# Patient Record
Sex: MALE | Race: WHITE | NOT HISPANIC OR LATINO | Employment: OTHER | URBAN - NONMETROPOLITAN AREA
[De-identification: names, ages, dates, MRNs, and addresses within clinical notes are randomized per-mention and may not be internally consistent; named-entity substitution may affect disease eponyms.]

---

## 2017-01-03 RX ORDER — SOTALOL HYDROCHLORIDE 80 MG/1
TABLET ORAL
Qty: 90 TABLET | Refills: 3 | Status: SHIPPED | OUTPATIENT
Start: 2017-01-03 | End: 2018-01-15 | Stop reason: SDUPTHER

## 2017-03-01 ENCOUNTER — CLINICAL SUPPORT (OUTPATIENT)
Dept: CARDIOLOGY | Facility: CLINIC | Age: 77
End: 2017-03-01

## 2017-03-01 DIAGNOSIS — I49.5 SICK SINUS SYNDROME (HCC): Primary | ICD-10-CM

## 2017-03-01 PROCEDURE — 93288 INTERROG EVL PM/LDLS PM IP: CPT | Performed by: INTERNAL MEDICINE

## 2017-03-01 NOTE — PROGRESS NOTES
Dual-chamber pacemaker    Indication sick sinus syndrome    Battery voltage is 2.63 V, ERIKA is 5 months  Atrial paced 96%, patient is pacemaker dependent  Lower rate is 60 height rate is 1 30 bpm  Atrial sensing is 2.8 mV, RV lead sensing is 16 mV  Atrial threshold is 1.3 V at 0.4 ms, RV lead threshold is 1.6 V at 0.4 ms  Programmed output in the atrium is 3.2 V at 0.4 ms, RV is 1.25 V at 0.4 ms  Atrial threshold is 497 ohms RV lead impedance is 1072 ohms    ERIKA is 5 months follow-up in 2 months

## 2017-03-02 DIAGNOSIS — C92.10 CHRONIC MYELOID LEUKEMIA (HCC): Primary | ICD-10-CM

## 2017-03-03 ENCOUNTER — LAB (OUTPATIENT)
Dept: ONCOLOGY | Facility: HOSPITAL | Age: 77
End: 2017-03-03

## 2017-03-03 ENCOUNTER — OFFICE VISIT (OUTPATIENT)
Dept: ONCOLOGY | Facility: CLINIC | Age: 77
End: 2017-03-03

## 2017-03-03 VITALS
DIASTOLIC BLOOD PRESSURE: 80 MMHG | RESPIRATION RATE: 16 BRPM | TEMPERATURE: 97.4 F | HEIGHT: 71 IN | SYSTOLIC BLOOD PRESSURE: 145 MMHG | BODY MASS INDEX: 28.64 KG/M2 | WEIGHT: 204.6 LBS | HEART RATE: 83 BPM

## 2017-03-03 DIAGNOSIS — C92.10 CHRONIC MYELOID LEUKEMIA (HCC): Primary | ICD-10-CM

## 2017-03-03 DIAGNOSIS — C92.10 CHRONIC MYELOID LEUKEMIA (HCC): ICD-10-CM

## 2017-03-03 LAB
ALBUMIN SERPL-MCNC: 5 G/DL (ref 3.4–4.8)
ALBUMIN/GLOB SERPL: 1.8 G/DL (ref 1.1–1.8)
ALP SERPL-CCNC: 37 U/L (ref 38–126)
ALT SERPL W P-5'-P-CCNC: 36 U/L (ref 21–72)
ANION GAP SERPL CALCULATED.3IONS-SCNC: 12 MMOL/L (ref 5–15)
ANISOCYTOSIS BLD QL: NORMAL
AST SERPL-CCNC: 32 U/L (ref 17–59)
BASOPHILS # BLD AUTO: 0.29 10*3/MM3 (ref 0–0.2)
BASOPHILS NFR BLD AUTO: 1.2 % (ref 0–2)
BILIRUB SERPL-MCNC: 1 MG/DL (ref 0.2–1.3)
BUN BLD-MCNC: 13 MG/DL (ref 7–21)
BUN/CREAT SERPL: 14.3 (ref 7–25)
CALCIUM SPEC-SCNC: 9.3 MG/DL (ref 8.4–10.2)
CHLORIDE SERPL-SCNC: 103 MMOL/L (ref 95–110)
CO2 SERPL-SCNC: 25 MMOL/L (ref 22–31)
CREAT BLD-MCNC: 0.91 MG/DL (ref 0.7–1.3)
DEPRECATED RDW RBC AUTO: 102.4 FL (ref 35.1–43.9)
EOSINOPHIL # BLD AUTO: 0.24 10*3/MM3 (ref 0–0.7)
EOSINOPHIL NFR BLD AUTO: 1 % (ref 0–7)
ERYTHROCYTE [DISTWIDTH] IN BLOOD BY AUTOMATED COUNT: 28.3 % (ref 11.5–14.5)
GFR SERPL CREATININE-BSD FRML MDRD: 81 ML/MIN/1.73 (ref 42–98)
GLOBULIN UR ELPH-MCNC: 2.8 GM/DL (ref 2.3–3.5)
GLUCOSE BLD-MCNC: 102 MG/DL (ref 60–100)
HCT VFR BLD AUTO: 34 % (ref 39–49)
HGB BLD-MCNC: 11.6 G/DL (ref 13.7–17.3)
HYPOCHROMIA BLD QL: NORMAL
IMM GRANULOCYTES # BLD: 5.27 10*3/MM3 (ref 0–0.02)
IMM GRANULOCYTES NFR BLD: 22.4 % (ref 0–0.5)
LDH SERPL-CCNC: 480 U/L (ref 313–618)
LYMPHOCYTES # BLD AUTO: 2.21 10*3/MM3 (ref 0.6–4.2)
LYMPHOCYTES NFR BLD AUTO: 9.4 % (ref 10–50)
MACROCYTES BLD QL SMEAR: NORMAL
MCH RBC QN AUTO: 34 PG (ref 26.5–34)
MCHC RBC AUTO-ENTMCNC: 34.1 G/DL (ref 31.5–36.3)
MCV RBC AUTO: 99.7 FL (ref 80–98)
MONOCYTES # BLD AUTO: 6.19 10*3/MM3 (ref 0–0.9)
MONOCYTES NFR BLD AUTO: 26.3 % (ref 0–12)
NEUTROPHILS # BLD AUTO: 9.33 10*3/MM3 (ref 2–8.6)
NEUTROPHILS NFR BLD AUTO: 39.7 % (ref 37–80)
NRBC BLD MANUAL-RTO: 2 /100 WBC (ref 0–0)
PLATELET # BLD AUTO: 187 10*3/MM3 (ref 150–450)
PMV BLD AUTO: ABNORMAL FL (ref 8–12)
POLYCHROMASIA BLD QL SMEAR: NORMAL
POTASSIUM BLD-SCNC: 4.8 MMOL/L (ref 3.5–5.1)
PROT SERPL-MCNC: 7.8 G/DL (ref 6.3–8.6)
RBC # BLD AUTO: 3.41 10*6/MM3 (ref 4.37–5.74)
SMALL PLATELETS BLD QL SMEAR: ADEQUATE
SODIUM BLD-SCNC: 140 MMOL/L (ref 137–145)
WBC MORPH BLD: NORMAL
WBC NRBC COR # BLD: 23.53 10*3/MM3 (ref 3.2–9.8)

## 2017-03-03 PROCEDURE — 80053 COMPREHEN METABOLIC PANEL: CPT | Performed by: INTERNAL MEDICINE

## 2017-03-03 PROCEDURE — 99213 OFFICE O/P EST LOW 20 MIN: CPT | Performed by: INTERNAL MEDICINE

## 2017-03-03 PROCEDURE — 85007 BL SMEAR W/DIFF WBC COUNT: CPT | Performed by: INTERNAL MEDICINE

## 2017-03-03 PROCEDURE — 83615 LACTATE (LD) (LDH) ENZYME: CPT | Performed by: INTERNAL MEDICINE

## 2017-03-03 PROCEDURE — 85025 COMPLETE CBC W/AUTO DIFF WBC: CPT | Performed by: INTERNAL MEDICINE

## 2017-03-03 NOTE — PROGRESS NOTES
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 male presents today for follow up.  Denied having any symptoms of chest pain, shortness of breath, nausea, vomiting or diarrhea. Has no mouth sores, skin rash or fatigue. No numbness or tingling sensations in the upper or lower extremities. No urinary symptoms. No fever, chills, rigors, night sweats, weight loss or loss of appetite.             Current Outpatient Prescriptions on File Prior to Visit   Medication Sig Dispense Refill   • allopurinol (ZYLOPRIM) 100 MG tablet Take 1 tablet by mouth 3 (Three) Times a Day. 270 tablet 1   • aspirin 81 MG chewable tablet Chew 81 mg daily.     • oxybutynin (DITROPAN) 5 MG tablet Take 5 mg by mouth 2 (Two) Times a Day.     • sotalol (BETAPACE) 80 MG tablet TAKE 1/2 TABLET TWICE DAILY 90 tablet 3   • tamsulosin (FLOMAX) 0.4 MG capsule 24 hr capsule Take 1 capsule by mouth every night.       No current facility-administered medications on file prior to visit.             Review Of Systems   Comprehensive review of systems was done it was negative other than what mentioned in HPI       PHYSICAL EXAMINATION:   There were no vitals taken for this visit.   General Appearance: Appears healthy, alert, polite and cooperative   Head and neck: Mild pallor and no jaundice. wet mucous membranes without ulceration.   Lungs: Good bilateral air entry, clear to auscultation   Heart: Regular rate and rhythm,   Abdomen: Soft, nontender, not distended   Extremities: No edema.     Labs:     Reviewed.     ASSESSMENT/PLAN:   1- CMML  Doing well clinically   Counts are stable   No indication to treat   Follow up in 3 months

## 2017-03-06 ENCOUNTER — OFFICE VISIT (OUTPATIENT)
Dept: INTERNAL MEDICINE | Facility: CLINIC | Age: 77
End: 2017-03-06

## 2017-03-06 VITALS
SYSTOLIC BLOOD PRESSURE: 120 MMHG | HEIGHT: 71 IN | WEIGHT: 208 LBS | DIASTOLIC BLOOD PRESSURE: 70 MMHG | BODY MASS INDEX: 29.12 KG/M2

## 2017-03-06 DIAGNOSIS — I48.0 PAROXYSMAL ATRIAL FIBRILLATION (HCC): ICD-10-CM

## 2017-03-06 DIAGNOSIS — I10 ESSENTIAL HYPERTENSION: ICD-10-CM

## 2017-03-06 DIAGNOSIS — C92.10 CHRONIC MYELOID LEUKEMIA (HCC): ICD-10-CM

## 2017-03-06 DIAGNOSIS — M1A.00X0 IDIOPATHIC CHRONIC GOUT WITHOUT TOPHUS, UNSPECIFIED SITE: Primary | ICD-10-CM

## 2017-03-06 PROCEDURE — 99213 OFFICE O/P EST LOW 20 MIN: CPT | Performed by: INTERNAL MEDICINE

## 2017-03-06 RX ORDER — COLCHICINE 0.6 MG/1
0.6 TABLET ORAL DAILY
Qty: 90 TABLET | Refills: 1 | Status: SHIPPED | OUTPATIENT
Start: 2017-03-06 | End: 2018-09-12

## 2017-03-06 NOTE — PROGRESS NOTES
Angelo Humphrey is a 76 y.o. male       Patient is in for recheck on his chronic medical conditions, including chronic myelomonocytic leukemia, paroxysmal atrial fibrillation and gout.    He is doing fairly well , except of having problems with recurrent gout in his left toe. He has had couple of gout attacks in the last few months.  On Allopurinol 300 mg daily for prevention. Tolerates medication well.  Uric acid level 6.4 /06/08/2015/.    His BP is controlled. Remains in NSR, on Sotalol.  Patient denies chest pain, palpitations,dyspnea or edema in lower extremities.  He is status post pacer placement and has been following with .    In terms of leukemia, patient remains under observation. He denies fever, chills or night sweats.  Also denies abdominal pain, nausea, vomiting or diarrhea. Appetite is good, and weight has been stable.  He is mil;dly anemic with most recent Hgb 11.4 and Hct 32.8.    Past Medical History   Diagnosis Date   • Atrophy of testis    • Benign prostatic hyperplasia    • Borderline glaucoma    • Chronic myelomonocytic leukemia    • Degenerative joint disease involving multiple joints    • Gout    • History of echocardiogram 05/08/2012     Normal left ventricular systolic function, EF 60%. Early diastolic dysfunction. Mild aortic and pulmonic regurgitation. Trace mitral and mild tricuspid regurgitation. No intracardiac mass pericardial effusion or cardiac thrombus.   • History of Holter monitoring 01/18/2011   • Impotence    • Lightheadedness    • Neck pain, musculoskeletal    • Sleep apnea      Probable reason for fatigue        Past Surgical History   Procedure Laterality Date   • Cardiac pacemaker placement  06/2008     Dual chamber permanent pacemaker implantation   • Prostatectomy  11/17/2000     Benign prostatic hypertrophy with urinary retention. Prostatitis. transurethral resection of prostate.   • Laparoscopic cholecystectomy  10/26/2006     Gallstones.  Laparoscopic cholecystectomy with operative cholangiogram   • Colectomy partial / total  04/14/2000     Cecal diverticulitis. Removal of small segment of terminal ileum, cecum and right colon, sent to pathology   • Cystoscopy  11/15/2000     Urinary retention on the basis of medications and benign prostatic hypertrophy   • Steroid injection  10/21/2010     Decadron (Gout)    • Inguinal hernia repair  02/15/2007     Recurrent right inguinal hernia. Repair of recurrent inguinal hernia with EPS Prolene mesh system.   • Steroid injection  07/23/2013     Kenalog (Gout) (4)   • Cardiac catheterization  09/30/2009     No epicardial coronary artery disease noted that would explain patient's chest pain of the abnormal stress test noted. Normal left ventricular systolic function with no wall motion abnormalities   • Cardiac catheterization  05/24/1989     Normal catheterization, false-positive exercise treadmill. Noncardiac chest pain   • Colon surgery  03/27/2016   • Colonoscopy  05/25/2012       Social History   Substance Use Topics   • Smoking status: Never Smoker   • Smokeless tobacco: Never Used   • Alcohol use No     Family History   Problem Relation Age of Onset   • Cancer Other    • Colon cancer Other      parent   • Coronary artery disease Other    • Heart disease Other    • Hypertension Other    • Cholelithiasis Other    • Other Other      colon problems     Allergies   Allergen Reactions   • Soma [Carisoprodol]    • Sulfa Antibiotics Swelling     facial   • Other Rash     SILK TAPE     Current Outpatient Prescriptions on File Prior to Visit   Medication Sig Dispense Refill   • allopurinol (ZYLOPRIM) 100 MG tablet Take 1 tablet by mouth 3 (Three) Times a Day. 270 tablet 1   • aspirin 81 MG chewable tablet Chew 81 mg daily.     • oxybutynin (DITROPAN) 5 MG tablet Take 5 mg by mouth 2 (Two) Times a Day.     • sotalol (BETAPACE) 80 MG tablet TAKE 1/2 TABLET TWICE DAILY 90 tablet 3   • tamsulosin (FLOMAX) 0.4 MG capsule  24 hr capsule Take 1 capsule by mouth every night.       No current facility-administered medications on file prior to visit.      Review of Systems   Constitutional: Negative for activity change, chills, fatigue and fever.   HENT: Negative.    Eyes: Negative.    Respiratory: Negative for chest tightness and shortness of breath.    Cardiovascular: Negative for chest pain, palpitations and leg swelling.   Gastrointestinal: Negative for abdominal pain, blood in stool, constipation and diarrhea.   Endocrine: Negative for cold intolerance, heat intolerance, polydipsia and polyuria.   Genitourinary: Negative for dysuria, flank pain and frequency.   Musculoskeletal: Negative for back pain and gait problem.        Positive for episodes of left toe pain and swelling   Neurological: Negative for dizziness and headaches.   Psychiatric/Behavioral: Negative for sleep disturbance. The patient is not nervous/anxious.        Objective   Physical Exam   Constitutional: He is oriented to person, place, and time. He appears well-developed and well-nourished.   HENT:   Head: Normocephalic and atraumatic.   Nose: Nose normal.   Mouth/Throat: Oropharynx is clear and moist.   Eyes: Conjunctivae and EOM are normal. Left eye exhibits no discharge. No scleral icterus.   Neck: Neck supple. No JVD present. No thyromegaly present.   Cardiovascular: Normal rate and regular rhythm.    No murmur heard.  Pulmonary/Chest: Effort normal and breath sounds normal.   Abdominal: Soft. Bowel sounds are normal. He exhibits no mass.   Musculoskeletal: Normal range of motion. He exhibits no edema or deformity.   Neurological: He is alert and oriented to person, place, and time. He has normal reflexes.   Skin: Skin is warm and dry. No rash noted.   Psychiatric: He has a normal mood and affect. His behavior is normal.           Patient Active Problem List   Diagnosis   • Sick sinus syndrome   • Essential hypertension   • Paroxysmal atrial fibrillation   •  Gout   • Chronic myeloid leukemia           Lab on 03/03/2017   Component Date Value Ref Range Status   • Glucose 03/03/2017 102* 60 - 100 mg/dL Final   • BUN 03/03/2017 13  7 - 21 mg/dL Final   • Creatinine 03/03/2017 0.91  0.70 - 1.30 mg/dL Final   • Sodium 03/03/2017 140  137 - 145 mmol/L Final   • Potassium 03/03/2017 4.8  3.5 - 5.1 mmol/L Final   • Chloride 03/03/2017 103  95 - 110 mmol/L Final   • CO2 03/03/2017 25.0  22.0 - 31.0 mmol/L Final   • Calcium 03/03/2017 9.3  8.4 - 10.2 mg/dL Final   • Total Protein 03/03/2017 7.8  6.3 - 8.6 g/dL Final   • Albumin 03/03/2017 5.00* 3.40 - 4.80 g/dL Final   • ALT (SGPT) 03/03/2017 36  21 - 72 U/L Final   • AST (SGOT) 03/03/2017 32  17 - 59 U/L Final   • Alkaline Phosphatase 03/03/2017 37* 38 - 126 U/L Final   • Total Bilirubin 03/03/2017 1.0  0.2 - 1.3 mg/dL Final   • eGFR Non African Amer 03/03/2017 81  42 - 98 mL/min/1.73 Final   • Globulin 03/03/2017 2.8  2.3 - 3.5 gm/dL Final   • A/G Ratio 03/03/2017 1.8  1.1 - 1.8 g/dL Final   • BUN/Creatinine Ratio 03/03/2017 14.3  7.0 - 25.0 Final   • Anion Gap 03/03/2017 12.0  5.0 - 15.0 mmol/L Final   • LDH 03/03/2017 480  313 - 618 U/L Final   • WBC 03/03/2017 23.53* 3.20 - 9.80 10*3/mm3 Final   • RBC 03/03/2017 3.41* 4.37 - 5.74 10*6/mm3 Final   • Hemoglobin 03/03/2017 11.6* 13.7 - 17.3 g/dL Final   • Hematocrit 03/03/2017 34.0* 39.0 - 49.0 % Final   • MCV 03/03/2017 99.7* 80.0 - 98.0 fL Final   • MCH 03/03/2017 34.0  26.5 - 34.0 pg Final   • MCHC 03/03/2017 34.1  31.5 - 36.3 g/dL Final   • RDW 03/03/2017 28.3* 11.5 - 14.5 % Final   • RDW-SD 03/03/2017 102.4* 35.1 - 43.9 fl Final   • MPV 03/03/2017   8.0 - 12.0 fL Final   • Platelets 03/03/2017 187  150 - 450 10*3/mm3 Final   • Neutrophil % 03/03/2017 39.7  37.0 - 80.0 % Final   • Lymphocyte % 03/03/2017 9.4* 10.0 - 50.0 % Final   • Monocyte % 03/03/2017 26.3* 0.0 - 12.0 % Final   • Eosinophil % 03/03/2017 1.0  0.0 - 7.0 % Final   • Basophil % 03/03/2017 1.2  0.0 - 2.0 %  Final   • Immature Grans % 03/03/2017 22.4* 0.0 - 0.5 % Final   • Neutrophils, Absolute 03/03/2017 9.33* 2.00 - 8.60 10*3/mm3 Final   • Lymphocytes, Absolute 03/03/2017 2.21  0.60 - 4.20 10*3/mm3 Final   • Monocytes, Absolute 03/03/2017 6.19* 0.00 - 0.90 10*3/mm3 Final   • Eosinophils, Absolute 03/03/2017 0.24  0.00 - 0.70 10*3/mm3 Final   • Basophils, Absolute 03/03/2017 0.29* 0.00 - 0.20 10*3/mm3 Final   • Immature Grans, Absolute 03/03/2017 5.27* 0.00 - 0.02 10*3/mm3 Final   • nRBC 03/03/2017 2.0* 0.0 - 0.0 /100 WBC Final   • Anisocytosis 03/03/2017 Mod/2+  None Seen Final   • Hypochromia 03/03/2017 Slight/1+  None Seen Final   • Macrocytes 03/03/2017 Slight/1+  None Seen Final   • Polychromasia 03/03/2017 Slight/1+  None Seen Final   • WBC Morphology 03/03/2017 Normal  Normal Final   • Platelet Estimate 03/03/2017 Adequate  Normal Final   Hospital Outpatient Visit on 12/09/2016   Component Date Value Ref Range Status   • Sodium 12/09/2016 139  137 - 145 mmol/L Final   • Potassium 12/09/2016 4.0  3.5 - 5.1 mmol/L Final   • Chloride 12/09/2016 102  95 - 110 mmol/L Final   • CO2 12/09/2016 24  22 - 31 mmol/L Final   • Anion Gap 12/09/2016 13.0  5.0 - 15.0 mmol/L Final   • Glucose 12/09/2016 101* 60 - 100 mg/dl Final   • BUN 12/09/2016 10  7 - 21 mg/dl Final   • Creatinine 12/09/2016 0.9  0.7 - 1.3 mg/dl Final   • GFR MDRD Non  12/09/2016 82  42 - 98 mL/min/1.73 sq.M Final   • GFR MDRD  12/09/2016 100* 42 - 98 mL/min/1.73 sq.M Final   • Calcium 12/09/2016 8.7  8.4 - 10.2 mg/dl Final   • Total Protein 12/09/2016 7.2  6.3 - 8.6 gm/dl Final   • Albumin 12/09/2016 4.3  3.4 - 4.8 gm/dl Final   • Total Bilirubin 12/09/2016 1.0  0.2 - 1.3 mg/dl Final   • Alkaline Phosphatase 12/09/2016 45  38 - 126 U/L Final   • AST (SGOT) 12/09/2016 24  17 - 59 U/L Final   • ALT (SGPT) 12/09/2016 25  21 - 72 U/L Final   • LDH 12/09/2016 401  313 - 618 U/L Final   • WBC 12/09/2016 14.1* 3.2 - 9.8 x1000/uL  Final   • RBC 12/09/2016 3.35* 4.37 - 5.74 laverne/mm3 Final   • Hemoglobin 12/09/2016 11.4* 13.7 - 17.3 gm/dl Final   • Hematocrit 12/09/2016 32.8* 39.0 - 49.0 % Final   • MCV 12/09/2016 97.9  80.0 - 98.0 fl Final   • MCH 12/09/2016 34.0  26.0 - 34.0 pg Final   • MCHC 12/09/2016 34.8  31.5 - 36.3 gm/dl Final   • RDW 12/09/2016 28.2* 11.5 - 14.5 % Final   • Platelets 12/09/2016 221  150 - 450 x1000/mm3 Final   • MPV 12/09/2016 0.0* 8.0 - 12.0 fl Final   • Neutrophil Rel % 12/09/2016 36.9* 37.0 - 80.0 % Final   • Lymphocyte Rel % 12/09/2016 14.6  10.0 - 50.0 % Final   • Monocyte Rel % 12/09/2016 34.9* 0.0 - 12.0 % Final   • Eosinophil Rel % 12/09/2016 1.2  0.0 - 7.0 % Final   • Basophil Rel % 12/09/2016 1.1  0.0 - 2.0 % Final   • Immature Granulocyte Rel % 12/09/2016 11.30* 0.00 - 0.50 % Final   • Neutrophils Absolute 12/09/2016 5.18  2.00 - 8.60 x1000/uL Final   • Lymphocytes Absolute 12/09/2016 2.06  0.60 - 4.20 x1000/uL Final   • Monocytes Absolute 12/09/2016 4.92* 0.00 - 0.90 x1000/uL Final   • Eosinophils Absolute 12/09/2016 0.17  0.00 - 0.70 x1000/uL Final   • Basophils Absolute 12/09/2016 0.16  0.00 - 0.20 x1000/uL Final   • Immature Granulocytes Absolute 12/09/2016 1.590* 0.005 - 0.022 x1000/uL Final   • nRBC 12/09/2016 0.1  0.0 - 0.2 % Final   • nRBC 12/09/2016 0.120  x1000/uL Final   • Neutrophil Rel % 12/09/2016 39  37 - 80 % Final   • Bands Rel %  12/09/2016 9* 3 - 5 % Final   • Lymphocyte Rel % 12/09/2016 13  10 - 50 % Final   • Monocyte Rel % 12/09/2016 27* 0 - 12 % Final   • Eosinophil Rel % 12/09/2016 1  0 - 7 % Final   • Basophil Rel % 12/09/2016 2  0 - 2 % Final   • Atypical Lymphocytes Rel % 12/09/2016 4* 0 - 0 % Final   • Metamyelocyte % 12/09/2016 1* 0 - 0 % Final   • Myelocyte Rel % 12/09/2016 2* 0 - 0 % Final   • Promyelocyte Rel %  12/09/2016 2* 0 - 0 % Final   • nRBC 12/09/2016 2* 0 - 0  /100 WBC Final   • RBC Morphology 12/09/2016 1+ Anisocytosis 1+ Poikilocytosis Occ Hypochromia   Final   •  Platelet Estimate 12/09/2016 Normal   Final   • Total Counted 12/09/2016 100   Final   Hospital Outpatient Visit on 11/02/2016   Component Date Value Ref Range Status   • Sodium 11/02/2016 139  137 - 145 mmol/L Final   • Potassium 11/02/2016 3.9  3.5 - 5.1 mmol/L Final   • Chloride 11/02/2016 104  95 - 110 mmol/L Final   • CO2 11/02/2016 20* 22 - 31 mmol/L Final   • Anion Gap 11/02/2016 15.0  5.0 - 15.0 mmol/L Final   • Glucose 11/02/2016 113* 60 - 100 mg/dl Final   • BUN 11/02/2016 17  7 - 21 mg/dl Final   • Creatinine 11/02/2016 1.1  0.7 - 1.3 mg/dl Final   • GFR MDRD Non  11/02/2016 65  42 - 98 mL/min/1.73 sq.M Final   • GFR MDRD  11/02/2016 79  42 - 98 mL/min/1.73 sq.M Final   • Calcium 11/02/2016 8.8  8.4 - 10.2 mg/dl Final   • Total Protein 11/02/2016 8.6  6.3 - 8.6 gm/dl Final   • Albumin 11/02/2016 4.9* 3.4 - 4.8 gm/dl Final   • Total Bilirubin 11/02/2016 1.2  0.2 - 1.3 mg/dl Final   • Alkaline Phosphatase 11/02/2016 54  38 - 126 U/L Final   • AST (SGOT) 11/02/2016 25  17 - 59 U/L Final   • ALT (SGPT) 11/02/2016 20* 21 - 72 U/L Final   • WBC 11/02/2016 29.7* 3.2 - 9.8 x1000/uL Final   • RBC 11/02/2016 3.87* 4.37 - 5.74 laverne/mm3 Final   • Hemoglobin 11/02/2016 13.1* 13.7 - 17.3 gm/dl Final   • Hematocrit 11/02/2016 38.0* 39.0 - 49.0 % Final   • MCV 11/02/2016 98.2* 80.0 - 98.0 fl Final   • MCH 11/02/2016 33.9  26.0 - 34.0 pg Final   • MCHC 11/02/2016 34.5  31.5 - 36.3 gm/dl Final   • RDW 11/02/2016 28.3* 11.5 - 14.5 % Final   • Platelets 11/02/2016 221  150 - 450 x1000/mm3 Final   • MPV 11/02/2016 0.0* 8.0 - 12.0 fl Final   • Neutrophil Rel % 11/02/2016 46.8  37.0 - 80.0 % Final   • Lymphocyte Rel % 11/02/2016 9.8* 10.0 - 50.0 % Final   • Monocyte Rel % 11/02/2016 27.6* 0.0 - 12.0 % Final   • Eosinophil Rel % 11/02/2016 0.7  0.0 - 7.0 % Final   • Basophil Rel % 11/02/2016 1.2  0.0 - 2.0 % Final   • Immature Granulocyte Rel % 11/02/2016 13.90* 0.00 - 0.50 % Final   • Neutrophils  Absolute 11/02/2016 13.89* 2.00 - 8.60 x1000/uL Final   • Lymphocytes Absolute 11/02/2016 2.90  0.60 - 4.20 x1000/uL Final   • Monocytes Absolute 11/02/2016 8.19* 0.00 - 0.90 x1000/uL Final   • Eosinophils Absolute 11/02/2016 0.21  0.00 - 0.70 x1000/uL Final   • Basophils Absolute 11/02/2016 0.37* 0.00 - 0.20 x1000/uL Final   • Immature Granulocytes Absolute 11/02/2016 4.130* 0.005 - 0.022 x1000/uL Final   • nRBC 11/02/2016 0.2  0.0 - 0.2 % Final   • nRBC 11/02/2016 0.530  x1000/uL Final   • Neutrophil Rel % 11/02/2016 56  37 - 80 % Final   • Bands Rel %  11/02/2016 10* 3 - 5 % Final   • Lymphocyte Rel % 11/02/2016 9* 10 - 50 % Final   • Monocyte Rel % 11/02/2016 15* 0 - 12 % Final   • Eosinophil Rel % 11/02/2016 1  0 - 7 % Final   • Metamyelocyte % 11/02/2016 5* 0 - 0 % Final   • Myelocyte Rel % 11/02/2016 4* 0 - 0 % Final   • Platelet Estimate 11/02/2016 Normal   Final   • Total Counted 11/02/2016 100   Final         Assessment      Diagnosis Plan   1. Idiopathic chronic gout without tophus, unspecified site  Uric acid   2. Essential hypertension  Lipid Panel   3. Paroxysmal atrial fibrillation     4. Chronic myeloid leukemia         Plan      1. Patient will continue Allopurinol 300mg daily.      Will recheck uric acid level.      Will add Colchicine 0.6 mg daily.      Discussed diet for patient with gout.  2. BP remains in normal range, off medications.  3. Patient will continue Sotalol, as per his cardiologist.  4. He is clinically stable and will have labs repeated in [facility] in 3 months.      Follow up in 3 months or earlier if any problems.

## 2017-03-10 ENCOUNTER — APPOINTMENT (OUTPATIENT)
Dept: ONCOLOGY | Facility: HOSPITAL | Age: 77
End: 2017-03-10

## 2017-03-21 ENCOUNTER — APPOINTMENT (OUTPATIENT)
Dept: LAB | Facility: HOSPITAL | Age: 77
End: 2017-03-21

## 2017-03-21 ENCOUNTER — TELEPHONE (OUTPATIENT)
Dept: INTERNAL MEDICINE | Facility: CLINIC | Age: 77
End: 2017-03-21

## 2017-03-21 LAB
ARTICHOKE IGE QN: 45 MG/DL (ref 1–129)
CHOLEST SERPL-MCNC: 96 MG/DL (ref 0–199)
HDLC SERPL-MCNC: 26 MG/DL (ref 60–200)
LDLC/HDLC SERPL: 1.38 {RATIO} (ref 0–3.55)
TRIGL SERPL-MCNC: 170 MG/DL (ref 20–199)
URATE SERPL-MCNC: 7 MG/DL (ref 2.5–8.5)

## 2017-03-21 PROCEDURE — 36415 COLL VENOUS BLD VENIPUNCTURE: CPT | Performed by: INTERNAL MEDICINE

## 2017-03-21 PROCEDURE — 84550 ASSAY OF BLOOD/URIC ACID: CPT | Performed by: INTERNAL MEDICINE

## 2017-03-21 PROCEDURE — 80061 LIPID PANEL: CPT | Performed by: INTERNAL MEDICINE

## 2017-03-21 NOTE — TELEPHONE ENCOUNTER
Spoke with pt let him know lab shows uric acid level is elevated ans since he still has gout and insurance wont approve the colchicine DR MATHEW recommends he increase the allopurinol to 400mg daily

## 2017-03-23 RX ORDER — ALLOPURINOL 100 MG/1
100 TABLET ORAL 4 TIMES DAILY
Qty: 360 TABLET | Refills: 1 | Status: SHIPPED | OUTPATIENT
Start: 2017-03-23 | End: 2018-01-15 | Stop reason: SDUPTHER

## 2017-05-03 ENCOUNTER — TELEPHONE (OUTPATIENT)
Dept: CARDIOLOGY | Facility: CLINIC | Age: 77
End: 2017-05-03

## 2017-05-03 ENCOUNTER — CLINICAL SUPPORT (OUTPATIENT)
Dept: CARDIOLOGY | Facility: CLINIC | Age: 77
End: 2017-05-03

## 2017-05-03 DIAGNOSIS — I49.5 SICK SINUS SYNDROME (HCC): Primary | ICD-10-CM

## 2017-05-03 DIAGNOSIS — I48.0 PAROXYSMAL ATRIAL FIBRILLATION (HCC): ICD-10-CM

## 2017-05-03 PROCEDURE — 93288 INTERROG EVL PM/LDLS PM IP: CPT | Performed by: INTERNAL MEDICINE

## 2017-05-08 ENCOUNTER — OFFICE VISIT (OUTPATIENT)
Dept: CARDIOLOGY | Facility: CLINIC | Age: 77
End: 2017-05-08

## 2017-05-08 VITALS
HEIGHT: 71 IN | DIASTOLIC BLOOD PRESSURE: 60 MMHG | BODY MASS INDEX: 28.28 KG/M2 | SYSTOLIC BLOOD PRESSURE: 120 MMHG | WEIGHT: 202 LBS | HEART RATE: 60 BPM

## 2017-05-08 DIAGNOSIS — I10 ESSENTIAL HYPERTENSION: ICD-10-CM

## 2017-05-08 DIAGNOSIS — I48.0 PAROXYSMAL ATRIAL FIBRILLATION (HCC): ICD-10-CM

## 2017-05-08 DIAGNOSIS — I49.5 SICK SINUS SYNDROME (HCC): Primary | ICD-10-CM

## 2017-05-08 PROCEDURE — 99213 OFFICE O/P EST LOW 20 MIN: CPT | Performed by: INTERNAL MEDICINE

## 2017-05-10 ENCOUNTER — PREP FOR SURGERY (OUTPATIENT)
Dept: CARDIOLOGY | Facility: CLINIC | Age: 77
End: 2017-05-10

## 2017-05-10 DIAGNOSIS — I49.5 SSS (SICK SINUS SYNDROME) (HCC): Primary | ICD-10-CM

## 2017-05-10 RX ORDER — SODIUM CHLORIDE 9 MG/ML
50 INJECTION, SOLUTION INTRAVENOUS CONTINUOUS
Status: CANCELLED | OUTPATIENT
Start: 2017-05-18

## 2017-05-18 ENCOUNTER — HOSPITAL ENCOUNTER (OUTPATIENT)
Facility: HOSPITAL | Age: 77
Setting detail: HOSPITAL OUTPATIENT SURGERY
Discharge: HOME | End: 2017-05-18
Attending: INTERNAL MEDICINE | Admitting: INTERNAL MEDICINE

## 2017-05-18 VITALS
WEIGHT: 201.28 LBS | DIASTOLIC BLOOD PRESSURE: 69 MMHG | OXYGEN SATURATION: 94 % | TEMPERATURE: 97.7 F | BODY MASS INDEX: 28.18 KG/M2 | SYSTOLIC BLOOD PRESSURE: 128 MMHG | HEIGHT: 71 IN | RESPIRATION RATE: 20 BRPM | HEART RATE: 61 BPM

## 2017-05-18 DIAGNOSIS — I49.5 SSS (SICK SINUS SYNDROME) (HCC): ICD-10-CM

## 2017-05-18 LAB
ANION GAP SERPL CALCULATED.3IONS-SCNC: 12 MMOL/L (ref 5–15)
ANISOCYTOSIS BLD QL: ABNORMAL
BUN BLD-MCNC: 14 MG/DL (ref 7–21)
BUN/CREAT SERPL: 15.2 (ref 7–25)
CALCIUM SPEC-SCNC: 8.8 MG/DL (ref 8.4–10.2)
CHLORIDE SERPL-SCNC: 104 MMOL/L (ref 95–110)
CO2 SERPL-SCNC: 22 MMOL/L (ref 22–31)
CREAT BLD-MCNC: 0.92 MG/DL (ref 0.7–1.3)
DEPRECATED RDW RBC AUTO: 88.9 FL (ref 35.1–43.9)
ERYTHROCYTE [DISTWIDTH] IN BLOOD BY AUTOMATED COUNT: 24.6 % (ref 11.5–14.5)
GFR SERPL CREATININE-BSD FRML MDRD: 80 ML/MIN/1.73 (ref 42–98)
GLUCOSE BLD-MCNC: 99 MG/DL (ref 60–100)
HCT VFR BLD AUTO: 32.6 % (ref 39–49)
HGB BLD-MCNC: 11.4 G/DL (ref 13.7–17.3)
INR PPP: 1.31 (ref 0.8–1.2)
LYMPHOCYTES # BLD MANUAL: 2.08 10*3/MM3 (ref 0.6–4.2)
LYMPHOCYTES NFR BLD MANUAL: 25 % (ref 10–50)
LYMPHOCYTES NFR BLD MANUAL: 27 % (ref 0–12)
MCH RBC QN AUTO: 34.7 PG (ref 26.5–34)
MCHC RBC AUTO-ENTMCNC: 35 G/DL (ref 31.5–36.3)
MCV RBC AUTO: 99.1 FL (ref 80–98)
METAMYELOCYTES NFR BLD MANUAL: 3 % (ref 0–0)
MONOCYTES # BLD AUTO: 2.24 10*3/MM3 (ref 0–0.9)
MYELOCYTES NFR BLD MANUAL: 2 % (ref 0–0)
NEUTROPHILS # BLD AUTO: 3.4 10*3/MM3 (ref 2–8.6)
NEUTROPHILS NFR BLD MANUAL: 41 % (ref 37–80)
PLATELET # BLD AUTO: 169 10*3/MM3 (ref 150–450)
PMV BLD AUTO: ABNORMAL FL (ref 8–12)
POTASSIUM BLD-SCNC: 4.2 MMOL/L (ref 3.5–5.1)
PROTHROMBIN TIME: 16.3 SECONDS (ref 11.1–15.3)
RBC # BLD AUTO: 3.29 10*6/MM3 (ref 4.37–5.74)
SMALL PLATELETS BLD QL SMEAR: ADEQUATE
SODIUM BLD-SCNC: 138 MMOL/L (ref 137–145)
VARIANT LYMPHS NFR BLD MANUAL: 2 % (ref 0–5)
WBC MORPH BLD: NORMAL
WBC NRBC COR # BLD: 8.3 10*3/MM3 (ref 3.2–9.8)

## 2017-05-18 PROCEDURE — 85610 PROTHROMBIN TIME: CPT | Performed by: INTERNAL MEDICINE

## 2017-05-18 PROCEDURE — 33228 REMV&REPLC PM GEN DUAL LEAD: CPT | Performed by: INTERNAL MEDICINE

## 2017-05-18 PROCEDURE — 25010000002 CEFAZOLIN PER 500 MG: Performed by: INTERNAL MEDICINE

## 2017-05-18 PROCEDURE — 25010000002 HYDROMORPHONE PER 4 MG: Performed by: INTERNAL MEDICINE

## 2017-05-18 PROCEDURE — 85007 BL SMEAR W/DIFF WBC COUNT: CPT | Performed by: INTERNAL MEDICINE

## 2017-05-18 PROCEDURE — 25010000002 GENTAMICIN PER 80 MG: Performed by: INTERNAL MEDICINE

## 2017-05-18 PROCEDURE — 85025 COMPLETE CBC W/AUTO DIFF WBC: CPT | Performed by: INTERNAL MEDICINE

## 2017-05-18 PROCEDURE — 25010000002 MIDAZOLAM PER 1 MG: Performed by: INTERNAL MEDICINE

## 2017-05-18 PROCEDURE — 80048 BASIC METABOLIC PNL TOTAL CA: CPT | Performed by: INTERNAL MEDICINE

## 2017-05-18 PROCEDURE — C1785 PMKR, DUAL, RATE-RESP: HCPCS | Performed by: INTERNAL MEDICINE

## 2017-05-18 DEVICE — GEN PM ADVISA SURESCAN DR MRI: Type: IMPLANTABLE DEVICE | Status: FUNCTIONAL

## 2017-05-18 RX ORDER — CEPHALEXIN 500 MG/1
500 CAPSULE ORAL 3 TIMES DAILY
Qty: 18 CAPSULE | Refills: 0 | Status: SHIPPED | OUTPATIENT
Start: 2017-05-18 | End: 2017-06-20

## 2017-05-18 RX ORDER — MIDAZOLAM HYDROCHLORIDE 1 MG/ML
INJECTION INTRAMUSCULAR; INTRAVENOUS AS NEEDED
Status: DISCONTINUED | OUTPATIENT
Start: 2017-05-18 | End: 2017-05-18 | Stop reason: HOSPADM

## 2017-05-18 RX ORDER — LIDOCAINE HYDROCHLORIDE 20 MG/ML
INJECTION, SOLUTION INFILTRATION; PERINEURAL AS NEEDED
Status: DISCONTINUED | OUTPATIENT
Start: 2017-05-18 | End: 2017-05-18 | Stop reason: HOSPADM

## 2017-05-18 RX ORDER — SODIUM CHLORIDE 9 MG/ML
50 INJECTION, SOLUTION INTRAVENOUS CONTINUOUS
Status: DISCONTINUED | OUTPATIENT
Start: 2017-05-18 | End: 2017-05-18 | Stop reason: HOSPADM

## 2017-05-18 RX ADMIN — GENTAMICIN SULFATE 80 MG: 40 INJECTION, SOLUTION INTRAMUSCULAR; INTRAVENOUS at 08:45

## 2017-05-18 RX ADMIN — SODIUM CHLORIDE 50 ML/HR: 9 INJECTION, SOLUTION INTRAVENOUS at 07:29

## 2017-05-18 RX ADMIN — CEFAZOLIN SODIUM 2 G: 10 INJECTION, POWDER, FOR SOLUTION INTRAVENOUS at 08:25

## 2017-05-24 ENCOUNTER — TELEPHONE (OUTPATIENT)
Dept: CARDIOLOGY | Facility: CLINIC | Age: 77
End: 2017-05-24

## 2017-05-31 ENCOUNTER — DOCUMENTATION (OUTPATIENT)
Dept: CARDIOLOGY | Facility: CLINIC | Age: 77
End: 2017-05-31

## 2017-06-02 ENCOUNTER — LAB (OUTPATIENT)
Dept: ONCOLOGY | Facility: HOSPITAL | Age: 77
End: 2017-06-02

## 2017-06-02 ENCOUNTER — OFFICE VISIT (OUTPATIENT)
Dept: ONCOLOGY | Facility: CLINIC | Age: 77
End: 2017-06-02

## 2017-06-02 VITALS
TEMPERATURE: 97.8 F | HEART RATE: 81 BPM | RESPIRATION RATE: 18 BRPM | BODY MASS INDEX: 27.85 KG/M2 | WEIGHT: 199.7 LBS | DIASTOLIC BLOOD PRESSURE: 75 MMHG | SYSTOLIC BLOOD PRESSURE: 145 MMHG

## 2017-06-02 DIAGNOSIS — C92.10 CHRONIC MYELOID LEUKEMIA (HCC): ICD-10-CM

## 2017-06-02 LAB
BASOPHILS # BLD AUTO: 0.09 10*3/MM3 (ref 0–0.2)
BASOPHILS NFR BLD AUTO: 0.8 % (ref 0–2)
DACRYOCYTES BLD QL SMEAR: NORMAL
DEPRECATED RDW RBC AUTO: 85 FL (ref 35.1–43.9)
EOSINOPHIL # BLD AUTO: 0.14 10*3/MM3 (ref 0–0.7)
EOSINOPHIL NFR BLD AUTO: 1.2 % (ref 0–7)
ERYTHROCYTE [DISTWIDTH] IN BLOOD BY AUTOMATED COUNT: 23.8 % (ref 11.5–14.5)
HCT VFR BLD AUTO: 32.5 % (ref 39–49)
HGB BLD-MCNC: 11.1 G/DL (ref 13.7–17.3)
IMM GRANULOCYTES # BLD: 0.91 10*3/MM3 (ref 0–0.02)
IMM GRANULOCYTES NFR BLD: 8.1 % (ref 0–0.5)
LYMPHOCYTES # BLD AUTO: 1.88 10*3/MM3 (ref 0.6–4.2)
LYMPHOCYTES NFR BLD AUTO: 16.7 % (ref 10–50)
MCH RBC QN AUTO: 33.7 PG (ref 26.5–34)
MCHC RBC AUTO-ENTMCNC: 34.2 G/DL (ref 31.5–36.3)
MCV RBC AUTO: 98.8 FL (ref 80–98)
MONOCYTES # BLD AUTO: 3.49 10*3/MM3 (ref 0–0.9)
MONOCYTES NFR BLD AUTO: 31 % (ref 0–12)
NEUTROPHILS # BLD AUTO: 4.75 10*3/MM3 (ref 2–8.6)
NEUTROPHILS NFR BLD AUTO: 42.2 % (ref 37–80)
OVALOCYTES BLD QL SMEAR: NORMAL
PLATELET # BLD AUTO: 193 10*3/MM3 (ref 150–450)
PMV BLD AUTO: ABNORMAL FL (ref 8–12)
POLYCHROMASIA BLD QL SMEAR: NORMAL
RBC # BLD AUTO: 3.29 10*6/MM3 (ref 4.37–5.74)
SMALL PLATELETS BLD QL SMEAR: ADEQUATE
WBC MORPH BLD: NORMAL
WBC NRBC COR # BLD: 11.26 10*3/MM3 (ref 3.2–9.8)

## 2017-06-02 PROCEDURE — 99213 OFFICE O/P EST LOW 20 MIN: CPT | Performed by: INTERNAL MEDICINE

## 2017-06-02 PROCEDURE — G0463 HOSPITAL OUTPT CLINIC VISIT: HCPCS | Performed by: INTERNAL MEDICINE

## 2017-06-02 PROCEDURE — 85007 BL SMEAR W/DIFF WBC COUNT: CPT | Performed by: INTERNAL MEDICINE

## 2017-06-02 PROCEDURE — 36415 COLL VENOUS BLD VENIPUNCTURE: CPT | Performed by: INTERNAL MEDICINE

## 2017-06-02 PROCEDURE — 85025 COMPLETE CBC W/AUTO DIFF WBC: CPT | Performed by: INTERNAL MEDICINE

## 2017-06-02 RX ORDER — OXYBUTYNIN CHLORIDE 5 MG/1
5 TABLET ORAL 2 TIMES DAILY
COMMUNITY
Start: 2017-05-31

## 2017-06-02 NOTE — PROGRESS NOTES
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 male presents today for follow up.  Denied having any symptoms of chest pain, shortness of breath, nausea, vomiting or diarrhea. Has no mouth sores, skin rash or fatigue. No numbness or tingling sensations in the upper or lower extremities. No urinary symptoms. No fever, chills, rigors, night sweats, weight loss or loss of appetite.            Current Outpatient Prescriptions on File Prior to Visit   Medication Sig Dispense Refill   • allopurinol (ZYLOPRIM) 100 MG tablet Take 1 tablet by mouth 4 (Four) Times a Day. 360 tablet 1   • aspirin 81 MG chewable tablet Chew 81 mg daily.     • cephalexin (KEFLEX) 500 MG capsule Take 1 capsule by mouth 3 (Three) Times a Day. 18 capsule 0   • colchicine 0.6 MG tablet Take 1 tablet by mouth Daily. 90 tablet 1   • sotalol (BETAPACE) 80 MG tablet TAKE 1/2 TABLET TWICE DAILY 90 tablet 3   • tamsulosin (FLOMAX) 0.4 MG capsule 24 hr capsule Take 1 capsule by mouth every night.       No current facility-administered medications on file prior to visit.             Review Of Systems   Comprehensive review of systems was done it was negative other than what mentioned in HPI       PHYSICAL EXAMINATION:   There were no vitals taken for this visit.   General Appearance: Appears healthy, alert, polite and cooperative   Head and neck: Mild pallor and no jaundice. wet mucous membranes without ulceration.   Lungs: Good bilateral air entry, clear to auscultation   Heart: Regular rate and rhythm,   Abdomen: Soft, nontender, not distended   Extremities: No edema.     Labs:     Reviewed.     ASSESSMENT/PLAN:   1- CMML  Clinically asymptomatic    Counts are stable. WBC back to normal.  Will continue to follow up every 6 months.

## 2017-06-06 ENCOUNTER — OFFICE VISIT (OUTPATIENT)
Dept: INTERNAL MEDICINE | Facility: CLINIC | Age: 77
End: 2017-06-06

## 2017-06-06 ENCOUNTER — DOCUMENTATION (OUTPATIENT)
Dept: CARDIOLOGY | Facility: CLINIC | Age: 77
End: 2017-06-06

## 2017-06-06 VITALS
SYSTOLIC BLOOD PRESSURE: 120 MMHG | WEIGHT: 198.4 LBS | BODY MASS INDEX: 27.77 KG/M2 | DIASTOLIC BLOOD PRESSURE: 80 MMHG | HEIGHT: 71 IN

## 2017-06-06 DIAGNOSIS — I48.0 PAROXYSMAL ATRIAL FIBRILLATION (HCC): ICD-10-CM

## 2017-06-06 DIAGNOSIS — M1A.00X0 IDIOPATHIC CHRONIC GOUT WITHOUT TOPHUS, UNSPECIFIED SITE: Primary | ICD-10-CM

## 2017-06-06 DIAGNOSIS — I10 ESSENTIAL HYPERTENSION: ICD-10-CM

## 2017-06-06 PROCEDURE — 99213 OFFICE O/P EST LOW 20 MIN: CPT | Performed by: INTERNAL MEDICINE

## 2017-06-06 NOTE — PROGRESS NOTES
Pt in today for wound check, area clear, no s/s redness or swelling. Will return for follow-up apt

## 2017-06-06 NOTE — PROGRESS NOTES
Subjective   Jan Humphrey is a 76 y.o. male     Patient is in for recheck on gout and chronic medical conditions.    He has not had any attacks of gout in the last few months. Patient is taking Allopurinol for prevention and has been using Colchicine only for flares.  Uric acid level 7.0 /03/12/2017/.        Patient remains in NSR, on Sotalol. BP is well controlled off the medications.  He denies, chest pain, dyspnea, orthopnea or edema in lower extremities.  He is status post pacer placement for sick sinus syndrome and had pacer changed recently by .    In terms of leukemia, he remains asymptomatic.   Denies any fever, chills or systemic complaints.  His blood counts remain stable.      Past Medical History:   Diagnosis Date   • Atrophy of testis    • Benign prostatic hyperplasia    • Borderline glaucoma    • Chronic myelomonocytic leukemia    • Degenerative joint disease involving multiple joints    • Gout    • History of echocardiogram 05/08/2012    Normal left ventricular systolic function, EF 60%. Early diastolic dysfunction. Mild aortic and pulmonic regurgitation. Trace mitral and mild tricuspid regurgitation. No intracardiac mass pericardial effusion or cardiac thrombus.   • History of Holter monitoring 01/18/2011   • Impotence    • Lightheadedness    • Neck pain, musculoskeletal    • Sleep apnea     Probable reason for fatigue        Past Surgical History:   Procedure Laterality Date   • CARDIAC CATHETERIZATION  09/30/2009    No epicardial coronary artery disease noted that would explain patient's chest pain of the abnormal stress test noted. Normal left ventricular systolic function with no wall motion abnormalities   • CARDIAC CATHETERIZATION  05/24/1989    Normal catheterization, false-positive exercise treadmill. Noncardiac chest pain   • CARDIAC ELECTROPHYSIOLOGY PROCEDURE N/A 5/18/2017    Procedure: PPM generator change - dual;  Surgeon: Geneva Day MD;  Location: [facility]  CATH INVASIVE LOCATION;  Service:    • CARDIAC PACEMAKER PLACEMENT  06/2008    Dual chamber permanent pacemaker implantation   • COLECTOMY PARTIAL / TOTAL  04/14/2000    Cecal diverticulitis. Removal of small segment of terminal ileum, cecum and right colon, sent to pathology   • COLON SURGERY  03/27/2016   • COLONOSCOPY  05/25/2012   • CYSTOSCOPY  11/15/2000    Urinary retention on the basis of medications and benign prostatic hypertrophy   • INGUINAL HERNIA REPAIR  02/15/2007    Recurrent right inguinal hernia. Repair of recurrent inguinal hernia with EPS Prolene mesh system.   • LAPAROSCOPIC CHOLECYSTECTOMY  10/26/2006    Gallstones. Laparoscopic cholecystectomy with operative cholangiogram   • PROSTATECTOMY  11/17/2000    Benign prostatic hypertrophy with urinary retention. Prostatitis. transurethral resection of prostate.   • STEROID INJECTION  10/21/2010    Decadron (Gout)    • STEROID INJECTION  07/23/2013    Kenalog (Gout) (4)       Social History   Substance Use Topics   • Smoking status: Never Smoker   • Smokeless tobacco: Never Used   • Alcohol use No     Family History   Problem Relation Age of Onset   • Cancer Other    • Colon cancer Other      parent   • Coronary artery disease Other    • Heart disease Other    • Hypertension Other    • Cholelithiasis Other    • Other Other      colon problems     Allergies   Allergen Reactions   • Soma [Carisoprodol]    • Sulfa Antibiotics Swelling     facial   • Other Rash     SILK TAPE     Current Outpatient Prescriptions on File Prior to Visit   Medication Sig Dispense Refill   • allopurinol (ZYLOPRIM) 100 MG tablet Take 1 tablet by mouth 4 (Four) Times a Day. 360 tablet 1   • cephalexin (KEFLEX) 500 MG capsule Take 1 capsule by mouth 3 (Three) Times a Day. 18 capsule 0   • colchicine 0.6 MG tablet Take 1 tablet by mouth Daily. (Patient taking differently: Take 0.6 mg by mouth As Needed.) 90 tablet 1   • oxybutynin (DITROPAN) 5 MG tablet      • sotalol (BETAPACE)  80 MG tablet TAKE 1/2 TABLET TWICE DAILY 90 tablet 3   • tamsulosin (FLOMAX) 0.4 MG capsule 24 hr capsule Take 1 capsule by mouth every night.     • aspirin 81 MG chewable tablet Chew 81 mg daily.       No current facility-administered medications on file prior to visit.      Review of Systems   Constitutional: Negative for fatigue and unexpected weight change.   Respiratory: Negative for chest tightness and shortness of breath.    Gastrointestinal: Negative for abdominal pain, constipation and diarrhea.   Endocrine: Negative for cold intolerance and heat intolerance.   Genitourinary: Negative for flank pain and frequency.   Musculoskeletal: Negative for gait problem.   Skin: Negative for color change and rash.   Neurological: Negative for dizziness and light-headedness.   Hematological: Negative for adenopathy. Does not bruise/bleed easily.   Psychiatric/Behavioral: Negative for sleep disturbance. The patient is not nervous/anxious.        Objective   Physical Exam   Constitutional: He is oriented to person, place, and time.   HENT:   Head: Normocephalic and atraumatic.   Nose: Nose normal.   Mouth/Throat: Oropharynx is clear and moist.   Eyes: Conjunctivae are normal. Pupils are equal, round, and reactive to light.   Neck: Neck supple. No JVD present. No thyromegaly present.   Cardiovascular: Normal rate and regular rhythm.    No murmur heard.  Pulmonary/Chest: Effort normal and breath sounds normal.   Abdominal: Soft. Bowel sounds are normal. He exhibits no mass.   Neurological: He is alert and oriented to person, place, and time. He has normal reflexes.   Skin: Skin is warm and dry.   Psychiatric: He has a normal mood and affect. His behavior is normal.   Nursing note and vitals reviewed.          Patient Active Problem List   Diagnosis   • Sick sinus syndrome   • Essential hypertension   • Paroxysmal atrial fibrillation   • Gout   • Chronic myeloid leukemia               Assessment    Diagnosis Plan   1.  Idiopathic chronic gout without tophus, unspecified site  Uric acid   2. Essential hypertension     3. Paroxysmal atrial fibrillation           Plan      1. Patient will continue current therapy with Allopurinol and Colchicine.      Medications risks and side effects discussed with the patient.      Will recheck uric acid level.  2. BP is OK off medications.  3. Sotalol is continued.      Continue to follow up with the cardiologist.      Follow up in 6 months.

## 2017-06-20 ENCOUNTER — OFFICE VISIT (OUTPATIENT)
Dept: CARDIOLOGY | Facility: CLINIC | Age: 77
End: 2017-06-20

## 2017-06-20 ENCOUNTER — CLINICAL SUPPORT (OUTPATIENT)
Dept: CARDIOLOGY | Facility: CLINIC | Age: 77
End: 2017-06-20

## 2017-06-20 VITALS
SYSTOLIC BLOOD PRESSURE: 110 MMHG | HEART RATE: 70 BPM | WEIGHT: 198 LBS | HEIGHT: 71 IN | BODY MASS INDEX: 27.72 KG/M2 | DIASTOLIC BLOOD PRESSURE: 70 MMHG

## 2017-06-20 DIAGNOSIS — I49.5 SICK SINUS SYNDROME (HCC): Primary | ICD-10-CM

## 2017-06-20 DIAGNOSIS — I10 ESSENTIAL HYPERTENSION: ICD-10-CM

## 2017-06-20 DIAGNOSIS — I48.0 PAROXYSMAL ATRIAL FIBRILLATION (HCC): ICD-10-CM

## 2017-06-20 PROCEDURE — 93288 INTERROG EVL PM/LDLS PM IP: CPT | Performed by: INTERNAL MEDICINE

## 2017-06-20 PROCEDURE — 99024 POSTOP FOLLOW-UP VISIT: CPT | Performed by: INTERNAL MEDICINE

## 2017-06-20 NOTE — PROGRESS NOTES
77 years old patient with history of sick sinus syndrome status post pacemaker implantation.  Manufact[employer]'s a [employer] model #80 2DR01 and serial number PU [id number].  Implantation date 5/20/2017 and date of interrogation 6/20/2017.  Battery status is good for 10 years patient atrium about 90% ventricle less than 5% of the time.  Bradycardia component programmed DDD at 60 and 1:30.  AV delay 180 and PV delay 150.  Sensing P waves 1 with a lead impedance 380 and threshold 1.7 at 0.66.  Sensing R waves 7 with a lead impedance 720 and threshold 0.7 at 0.4.  Clinical impression normal function pacemaker recommend to follow up in 6 month

## 2017-06-20 NOTE — PROGRESS NOTES
Jan Humphrey  77 y.o. male    06/20/2017  1. Sick sinus syndrome    2. Essential hypertension    3. Paroxysmal atrial fibrillation        History of Present Illness    Mr. Humphrey is here for follow-up of his above stated problems.  He underwent a pacemaker generator replacement about a month ago and the procedure was uncomplicated.  Pacing and sensing parameters were appropriate post procedure.  Incision site is healing well.  His blood pressure was in the normal range.  No signs of congestive heart failure or angina was noted.  His been compliant with his medications.        SUBJECTIVE    Allergies   Allergen Reactions   • Soma [Carisoprodol]    • Sulfa Antibiotics Swelling     facial   • Other Rash     SILK TAPE         Past Medical History:   Diagnosis Date   • Atrophy of testis    • Benign prostatic hyperplasia    • Borderline glaucoma    • Chronic myelomonocytic leukemia    • Degenerative joint disease involving multiple joints    • Gout    • History of echocardiogram 05/08/2012    Normal left ventricular systolic function, EF 60%. Early diastolic dysfunction. Mild aortic and pulmonic regurgitation. Trace mitral and mild tricuspid regurgitation. No intracardiac mass pericardial effusion or cardiac thrombus.   • History of Holter monitoring 01/18/2011   • Impotence    • Lightheadedness    • Neck pain, musculoskeletal    • Sleep apnea     Probable reason for fatigue            Past Surgical History:   Procedure Laterality Date   • CARDIAC CATHETERIZATION  09/30/2009    No epicardial coronary artery disease noted that would explain patient's chest pain of the abnormal stress test noted. Normal left ventricular systolic function with no wall motion abnormalities   • CARDIAC CATHETERIZATION  05/24/1989    Normal catheterization, false-positive exercise treadmill. Noncardiac chest pain   • CARDIAC ELECTROPHYSIOLOGY PROCEDURE N/A 5/18/2017    Procedure: PPM generator change - dual;  Surgeon: Geneva Turcios  MD Shay;  Location: [facility] INVASIVE LOCATION;  Service:    • CARDIAC PACEMAKER PLACEMENT  06/2008    Dual chamber permanent pacemaker implantation   • COLECTOMY PARTIAL / TOTAL  04/14/2000    Cecal diverticulitis. Removal of small segment of terminal ileum, cecum and right colon, sent to pathology   • COLON SURGERY  03/27/2016   • COLONOSCOPY  05/25/2012   • CYSTOSCOPY  11/15/2000    Urinary retention on the basis of medications and benign prostatic hypertrophy   • INGUINAL HERNIA REPAIR  02/15/2007    Recurrent right inguinal hernia. Repair of recurrent inguinal hernia with EPS Prolene mesh system.   • LAPAROSCOPIC CHOLECYSTECTOMY  10/26/2006    Gallstones. Laparoscopic cholecystectomy with operative cholangiogram   • PROSTATECTOMY  11/17/2000    Benign prostatic hypertrophy with urinary retention. Prostatitis. transurethral resection of prostate.   • STEROID INJECTION  10/21/2010    Decadron (Gout)    • STEROID INJECTION  07/23/2013    Kenalog (Gout) (4)         Family History   Problem Relation Age of Onset   • Cancer Other    • Colon cancer Other      parent   • Coronary artery disease Other    • Heart disease Other    • Hypertension Other    • Cholelithiasis Other    • Other Other      colon problems         Social History     Social History   • Marital status:      Spouse name: N/A   • Number of children: N/A   • Years of education: N/A     Occupational History   • Not on file.     Social History Main Topics   • Smoking status: Never Smoker   • Smokeless tobacco: Never Used   • Alcohol use No   • Drug use: No   • Sexual activity: Defer     Other Topics Concern   • Not on file     Social History Narrative         Current Outpatient Prescriptions   Medication Sig Dispense Refill   • allopurinol (ZYLOPRIM) 100 MG tablet Take 1 tablet by mouth 4 (Four) Times a Day. 360 tablet 1   • aspirin 81 MG chewable tablet Chew 81 mg daily.     • colchicine 0.6 MG tablet Take 1 tablet by mouth Daily.  "(Patient taking differently: Take 0.6 mg by mouth As Needed.) 90 tablet 1   • oxybutynin (DITROPAN) 5 MG tablet      • sotalol (BETAPACE) 80 MG tablet TAKE 1/2 TABLET TWICE DAILY 90 tablet 3   • tamsulosin (FLOMAX) 0.4 MG capsule 24 hr capsule Take 1 capsule by mouth every night.       No current facility-administered medications for this visit.          OBJECTIVE    /70  Pulse 70  Ht 71\" (180.3 cm)  Wt 198 lb (89.8 kg)  BMI 27.62 kg/m2        Review of Systems     Constitutional:  Denies recent weight loss, weight gain, fever or chills, no change in exercise tolerance     HENT:  Denies any hearing loss, epistaxis, hoarseness, or difficulty speaking.     Eyes: Wears eyeglasses or contact lenses     Respiratory:  Denies dyspnea with exertion,no cough, wheezing, or hemoptysis.     Cardiovascular: Negative for palpations, chest pain, orthopnea, PND, peripheral edema, syncope, or claudication.     Gastrointestinal:  Denies change in bowel habits, dyspepsia, ulcer disease, hematochezia, or melena.     Endocrine: Negative for cold intolerance, heat intolerance, polydipsia, polyphagia and polyuria.      Physical Exam     Constitutional: Cooperative, alert and oriented, well-developed, well-nourished, in no acute distress.     HENT:   Head: Normocephalic, normal hair patterns, no masses or tenderness.  Ears, Nose, and Throat: No gross abnormalities. No pallor or cyanosis.   Eyes: EOMS intact, PERRL, conjunctivae and lids unremarkable. Fundoscopic exam and visual fields not performed.   Neck: No palpable masses or adenopathy, no thyromegaly, no JVD, carotid pulses are full and equal bilaterally and without  Bruits.     Cardiovascular: Regular rhythm, S1 and S2 normal, no S3 or S4. No murmurs, gallops, or rubs detected.     Pulmonary/Chest: Chest: normal symmetry, no tenderness to palpation, normal respiratory excursion, pacemaker incision site healed well           Pulmonary: Normal breath sounds. No rales or " handyyoan.    Abdominal: Abdomen soft, bowel sounds normoactive, no masses, no hepatosplenomegaly, non-tender, no bruits.     Musculoskeletal: No deformities, clubbing, cyanosis, erythema, or edema observed.    Procedures      Lab Results   Component Value Date    WBC 11.26 (H) 06/02/2017    HGB 11.1 (L) 06/02/2017    HCT 32.5 (L) 06/02/2017    MCV 98.8 (H) 06/02/2017     06/02/2017     Lab Results   Component Value Date    GLUCOSE 99 05/18/2017    BUN 14 05/18/2017    CREATININE 0.92 05/18/2017    EGFRIFNONA 80 05/18/2017    BCR 15.2 05/18/2017    CO2 22.0 05/18/2017    CALCIUM 8.8 05/18/2017    ALBUMIN 5.00 (H) 03/03/2017    LABIL2 1.8 03/03/2017    AST 32 03/03/2017    ALT 36 03/03/2017     Lab Results   Component Value Date    CHOL 96 03/21/2017     Lab Results   Component Value Date    TRIG 170 03/21/2017    TRIG 113 09/22/2015     Lab Results   Component Value Date    HDL 26 (L) 03/21/2017     Lab Results   Component Value Date    LDLCALC 62 09/22/2015     No results found for: LDL  No results found for: HDLLDLRATIO  No components found for: CHOLHDL  No results found for: HGBA1C  Lab Results   Component Value Date    TSH 1.79 12/16/2014           ASSESSMENT AND PLAN  Mr. del cid is stable with no evidence of angina, arrhythmia or congestive heart failure.  Incision site is healing well.  Antiplatelet therapy with aspirin, antiarrhythmic therapy with sotalol has been continued.    Diagnoses and all orders for this visit:    Sick sinus syndrome    Essential hypertension    Paroxysmal atrial fibrillation        Geneva Day MD  6/20/2017  11:39 AM

## 2017-09-06 ENCOUNTER — OFFICE VISIT (OUTPATIENT)
Dept: INTERNAL MEDICINE | Facility: CLINIC | Age: 77
End: 2017-09-06

## 2017-09-06 ENCOUNTER — CLINICAL SUPPORT (OUTPATIENT)
Dept: CARDIOLOGY | Facility: CLINIC | Age: 77
End: 2017-09-06

## 2017-09-06 ENCOUNTER — LAB (OUTPATIENT)
Dept: LAB | Facility: HOSPITAL | Age: 77
End: 2017-09-06

## 2017-09-06 VITALS
HEIGHT: 71 IN | SYSTOLIC BLOOD PRESSURE: 140 MMHG | DIASTOLIC BLOOD PRESSURE: 70 MMHG | BODY MASS INDEX: 27.92 KG/M2 | WEIGHT: 199.4 LBS

## 2017-09-06 DIAGNOSIS — M54.41 ACUTE RIGHT-SIDED LOW BACK PAIN WITH RIGHT-SIDED SCIATICA: Primary | ICD-10-CM

## 2017-09-06 DIAGNOSIS — I49.5 SICK SINUS SYNDROME (HCC): Primary | ICD-10-CM

## 2017-09-06 DIAGNOSIS — M1A.00X0 IDIOPATHIC CHRONIC GOUT WITHOUT TOPHUS, UNSPECIFIED SITE: ICD-10-CM

## 2017-09-06 DIAGNOSIS — Z51.81 MEDICATION MONITORING ENCOUNTER: ICD-10-CM

## 2017-09-06 DIAGNOSIS — I48.0 PAROXYSMAL ATRIAL FIBRILLATION (HCC): ICD-10-CM

## 2017-09-06 DIAGNOSIS — Z51.81 MEDICATION MONITORING ENCOUNTER: Primary | ICD-10-CM

## 2017-09-06 LAB
ANION GAP SERPL CALCULATED.3IONS-SCNC: 13 MMOL/L (ref 5–15)
BUN BLD-MCNC: 10 MG/DL (ref 7–21)
BUN/CREAT SERPL: 12.2 (ref 7–25)
CALCIUM SPEC-SCNC: 9 MG/DL (ref 8.4–10.2)
CHLORIDE SERPL-SCNC: 101 MMOL/L (ref 95–110)
CO2 SERPL-SCNC: 25 MMOL/L (ref 22–31)
CREAT BLD-MCNC: 0.82 MG/DL (ref 0.7–1.3)
GFR SERPL CREATININE-BSD FRML MDRD: 91 ML/MIN/1.73 (ref 60–98)
GLUCOSE BLD-MCNC: 91 MG/DL (ref 60–100)
POTASSIUM BLD-SCNC: 4.7 MMOL/L (ref 3.5–5.1)
SODIUM BLD-SCNC: 139 MMOL/L (ref 137–145)
URATE SERPL-MCNC: 4.8 MG/DL (ref 2.5–8.5)

## 2017-09-06 PROCEDURE — 84550 ASSAY OF BLOOD/URIC ACID: CPT | Performed by: INTERNAL MEDICINE

## 2017-09-06 PROCEDURE — 36415 COLL VENOUS BLD VENIPUNCTURE: CPT

## 2017-09-06 PROCEDURE — 99213 OFFICE O/P EST LOW 20 MIN: CPT | Performed by: INTERNAL MEDICINE

## 2017-09-06 PROCEDURE — 80048 BASIC METABOLIC PNL TOTAL CA: CPT | Performed by: INTERNAL MEDICINE

## 2017-09-06 RX ORDER — TRAMADOL HYDROCHLORIDE 50 MG/1
50 TABLET ORAL EVERY 6 HOURS PRN
Qty: 30 TABLET | Refills: 0 | Status: SHIPPED | OUTPATIENT
Start: 2017-09-06 | End: 2018-01-05

## 2017-09-06 RX ORDER — SODIUM PHOSPHATE,MONO-DIBASIC 19G-7G/118
1 ENEMA (ML) RECTAL 2 TIMES DAILY WITH MEALS
COMMUNITY
End: 2018-04-18

## 2017-09-06 NOTE — PROGRESS NOTES
"Pacemaker\" report on Jan Humphrey,  date of birth [birth year]  Mr. Humphrey is a 77-year-old male with sick sinus syndrome, paroxysmal atrial fibrillation status post pacemaker implantation on 5/8/2017  The pacemaker was a [employer] pacemaker ADVISA DR MCCULLOUGH  The battery status was adequate at 3.03V an estimated longevity was 8 years.  The patient was atrial paced 86.7%  Atrial sensing was 1.3 mV and threshold 1.5V at 0.6 ms and impedance 437 ohms  Ventricular lead sensing was 8.6 mV and threshold 0.7 5V at 0.4 ms and impedance 855 ohms  There were no atrial high rates and 1 ventricular high rate was noted.  Pacemaker functioning well.  No changes in parameters made.  "

## 2017-09-06 NOTE — PROGRESS NOTES
Subjective   Jan Julio Workman is a 77 y.o. male     Patient is in complaining of persistent back pain over the last 6 weeks. Pain is located in lower lumbar area and is radiates down the right leg. Also complains of numbness in his right leg.  [facility] and was placed on Diclofenac and Zanaflex without any improvement in his symptoms.  He denies any recent falls or injuries.  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    Past Medical History:   Diagnosis Date   • Atrophy of testis    • Benign prostatic hyperplasia    • Borderline glaucoma    • Chronic myelomonocytic leukemia    • Degenerative joint disease involving multiple joints    • Gout    • History of echocardiogram 05/08/2012    Normal left ventricular systolic function, EF 60%. Early diastolic dysfunction. Mild aortic and pulmonic regurgitation. Trace mitral and mild tricuspid regurgitation. No intracardiac mass pericardial effusion or cardiac thrombus.   • History of Holter monitoring 01/18/2011   • Impotence    • Lightheadedness    • Neck pain, musculoskeletal    • Sleep apnea     Probable reason for fatigue        Past Surgical History:   Procedure Laterality Date   • CARDIAC CATHETERIZATION  09/30/2009    No epicardial coronary artery disease noted that would explain patient's chest pain of the abnormal stress test noted. Normal left ventricular systolic function with no wall motion abnormalities   • CARDIAC CATHETERIZATION  05/24/1989    Normal catheterization, false-positive exercise treadmill. Noncardiac chest pain   • CARDIAC ELECTROPHYSIOLOGY PROCEDURE N/A 5/18/2017    Procedure: PPM generator change - dual;  Surgeon: Geneva Day MD;  Location: [facility] INVASIVE LOCATION;  Service:    • CARDIAC PACEMAKER PLACEMENT  06/2008    Dual chamber permanent pacemaker implantation   • COLECTOMY PARTIAL / TOTAL  04/14/2000    Cecal diverticulitis. Removal of small segment of terminal  ileum, cecum and right colon, sent to pathology   • COLON SURGERY  03/27/2016   • COLONOSCOPY  05/25/2012   • CYSTOSCOPY  11/15/2000    Urinary retention on the basis of medications and benign prostatic hypertrophy   • INGUINAL HERNIA REPAIR  02/15/2007    Recurrent right inguinal hernia. Repair of recurrent inguinal hernia with EPS Prolene mesh system.   • LAPAROSCOPIC CHOLECYSTECTOMY  10/26/2006    Gallstones. Laparoscopic cholecystectomy with operative cholangiogram   • PROSTATECTOMY  11/17/2000    Benign prostatic hypertrophy with urinary retention. Prostatitis. transurethral resection of prostate.   • STEROID INJECTION  10/21/2010    Decadron (Gout)    • STEROID INJECTION  07/23/2013    Kenalog (Gout) (4)       Social History   Substance Use Topics   • Smoking status: Never Smoker   • Smokeless tobacco: Never Used   • Alcohol use No     Family History   Problem Relation Age of Onset   • Cancer Other    • Colon cancer Other      parent   • Coronary artery disease Other    • Heart disease Other    • Hypertension Other    • Cholelithiasis Other    • Other Other      colon problems     Allergies   Allergen Reactions   • Soma [Carisoprodol]    • Sulfa Antibiotics Swelling     facial   • Other Rash     SILK TAPE     Current Outpatient Prescriptions on File Prior to Visit   Medication Sig Dispense Refill   • allopurinol (ZYLOPRIM) 100 MG tablet Take 1 tablet by mouth 4 (Four) Times a Day. 360 tablet 1   • aspirin 81 MG chewable tablet Chew 81 mg daily.     • colchicine 0.6 MG tablet Take 1 tablet by mouth Daily. (Patient taking differently: Take 0.6 mg by mouth As Needed.) 90 tablet 1   • oxybutynin (DITROPAN) 5 MG tablet      • sotalol (BETAPACE) 80 MG tablet TAKE 1/2 TABLET TWICE DAILY 90 tablet 3   • tamsulosin (FLOMAX) 0.4 MG capsule 24 hr capsule Take 1 capsule by mouth every night.     • [DISCONTINUED] diclofenac (VOLTAREN) 50 MG EC tablet Take 1 tablet by mouth 3 (Three) Times a Day. 30 tablet 0   •  [DISCONTINUED] tiZANidine (ZANAFLEX) 2 MG tablet Take 1 tablet by mouth Every 8 (Eight) Hours As Needed for Muscle Spasms. 30 tablet 0     No current facility-administered medications on file prior to visit.      Review of Systems   Constitutional: Negative for activity change, chills and fever.   Respiratory: Negative for chest tightness and shortness of breath.    Cardiovascular: Negative for chest pain, palpitations and leg swelling.   Gastrointestinal: Negative for abdominal pain, constipation and diarrhea.   Endocrine: Negative for cold intolerance, heat intolerance, polydipsia and polyuria.   Genitourinary: Negative for difficulty urinating, flank pain and frequency.   Musculoskeletal: Positive for back pain.        Positive for pain and numbness in right leg   Neurological: Positive for numbness. Negative for dizziness.       Objective   Physical Exam   Constitutional: He appears well-developed and well-nourished.   Eyes: Conjunctivae are normal. No scleral icterus.   Neck: Neck supple. No JVD present. No thyromegaly present.   Cardiovascular: Normal rate and regular rhythm.    No murmur heard.  Pulmonary/Chest: Effort normal and breath sounds normal.   Abdominal: Soft. Bowel sounds are normal. He exhibits no mass.   Musculoskeletal:   Tenderness in lower lumbar area   Neurological: He is alert. He has normal reflexes.   Skin: Skin is warm. No rash noted.   Psychiatric: He has a normal mood and affect.   Nursing note and vitals reviewed.          Patient Active Problem List   Diagnosis   • Sick sinus syndrome   • Essential hypertension   • Paroxysmal atrial fibrillation   • Gout   • Chronic myeloid leukemia               Assessment   Diagnosis Plan   1. Acute right-sided low back pain with right-sided sciatica  CT lumbar spine w contrast       Plan      Patient will be scheduled for CT lumbar spine.  He has pacemaker and cannot have MRI done.  Tramadol 50 mg q 8 hrs PRN for pain.        Follow up after CT  scan is completed.          This document has been electronically signed by Alicia Gaona MD on September 6, 2017 9:35 AM

## 2017-09-07 ENCOUNTER — TELEPHONE (OUTPATIENT)
Dept: INTERNAL MEDICINE | Facility: CLINIC | Age: 77
End: 2017-09-07

## 2017-09-13 ENCOUNTER — APPOINTMENT (OUTPATIENT)
Dept: CT IMAGING | Facility: HOSPITAL | Age: 77
End: 2017-09-13
Attending: INTERNAL MEDICINE

## 2017-12-01 ENCOUNTER — OFFICE VISIT (OUTPATIENT)
Dept: INTERNAL MEDICINE | Facility: CLINIC | Age: 77
End: 2017-12-01

## 2017-12-01 ENCOUNTER — OFFICE VISIT (OUTPATIENT)
Dept: ONCOLOGY | Facility: CLINIC | Age: 77
End: 2017-12-01

## 2017-12-01 ENCOUNTER — LAB (OUTPATIENT)
Dept: ONCOLOGY | Facility: HOSPITAL | Age: 77
End: 2017-12-01

## 2017-12-01 VITALS
BODY MASS INDEX: 27.99 KG/M2 | DIASTOLIC BLOOD PRESSURE: 78 MMHG | HEIGHT: 71 IN | RESPIRATION RATE: 16 BRPM | TEMPERATURE: 98.3 F | HEART RATE: 84 BPM | SYSTOLIC BLOOD PRESSURE: 136 MMHG | WEIGHT: 199.96 LBS

## 2017-12-01 VITALS
HEART RATE: 86 BPM | DIASTOLIC BLOOD PRESSURE: 70 MMHG | SYSTOLIC BLOOD PRESSURE: 150 MMHG | HEIGHT: 71 IN | OXYGEN SATURATION: 93 % | BODY MASS INDEX: 28.15 KG/M2 | WEIGHT: 201.06 LBS

## 2017-12-01 DIAGNOSIS — C92.10 CHRONIC MYELOID LEUKEMIA (HCC): ICD-10-CM

## 2017-12-01 DIAGNOSIS — Z23 INFLUENZA VACCINE NEEDED: ICD-10-CM

## 2017-12-01 DIAGNOSIS — C93.10 CHRONIC MYELOMONOCYTIC LEUKEMIA NOT HAVING ACHIEVED REMISSION (HCC): Primary | ICD-10-CM

## 2017-12-01 DIAGNOSIS — M54.16 LUMBAR BACK PAIN WITH RADICULOPATHY AFFECTING RIGHT LOWER EXTREMITY: Primary | ICD-10-CM

## 2017-12-01 LAB
ALBUMIN SERPL-MCNC: 4.8 G/DL (ref 3.4–4.8)
ALBUMIN/GLOB SERPL: 1.5 G/DL (ref 1.1–1.8)
ALP SERPL-CCNC: 43 U/L (ref 38–126)
ALT SERPL W P-5'-P-CCNC: 30 U/L (ref 21–72)
ANION GAP SERPL CALCULATED.3IONS-SCNC: 15 MMOL/L (ref 5–15)
ANISOCYTOSIS BLD QL: ABNORMAL
AST SERPL-CCNC: 31 U/L (ref 17–59)
BASOPHILS # BLD MANUAL: 0.21 10*3/MM3 (ref 0–0.2)
BASOPHILS NFR BLD AUTO: 1 % (ref 0–2)
BILIRUB SERPL-MCNC: 0.9 MG/DL (ref 0.2–1.3)
BUN BLD-MCNC: 15 MG/DL (ref 7–21)
BUN/CREAT SERPL: 15 (ref 7–25)
CALCIUM SPEC-SCNC: 9.2 MG/DL (ref 8.4–10.2)
CHLORIDE SERPL-SCNC: 103 MMOL/L (ref 95–110)
CO2 SERPL-SCNC: 22 MMOL/L (ref 22–31)
CREAT BLD-MCNC: 1 MG/DL (ref 0.7–1.3)
DEPRECATED RDW RBC AUTO: 93 FL (ref 35.1–43.9)
EOSINOPHIL # BLD MANUAL: 0.21 10*3/MM3 (ref 0–0.7)
EOSINOPHIL NFR BLD MANUAL: 1 % (ref 0–7)
ERYTHROCYTE [DISTWIDTH] IN BLOOD BY AUTOMATED COUNT: 25.3 % (ref 11.5–14.5)
GFR SERPL CREATININE-BSD FRML MDRD: 73 ML/MIN/1.73 (ref 42–98)
GLOBULIN UR ELPH-MCNC: 3.2 GM/DL (ref 2.3–3.5)
GLUCOSE BLD-MCNC: 119 MG/DL (ref 60–100)
HCT VFR BLD AUTO: 34 % (ref 39–49)
HGB BLD-MCNC: 11.9 G/DL (ref 13.7–17.3)
HYPOCHROMIA BLD QL: ABNORMAL
LYMPHOCYTES # BLD MANUAL: 2.28 10*3/MM3 (ref 0.6–4.2)
LYMPHOCYTES NFR BLD MANUAL: 11 % (ref 10–50)
LYMPHOCYTES NFR BLD MANUAL: 16 % (ref 0–12)
MCH RBC QN AUTO: 35.5 PG (ref 26.5–34)
MCHC RBC AUTO-ENTMCNC: 35 G/DL (ref 31.5–36.3)
MCV RBC AUTO: 101.5 FL (ref 80–98)
METAMYELOCYTES NFR BLD MANUAL: 8 % (ref 0–0)
MONOCYTES # BLD AUTO: 3.32 10*3/MM3 (ref 0–0.9)
MYELOCYTES NFR BLD MANUAL: 4 % (ref 0–0)
NEUTROPHILS # BLD AUTO: 11.83 10*3/MM3 (ref 2–8.6)
NEUTROPHILS NFR BLD MANUAL: 48 % (ref 37–80)
NEUTS BAND NFR BLD MANUAL: 9 % (ref 0–5)
PLATELET # BLD AUTO: 208 10*3/MM3 (ref 150–450)
PMV BLD AUTO: ABNORMAL FL (ref 8–12)
POIKILOCYTOSIS BLD QL SMEAR: ABNORMAL
POLYCHROMASIA BLD QL SMEAR: ABNORMAL
POTASSIUM BLD-SCNC: 3.9 MMOL/L (ref 3.5–5.1)
PROT SERPL-MCNC: 8 G/DL (ref 6.3–8.6)
RBC # BLD AUTO: 3.35 10*6/MM3 (ref 4.37–5.74)
SMALL PLATELETS BLD QL SMEAR: ADEQUATE
SODIUM BLD-SCNC: 140 MMOL/L (ref 137–145)
VARIANT LYMPHS NFR BLD MANUAL: 2 % (ref 0–5)
WBC MORPH BLD: NORMAL
WBC NRBC COR # BLD: 20.76 10*3/MM3 (ref 3.2–9.8)

## 2017-12-01 PROCEDURE — 96372 THER/PROPH/DIAG INJ SC/IM: CPT | Performed by: INTERNAL MEDICINE

## 2017-12-01 PROCEDURE — 90662 IIV NO PRSV INCREASED AG IM: CPT | Performed by: INTERNAL MEDICINE

## 2017-12-01 PROCEDURE — G0463 HOSPITAL OUTPT CLINIC VISIT: HCPCS | Performed by: NURSE PRACTITIONER

## 2017-12-01 PROCEDURE — 85007 BL SMEAR W/DIFF WBC COUNT: CPT

## 2017-12-01 PROCEDURE — 80053 COMPREHEN METABOLIC PANEL: CPT

## 2017-12-01 PROCEDURE — 85025 COMPLETE CBC W/AUTO DIFF WBC: CPT

## 2017-12-01 PROCEDURE — G0008 ADMIN INFLUENZA VIRUS VAC: HCPCS | Performed by: INTERNAL MEDICINE

## 2017-12-01 PROCEDURE — 99214 OFFICE O/P EST MOD 30 MIN: CPT | Performed by: NURSE PRACTITIONER

## 2017-12-01 PROCEDURE — 99213 OFFICE O/P EST LOW 20 MIN: CPT | Performed by: INTERNAL MEDICINE

## 2017-12-01 RX ORDER — OMEPRAZOLE 20 MG/1
20 CAPSULE, DELAYED RELEASE ORAL DAILY
Qty: 30 CAPSULE | Refills: 1 | Status: SHIPPED | OUTPATIENT
Start: 2017-12-01 | End: 2018-01-15 | Stop reason: SDUPTHER

## 2017-12-01 RX ORDER — TRIAMCINOLONE ACETONIDE 40 MG/ML
40 INJECTION, SUSPENSION INTRA-ARTICULAR; INTRAMUSCULAR ONCE
Status: COMPLETED | OUTPATIENT
Start: 2017-12-01 | End: 2017-12-01

## 2017-12-01 RX ORDER — TIZANIDINE 4 MG/1
4 TABLET ORAL NIGHTLY PRN
Qty: 30 TABLET | Refills: 1 | Status: SHIPPED | OUTPATIENT
Start: 2017-12-01 | End: 2017-12-04 | Stop reason: SDUPTHER

## 2017-12-01 RX ADMIN — TRIAMCINOLONE ACETONIDE 40 MG: 40 INJECTION, SUSPENSION INTRA-ARTICULAR; INTRAMUSCULAR at 14:40

## 2017-12-01 NOTE — PROGRESS NOTES
Subjective   Jan Julio Workman is a 77 y.o. male       Patient is in complaining of back pain. Pain is located in lower lumbar area and radiates down his right leg. Complains of numbness in his leg.  Pain is worse with activity or walking.  Symptoms have been present for several weeks.  He denies any injury or falls.  Has history of back pain in the past.    Past Medical History:   Diagnosis Date   • Atrophy of testis    • Benign prostatic hyperplasia    • Borderline glaucoma    • Chronic myelomonocytic leukemia    • Degenerative joint disease involving multiple joints    • Gout    • History of echocardiogram 05/08/2012    Normal left ventricular systolic function, EF 60%. Early diastolic dysfunction. Mild aortic and pulmonic regurgitation. Trace mitral and mild tricuspid regurgitation. No intracardiac mass pericardial effusion or cardiac thrombus.   • History of Holter monitoring 01/18/2011   • Impotence    • Lightheadedness    • Neck pain, musculoskeletal    • Sleep apnea     Probable reason for fatigue        Past Surgical History:   Procedure Laterality Date   • CARDIAC CATHETERIZATION  09/30/2009    No epicardial coronary artery disease noted that would explain patient's chest pain of the abnormal stress test noted. Normal left ventricular systolic function with no wall motion abnormalities   • CARDIAC CATHETERIZATION  05/24/1989    Normal catheterization, false-positive exercise treadmill. Noncardiac chest pain   • CARDIAC ELECTROPHYSIOLOGY PROCEDURE N/A 5/18/2017    Procedure: PPM generator change - dual;  Surgeon: Geneva Day MD;  Location: [facility] INVASIVE LOCATION;  Service:    • CARDIAC PACEMAKER PLACEMENT  06/2008    Dual chamber permanent pacemaker implantation   • COLECTOMY PARTIAL / TOTAL  04/14/2000    Cecal diverticulitis. Removal of small segment of terminal ileum, cecum and right colon, sent to pathology   • COLON SURGERY  03/27/2016   • COLONOSCOPY  05/25/2012   • CYSTOSCOPY   11/15/2000    Urinary retention on the basis of medications and benign prostatic hypertrophy   • INGUINAL HERNIA REPAIR  02/15/2007    Recurrent right inguinal hernia. Repair of recurrent inguinal hernia with EPS Prolene mesh system.   • LAPAROSCOPIC CHOLECYSTECTOMY  10/26/2006    Gallstones. Laparoscopic cholecystectomy with operative cholangiogram   • PROSTATECTOMY  11/17/2000    Benign prostatic hypertrophy with urinary retention. Prostatitis. transurethral resection of prostate.   • STEROID INJECTION  10/21/2010    Decadron (Gout)    • STEROID INJECTION  07/23/2013    Kenalog (Gout) (4)       Social History   Substance Use Topics   • Smoking status: Never Smoker   • Smokeless tobacco: Never Used   • Alcohol use No     Family History   Problem Relation Age of Onset   • Cancer Other    • Colon cancer Other      parent   • Coronary artery disease Other    • Heart disease Other    • Hypertension Other    • Cholelithiasis Other    • Other Other      colon problems     Allergies   Allergen Reactions   • Soma [Carisoprodol]    • Sulfa Antibiotics Swelling     facial   • Other Rash     SILK TAPE     Current Outpatient Prescriptions on File Prior to Visit   Medication Sig Dispense Refill   • allopurinol (ZYLOPRIM) 100 MG tablet Take 1 tablet by mouth 4 (Four) Times a Day. 360 tablet 1   • aspirin 81 MG chewable tablet Chew 81 mg daily.     • colchicine 0.6 MG tablet Take 1 tablet by mouth Daily. (Patient taking differently: Take 0.6 mg by mouth As Needed.) 90 tablet 1   • glucosamine-chondroitin 500-400 MG capsule capsule Take 1 capsule by mouth 2 (Two) Times a Day With Meals.     • oxybutynin (DITROPAN) 5 MG tablet      • sotalol (BETAPACE) 80 MG tablet TAKE 1/2 TABLET TWICE DAILY 90 tablet 3   • tamsulosin (FLOMAX) 0.4 MG capsule 24 hr capsule Take 1 capsule by mouth every night.     • traMADol (ULTRAM) 50 MG tablet Take 1 tablet by mouth Every 6 (Six) Hours As Needed for Moderate Pain . (Patient taking differently:  Take 50 mg by mouth As Needed for Moderate Pain .) 30 tablet 0     No current facility-administered medications on file prior to visit.      Review of Systems   Constitutional: Negative for activity change, chills, fatigue and fever.   Respiratory: Negative for chest tightness and shortness of breath.    Gastrointestinal: Negative for abdominal pain, constipation and diarrhea.   Endocrine: Negative for cold intolerance, heat intolerance, polydipsia and polyuria.   Genitourinary: Negative for difficulty urinating, flank pain and frequency.   Musculoskeletal: Positive for back pain.   Neurological: Positive for numbness.       Objective   Physical Exam   Constitutional: He is oriented to person, place, and time. He appears well-developed and well-nourished.   Eyes: Conjunctivae are normal.   Neck: Neck supple. No JVD present. No thyromegaly present.   Cardiovascular: Normal rate and regular rhythm.    No murmur heard.  Pulmonary/Chest: Effort normal and breath sounds normal.   Abdominal: Soft. Bowel sounds are normal. He exhibits no mass.   Musculoskeletal:        Lumbar back: He exhibits decreased range of motion, tenderness and pain. He exhibits no swelling.   Neurological: He is alert and oriented to person, place, and time. He has normal reflexes.   Straight leg is positive on the right   Skin: Skin is warm and dry. No erythema.   Psychiatric: He has a normal mood and affect.   Nursing note and vitals reviewed.          Patient Active Problem List   Diagnosis   • Sick sinus syndrome   • Essential hypertension   • Paroxysmal atrial fibrillation   • Gout   • Chronic myeloid leukemia   • Chronic myelomonocytic leukemia not having achieved remission   • Sleep apnea   • Neck pain, musculoskeletal   • Lightheadedness   • Impotence   • History of Holter monitoring   • History of echocardiogram   • Degenerative joint disease involving multiple joints   • Chronic myelomonocytic leukemia   • Borderline glaucoma   • Benign  prostatic hyperplasia   • Atrophy of testis               Assessment   Diagnosis Plan   1. Lumbar back pain with radiculopathy affecting right lower extremity  triamcinolone acetonide (KENALOG-40) injection 40 mg       Plan      Voltaren 50 mg bid.  Prilosec 20 mg daily for GI protection.  Zanaflex 4 mg q 8hrs PRN.  He is deferring PT at present.      MRI of L spine if not improved.        Follow in 4-6 weeks.              This document has been electronically signed by Alicia Gaona MD on December 1, 2017 2:22 PM

## 2017-12-01 NOTE — PROGRESS NOTES
DATE OF VISIT: 12/1/2017    REASON FOR VISIT:  Here for 6 month follow-up    HISTORY OF PRESENT ILLNESS:      77 yr old male with past medical history of  CMML type 1. He has never required treatment.  He was sent to [place] to Dr. Kimbrough to see if he was a transplant candidate and he was found not to be a candidate. He was offered Vidaza in past but he refused. He remains on observation at this time. Clinically he has been doing very well. He denies any weight loss, no night sweats and no fever or chills. Appetite is good. He is having some hip pain and he is seeing his primary medical doctor today regarding that. He thinks he is having issue with sciatica nerve pain.    PAST MEDICAL HISTORY:    Past Medical History:   Diagnosis Date   • Atrophy of testis    • Benign prostatic hyperplasia    • Borderline glaucoma    • Chronic myelomonocytic leukemia    • Degenerative joint disease involving multiple joints    • Gout    • History of echocardiogram 05/08/2012    Normal left ventricular systolic function, EF 60%. Early diastolic dysfunction. Mild aortic and pulmonic regurgitation. Trace mitral and mild tricuspid regurgitation. No intracardiac mass pericardial effusion or cardiac thrombus.   • History of Holter monitoring 01/18/2011   • Impotence    • Lightheadedness    • Neck pain, musculoskeletal    • Sleep apnea     Probable reason for fatigue          SOCIAL HISTORY:    Social History   Substance Use Topics   • Smoking status: Never Smoker   • Smokeless tobacco: Never Used   • Alcohol use No       Surgical History :  Past Surgical History:   Procedure Laterality Date   • CARDIAC CATHETERIZATION  09/30/2009    No epicardial coronary artery disease noted that would explain patient's chest pain of the abnormal stress test noted. Normal left ventricular systolic function with no wall motion abnormalities   • CARDIAC CATHETERIZATION  05/24/1989    Normal catheterization, false-positive exercise treadmill. Noncardiac  chest pain   • CARDIAC ELECTROPHYSIOLOGY PROCEDURE N/A 5/18/2017    Procedure: PPM generator change - dual;  Surgeon: Geneva Day MD;  Location: [facility] INVASIVE LOCATION;  Service:    • CARDIAC PACEMAKER PLACEMENT  06/2008    Dual chamber permanent pacemaker implantation   • COLECTOMY PARTIAL / TOTAL  04/14/2000    Cecal diverticulitis. Removal of small segment of terminal ileum, cecum and right colon, sent to pathology   • COLON SURGERY  03/27/2016   • COLONOSCOPY  05/25/2012   • CYSTOSCOPY  11/15/2000    Urinary retention on the basis of medications and benign prostatic hypertrophy   • INGUINAL HERNIA REPAIR  02/15/2007    Recurrent right inguinal hernia. Repair of recurrent inguinal hernia with EPS Prolene mesh system.   • LAPAROSCOPIC CHOLECYSTECTOMY  10/26/2006    Gallstones. Laparoscopic cholecystectomy with operative cholangiogram   • PROSTATECTOMY  11/17/2000    Benign prostatic hypertrophy with urinary retention. Prostatitis. transurethral resection of prostate.   • STEROID INJECTION  10/21/2010    Decadron (Gout)    • STEROID INJECTION  07/23/2013    Kenalog (Gout) (4)       ALLERGIES:    Allergies   Allergen Reactions   • Soma [Carisoprodol]    • Sulfa Antibiotics Swelling     facial   • Other Rash     SILK TAPE       REVIEW OF SYSTEMS:      CONSTITUTIONAL:  No fever, chills, or night sweats.     HEENT:  No epistaxis, mouth sores, or difficulty swallowing.    RESPIRATORY:  No new shortness of breath or cough at present.    CARDIOVASCULAR:  No chest pain or palpitations.    GASTROINTESTINAL:  No abdominal pain, nausea, vomiting, or blood in the stool.    GENITOURINARY:  No dysuria or hematuria.    MUSCULOSKELETAL:  New pain in his left hip; seeing Dr. Gaona today.     NEUROLOGICAL:  No tingling or numbness. No new headache or dizziness.     LYMPHATICS:  Denies any abnormal swollen and anywhere in the body.    SKIN:  Denies any new skin rash.    PHYSICAL EXAMINATION:      VITAL SIGNS:   "/78  Pulse 84  Temp 98.3 °F (36.8 °C) (Temporal Artery )   Resp 16  Ht 70.98\" (180.3 cm)  Wt 199 lb 15.3 oz (90.7 kg)  BMI 27.9 kg/m2    GENERAL:  Not in any distress.    HEENT:  Normocephalic, Atraumatic.Mild Conjunctival pallor. No icterus. Facial Asymmetry noted.    NECK:  No adenopathy. No JVD.    RESPIRATORY:  Fair air entry bilateral. No rhonchi or wheezing.    CARDIOVASCULAR:  S1, S2. Regular rate and rhythm. No murmur or gallop appreciated.    ABDOMEN:  Soft, obese, nontender. Bowel sounds present in all four quadrants.  No organomegaly appreciated.    EXTREMITIES:  No edema.No Calf Tenderness.    NEUROLOGIC:  Alert, awake and oriented ×3.      SKIN : No new skin lesion identified    DIAGNOSTIC DATA:    Glucose   Date Value Ref Range Status   12/01/2017 119 (H) 60 - 100 mg/dL Final     Sodium   Date Value Ref Range Status   12/01/2017 140 137 - 145 mmol/L Final     Potassium   Date Value Ref Range Status   12/01/2017 3.9 3.5 - 5.1 mmol/L Final     CO2   Date Value Ref Range Status   12/01/2017 22.0 22.0 - 31.0 mmol/L Final     Chloride   Date Value Ref Range Status   12/01/2017 103 95 - 110 mmol/L Final     Anion Gap   Date Value Ref Range Status   12/01/2017 15.0 5.0 - 15.0 mmol/L Final     Creatinine   Date Value Ref Range Status   12/01/2017 1.00 0.70 - 1.30 mg/dL Final     BUN   Date Value Ref Range Status   12/01/2017 15 7 - 21 mg/dL Final     BUN/Creatinine Ratio   Date Value Ref Range Status   12/01/2017 15.0 7.0 - 25.0 Final     Calcium   Date Value Ref Range Status   12/01/2017 9.2 8.4 - 10.2 mg/dL Final     eGFR Non  Amer   Date Value Ref Range Status   12/01/2017 73 42 - 98 mL/min/1.73 Final     Alkaline Phosphatase   Date Value Ref Range Status   12/01/2017 43 38 - 126 U/L Final     Total Protein   Date Value Ref Range Status   12/01/2017 8.0 6.3 - 8.6 g/dL Final     ALT (SGPT)   Date Value Ref Range Status   12/01/2017 30 21 - 72 U/L Final     AST (SGOT)   Date Value Ref Range " Status   12/01/2017 31 17 - 59 U/L Final     Total Bilirubin   Date Value Ref Range Status   12/01/2017 0.9 0.2 - 1.3 mg/dL Final     Albumin   Date Value Ref Range Status   12/01/2017 4.80 3.40 - 4.80 g/dL Final     Globulin   Date Value Ref Range Status   12/01/2017 3.2 2.3 - 3.5 gm/dL Final     A/G Ratio   Date Value Ref Range Status   12/01/2017 1.5 1.1 - 1.8 g/dL Final     Lab Results   Component Value Date    WBC 20.76 (H) 12/01/2017    HGB 11.9 (L) 12/01/2017    HCT 34.0 (L) 12/01/2017    .5 (H) 12/01/2017     12/01/2017     Lab Results   Component Value Date    NEUTROABS 11.83 (H) 12/01/2017     Lab Results   Component Value Date    HCGQUANT <1 12/16/2014   ]        ASSESSMENT AND PLAN:      1. Chronic myelomonocytic leukemia, type I. Patient remains on observation. He has not been interested in any treatment and wants to continue with observation for now. His WBC and nuetorphil counts are little more elevated today than they have been recently, this could be reactive to what is going on with his hip. He feels good and is not having fevers or night sweats. Will continue observation for now, will change his follow-up however to every 3 months just to keep a better eye on his blood counts. He is agreeable to this.    2. Mild anemia secondary to problem #1, Hgb is actually better today. He has been checked and no nutritional deficiencies found.    3. Health maintenance, he is not a smoke and he had a colonoscopy in 2012.           This document has been signed by ISABELL Torres on December 1, 2017 10:51 AM

## 2017-12-04 RX ORDER — TIZANIDINE 4 MG/1
4 TABLET ORAL NIGHTLY PRN
Qty: 30 TABLET | Refills: 1 | Status: SHIPPED | OUTPATIENT
Start: 2017-12-04 | End: 2017-12-12 | Stop reason: SINTOL

## 2017-12-05 RX ORDER — TIZANIDINE 4 MG/1
TABLET ORAL
Qty: 90 TABLET | Refills: 1 | OUTPATIENT
Start: 2017-12-05

## 2017-12-12 ENCOUNTER — OFFICE VISIT (OUTPATIENT)
Dept: CARDIOLOGY | Facility: CLINIC | Age: 77
End: 2017-12-12

## 2017-12-12 VITALS
WEIGHT: 198 LBS | DIASTOLIC BLOOD PRESSURE: 82 MMHG | SYSTOLIC BLOOD PRESSURE: 132 MMHG | HEIGHT: 71 IN | BODY MASS INDEX: 27.72 KG/M2 | HEART RATE: 83 BPM

## 2017-12-12 DIAGNOSIS — I49.5 SICK SINUS SYNDROME (HCC): ICD-10-CM

## 2017-12-12 DIAGNOSIS — I48.0 PAROXYSMAL ATRIAL FIBRILLATION (HCC): ICD-10-CM

## 2017-12-12 DIAGNOSIS — I10 ESSENTIAL HYPERTENSION: Primary | ICD-10-CM

## 2017-12-12 PROCEDURE — 99213 OFFICE O/P EST LOW 20 MIN: CPT | Performed by: INTERNAL MEDICINE

## 2017-12-12 PROCEDURE — 93000 ELECTROCARDIOGRAM COMPLETE: CPT | Performed by: INTERNAL MEDICINE

## 2017-12-12 NOTE — PROGRESS NOTES
Jan Humphrey  77 y.o. male    12/12/2017  1. Essential hypertension    2. Paroxysmal atrial fibrillation    3. Sick sinus syndrome        History of Present Illness    Mr. Humphrey is here for follow-up of his above stated problems.  He denied any chest pain, palpitation, dizziness or syncope.  His blood pressure was in the normal range.  EKG confirmed sinus rhythm with paced atrial rhythm.  Nonspecific T-wave changes noted.  QTc interval was 437 ms.        SUBJECTIVE    Allergies   Allergen Reactions   • Soma [Carisoprodol]    • Sulfa Antibiotics Swelling     facial   • Other Rash     SILK TAPE         Past Medical History:   Diagnosis Date   • Atrophy of testis    • Benign prostatic hyperplasia    • Borderline glaucoma    • Chronic myelomonocytic leukemia    • Degenerative joint disease involving multiple joints    • Gout    • History of echocardiogram 05/08/2012    Normal left ventricular systolic function, EF 60%. Early diastolic dysfunction. Mild aortic and pulmonic regurgitation. Trace mitral and mild tricuspid regurgitation. No intracardiac mass pericardial effusion or cardiac thrombus.   • History of Holter monitoring 01/18/2011   • Impotence    • Lightheadedness    • Neck pain, musculoskeletal    • Sleep apnea     Probable reason for fatigue            Past Surgical History:   Procedure Laterality Date   • CARDIAC CATHETERIZATION  09/30/2009    No epicardial coronary artery disease noted that would explain patient's chest pain of the abnormal stress test noted. Normal left ventricular systolic function with no wall motion abnormalities   • CARDIAC CATHETERIZATION  05/24/1989    Normal catheterization, false-positive exercise treadmill. Noncardiac chest pain   • CARDIAC ELECTROPHYSIOLOGY PROCEDURE N/A 5/18/2017    Procedure: PPM generator change - dual;  Surgeon: Geneva Day MD;  Location: [facility] INVASIVE LOCATION;  Service:    • CARDIAC PACEMAKER PLACEMENT  06/2008    Dual chamber  permanent pacemaker implantation   • COLECTOMY PARTIAL / TOTAL  04/14/2000    Cecal diverticulitis. Removal of small segment of terminal ileum, cecum and right colon, sent to pathology   • COLON SURGERY  03/27/2016   • COLONOSCOPY  05/25/2012   • CYSTOSCOPY  11/15/2000    Urinary retention on the basis of medications and benign prostatic hypertrophy   • INGUINAL HERNIA REPAIR  02/15/2007    Recurrent right inguinal hernia. Repair of recurrent inguinal hernia with EPS Prolene mesh system.   • LAPAROSCOPIC CHOLECYSTECTOMY  10/26/2006    Gallstones. Laparoscopic cholecystectomy with operative cholangiogram   • PROSTATECTOMY  11/17/2000    Benign prostatic hypertrophy with urinary retention. Prostatitis. transurethral resection of prostate.   • STEROID INJECTION  10/21/2010    Decadron (Gout)    • STEROID INJECTION  07/23/2013    Kenalog (Gout) (4)         Family History   Problem Relation Age of Onset   • Cancer Other    • Colon cancer Other      parent   • Coronary artery disease Other    • Heart disease Other    • Hypertension Other    • Cholelithiasis Other    • Other Other      colon problems         Social History     Social History   • Marital status:      Spouse name: N/A   • Number of children: N/A   • Years of education: N/A     Occupational History   • Not on file.     Social History Main Topics   • Smoking status: Never Smoker   • Smokeless tobacco: Never Used   • Alcohol use No   • Drug use: No   • Sexual activity: Defer     Other Topics Concern   • Not on file     Social History Narrative         Current Outpatient Prescriptions   Medication Sig Dispense Refill   • allopurinol (ZYLOPRIM) 100 MG tablet Take 1 tablet by mouth 4 (Four) Times a Day. 360 tablet 1   • aspirin 81 MG chewable tablet Chew 81 mg daily.     • colchicine 0.6 MG tablet Take 1 tablet by mouth Daily. (Patient taking differently: Take 0.6 mg by mouth As Needed.) 90 tablet 1   • diclofenac (VOLTAREN) 50 MG EC tablet Take 1 tablet by  "mouth 2 (Two) Times a Day. 60 tablet 1   • glucosamine-chondroitin 500-400 MG capsule capsule Take 1 capsule by mouth 2 (Two) Times a Day With Meals.     • omeprazole (PRILOSEC) 20 MG capsule Take 1 capsule by mouth Daily. 30 capsule 1   • oxybutynin (DITROPAN) 5 MG tablet      • sotalol (BETAPACE) 80 MG tablet TAKE 1/2 TABLET TWICE DAILY 90 tablet 3   • tamsulosin (FLOMAX) 0.4 MG capsule 24 hr capsule Take 1 capsule by mouth every night.     • traMADol (ULTRAM) 50 MG tablet Take 1 tablet by mouth Every 6 (Six) Hours As Needed for Moderate Pain . (Patient taking differently: Take 50 mg by mouth As Needed for Moderate Pain .) 30 tablet 0     No current facility-administered medications for this visit.          OBJECTIVE    /82  Pulse 83  Ht 180.3 cm (70.98\")  Wt 89.8 kg (198 lb)  BMI 27.63 kg/m2        Review of Systems     Constitutional:  Denies recent weight loss, weight gain, fever or chills, no change in exercise tolerance     HENT:  Denies any hearing loss, epistaxis, hoarseness, or difficulty speaking.     Eyes: Wears eyeglasses or contact lenses     Respiratory:  Denies dyspnea with exertion,no cough, wheezing, or hemoptysis.     Cardiovascular: Negative for palpations, chest pain, orthopnea, PND, peripheral edema, syncope, or claudication.     Gastrointestinal:  Denies change in bowel habits, dyspepsia, ulcer disease, hematochezia, or melena.     Endocrine: Negative for cold intolerance, heat intolerance, polydipsia, polyphagia and polyuria. Denies any history of weight change, heat/cold intolerance, polydipsia, polyuria     Genitourinary: Negative.      Musculoskeletal: DJD    Skin:  Denies any change in hair or nails, rashes, or skin lesions.     Allergic/Immunologic: Negative.  Negative for environmental allergies, food allergies and immunocompromised state.     Neurological:  Denies any history of recurrent headaches, strokes, TIA, or seizure disorder.     Hematological: Chronic mild low " monocytic leukemia.  Stable    Psychiatric/Behavioral: Denies any history of depression, substance abuse, or change in cognitive function.         Physical Exam     Constitutional: Cooperative, alert and oriented, well-developed, well-nourished, in no acute distress.     HENT:   Head: Normocephalic, normal hair patterns, no masses or tenderness.  Ears, Nose, and Throat: No gross abnormalities. No pallor or cyanosis. Eyes: EOMS intact, PERRL, conjunctivae and lids unremarkable. Fundoscopic exam and visual fields not performed.   Neck: No palpable masses or adenopathy, no thyromegaly, no JVD, carotid pulses are full and equal bilaterally and without  Bruits.     Cardiovascular: Regular rhythm, S1 and S2 normal, no S3 or S4. No murmurs, gallops, or rubs detected.     Pulmonary/Chest: Chest: normal symmetry, no tenderness to palpation, normal respiratory excursion, no intercostal retraction, no use of accessory muscles.            Pulmonary: Normal breath sounds. No rales or ronchi.    Abdominal: Abdomen soft, bowel sounds normoactive, no masses, no hepatosplenomegaly, non-tender, no bruits.     Musculoskeletal: No deformities, clubbing, cyanosis, erythema, or edema observed.     Neurological: No gross motor or sensory deficits noted, affect appropriate, oriented to time, person, place.     Skin: Warm and dry to the touch, no apparent skin lesions or masses noted.     Psychiatric: He has a normal mood and affect. His behavior is normal. Judgment and thought content normal.           ECG 12 Lead  Date/Time: 2017 11:03 AM  Performed by: ALBERT KIRBY  Authorized by: ALBERT KIRBY   Comparison: not compared with previous ECG   Rhythm: sinus rhythm  Rate: normal  QRS axis: normal  Pacin% capture  Comments: EKG showed sinus rhythm with heart rate of 84 bpm.  Based atrial beats noted.  QTc interval 437 ms.              Lab Results   Component Value Date    WBC 20.76 (H) 2017    HGB  11.9 (L) 12/01/2017    HCT 34.0 (L) 12/01/2017    .5 (H) 12/01/2017     12/01/2017     Lab Results   Component Value Date    GLUCOSE 119 (H) 12/01/2017    BUN 15 12/01/2017    CREATININE 1.00 12/01/2017    EGFRIFNONA 73 12/01/2017    BCR 15.0 12/01/2017    CO2 22.0 12/01/2017    CALCIUM 9.2 12/01/2017    ALBUMIN 4.80 12/01/2017    LABIL2 1.5 12/01/2017    AST 31 12/01/2017    ALT 30 12/01/2017     Lab Results   Component Value Date    CHOL 96 03/21/2017     Lab Results   Component Value Date    TRIG 170 03/21/2017    TRIG 113 09/22/2015     Lab Results   Component Value Date    HDL 26 (L) 03/21/2017     Lab Results   Component Value Date    LDLCALC 62 09/22/2015     No results found for: LDL  No results found for: HDLLDLRATIO  No components found for: CHOLHDL  No results found for: HGBA1C  Lab Results   Component Value Date    TSH 1.79 12/16/2014           ASSESSMENT AND PLAN  Mr. Humphrey is stable with no reoccurrence of atrial fibrillation.  He remains in sinus rhythm.  QTc interval within normal limits.  Present antiplatelet therapy with aspirin, antiarrhythmic therapy with sotalol have been continued.  Pacemaker functioning well.    Jan was seen today for follow-up.    Diagnoses and all orders for this visit:    Essential hypertension    Paroxysmal atrial fibrillation    Sick sinus syndrome        Geneva Day MD  12/12/2017  11:03 AM

## 2018-01-05 ENCOUNTER — OFFICE VISIT (OUTPATIENT)
Dept: INTERNAL MEDICINE | Facility: CLINIC | Age: 78
End: 2018-01-05

## 2018-01-05 VITALS
SYSTOLIC BLOOD PRESSURE: 130 MMHG | BODY MASS INDEX: 27.82 KG/M2 | WEIGHT: 198.7 LBS | DIASTOLIC BLOOD PRESSURE: 80 MMHG | HEIGHT: 71 IN

## 2018-01-05 DIAGNOSIS — J06.9 URI, ACUTE: Primary | ICD-10-CM

## 2018-01-05 DIAGNOSIS — R05.9 COUGH: ICD-10-CM

## 2018-01-05 PROCEDURE — 99213 OFFICE O/P EST LOW 20 MIN: CPT | Performed by: INTERNAL MEDICINE

## 2018-01-05 RX ORDER — FLUTICASONE PROPIONATE 50 MCG
2 SPRAY, SUSPENSION (ML) NASAL DAILY
Qty: 1 BOTTLE | Refills: 1 | Status: SHIPPED | OUTPATIENT
Start: 2018-01-05 | End: 2018-04-18

## 2018-01-05 RX ORDER — LORATADINE 10 MG/1
10 TABLET ORAL DAILY
Qty: 30 TABLET | Refills: 0 | Status: SHIPPED | OUTPATIENT
Start: 2018-01-05 | End: 2018-01-21

## 2018-01-08 NOTE — PROGRESS NOTES
Subjective   Jan Humphrey is a 77 y.o. male who presents with URI for about a week.Complains of nasal congestion, drainage and nonproductive cough.  URI    This is a new problem. The current episode started in the past 7 days. There has been no fever. Associated symptoms include congestion. Pertinent negatives include no abdominal pain, dysuria, nausea, neck pain, sinus pain, sneezing, swollen glands or vomiting. He has tried acetaminophen for the symptoms. The treatment provided mild relief.         Patient is in co  Past Medical History:   Diagnosis Date   • Atrophy of testis    • Benign prostatic hyperplasia    • Borderline glaucoma    • Chronic myelomonocytic leukemia    • Degenerative joint disease involving multiple joints    • Gout    • History of echocardiogram 05/08/2012    Normal left ventricular systolic function, EF 60%. Early diastolic dysfunction. Mild aortic and pulmonic regurgitation. Trace mitral and mild tricuspid regurgitation. No intracardiac mass pericardial effusion or cardiac thrombus.   • History of Holter monitoring 01/18/2011   • Impotence    • Lightheadedness    • Neck pain, musculoskeletal    • Sleep apnea     Probable reason for fatigue        Past Surgical History:   Procedure Laterality Date   • CARDIAC CATHETERIZATION  09/30/2009    No epicardial coronary artery disease noted that would explain patient's chest pain of the abnormal stress test noted. Normal left ventricular systolic function with no wall motion abnormalities   • CARDIAC CATHETERIZATION  05/24/1989    Normal catheterization, false-positive exercise treadmill. Noncardiac chest pain   • CARDIAC ELECTROPHYSIOLOGY PROCEDURE N/A 5/18/2017    Procedure: PPM generator change - dual;  Surgeon: Geneva Day MD;  Location: [facility] INVASIVE LOCATION;  Service:    • CARDIAC PACEMAKER PLACEMENT  06/2008    Dual chamber permanent pacemaker implantation   • COLECTOMY PARTIAL / TOTAL  04/14/2000    Cecal  diverticulitis. Removal of small segment of terminal ileum, cecum and right colon, sent to pathology   • COLON SURGERY  03/27/2016   • COLONOSCOPY  05/25/2012   • CYSTOSCOPY  11/15/2000    Urinary retention on the basis of medications and benign prostatic hypertrophy   • INGUINAL HERNIA REPAIR  02/15/2007    Recurrent right inguinal hernia. Repair of recurrent inguinal hernia with EPS Prolene mesh system.   • LAPAROSCOPIC CHOLECYSTECTOMY  10/26/2006    Gallstones. Laparoscopic cholecystectomy with operative cholangiogram   • PROSTATECTOMY  11/17/2000    Benign prostatic hypertrophy with urinary retention. Prostatitis. transurethral resection of prostate.   • STEROID INJECTION  10/21/2010    Decadron (Gout)    • STEROID INJECTION  07/23/2013    Kenalog (Gout) (4)       Social History   Substance Use Topics   • Smoking status: Never Smoker   • Smokeless tobacco: Never Used   • Alcohol use No     Family History   Problem Relation Age of Onset   • Cancer Other    • Colon cancer Other      parent   • Coronary artery disease Other    • Heart disease Other    • Hypertension Other    • Cholelithiasis Other    • Other Other      colon problems     Allergies   Allergen Reactions   • Soma [Carisoprodol]    • Sulfa Antibiotics Swelling     facial   • Other Rash     SILK TAPE     Current Outpatient Prescriptions on File Prior to Visit   Medication Sig Dispense Refill   • allopurinol (ZYLOPRIM) 100 MG tablet Take 1 tablet by mouth 4 (Four) Times a Day. 360 tablet 1   • aspirin 81 MG chewable tablet Chew 81 mg daily.     • colchicine 0.6 MG tablet Take 1 tablet by mouth Daily. (Patient taking differently: Take 0.6 mg by mouth As Needed.) 90 tablet 1   • diclofenac (VOLTAREN) 50 MG EC tablet Take 1 tablet by mouth 2 (Two) Times a Day. 60 tablet 1   • glucosamine-chondroitin 500-400 MG capsule capsule Take 1 capsule by mouth 2 (Two) Times a Day With Meals.     • omeprazole (PRILOSEC) 20 MG capsule Take 1 capsule by mouth Daily. 30  capsule 1   • oxybutynin (DITROPAN) 5 MG tablet      • sotalol (BETAPACE) 80 MG tablet TAKE 1/2 TABLET TWICE DAILY 90 tablet 3   • tamsulosin (FLOMAX) 0.4 MG capsule 24 hr capsule Take 1 capsule by mouth every night.       No current facility-administered medications on file prior to visit.      Review of Systems   HENT: Positive for congestion. Negative for facial swelling, sinus pain, sinus pressure, sneezing, trouble swallowing and voice change.    Respiratory: Negative for chest tightness.    Gastrointestinal: Negative for abdominal pain, nausea and vomiting.   Genitourinary: Negative for dysuria, flank pain and frequency.   Musculoskeletal: Negative for back pain and neck pain.   Neurological: Negative for dizziness and light-headedness.       Objective   Physical Exam   Constitutional: He appears well-developed and well-nourished.   HENT:   Head: Normocephalic and atraumatic.   Right Ear: Hearing and tympanic membrane normal.   Left Ear: Hearing and tympanic membrane normal.   Nose: Mucosal edema and rhinorrhea present. No sinus tenderness. Right sinus exhibits no maxillary sinus tenderness and no frontal sinus tenderness. Left sinus exhibits no maxillary sinus tenderness and no frontal sinus tenderness.   Mouth/Throat: Oropharynx is clear and moist and mucous membranes are normal.   Eyes: Conjunctivae and EOM are normal. Pupils are equal, round, and reactive to light.   Neck: Neck supple. No JVD present. No thyromegaly present.   Cardiovascular: Normal rate and regular rhythm.    Pulmonary/Chest: Effort normal and breath sounds normal.   Abdominal: Soft. Bowel sounds are normal.   Lymphadenopathy:     He has no cervical adenopathy.   Skin: Skin is warm.   Nursing note and vitals reviewed.          Patient Active Problem List   Diagnosis   • Sick sinus syndrome   • Essential hypertension   • Paroxysmal atrial fibrillation   • Gout   • Chronic myeloid leukemia   • Chronic myelomonocytic leukemia not having  achieved remission   • Sleep apnea   • Neck pain, musculoskeletal   • Lightheadedness   • Impotence   • History of Holter monitoring   • History of echocardiogram   • Degenerative joint disease involving multiple joints   • Chronic myelomonocytic leukemia   • Borderline glaucoma   • Benign prostatic hyperplasia   • Atrophy of testis               Assessment/Plan   Jan was seen today for uri and cough.    Diagnoses and all orders for this visit:    URI, acute    Cough    Other orders  -     fluticasone (FLONASE) 50 MCG/ACT nasal spray; 2 sprays into each nostril Daily.  -     loratadine (CLARITIN) 10 MG tablet; Take 1 tablet by mouth Daily.           Plan      Current symptoms are likely secondary to viral URI.  Symptomatic treatment.  Flonase nasal spray 2 puffs daily.  Claritin 10 mg daily.  Robitussin DM OTC for cough.  Advised to increase intake of fluids.      Follow up if not improved or for routine care.            This document has been electronically signed by Alicia Gaona MD on January 7, 2018 10:01 PM

## 2018-01-15 RX ORDER — SOTALOL HYDROCHLORIDE 80 MG/1
40 TABLET ORAL
Qty: 90 TABLET | Refills: 2 | Status: SHIPPED | OUTPATIENT
Start: 2018-01-15 | End: 2018-01-15 | Stop reason: SDUPTHER

## 2018-01-15 RX ORDER — ALLOPURINOL 100 MG/1
100 TABLET ORAL 4 TIMES DAILY
Qty: 360 TABLET | Refills: 1 | Status: SHIPPED | OUTPATIENT
Start: 2018-01-15 | End: 2018-10-22 | Stop reason: SDUPTHER

## 2018-01-15 RX ORDER — OMEPRAZOLE 20 MG/1
20 CAPSULE, DELAYED RELEASE ORAL DAILY
Qty: 90 CAPSULE | Refills: 1 | Status: SHIPPED | OUTPATIENT
Start: 2018-01-15 | End: 2018-05-17 | Stop reason: SDUPTHER

## 2018-01-15 RX ORDER — SOTALOL HYDROCHLORIDE 80 MG/1
40 TABLET ORAL
Qty: 90 TABLET | Refills: 2 | Status: SHIPPED | OUTPATIENT
Start: 2018-01-15 | End: 2018-03-06 | Stop reason: SDUPTHER

## 2018-01-21 ENCOUNTER — HOSPITAL ENCOUNTER (EMERGENCY)
Facility: HOSPITAL | Age: 78
Discharge: HOME | End: 2018-01-21
Attending: EMERGENCY MEDICINE | Admitting: EMERGENCY MEDICINE

## 2018-01-21 ENCOUNTER — APPOINTMENT (OUTPATIENT)
Dept: GENERAL RADIOLOGY | Facility: HOSPITAL | Age: 78
End: 2018-01-21

## 2018-01-21 VITALS
HEIGHT: 71 IN | TEMPERATURE: 98.8 F | WEIGHT: 201 LBS | RESPIRATION RATE: 18 BRPM | HEART RATE: 61 BPM | OXYGEN SATURATION: 95 % | DIASTOLIC BLOOD PRESSURE: 59 MMHG | BODY MASS INDEX: 28.14 KG/M2 | SYSTOLIC BLOOD PRESSURE: 120 MMHG

## 2018-01-21 DIAGNOSIS — R07.9 CHEST PAIN, UNSPECIFIED TYPE: Primary | ICD-10-CM

## 2018-01-21 LAB
ALBUMIN SERPL-MCNC: 4.4 G/DL (ref 3.4–4.8)
ALBUMIN/GLOB SERPL: 1.5 G/DL (ref 1.1–1.8)
ALP SERPL-CCNC: 43 U/L (ref 38–126)
ALT SERPL W P-5'-P-CCNC: 26 U/L (ref 21–72)
ANION GAP SERPL CALCULATED.3IONS-SCNC: 14 MMOL/L (ref 5–15)
ANISOCYTOSIS BLD QL: ABNORMAL
AST SERPL-CCNC: 26 U/L (ref 17–59)
BASOPHILS # BLD MANUAL: 0.27 10*3/MM3 (ref 0–0.2)
BASOPHILS NFR BLD AUTO: 1 % (ref 0–2)
BILIRUB SERPL-MCNC: 1.1 MG/DL (ref 0.2–1.3)
BUN BLD-MCNC: 11 MG/DL (ref 7–21)
BUN/CREAT SERPL: 14.7 (ref 7–25)
CALCIUM SPEC-SCNC: 8.9 MG/DL (ref 8.4–10.2)
CHLORIDE SERPL-SCNC: 103 MMOL/L (ref 95–110)
CO2 SERPL-SCNC: 23 MMOL/L (ref 22–31)
CREAT BLD-MCNC: 0.75 MG/DL (ref 0.7–1.3)
D-DIMER, QUANTITATIVE (MAD,POW, STR): <270 NG/ML (FEU) (ref 0–470)
DEPRECATED RDW RBC AUTO: 101.6 FL (ref 35.1–43.9)
ELLIPTOCYTES BLD QL SMEAR: ABNORMAL
ERYTHROCYTE [DISTWIDTH] IN BLOOD BY AUTOMATED COUNT: 26.9 % (ref 11.5–14.5)
GFR SERPL CREATININE-BSD FRML MDRD: 101 ML/MIN/1.73 (ref 60–98)
GIANT PLATELETS: ABNORMAL
GLOBULIN UR ELPH-MCNC: 2.9 GM/DL (ref 2.3–3.5)
GLUCOSE BLD-MCNC: 134 MG/DL (ref 60–100)
HCT VFR BLD AUTO: 34 % (ref 39–49)
HGB BLD-MCNC: 11.8 G/DL (ref 13.7–17.3)
HOLD SPECIMEN: NORMAL
HOLD SPECIMEN: NORMAL
LARGE PLATELETS: ABNORMAL
LYMPHOCYTES # BLD MANUAL: 4.53 10*3/MM3 (ref 0.6–4.2)
LYMPHOCYTES NFR BLD MANUAL: 17 % (ref 10–50)
LYMPHOCYTES NFR BLD MANUAL: 24 % (ref 0–12)
MACROCYTES BLD QL SMEAR: ABNORMAL
MCH RBC QN AUTO: 36.2 PG (ref 26.5–34)
MCHC RBC AUTO-ENTMCNC: 34.7 G/DL (ref 31.5–36.3)
MCV RBC AUTO: 104.3 FL (ref 80–98)
MONOCYTES # BLD AUTO: 6.4 10*3/MM3 (ref 0–0.9)
MYELOCYTES NFR BLD MANUAL: 6 % (ref 0–0)
NEUTROPHILS # BLD AUTO: 13.86 10*3/MM3 (ref 2–8.6)
NEUTROPHILS NFR BLD MANUAL: 52 % (ref 37–80)
NRBC SPEC MANUAL: 4 /100 WBC (ref 0–0)
NT-PROBNP SERPL-MCNC: 194 PG/ML (ref 0–1800)
PLATELET # BLD AUTO: 208 10*3/MM3 (ref 150–450)
PMV BLD AUTO: ABNORMAL FL (ref 8–12)
POLYCHROMASIA BLD QL SMEAR: ABNORMAL
POTASSIUM BLD-SCNC: 3.9 MMOL/L (ref 3.5–5.1)
PROT SERPL-MCNC: 7.3 G/DL (ref 6.3–8.6)
RBC # BLD AUTO: 3.26 10*6/MM3 (ref 4.37–5.74)
SODIUM BLD-SCNC: 140 MMOL/L (ref 137–145)
TARGETS BLD QL SMEAR: ABNORMAL
TROPONIN I SERPL-MCNC: <0.012 NG/ML
TROPONIN I SERPL-MCNC: <0.012 NG/ML
WBC MORPH BLD: NORMAL
WBC NRBC COR # BLD: 26.65 10*3/MM3 (ref 3.2–9.8)
WHOLE BLOOD HOLD SPECIMEN: NORMAL
WHOLE BLOOD HOLD SPECIMEN: NORMAL

## 2018-01-21 PROCEDURE — 80053 COMPREHEN METABOLIC PANEL: CPT | Performed by: EMERGENCY MEDICINE

## 2018-01-21 PROCEDURE — 71045 X-RAY EXAM CHEST 1 VIEW: CPT

## 2018-01-21 PROCEDURE — 85379 FIBRIN DEGRADATION QUANT: CPT | Performed by: EMERGENCY MEDICINE

## 2018-01-21 PROCEDURE — 83880 ASSAY OF NATRIURETIC PEPTIDE: CPT | Performed by: EMERGENCY MEDICINE

## 2018-01-21 PROCEDURE — 84484 ASSAY OF TROPONIN QUANT: CPT | Performed by: EMERGENCY MEDICINE

## 2018-01-21 PROCEDURE — 99284 EMERGENCY DEPT VISIT MOD MDM: CPT

## 2018-01-21 PROCEDURE — 85025 COMPLETE CBC W/AUTO DIFF WBC: CPT | Performed by: EMERGENCY MEDICINE

## 2018-01-21 PROCEDURE — 93010 ELECTROCARDIOGRAM REPORT: CPT | Performed by: INTERNAL MEDICINE

## 2018-01-21 PROCEDURE — 93005 ELECTROCARDIOGRAM TRACING: CPT

## 2018-01-21 PROCEDURE — 93005 ELECTROCARDIOGRAM TRACING: CPT | Performed by: EMERGENCY MEDICINE

## 2018-01-21 PROCEDURE — 85007 BL SMEAR W/DIFF WBC COUNT: CPT | Performed by: EMERGENCY MEDICINE

## 2018-01-21 RX ORDER — NITROGLYCERIN 0.4 MG/1
0.4 TABLET SUBLINGUAL
Status: DISCONTINUED | OUTPATIENT
Start: 2018-01-21 | End: 2018-01-21 | Stop reason: HOSPADM

## 2018-01-21 RX ORDER — ASPIRIN 325 MG
325 TABLET ORAL ONCE
Status: COMPLETED | OUTPATIENT
Start: 2018-01-21 | End: 2018-01-21

## 2018-01-21 RX ORDER — SODIUM CHLORIDE 0.9 % (FLUSH) 0.9 %
10 SYRINGE (ML) INJECTION AS NEEDED
Status: DISCONTINUED | OUTPATIENT
Start: 2018-01-21 | End: 2018-01-21 | Stop reason: HOSPADM

## 2018-01-21 RX ADMIN — NITROGLYCERIN 0.4 MG: 0.4 TABLET SUBLINGUAL at 13:20

## 2018-01-21 RX ADMIN — NITROGLYCERIN 0.4 MG: 0.4 TABLET SUBLINGUAL at 15:09

## 2018-01-21 RX ADMIN — NITROGLYCERIN 0.4 MG: 0.4 TABLET SUBLINGUAL at 13:29

## 2018-01-21 RX ADMIN — ASPIRIN 325 MG: 325 TABLET, COATED ORAL at 13:18

## 2018-01-21 NOTE — ED NOTES
Pt has been experiencing chest pain on the right side that radiates to the right side since yesterday morning.  Pt c/o nausea as well.     Anay Nichole RN  01/21/18 [id number]

## 2018-01-21 NOTE — ED PROVIDER NOTES
Subjective   History of Present Illness  77-year-old male presents to the emergency department with right-sided chest pain is worse with inspiration described as a tightness sensation.  He also has some pain radiating down his right arm.  He does report that he had a recent viral illness and thought maybe he was having pleurisy.  The pain was worse and he came to the emergency department today for evaluation.  He is a previous history of bradycardia requiring pacemaker placement no previous history of coronary artery disease or stents.  Denies any previous history of heart attacks.  No previous history of blood clots.  He is not on any anticoagulation.  The pain isn't present since yesterday and has been constant since that time.  He had some nausea associated with the pain but no vomiting.  Denies any diaphoresis.  Review of Systems   Constitutional: Negative for chills and fever.   HENT: Negative for congestion, nosebleeds, postnasal drip, sinus pressure and sore throat.    Respiratory: Positive for chest tightness and shortness of breath. Negative for cough, wheezing and stridor.    Gastrointestinal: Positive for nausea. Negative for abdominal pain, constipation, diarrhea and vomiting.   Genitourinary: Negative for dysuria, flank pain, frequency, hematuria, testicular pain and urgency.   Musculoskeletal: Negative for arthralgias and myalgias.        Right arm pain   Skin: Negative for rash.   Neurological: Negative for syncope, light-headedness and headaches.   Psychiatric/Behavioral: Negative.        Past Medical History:   Diagnosis Date   • Atrophy of testis    • Benign prostatic hyperplasia    • Borderline glaucoma    • Chronic myelomonocytic leukemia    • Degenerative joint disease involving multiple joints    • Gout    • History of echocardiogram 05/08/2012    Normal left ventricular systolic function, EF 60%. Early diastolic dysfunction. Mild aortic and pulmonic regurgitation. Trace mitral and mild  tricuspid regurgitation. No intracardiac mass pericardial effusion or cardiac thrombus.   • History of Holter monitoring 01/18/2011   • Impotence    • Lightheadedness    • Neck pain, musculoskeletal    • Sleep apnea     Probable reason for fatigue          Allergies   Allergen Reactions   • Sulfa Antibiotics Swelling     facial   • Other Rash     SILK TAPE   • Soma [Carisoprodol] Swelling and Rash       Past Surgical History:   Procedure Laterality Date   • CARDIAC CATHETERIZATION  09/30/2009    No epicardial coronary artery disease noted that would explain patient's chest pain of the abnormal stress test noted. Normal left ventricular systolic function with no wall motion abnormalities   • CARDIAC CATHETERIZATION  05/24/1989    Normal catheterization, false-positive exercise treadmill. Noncardiac chest pain   • CARDIAC ELECTROPHYSIOLOGY PROCEDURE N/A 5/18/2017    Procedure: PPM generator change - dual;  Surgeon: Geneva Day MD;  Location: [facility] INVASIVE LOCATION;  Service:    • CARDIAC PACEMAKER PLACEMENT  06/2008    Dual chamber permanent pacemaker implantation   • COLECTOMY PARTIAL / TOTAL  04/14/2000    Cecal diverticulitis. Removal of small segment of terminal ileum, cecum and right colon, sent to pathology   • COLON SURGERY  03/27/2016   • COLONOSCOPY  05/25/2012   • CYSTOSCOPY  11/15/2000    Urinary retention on the basis of medications and benign prostatic hypertrophy   • INGUINAL HERNIA REPAIR  02/15/2007    Recurrent right inguinal hernia. Repair of recurrent inguinal hernia with EPS Prolene mesh system.   • LAPAROSCOPIC CHOLECYSTECTOMY  10/26/2006    Gallstones. Laparoscopic cholecystectomy with operative cholangiogram   • PROSTATECTOMY  11/17/2000    Benign prostatic hypertrophy with urinary retention. Prostatitis. transurethral resection of prostate.   • STEROID INJECTION  10/21/2010    Decadron (Gout)    • STEROID INJECTION  07/23/2013    Kenalog (Gout) (4)       Family History    Problem Relation Age of Onset   • Cancer Other    • Colon cancer Other      parent   • Coronary artery disease Other    • Heart disease Other    • Hypertension Other    • Cholelithiasis Other    • Other Other      colon problems       Social History     Social History   • Marital status:      Spouse name: N/A   • Number of children: N/A   • Years of education: N/A     Social History Main Topics   • Smoking status: Never Smoker   • Smokeless tobacco: Never Used   • Alcohol use No   • Drug use: No   • Sexual activity: Defer     Other Topics Concern   • None     Social History Narrative           Objective   Physical Exam   Constitutional: He is oriented to person, place, and time. He appears well-developed and well-nourished.   HENT:   Head: Normocephalic and atraumatic.   Eyes: Conjunctivae and EOM are normal. Pupils are equal, round, and reactive to light.   Neck: Normal range of motion. Neck supple.   Cardiovascular: Normal rate, regular rhythm and normal heart sounds.    Pulmonary/Chest: Effort normal and breath sounds normal. No respiratory distress. He has no wheezes. He has no rales.   Patient is tender to palpation along the right anterior lateral chest wall   Abdominal: Soft. He exhibits no distension. There is no tenderness. There is no rebound and no guarding.   Musculoskeletal: Normal range of motion.   Neurological: He is alert and oriented to person, place, and time.   Skin: Skin is warm and dry.   Psychiatric: He has a normal mood and affect.   Nursing note and vitals reviewed.      ECG 12 Lead    Date/Time: 1/21/2018 2:06 PM  Performed by: KAREN BELTRE  Authorized by: KAREN BELTRE   Interpreted by physician  Comments: Paced rate is 62.  Atrial pacemaker.  No STEMI.               ED Course  ED Course        Labs Reviewed   COMPREHENSIVE METABOLIC PANEL - Abnormal; Notable for the following:        Result Value    Glucose 134 (*)     All other components within normal  limits    Narrative:     The MDRD GFR formula is only valid for adults with stable renal function between ages 18 and 70.   CBC WITH AUTO DIFFERENTIAL - Abnormal; Notable for the following:     WBC 26.65 (*)     RBC 3.26 (*)     Hemoglobin 11.8 (*)     Hematocrit 34.0 (*)     .3 (*)     MCH 36.2 (*)     RDW 26.9 (*)     RDW-.6 (*)     All other components within normal limits   MANUAL DIFFERENTIAL - Abnormal; Notable for the following:     Monocyte % 24.0 (*)     Myelocyte % 6.0 (*)     Neutrophils Absolute 13.86 (*)     Lymphocytes Absolute 4.53 (*)     Monocytes Absolute 6.40 (*)     Basophils Absolute 0.27 (*)     nRBC 4.0 (*)     All other components within normal limits   TROPONIN (IN-HOUSE) - Normal   TROPONIN (IN-HOUSE) - Normal   BNP (IN-HOUSE) - Normal   D-DIMER, QUANTITATIVE - Normal    Narrative:     Dimer values <500 ng/ml FEU are FDA approved as aid in diagnosis of deep venous thrombosis and pulmonary embolism.  This test should not be used in an exclusion strategy with pretest probability alone.    A recent guideline regarding diagnosis for pulmonary thomboembolism recommends an adjusted exclusion criterion of age x 10 ng/ml FEU for patients >50 years of age ([employer] Intern Med 2015; 163: 701-711).   RAINBOW DRAW    Narrative:     The following orders were created for panel order [place] Draw.  Procedure                               Abnormality         Status                     ---------                               -----------         ------                     Light Blue Top[[id number]]                                   Final result               Green Top (Gel)[[id number]]                                  Final result               Lavender Top[[id number]]                                     Final result               Gold Top - SST[[id number]]                                   Final result                 Please view results for these tests on the individual orders.   TROPONIN (IN-HOUSE)    CBC AND DIFFERENTIAL    Narrative:     The following orders were created for panel order CBC & Differential.  Procedure                               Abnormality         Status                     ---------                               -----------         ------                     Manual Differential[[id number]]          Abnormal            Final result               Scan Slide[[id number]]                                                                  CBC Auto Differential[[id number]]        Abnormal            Final result                 Please view results for these tests on the individual orders.   LIGHT BLUE TOP   GREEN TOP   LAVENDER TOP   GOLD TOP - SST     XR Chest 1 View   Final Result   No acute pulmonary or pleural finding. Bronchovascular   accentuation on the basis of the inspiratory effort.      Electronically signed by:  Justin Verdin MD  1/21/2018 2:26 PM   [facility] Workstation: [id number]                  MDM  Number of Diagnoses or Management Options  Chest pain, unspecified type:   Diagnosis management comments: Patient has been having constant chest pain for last day and a half.  EKG is unremarkable and shows a paced rhythm.  His initial troponin is less than 0.012 effectively ruling out acute coronary syndrome given his constant pain for the last day and a half.  He does have a history of leukemia although is now receiving chemotherapy.  We're waiting on a d-dimer to risk stratify this patient further for pulmonary embolism.  It is possible patient could've been pleurisy given his recent viral illness however given his leukemia with chest pain is worse with inspiration he does need some risk stratification for pulmonary embolism.      D-dimer is negative.  Patient has 2 troponins that were negative.  A negative d-dimer makes patient low risk for PE in today for troponins were normal EKG make it unlikely this is acute coronary syndrome especially given the duration of the patient's pain.  We'll  discharge patient home with follow-up with primary care doctor.      Final diagnoses:   Chest pain, unspecified type            Rajesh Danielson MD  01/21/18 [id number]

## 2018-01-21 NOTE — DISCHARGE INSTRUCTIONS
Nonspecific Chest Pain  Chest pain can be caused by many different conditions. There is a chance that your pain could be related to something serious, such as a heart attack or a blood clot in your lungs. Chest pain can also be caused by conditions that are not life-threatening. If you have chest pain, it is very important to follow up with your doctor.  Follow these instructions at home:  Medicines  · If you were prescribed an antibiotic medicine, take it as told by your doctor. Do not stop taking the antibiotic even if you start to feel better.  · Take over-the-counter and prescription medicines only as told by your doctor.  Lifestyle  · Do not use any products that contain nicotine or tobacco, such as cigarettes and e-cigarettes. If you need help quitting, ask your doctor.  · Do not drink alcohol.  · Make lifestyle changes as told by your doctor. These may include:  ¨ Getting regular exercise. Ask your doctor for some activities that are safe for you.  ¨ Eating a heart-healthy diet. A diet specialist (dietitian) can help you to learn healthy eating options.  ¨ Staying at a healthy weight.  ¨ Managing diabetes, if needed.  ¨ Lowering your stress, as with deep breathing or spending time in nature.  General instructions  · Avoid any activities that make you feel chest pain.  · If your chest pain is because of heartburn:  ¨ Raise (elevate) the head of your bed about 6 inches (15 cm). You can do this by putting blocks under the bed legs at the head of the bed.  ¨ Do not sleep with extra pillows under your head. That does not help heartburn.  · Keep all follow-up visits as told by your doctor. This is important. This includes any further testing if your chest pain does not go away.  Contact a doctor if:  · Your chest pain does not go away.  · You have a rash with blisters on your chest.  · You have a fever.  · You have chills.  Get help right away if:  · Your chest pain is worse.  · You have a cough that gets worse, or  you cough up blood.  · You have very bad (severe) pain in your belly (abdomen).  · You are very weak.  · You pass out (faint).  · You have either of these for no clear reason:  ¨ Sudden chest discomfort.  ¨ Sudden discomfort in your arms, back, neck, or jaw.  · You have shortness of breath at any time.  · You suddenly start to sweat, or your skin gets clammy.  · You feel sick to your stomach (nauseous).  · You throw up (vomit).  · You suddenly feel light-headed or dizzy.  · Your heart starts to beat fast, or it feels like it is skipping beats.  These symptoms may be an emergency. Do not wait to see if the symptoms will go away. Get medical help right away. Call your local emergency services (911 in the [place] Do not drive yourself to the hospital.   This information is not intended to replace advice given to you by your health care provider. Make sure you discuss any questions you have with your health care provider.  Document Released: 06/05/2009 Document Revised: 09/11/2017 Document Reviewed: 09/11/2017  [employer] Interactive Patient Education © 2017 [employer]

## 2018-01-22 ENCOUNTER — OFFICE VISIT (OUTPATIENT)
Dept: INTERNAL MEDICINE | Facility: CLINIC | Age: 78
End: 2018-01-22

## 2018-01-22 VITALS
HEIGHT: 71 IN | SYSTOLIC BLOOD PRESSURE: 160 MMHG | BODY MASS INDEX: 28.22 KG/M2 | DIASTOLIC BLOOD PRESSURE: 78 MMHG | WEIGHT: 201.6 LBS

## 2018-01-22 DIAGNOSIS — R07.81 CHEST PAIN, PLEURITIC: Primary | ICD-10-CM

## 2018-01-22 PROCEDURE — 99213 OFFICE O/P EST LOW 20 MIN: CPT | Performed by: INTERNAL MEDICINE

## 2018-01-22 PROCEDURE — 96372 THER/PROPH/DIAG INJ SC/IM: CPT | Performed by: INTERNAL MEDICINE

## 2018-01-22 RX ORDER — TRIAMCINOLONE ACETONIDE 40 MG/ML
40 INJECTION, SUSPENSION INTRA-ARTICULAR; INTRAMUSCULAR ONCE
Status: COMPLETED | OUTPATIENT
Start: 2018-01-22 | End: 2018-01-22

## 2018-01-22 RX ADMIN — TRIAMCINOLONE ACETONIDE 40 MG: 40 INJECTION, SUSPENSION INTRA-ARTICULAR; INTRAMUSCULAR at 15:26

## 2018-01-25 NOTE — PROGRESS NOTES
Subjective   Jan Humphrey is a 77 y.o. male     Patient is in complaining of right sided chest pain.Pain is described as sharp and is worse with inspiration and cough. He denies any fever or chills. Has some mild shortness of breath with that.  He was seen in ER where cardiac evaluation was negative.  Chest X ray did not show any abnormalities and D dimer was normal.    Past Medical History:   Diagnosis Date   • Atrophy of testis    • Benign prostatic hyperplasia    • Borderline glaucoma    • Chronic myelomonocytic leukemia    • Degenerative joint disease involving multiple joints    • Gout    • History of echocardiogram 05/08/2012    Normal left ventricular systolic function, EF 60%. Early diastolic dysfunction. Mild aortic and pulmonic regurgitation. Trace mitral and mild tricuspid regurgitation. No intracardiac mass pericardial effusion or cardiac thrombus.   • History of Holter monitoring 01/18/2011   • Impotence    • Lightheadedness    • Neck pain, musculoskeletal    • Sleep apnea     Probable reason for fatigue        Past Surgical History:   Procedure Laterality Date   • CARDIAC CATHETERIZATION  09/30/2009    No epicardial coronary artery disease noted that would explain patient's chest pain of the abnormal stress test noted. Normal left ventricular systolic function with no wall motion abnormalities   • CARDIAC CATHETERIZATION  05/24/1989    Normal catheterization, false-positive exercise treadmill. Noncardiac chest pain   • CARDIAC ELECTROPHYSIOLOGY PROCEDURE N/A 5/18/2017    Procedure: PPM generator change - dual;  Surgeon: Geneva Day MD;  Location: [facility] INVASIVE LOCATION;  Service:    • CARDIAC PACEMAKER PLACEMENT  06/2008    Dual chamber permanent pacemaker implantation   • COLECTOMY PARTIAL / TOTAL  04/14/2000    Cecal diverticulitis. Removal of small segment of terminal ileum, cecum and right colon, sent to pathology   • COLON SURGERY  03/27/2016   • COLONOSCOPY  05/25/2012    • CYSTOSCOPY  11/15/2000    Urinary retention on the basis of medications and benign prostatic hypertrophy   • INGUINAL HERNIA REPAIR  02/15/2007    Recurrent right inguinal hernia. Repair of recurrent inguinal hernia with EPS Prolene mesh system.   • LAPAROSCOPIC CHOLECYSTECTOMY  10/26/2006    Gallstones. Laparoscopic cholecystectomy with operative cholangiogram   • PROSTATECTOMY  11/17/2000    Benign prostatic hypertrophy with urinary retention. Prostatitis. transurethral resection of prostate.   • STEROID INJECTION  10/21/2010    Decadron (Gout)    • STEROID INJECTION  07/23/2013    Kenalog (Gout) (4)       Social History   Substance Use Topics   • Smoking status: Never Smoker   • Smokeless tobacco: Never Used   • Alcohol use No     Family History   Problem Relation Age of Onset   • Cancer Other    • Colon cancer Other      parent   • Coronary artery disease Other    • Heart disease Other    • Hypertension Other    • Cholelithiasis Other    • Other Other      colon problems     Allergies   Allergen Reactions   • Sulfa Antibiotics Swelling     facial   • Other Rash     SILK TAPE   • Soma [Carisoprodol] Swelling and Rash     Current Outpatient Prescriptions on File Prior to Visit   Medication Sig Dispense Refill   • allopurinol (ZYLOPRIM) 100 MG tablet Take 1 tablet by mouth 4 (Four) Times a Day. 360 tablet 1   • aspirin 81 MG chewable tablet Chew 81 mg daily.     • colchicine 0.6 MG tablet Take 1 tablet by mouth Daily. (Patient taking differently: Take 0.6 mg by mouth As Needed.) 90 tablet 1   • diclofenac (VOLTAREN) 50 MG EC tablet Take 1 tablet by mouth 2 (Two) Times a Day. 60 tablet 1   • fluticasone (FLONASE) 50 MCG/ACT nasal spray 2 sprays into each nostril Daily. 1 bottle 1   • glucosamine-chondroitin 500-400 MG capsule capsule Take 1 capsule by mouth 2 (Two) Times a Day With Meals.     • omeprazole (PRILOSEC) 20 MG capsule Take 1 capsule by mouth Daily. 90 capsule 1   • oxybutynin (DITROPAN) 5 MG tablet       • sotalol (BETAPACE) 80 MG tablet Take 0.5 tablets by mouth 2 (Two) Times a Day. 90 tablet 2   • tamsulosin (FLOMAX) 0.4 MG capsule 24 hr capsule Take 1 capsule by mouth every night.       No current facility-administered medications on file prior to visit.      Review of Systems   Constitutional: Negative for chills and fever.   HENT: Negative for congestion.    Respiratory: Positive for shortness of breath.    Cardiovascular: Positive for chest pain. Negative for palpitations and leg swelling.   Gastrointestinal: Negative for abdominal pain, constipation and diarrhea.   Endocrine: Negative for cold intolerance and polyuria.   Genitourinary: Negative for flank pain and frequency.   Skin: Negative for pallor and wound.   Neurological: Negative for dizziness, light-headedness and headaches.   Psychiatric/Behavioral: Negative for sleep disturbance. The patient is not nervous/anxious.        Objective   Physical Exam   Constitutional: He appears well-developed and well-nourished.   HENT:   Nose: Nose normal.   Mouth/Throat: Oropharynx is clear and moist.   Eyes: Conjunctivae and EOM are normal. Pupils are equal, round, and reactive to light.   Neck: Neck supple. No JVD present. No thyromegaly present.   Cardiovascular: Normal rate and regular rhythm.    No murmur heard.  Pulmonary/Chest: Effort normal and breath sounds normal. He has no wheezes.   Abdominal: Soft. Bowel sounds are normal.   Musculoskeletal: Normal range of motion.   Neurological: He has normal reflexes.   Skin: Skin is warm and dry. No rash noted. No erythema.   Nursing note and vitals reviewed.          Patient Active Problem List   Diagnosis   • Sick sinus syndrome   • Essential hypertension   • Paroxysmal atrial fibrillation   • Gout   • Chronic myeloid leukemia   • Chronic myelomonocytic leukemia not having achieved remission   • Sleep apnea   • Neck pain, musculoskeletal   • Lightheadedness   • Impotence   • History of Holter monitoring   •  History of echocardiogram   • Degenerative joint disease involving multiple joints   • Chronic myelomonocytic leukemia   • Borderline glaucoma   • Benign prostatic hyperplasia   • Atrophy of testis         Results for orders placed or performed during the hospital encounter of 01/21/18   Troponin   Result Value Ref Range    Troponin I <0.012 <=0.034 ng/mL   Troponin   Result Value Ref Range    Troponin I <0.012 <=0.034 ng/mL   Comprehensive Metabolic Panel   Result Value Ref Range    Glucose 134 (H) 60 - 100 mg/dL    BUN 11 7 - 21 mg/dL    Creatinine 0.75 0.70 - 1.30 mg/dL    Sodium 140 137 - 145 mmol/L    Potassium 3.9 3.5 - 5.1 mmol/L    Chloride 103 95 - 110 mmol/L    CO2 23.0 22.0 - 31.0 mmol/L    Calcium 8.9 8.4 - 10.2 mg/dL    Total Protein 7.3 6.3 - 8.6 g/dL    Albumin 4.40 3.40 - 4.80 g/dL    ALT (SGPT) 26 21 - 72 U/L    AST (SGOT) 26 17 - 59 U/L    Alkaline Phosphatase 43 38 - 126 U/L    Total Bilirubin 1.1 0.2 - 1.3 mg/dL    eGFR Non African Amer 101 >60 mL/min/1.73    Globulin 2.9 2.3 - 3.5 gm/dL    A/G Ratio 1.5 1.1 - 1.8 g/dL    BUN/Creatinine Ratio 14.7 7.0 - 25.0    Anion Gap 14.0 5.0 - 15.0 mmol/L   BNP   Result Value Ref Range    proBNP 194.0 0.0 - 1800.0 pg/mL   CBC Auto Differential   Result Value Ref Range    WBC 26.65 (H) 3.20 - 9.80 10*3/mm3    RBC 3.26 (L) 4.37 - 5.74 10*6/mm3    Hemoglobin 11.8 (L) 13.7 - 17.3 g/dL    Hematocrit 34.0 (L) 39.0 - 49.0 %    .3 (H) 80.0 - 98.0 fL    MCH 36.2 (H) 26.5 - 34.0 pg    MCHC 34.7 31.5 - 36.3 g/dL    RDW 26.9 (H) 11.5 - 14.5 %    RDW-.6 (H) 35.1 - 43.9 fl    MPV  8.0 - 12.0 fL    Platelets 208 150 - 450 10*3/mm3   D-dimer, Quantitative   Result Value Ref Range    D-Dimer, Quantitative <270 0 - 470 ng/mL (FEU)   Light Blue Top   Result Value Ref Range    Extra Tube hold for add-on    Green Top (Gel)   Result Value Ref Range    Extra Tube Hold for add-ons.    Lavender Top   Result Value Ref Range    Extra Tube hold for add-on    Gold Top - SST    Result Value Ref Range    Extra Tube Hold for add-ons.    Manual Differential   Result Value Ref Range    Neutrophil % 52.0 37.0 - 80.0 %    Lymphocyte % 17.0 10.0 - 50.0 %    Monocyte % 24.0 (H) 0.0 - 12.0 %    Basophil % 1.0 0.0 - 2.0 %    Myelocyte % 6.0 (H) 0.0 - 0.0 %    Neutrophils Absolute 13.86 (H) 2.00 - 8.60 10*3/mm3    Lymphocytes Absolute 4.53 (H) 0.60 - 4.20 10*3/mm3    Monocytes Absolute 6.40 (H) 0.00 - 0.90 10*3/mm3    Basophils Absolute 0.27 (H) 0.00 - 0.20 10*3/mm3    nRBC 4.0 (H) 0.0 - 0.0 /100 WBC    Anisocytosis Large/3+ None Seen    Elliptocytes Slight/1+ None Seen    Macrocytes Mod/2+ None Seen    Polychromasia Slight/1+ None Seen    Target Cells Slight/1+ None Seen    WBC Morphology Normal Normal    Large Platelets Slight/1+ None Seen    Giant Platelets Slight/1+ None Seen     TECHNIQUE: Single AP view of the chest     COMPARISON: 10/29/2015     HISTORY: Chest pain triage protocol.     FINDINGS:      Life-support devices: Redemonstration of a dual lead left  subclavian approach pacemaking device.      Lungs/pleura: There is decreased inspiratory effort on the  current exam which contributes to the bronchovascular marking  accentuation. No parenchymal consolidation, pleural effusion, or  pneumothorax.     Heart, hilar and mediastinal structures: Heart size and  mediastinal contours within limits of normal. The trachea is  midline.     Skeletal Structures: Degenerative changes within the left greater  than right shoulder, AC joints, and the spine. No free air  beneath the diaphragm.        IMPRESSION:  No acute pulmonary or pleural finding. Bronchovascular  accentuation on the basis of the inspiratory effort.     Electronically signed by:  Justin Verdin MD  1/21/2018 2:26       Assessment/Plan   Jan was seen today for chest pain.    Diagnoses and all orders for this visit:    Chest pain, pleuritic  -     triamcinolone acetonide (KENALOG-40) injection 40 mg; Inject 1 mL into the shoulder,  thigh, or buttocks 1 (One) Time.             Plan      Patient's symptoms are most likely related to pleurisy associated with recent viral illness.  Kenalog 40 mg IM.  Voltaren 50 mg bid.  Tramadol 50 mg q 8hrs PRN.      Follow up in 2 weeks or earlier if not improved.          This document has been electronically signed by Alicia Gaona MD on January 24, 2018 9:00 PM

## 2018-01-26 ENCOUNTER — HOSPITAL ENCOUNTER (OUTPATIENT)
Dept: CT IMAGING | Facility: HOSPITAL | Age: 78
Discharge: HOME | End: 2018-01-26
Admitting: INTERNAL MEDICINE

## 2018-01-26 DIAGNOSIS — R06.02 SOB (SHORTNESS OF BREATH): ICD-10-CM

## 2018-01-26 DIAGNOSIS — Z12.5 SCREENING FOR PROSTATE CANCER: Primary | ICD-10-CM

## 2018-01-26 DIAGNOSIS — R07.9 RIGHT-SIDED CHEST PAIN: Primary | ICD-10-CM

## 2018-01-26 DIAGNOSIS — R07.9 RIGHT-SIDED CHEST PAIN: ICD-10-CM

## 2018-01-26 PROCEDURE — 71260 CT THORAX DX C+: CPT

## 2018-01-26 PROCEDURE — 0 IOPAMIDOL 61 % SOLUTION: Performed by: INTERNAL MEDICINE

## 2018-01-26 RX ORDER — HYDROCODONE BITARTRATE AND ACETAMINOPHEN 7.5; 325 MG/1; MG/1
1 TABLET ORAL EVERY 4 HOURS PRN
Qty: 30 TABLET | Refills: 0 | Status: SHIPPED | OUTPATIENT
Start: 2018-01-26 | End: 2018-04-18

## 2018-01-26 RX ADMIN — IOPAMIDOL 90 ML: 612 INJECTION, SOLUTION INTRAVENOUS at 13:58

## 2018-01-29 ENCOUNTER — APPOINTMENT (OUTPATIENT)
Dept: LAB | Facility: HOSPITAL | Age: 78
End: 2018-01-29

## 2018-01-29 LAB — PSA SERPL-MCNC: 0.58 NG/ML (ref 0–4)

## 2018-01-29 PROCEDURE — G0103 PSA SCREENING: HCPCS | Performed by: INTERNAL MEDICINE

## 2018-01-29 PROCEDURE — 36415 COLL VENOUS BLD VENIPUNCTURE: CPT | Performed by: INTERNAL MEDICINE

## 2018-01-30 ENCOUNTER — TELEPHONE (OUTPATIENT)
Dept: INTERNAL MEDICINE | Facility: CLINIC | Age: 78
End: 2018-01-30

## 2018-01-31 DIAGNOSIS — R93.89 ABNORMAL FINDING ON CT SCAN: Primary | ICD-10-CM

## 2018-02-08 ENCOUNTER — HOSPITAL ENCOUNTER (OUTPATIENT)
Dept: NUCLEAR MEDICINE | Facility: HOSPITAL | Age: 78
Discharge: HOME | End: 2018-02-08

## 2018-02-08 DIAGNOSIS — R93.89 ABNORMAL FINDING ON CT SCAN: ICD-10-CM

## 2018-02-08 PROCEDURE — A9503 TC99M MEDRONATE: HCPCS | Performed by: INTERNAL MEDICINE

## 2018-02-08 PROCEDURE — 78306 BONE IMAGING WHOLE BODY: CPT

## 2018-02-08 PROCEDURE — 0 TECHNETIUM MEDRONATE KIT: Performed by: INTERNAL MEDICINE

## 2018-02-08 RX ORDER — TC 99M MEDRONATE 20 MG/10ML
26.7 INJECTION, POWDER, LYOPHILIZED, FOR SOLUTION INTRAVENOUS
Status: COMPLETED | OUTPATIENT
Start: 2018-02-08 | End: 2018-02-08

## 2018-02-08 RX ADMIN — Medication 26.7 MILLICURIE: at 08:49

## 2018-03-01 ENCOUNTER — OFFICE VISIT (OUTPATIENT)
Dept: ONCOLOGY | Facility: CLINIC | Age: 78
End: 2018-03-01

## 2018-03-01 ENCOUNTER — LAB (OUTPATIENT)
Dept: ONCOLOGY | Facility: HOSPITAL | Age: 78
End: 2018-03-01

## 2018-03-01 VITALS
HEIGHT: 71 IN | HEART RATE: 82 BPM | SYSTOLIC BLOOD PRESSURE: 131 MMHG | DIASTOLIC BLOOD PRESSURE: 76 MMHG | WEIGHT: 197.97 LBS | BODY MASS INDEX: 27.72 KG/M2 | TEMPERATURE: 98.3 F | RESPIRATION RATE: 16 BRPM

## 2018-03-01 DIAGNOSIS — C93.10 CHRONIC MYELOMONOCYTIC LEUKEMIA NOT HAVING ACHIEVED REMISSION (HCC): ICD-10-CM

## 2018-03-01 DIAGNOSIS — C93.10 CHRONIC MYELOMONOCYTIC LEUKEMIA NOT HAVING ACHIEVED REMISSION (HCC): Primary | ICD-10-CM

## 2018-03-01 LAB
ALBUMIN SERPL-MCNC: 4.3 G/DL (ref 3.4–4.8)
ALBUMIN/GLOB SERPL: 1.4 G/DL (ref 1.1–1.8)
ALP SERPL-CCNC: 41 U/L (ref 38–126)
ALT SERPL W P-5'-P-CCNC: 36 U/L (ref 21–72)
ANION GAP SERPL CALCULATED.3IONS-SCNC: 15 MMOL/L (ref 5–15)
ANISOCYTOSIS BLD QL: ABNORMAL
AST SERPL-CCNC: 24 U/L (ref 17–59)
BASOPHILS # BLD MANUAL: 0.16 10*3/MM3 (ref 0–0.2)
BASOPHILS NFR BLD AUTO: 1 % (ref 0–2)
BILIRUB SERPL-MCNC: 0.9 MG/DL (ref 0.2–1.3)
BUN BLD-MCNC: 13 MG/DL (ref 7–21)
BUN/CREAT SERPL: 16.7 (ref 7–25)
CALCIUM SPEC-SCNC: 8.8 MG/DL (ref 8.4–10.2)
CHLORIDE SERPL-SCNC: 103 MMOL/L (ref 95–110)
CO2 SERPL-SCNC: 22 MMOL/L (ref 22–31)
CREAT BLD-MCNC: 0.78 MG/DL (ref 0.7–1.3)
DEPRECATED RDW RBC AUTO: 97.4 FL (ref 35.1–43.9)
ERYTHROCYTE [DISTWIDTH] IN BLOOD BY AUTOMATED COUNT: 25.8 % (ref 11.5–14.5)
GFR SERPL CREATININE-BSD FRML MDRD: 97 ML/MIN/1.73 (ref 42–98)
GLOBULIN UR ELPH-MCNC: 3 GM/DL (ref 2.3–3.5)
GLUCOSE BLD-MCNC: 100 MG/DL (ref 60–100)
HCT VFR BLD AUTO: 33.1 % (ref 39–49)
HGB BLD-MCNC: 11.2 G/DL (ref 13.7–17.3)
HYPOCHROMIA BLD QL: ABNORMAL
LYMPHOCYTES # BLD MANUAL: 1.48 10*3/MM3 (ref 0.6–4.2)
LYMPHOCYTES NFR BLD MANUAL: 16 % (ref 0–12)
LYMPHOCYTES NFR BLD MANUAL: 9 % (ref 10–50)
MACROCYTES BLD QL SMEAR: ABNORMAL
MCH RBC QN AUTO: 34.7 PG (ref 26.5–34)
MCHC RBC AUTO-ENTMCNC: 33.8 G/DL (ref 31.5–36.3)
MCV RBC AUTO: 102.5 FL (ref 80–98)
METAMYELOCYTES NFR BLD MANUAL: 5 % (ref 0–0)
MONOCYTES # BLD AUTO: 2.63 10*3/MM3 (ref 0–0.9)
NEUTROPHILS # BLD AUTO: 11.34 10*3/MM3 (ref 2–8.6)
NEUTROPHILS NFR BLD MANUAL: 37 % (ref 37–80)
NEUTS BAND NFR BLD MANUAL: 32 % (ref 0–5)
PLATELET # BLD AUTO: 170 10*3/MM3 (ref 150–450)
PMV BLD AUTO: ABNORMAL FL (ref 8–12)
POIKILOCYTOSIS BLD QL SMEAR: ABNORMAL
POTASSIUM BLD-SCNC: 4 MMOL/L (ref 3.5–5.1)
PROT SERPL-MCNC: 7.3 G/DL (ref 6.3–8.6)
RBC # BLD AUTO: 3.23 10*6/MM3 (ref 4.37–5.74)
SMALL PLATELETS BLD QL SMEAR: ADEQUATE
SODIUM BLD-SCNC: 140 MMOL/L (ref 137–145)
WBC MORPH BLD: NORMAL
WBC NRBC COR # BLD: 16.43 10*3/MM3 (ref 3.2–9.8)

## 2018-03-01 PROCEDURE — 85007 BL SMEAR W/DIFF WBC COUNT: CPT | Performed by: NURSE PRACTITIONER

## 2018-03-01 PROCEDURE — 36415 COLL VENOUS BLD VENIPUNCTURE: CPT | Performed by: NURSE PRACTITIONER

## 2018-03-01 PROCEDURE — 99214 OFFICE O/P EST MOD 30 MIN: CPT | Performed by: NURSE PRACTITIONER

## 2018-03-01 PROCEDURE — G0463 HOSPITAL OUTPT CLINIC VISIT: HCPCS | Performed by: NURSE PRACTITIONER

## 2018-03-01 PROCEDURE — 85025 COMPLETE CBC W/AUTO DIFF WBC: CPT | Performed by: NURSE PRACTITIONER

## 2018-03-01 PROCEDURE — 80053 COMPREHEN METABOLIC PANEL: CPT | Performed by: NURSE PRACTITIONER

## 2018-03-01 NOTE — PROGRESS NOTES
DATE OF VISIT: 3/1/2018    REASON FOR VISIT:  3 month follow-up for CMML    HISTORY OF PRESENT ILLNESS: 77 yr old male with past medical history of  CMML type 1. He has never required treatment.  He was sent to [place] to Dr. Kimbrough to see if he was a transplant candidate and he was found not to be a candidate. He was offered Vidaza in past but he refused. He remains on observation at this time. Clinically he has been doing very well. He denies any weight loss, no night sweats and no fever or chills. Appetite is good.     Since he was here last he was seeing his primary care provider for shoulder pain. CT scan was obtained that mentioned sclerotic lesions in ribs and spine; whole body bone scan was obtained that mentions uptake in right 7th rib and left femur. Pt states he was referred to Dr. Bennett who ordered a PSA level that was 0.5; she is planning to do prostate biopsy later this month. Pt denies any pain in left leg and denies any type of injury to this area.       PAST MEDICAL HISTORY:    Past Medical History:   Diagnosis Date   • Atrophy of testis    • Benign prostatic hyperplasia    • Borderline glaucoma    • Chronic myelomonocytic leukemia    • Degenerative joint disease involving multiple joints    • Gout    • History of echocardiogram 05/08/2012    Normal left ventricular systolic function, EF 60%. Early diastolic dysfunction. Mild aortic and pulmonic regurgitation. Trace mitral and mild tricuspid regurgitation. No intracardiac mass pericardial effusion or cardiac thrombus.   • History of Holter monitoring 01/18/2011   • Impotence    • Lightheadedness    • Neck pain, musculoskeletal    • Sleep apnea     Probable reason for fatigue          SOCIAL HISTORY:    Social History   Substance Use Topics   • Smoking status: Never Smoker   • Smokeless tobacco: Never Used   • Alcohol use No       Surgical History :  Past Surgical History:   Procedure Laterality Date   • CARDIAC CATHETERIZATION  09/30/2009    No  epicardial coronary artery disease noted that would explain patient's chest pain of the abnormal stress test noted. Normal left ventricular systolic function with no wall motion abnormalities   • CARDIAC CATHETERIZATION  05/24/1989    Normal catheterization, false-positive exercise treadmill. Noncardiac chest pain   • CARDIAC ELECTROPHYSIOLOGY PROCEDURE N/A 5/18/2017    Procedure: PPM generator change - dual;  Surgeon: Geneva Day MD;  Location: [facility] INVASIVE LOCATION;  Service:    • CARDIAC PACEMAKER PLACEMENT  06/2008    Dual chamber permanent pacemaker implantation   • COLECTOMY PARTIAL / TOTAL  04/14/2000    Cecal diverticulitis. Removal of small segment of terminal ileum, cecum and right colon, sent to pathology   • COLON SURGERY  03/27/2016   • COLONOSCOPY  05/25/2012   • CYSTOSCOPY  11/15/2000    Urinary retention on the basis of medications and benign prostatic hypertrophy   • INGUINAL HERNIA REPAIR  02/15/2007    Recurrent right inguinal hernia. Repair of recurrent inguinal hernia with EPS Prolene mesh system.   • LAPAROSCOPIC CHOLECYSTECTOMY  10/26/2006    Gallstones. Laparoscopic cholecystectomy with operative cholangiogram   • PROSTATECTOMY  11/17/2000    Benign prostatic hypertrophy with urinary retention. Prostatitis. transurethral resection of prostate.   • STEROID INJECTION  10/21/2010    Decadron (Gout)    • STEROID INJECTION  07/23/2013    Kenalog (Gout) (4)       ALLERGIES:    Allergies   Allergen Reactions   • Sulfa Antibiotics Swelling     facial   • Other Rash     SILK TAPE   • Soma [Carisoprodol] Swelling and Rash       REVIEW OF SYSTEMS:      CONSTITUTIONAL:  No fever, chills, or night sweats.     HEENT:  No epistaxis, mouth sores, or difficulty swallowing.    RESPIRATORY:  No new shortness of breath or cough at present.    CARDIOVASCULAR:  No chest pain or palpitations.    GASTROINTESTINAL:  No abdominal pain, nausea, vomiting, or blood in the stool.    GENITOURINARY:   "No dysuria or hematuria.    MUSCULOSKELETAL:  No any new back pain or arthralgias.     NEUROLOGICAL:  No tingling or numbness. No new headache or dizziness.     LYMPHATICS:  Denies any abnormal swollen and anywhere in the body.    SKIN:  Denies any new skin rash.    PHYSICAL EXAMINATION:      VITAL SIGNS:  /76  Pulse 82  Temp 98.3 °F (36.8 °C) (Temporal Artery )   Resp 16  Ht 180.3 cm (70.98\")  Wt 89.8 kg (197 lb 15.6 oz)  BMI 27.62 kg/m2    GENERAL:  Not in any distress.    HEENT:  Normocephalic, Atraumatic.Mild Conjunctival pallor. No icterus. Extraocular Movements Intact. No Facial Asymmetry noted.    NECK:  No adenopathy. No JVD.    RESPIRATORY:  Fair air entry bilateral. No rhonchi or wheezing.    CARDIOVASCULAR:  S1, S2. Regular rate and rhythm. No murmur or gallop appreciated.    ABDOMEN:  Soft, obese, nontender. Bowel sounds present in all four quadrants.  No organomegaly appreciated.    EXTREMITIES:  No edema.No Calf Tenderness.    NEUROLOGIC:  Alert, awake and oriented ×3.  No  Motor or sensory deficit appreciated. Cranial Nerves 2-12 grossly intact.    SKIN : No new skin lesion identified  DIAGNOSTIC DATA:    Glucose   Date Value Ref Range Status   03/01/2018 100 60 - 100 mg/dL Final     Sodium   Date Value Ref Range Status   03/01/2018 140 137 - 145 mmol/L Final     Potassium   Date Value Ref Range Status   03/01/2018 4.0 3.5 - 5.1 mmol/L Final     CO2   Date Value Ref Range Status   03/01/2018 22.0 22.0 - 31.0 mmol/L Final     Chloride   Date Value Ref Range Status   03/01/2018 103 95 - 110 mmol/L Final     Anion Gap   Date Value Ref Range Status   03/01/2018 15.0 5.0 - 15.0 mmol/L Final     Creatinine   Date Value Ref Range Status   03/01/2018 0.78 0.70 - 1.30 mg/dL Final     BUN   Date Value Ref Range Status   03/01/2018 13 7 - 21 mg/dL Final     BUN/Creatinine Ratio   Date Value Ref Range Status   03/01/2018 16.7 7.0 - 25.0 Final     Calcium   Date Value Ref Range Status   03/01/2018 8.8 " 8.4 - 10.2 mg/dL Final     eGFR Non  Amer   Date Value Ref Range Status   03/01/2018 97 42 - 98 mL/min/1.73 Final     Alkaline Phosphatase   Date Value Ref Range Status   03/01/2018 41 38 - 126 U/L Final     Total Protein   Date Value Ref Range Status   03/01/2018 7.3 6.3 - 8.6 g/dL Final     ALT (SGPT)   Date Value Ref Range Status   03/01/2018 36 21 - 72 U/L Final     AST (SGOT)   Date Value Ref Range Status   03/01/2018 24 17 - 59 U/L Final     Total Bilirubin   Date Value Ref Range Status   03/01/2018 0.9 0.2 - 1.3 mg/dL Final     Albumin   Date Value Ref Range Status   03/01/2018 4.30 3.40 - 4.80 g/dL Final     Globulin   Date Value Ref Range Status   03/01/2018 3.0 2.3 - 3.5 gm/dL Final     A/G Ratio   Date Value Ref Range Status   03/01/2018 1.4 1.1 - 1.8 g/dL Final     Lab Results   Component Value Date    WBC 16.43 (H) 03/01/2018    HGB 11.2 (L) 03/01/2018    HCT 33.1 (L) 03/01/2018    .5 (H) 03/01/2018     03/01/2018     Lab Results   Component Value Date    NEUTROABS 11.34 (H) 03/01/2018     Lab Results   Component Value Date    HCGQUANT <1 12/16/2014   ]  Component PSA   Latest Ref Rng & Units 0.000 - 4.000 ng/mL   10/29/2015 1.22   5/13/2016 0.72   1/29/2018 0.585       RADIOLOGY DATA :   Whole body bone scan 2/8/2018:    IMPRESSION:  CONCLUSION:       1.  Focally increased activity in the posterior right seventh  rib, of uncertain clinical significance.      2. Focally increased activity involving the proximal left femur.  3. Otherwise, physiologic radiotracer distribution.     For imaging management of these findings, suggest a plain film  radiograph of the proximal left femur to establish a radiographic  baseline.     Consider 6 month follow-up utilizing a nuclear medicine total  body bone scan.       CT chest 1/26/2018:    IMPRESSION:  CONCLUSION:  Multiple sclerotic lesions in the spine and right RIBS,  concerning for metastatic prostate cancer. Recommend correlation  with PSA  levels and nuclear medicine bone scan.   No images are attached to the encounter.      ASSESSMENT AND PLAN:      1.  CMML; Chronic myelomonocytic leukemia, type I. Patient remains on observation. He has not been interested in any treatment and wants to continue with observation for now. His WBC and nuetorphil counts are stable today and he is not having any B symptoms; will continue with observation and recheck his labs again in 3 months.    2. Recent abnormalities on CT scan and bone scan; pt is scheduled for prostate biopsy later this month. He asked me to look into his imaging studies. Will discuss with other oncology providers here in clinic and will let him know if anything additional needs to be done.    3. Health maintenance, he does not smoke and had colonoscopy within past 5 yrs.       This document has been signed by ISABELL Torres on March 1, 2018 2:48 PM

## 2018-03-05 NOTE — PROGRESS NOTES
Discussed case with Dr. Mota, CT scan and bone scan images reviewed; Will contact pt and let him know agree with proceeding with prostate biopsy and would also recommend getting a CT femur to further evaluate the femur looking for any lytic or lesion that would need orthopedic referral. Will contact pt and let him know of these recommendations.

## 2018-03-06 RX ORDER — SOTALOL HYDROCHLORIDE 80 MG/1
40 TABLET ORAL
Qty: 90 TABLET | Refills: 3 | Status: SHIPPED | OUTPATIENT
Start: 2018-03-06 | End: 2018-09-12 | Stop reason: SDUPTHER

## 2018-03-07 ENCOUNTER — CLINICAL SUPPORT (OUTPATIENT)
Dept: CARDIOLOGY | Facility: CLINIC | Age: 78
End: 2018-03-07

## 2018-03-07 DIAGNOSIS — Z95.0 PACEMAKER: ICD-10-CM

## 2018-03-07 DIAGNOSIS — I49.5 SICK SINUS SYNDROME (HCC): Primary | ICD-10-CM

## 2018-03-07 PROCEDURE — 93288 INTERROG EVL PM/LDLS PM IP: CPT | Performed by: NURSE PRACTITIONER

## 2018-03-07 NOTE — PROGRESS NOTES
Pacemaker Evaluation Report    March 7, 2018    Primary Cardiologist: Dr. Day  Implanting MD: Dr. Day  :[id number] Model: A2DR01 Serial Number: [id number]  Implant date: 5-18-17    Reason for evaluation:routine Office, PPM  Cardiac device indication(s):sinus node dysfunction, AFib    Battery  ERIKA: 6 yrs       Interrogation Results  Atrial sensing: P wave: 1.1 mV  Atrial capture: 2.0 V @ 0.6 ms   Atrial lead impedance: 437 ohms  Ventricular sensing: R wave: 8.1 mV  Ventricular capture: 0.75 V @ 0.4 ms  Ventricular lead impedance: right  855 ohms    Parameters  Mode: AAAIR<=>DDDR  Base Rate: 60/130    Diagnostic Data  Atrial paced: 89.1 % Ventricular paced: 0.6 %  Mode switch:   AT/AF [place]: 0  AHR:0  VHR:0    Changes made: no changes    Conclusions: Normal device function    Assessment:  1. Sick sinus syndrome    2. Pacemaker              This document has been electronically signed by ISABELL Travis on March 7, 2018 12:12 PM

## 2018-03-14 DIAGNOSIS — M89.9 LYTIC BONE LESION OF LEFT FEMUR: Primary | ICD-10-CM

## 2018-03-23 ENCOUNTER — HOSPITAL ENCOUNTER (OUTPATIENT)
Dept: CT IMAGING | Facility: HOSPITAL | Age: 78
Discharge: HOME | End: 2018-03-23
Admitting: NURSE PRACTITIONER

## 2018-03-23 DIAGNOSIS — M89.9 LYTIC BONE LESION OF LEFT FEMUR: ICD-10-CM

## 2018-03-23 PROCEDURE — 25010000002 IOPAMIDOL 61 % SOLUTION: Performed by: NURSE PRACTITIONER

## 2018-03-23 PROCEDURE — 73701 CT LOWER EXTREMITY W/DYE: CPT

## 2018-03-23 RX ADMIN — IOPAMIDOL 80 ML: 612 INJECTION, SOLUTION INTRAVENOUS at 12:40

## 2018-03-26 ENCOUNTER — TELEPHONE (OUTPATIENT)
Dept: ONCOLOGY | Facility: CLINIC | Age: 78
End: 2018-03-26

## 2018-04-18 ENCOUNTER — OFFICE VISIT (OUTPATIENT)
Dept: INTERNAL MEDICINE | Facility: CLINIC | Age: 78
End: 2018-04-18

## 2018-04-18 VITALS
SYSTOLIC BLOOD PRESSURE: 130 MMHG | WEIGHT: 200.9 LBS | DIASTOLIC BLOOD PRESSURE: 80 MMHG | HEIGHT: 71 IN | BODY MASS INDEX: 28.13 KG/M2

## 2018-04-18 DIAGNOSIS — G89.29 CHRONIC MIDLINE LOW BACK PAIN WITH RIGHT-SIDED SCIATICA: ICD-10-CM

## 2018-04-18 DIAGNOSIS — M54.41 CHRONIC MIDLINE LOW BACK PAIN WITH RIGHT-SIDED SCIATICA: ICD-10-CM

## 2018-04-18 DIAGNOSIS — E66.3 OVERWEIGHT: ICD-10-CM

## 2018-04-18 DIAGNOSIS — Z86.39 HISTORY OF HYPERGLYCEMIA: ICD-10-CM

## 2018-04-18 DIAGNOSIS — Z00.00 MEDICARE ANNUAL WELLNESS VISIT, INITIAL: Primary | ICD-10-CM

## 2018-04-18 PROCEDURE — G0438 PPPS, INITIAL VISIT: HCPCS | Performed by: INTERNAL MEDICINE

## 2018-04-18 PROCEDURE — 99213 OFFICE O/P EST LOW 20 MIN: CPT | Performed by: INTERNAL MEDICINE

## 2018-04-18 NOTE — PROGRESS NOTES
QUICK REFERENCE INFORMATION:  The ABCs of the Annual Wellness Visit    Initial Medicare Wellness Visit    HEALTH RISK ASSESSMENT    1940    Recent Hospitalizations:  No hospitalization(s) within the last year..        Current Medical Providers:  Patient Care Team:  Alicia Gaona MD as PCP - General  Jordin Roblero MD as Consulting Physician (Hematology and Oncology)  ISABELL Torres as Nurse Practitioner (Oncology)        Smoking Status:  History   Smoking Status   • Never Smoker   Smokeless Tobacco   • Never Used       Alcohol Consumption:  History   Alcohol Use No       Depression Screen:   PHQ-2/PHQ-9 Depression Screening 4/18/2018   Little interest or pleasure in doing things 2   Feeling down, depressed, or hopeless 2   Trouble falling or staying asleep, or sleeping too much 3   Feeling tired or having little energy 3   Poor appetite or overeating 2   Feeling bad about yourself - or that you are a failure or have let yourself or your family down 2   Trouble concentrating on things, such as reading the newspaper or watching television 2   Moving or speaking so slowly that other people could have noticed. Or the opposite - being so fidgety or restless that you have been moving around a lot more than usual 2   Thoughts that you would be better off dead, or of hurting yourself in some way 2   Total Score 20   If you checked off any problems, how difficult have these problems made it for you to do your work, take care of things at home, or get along with other people? Very difficult       Health Habits and Functional and Cognitive Screening:  Functional & Cognitive Status 4/18/2018   Do you have difficulty preparing food and eating? Yes   Do you have difficulty bathing yourself, getting dressed or grooming yourself? Yes   Do you have difficulty using the toilet? No   Do you have difficulty moving around from place to place? Yes   Do you have trouble with steps or getting out of a bed or a chair? Yes   In the  past year have you fallen or experienced a near fall? No   Current Diet Well Balanced Diet   Dental Exam Not up to date   Eye Exam Not up to date   Exercise (times per week) 0 times per week   Current Exercise Activities Include None   Do you need help using the phone?  No   Are you deaf or do you have serious difficulty hearing?  Yes   Do you need help with transportation? Yes   Do you need help shopping? Yes   Do you need help preparing meals?  Yes   Do you need help with housework?  Yes   Do you need help with laundry? Yes   Do you need help taking your medications? No   Do you need help managing money? No   Do you ever drive or ride in a car without wearing a seat belt? Yes   Have you felt unusual stress, anger or loneliness in the last month? Yes   Who do you live with? Spouse   If you need help, do you have trouble finding someone available to you? Yes   Have you been bothered in the last four weeks by sexual problems? No   Do you have difficulty concentrating, remembering or making decisions? Yes           Does the patient have evidence of cognitive impairment? No    Asiprin use counseling: Taking ASA appropriately as indicated      Recent Lab Results:    Lab Results   Component Value Date    LDL 45 03/21/2017    LDL 62 09/22/2015     No results found for: HGBA1C   Lab Results   Component Value Date    HGB 11.2 (L) 03/01/2018    HGB 11.8 (L) 01/21/2018    HGB 11.9 (L) 12/01/2017     Lab Results   Component Value Date    HCT 33.1 (L) 03/01/2018    HCT 34.0 (L) 01/21/2018    HCT 34.0 (L) 12/01/2017     Lab Results   Component Value Date    BUN 13 03/01/2018    BUN 11 01/21/2018    BUN 15 12/01/2017     Lab Results   Component Value Date    CREATININE 0.78 03/01/2018    CREATININE 0.75 01/21/2018    CREATININE 1.00 12/01/2017       Visual Acuity:  No exam data present    Age-appropriate Screening Schedule:  Refer to the list below for future screening recommendations based on patient's age, sex and/or medical  conditions. Orders for these recommended tests are listed in the plan section. The patient has been provided with a written plan.    Health Maintenance   Topic Date Due   • ZOSTER VACCINE  10/18/2018 (Originally 8/2/2016)   • INFLUENZA VACCINE  08/01/2018   • TDAP/TD VACCINES (2 - Td) 09/25/2023   • COLONOSCOPY  03/27/2026   • PNEUMOCOCCAL VACCINES (65+ LOW/MEDIUM RISK)  Completed        Subjective   History of Present Illness    Jan Humphrey is a 77 y.o. male who presents for an Annual Wellness Visit and also has some other issues which will be addressed./see separate note/.    The following portions of the patient's history were reviewed and updated as appropriate: allergies, current medications, past family history, past medical history, past social history, past surgical history and problem list.    Outpatient Medications Prior to Visit   Medication Sig Dispense Refill   • allopurinol (ZYLOPRIM) 100 MG tablet Take 1 tablet by mouth 4 (Four) Times a Day. 360 tablet 1   • aspirin 81 MG chewable tablet Chew 81 mg daily.     • colchicine 0.6 MG tablet Take 1 tablet by mouth Daily. (Patient taking differently: Take 0.6 mg by mouth As Needed.) 90 tablet 1   • diclofenac (VOLTAREN) 50 MG EC tablet Take 1 tablet by mouth 2 (Two) Times a Day. 60 tablet 1   • omeprazole (PRILOSEC) 20 MG capsule Take 1 capsule by mouth Daily. 90 capsule 1   • oxybutynin (DITROPAN) 5 MG tablet      • sotalol (BETAPACE) 80 MG tablet Take 0.5 tablets by mouth 2 (Two) Times a Day. 90 tablet 3   • tamsulosin (FLOMAX) 0.4 MG capsule 24 hr capsule Take 1 capsule by mouth every night.     • fluticasone (FLONASE) 50 MCG/ACT nasal spray 2 sprays into each nostril Daily. 1 bottle 1   • HYDROcodone-acetaminophen (NORCO) 7.5-325 MG per tablet Take 1 tablet by mouth Every 4 (Four) Hours As Needed for Severe Pain . 30 tablet 0   • glucosamine-chondroitin 500-400 MG capsule capsule Take 1 capsule by mouth 2 (Two) Times a Day With Meals.       No  facility-administered medications prior to visit.        Patient Active Problem List   Diagnosis   • Sick sinus syndrome   • Essential hypertension   • Paroxysmal atrial fibrillation   • Gout   • Chronic myeloid leukemia   • Chronic myelomonocytic leukemia not having achieved remission   • Sleep apnea   • Neck pain, musculoskeletal   • Lightheadedness   • Impotence   • History of Holter monitoring   • History of echocardiogram   • Degenerative joint disease involving multiple joints   • Chronic myelomonocytic leukemia   • Borderline glaucoma   • Benign prostatic hyperplasia   • Atrophy of testis   • Pacemaker   • BMI 28.0-28.9,adult       Advance Care Planning:  has NO advance directive - information provided to the patient today    Identification of Risk Factors:  Risk factors include: weight , cardiovascular risk, inactivity, chronic pain and financial stress.    Review of Systems   Constitutional: Negative for activity change and fatigue.   HENT: Negative.    Eyes: Negative.    Respiratory: Negative for choking, shortness of breath and wheezing.    Cardiovascular: Negative for chest pain, palpitations and leg swelling.   Gastrointestinal: Negative for abdominal pain, constipation and diarrhea.   Endocrine: Negative for cold intolerance, heat intolerance, polydipsia and polyuria.   Genitourinary: Negative for flank pain and frequency.   Musculoskeletal: Positive for back pain. Negative for gait problem and joint swelling.   Skin: Negative for color change and rash.   Neurological: Negative for dizziness, light-headedness, numbness and headaches.   Hematological: Negative for adenopathy. Does not bruise/bleed easily.   Psychiatric/Behavioral: Negative for sleep disturbance. The patient is not nervous/anxious.        Compared to one year ago, the patient feels his physical health is better.  Compared to one year ago, the patient feels his mental health is the same.    Objective     Physical Exam    Vitals:     "04/18/18 1307   BP: 130/80   Weight: 91.1 kg (200 lb 14.4 oz)   Height: 180.3 cm (70.98\")   PainSc:   9   PainLoc: Back       Patient's Body mass index is 28.03 kg/m². BMI is above normal parameters. Follow-up plan includes:  educational material, exercise counseling and nutrition counseling.      Assessment/Plan   Patient Self-Management and Personalized Health Advice  The patient has been provided with information about: diet, exercise, weight management and designing advance directives and preventive services including:   · Advance directive  ·  Counseling for cardiovascular disease risk reduction, Diabetes screening, see lab orders,  ·  Exercise counseling provided, Nutrition counseling provided  · Information given on Shingrix, patient will check with Pharmacy about availability and coverage    Visit Diagnoses:    ICD-10-CM ICD-9-CM   1. Medicare annual wellness visit, initial Z00.00 V70.0   2. Chronic midline low back pain with right-sided sciatica M54.41 724.2    G89.29 724.3     338.29   3. BMI 28.0-28.9,adult Z68.28 V85.24   4. Overweight  E66.3 278.02   5. History of hyperglycemia Z86.39 V12.29       Orders Placed This Encounter   Procedures   • MRI lumbar spine w wo contrast   • Lipid Panel   • Hemoglobin A1c       Outpatient Encounter Prescriptions as of 4/18/2018   Medication Sig Dispense Refill   • allopurinol (ZYLOPRIM) 100 MG tablet Take 1 tablet by mouth 4 (Four) Times a Day. 360 tablet 1   • aspirin 81 MG chewable tablet Chew 81 mg daily.     • colchicine 0.6 MG tablet Take 1 tablet by mouth Daily. (Patient taking differently: Take 0.6 mg by mouth As Needed.) 90 tablet 1   • diclofenac (VOLTAREN) 50 MG EC tablet Take 1 tablet by mouth 2 (Two) Times a Day. 60 tablet 1   • omeprazole (PRILOSEC) 20 MG capsule Take 1 capsule by mouth Daily. 90 capsule 1   • oxybutynin (DITROPAN) 5 MG tablet      • sotalol (BETAPACE) 80 MG tablet Take 0.5 tablets by mouth 2 (Two) Times a Day. 90 tablet 3   • tamsulosin " (FLOMAX) 0.4 MG capsule 24 hr capsule Take 1 capsule by mouth every night.     • [DISCONTINUED] fluticasone (FLONASE) 50 MCG/ACT nasal spray 2 sprays into each nostril Daily. 1 bottle 1   • [DISCONTINUED] HYDROcodone-acetaminophen (NORCO) 7.5-325 MG per tablet Take 1 tablet by mouth Every 4 (Four) Hours As Needed for Severe Pain . 30 tablet 0   • [DISCONTINUED] glucosamine-chondroitin 500-400 MG capsule capsule Take 1 capsule by mouth 2 (Two) Times a Day With Meals.       No facility-administered encounter medications on file as of 4/18/2018.        Reviewed use of high risk medication in the elderly: yes  Reviewed for potential of harmful drug interactions in the elderly: yes    Follow Up:  3 months     An After Visit Summary and PPPS with all of these plans were given to the patient.

## 2018-04-18 NOTE — PROGRESS NOTES
Subjective   Jan Humphrey is a 77 y.o. male who presents with persistent back pain.    Back Pain   This is a chronic problem. The current episode started more than 1 month ago. The problem occurs constantly. The problem is unchanged. The pain is present in the lumbar spine and sacro-iliac. The quality of the pain is described as shooting. The pain radiates to the right thigh. The pain is at a severity of 5/10. The pain is moderate. The symptoms are aggravated by position, sitting and standing. Pertinent negatives include no abdominal pain, chest pain or headaches. Risk factors include lack of exercise and sedentary lifestyle. He has tried analgesics, bed rest and NSAIDs for the symptoms. The treatment provided mild relief.     Past Medical History:   Diagnosis Date   • Atrophy of testis    • Benign prostatic hyperplasia    • Borderline glaucoma    • Chronic myelomonocytic leukemia    • Degenerative joint disease involving multiple joints    • Gout    • History of echocardiogram 05/08/2012    Normal left ventricular systolic function, EF 60%. Early diastolic dysfunction. Mild aortic and pulmonic regurgitation. Trace mitral and mild tricuspid regurgitation. No intracardiac mass pericardial effusion or cardiac thrombus.   • History of Holter monitoring 01/18/2011   • Impotence    • Lightheadedness    • Neck pain, musculoskeletal    • Sleep apnea     Probable reason for fatigue        Past Surgical History:   Procedure Laterality Date   • CARDIAC CATHETERIZATION  09/30/2009    No epicardial coronary artery disease noted that would explain patient's chest pain of the abnormal stress test noted. Normal left ventricular systolic function with no wall motion abnormalities   • CARDIAC CATHETERIZATION  05/24/1989    Normal catheterization, false-positive exercise treadmill. Noncardiac chest pain   • CARDIAC ELECTROPHYSIOLOGY PROCEDURE N/A 5/18/2017    Procedure: PPM generator change - dual;  Surgeon: Geneva Turcios  MD Shay;  Location: [facility] INVASIVE LOC[facility];  Service:    • CARDIAC PACEMAKER PLACEMENT  06/2008    Dual chamber permanent pacemaker implantation   • COLECTOMY PARTIAL / TOTAL  04/14/2000    Cecal diverticulitis. Removal of small segment of terminal ileum, cecum and right colon, sent to pathology   • COLON SURGERY  03/27/2016   • COLONOSCOPY  05/25/2012   • CYSTOSCOPY  11/15/2000    Urinary retention on the basis of medications and benign prostatic hypertrophy   • INGUINAL HERNIA REPAIR  02/15/2007    Recurrent right inguinal hernia. Repair of recurrent inguinal hernia with EPS Prolene mesh system.   • LAPAROSCOPIC CHOLECYSTECTOMY  10/26/2006    Gallstones. Laparoscopic cholecystectomy with operative cholangiogram   • PROSTATECTOMY  11/17/2000    Benign prostatic hypertrophy with urinary retention. Prostatitis. transurethral resection of prostate.   • STEROID INJECTION  10/21/2010    Decadron (Gout)    • STEROID INJECTION  07/23/2013    Kenalog (Gout) (4)       Social History   Substance Use Topics   • Smoking status: Never Smoker   • Smokeless tobacco: Never Used   • Alcohol use No     Family History   Problem Relation Age of Onset   • Cancer Other    • Colon cancer Other      parent   • Coronary artery disease Other    • Heart disease Other    • Hypertension Other    • Cholelithiasis Other    • Other Other      colon problems     Allergies   Allergen Reactions   • Sulfa Antibiotics Swelling     facial   • Other Rash     SILK TAPE   • Soma [Carisoprodol] Swelling and Rash     Current Outpatient Prescriptions on File Prior to Visit   Medication Sig Dispense Refill   • allopurinol (ZYLOPRIM) 100 MG tablet Take 1 tablet by mouth 4 (Four) Times a Day. 360 tablet 1   • aspirin 81 MG chewable tablet Chew 81 mg daily.     • colchicine 0.6 MG tablet Take 1 tablet by mouth Daily. (Patient taking differently: Take 0.6 mg by mouth As Needed.) 90 tablet 1   • diclofenac (VOLTAREN) 50 MG EC tablet Take 1 tablet by  mouth 2 (Two) Times a Day. 60 tablet 1   • omeprazole (PRILOSEC) 20 MG capsule Take 1 capsule by mouth Daily. 90 capsule 1   • oxybutynin (DITROPAN) 5 MG tablet      • sotalol (BETAPACE) 80 MG tablet Take 0.5 tablets by mouth 2 (Two) Times a Day. 90 tablet 3   • tamsulosin (FLOMAX) 0.4 MG capsule 24 hr capsule Take 1 capsule by mouth every night.       No current facility-administered medications on file prior to visit.      Review of Systems   Constitutional: Negative for activity change, fatigue and unexpected weight change.   HENT: Negative.    Eyes: Negative.    Respiratory: Negative for chest tightness and shortness of breath.    Cardiovascular: Negative for chest pain, palpitations and leg swelling.   Gastrointestinal: Negative for abdominal pain, constipation and diarrhea.   Genitourinary: Negative for difficulty urinating and flank pain.   Musculoskeletal: Positive for back pain. Negative for arthralgias, gait problem and myalgias.   Skin: Negative for color change and rash.   Neurological: Negative for dizziness, light-headedness and headaches.   Hematological: Negative for adenopathy. Does not bruise/bleed easily.   Psychiatric/Behavioral: Negative for sleep disturbance. The patient is not nervous/anxious.        Objective   Physical Exam   Constitutional: He is oriented to person, place, and time. He appears well-developed and well-nourished.   HENT:   Head: Atraumatic.   Nose: Nose normal.   Mouth/Throat: Oropharynx is clear and moist.   Eyes: Conjunctivae and EOM are normal. Pupils are equal, round, and reactive to light. No scleral icterus.   Neck: Normal range of motion. Neck supple. No JVD present.   Cardiovascular: Normal rate and regular rhythm.    Pulmonary/Chest: Effort normal and breath sounds normal.   Abdominal: Soft. Bowel sounds are normal. He exhibits no mass.   Musculoskeletal: Normal range of motion. He exhibits no edema.   Neurological: He is alert and oriented to person, place, and  time. He has normal reflexes. He displays normal reflexes. Coordination normal.   ? Positive straight leg raising on the right   Skin: Skin is warm and dry.   Psychiatric: He has a normal mood and affect. His behavior is normal.   Nursing note and vitals reviewed.          Patient Active Problem List   Diagnosis   • Sick sinus syndrome   • Essential hypertension   • Paroxysmal atrial fibrillation   • Gout   • Chronic myeloid leukemia   • Chronic myelomonocytic leukemia not having achieved remission   • Sleep apnea   • Neck pain, musculoskeletal   • Lightheadedness   • Impotence   • History of Holter monitoring   • History of echocardiogram   • Degenerative joint disease involving multiple joints   • Chronic myelomonocytic leukemia   • Borderline glaucoma   • Benign prostatic hyperplasia   • Atrophy of testis   • Pacemaker   • BMI 28.0-28.9,adult                 Assessment/Plan   Jan was seen today for annual exam and back pain.    Diagnoses and all orders for this visit:    Medicare annual wellness visit, initial  -     Lipid Panel    Chronic midline low back pain with right-sided sciatica  -     MRI lumbar spine w wo contrast    BMI 28.0-28.9,adult    Overweight   -     Lipid Panel             Plan      1.See separate note.  2.MRI of L spine.  3. Activity: Approximately 150 minutes of moderate activity (such as walking program)  per week (20-30 minutes most days of the week)  Diet: Heart healthy foods - more fresh fruits and vegetables and whole grains; less red meat; more fish and poultry that is baked or grilled - not fried; less salt not to exceed 2000 mg daily - less processed food; No trans or saturated fat      Weight management: Patient's Body mass index is 28.03 kg/m². BMI is above normal parameters. Follow-up plan includes:  educational material, exercise counseling and nutrition counseling.    Stress management    *Disease risk for type 2 diabetes. Hypertension and cardiovascular disease discussed with  the patient  * Increased waist circumference (greater than 35 inches for females and 40 inches for males) also can be a marker for increased risk, even in persons of normal weight - patient made aware of this information                This document has been electronically signed by Alicia Gaona MD on April 23, 2018 7:34 AM

## 2018-04-18 NOTE — PATIENT INSTRUCTIONS
Advance Directive  Advance directives are legal documents that let you make choices ahead of time about your health care and medical treatment in case you become unable to communicate for yourself. Advance directives are a way for you to communicate your wishes to family, friends, and health care providers. This can help convey your decisions about end-of-life care if you become unable to communicate.  Discussing and writing advance directives should happen over time rather than all at once. Advance directives can be changed depending on your situation and what you want, even after you have signed the advance directives.  If you do not have an advance directive, some states assign family decision makers to act on your behalf based on how closely you are related to them. Each state has its own laws regarding advance directives. You may want to check with your health care provider, , or state representative about the laws in your state. There are different types of advance directives, such as:  · Medical power of .  · Living will.  · Do not resuscitate (DNR) or do not attempt resuscitation (DNAR) order.  Health care proxy and medical power of   A health care proxy, also called a health care agent, is a person who is appointed to make medical decisions for you in cases in which you are unable to make the decisions yourself. Generally, people choose someone they know well and trust to represent their preferences. Make sure to ask this person for an agreement to act as your proxy. A proxy may have to exercise judgment in the event of a medical decision for which your wishes are not known.  A medical power of  is a legal document that names your health care proxy. Depending on the laws in your state, after the document is written, it may also need to be:  · Signed.  · Notarized.  · Dated.  · Copied.  · Witnessed.  · Incorporated into your medical record.  You may also want to appoint  someone to manage your financial affairs in a situation in which you are unable to do so. This is called a durable power of  for finances. It is a separate legal document from the durable power of  for health care. You may choose the same person or someone different from your health care proxy to act as your agent in financial matters.  If you do not appoint a proxy, or if there is a concern that the proxy is not acting in your best interests, a court-appointed guardian may be designated to act on your behalf.  Living will  A living will is a set of instructions documenting your wishes about medical care when you cannot express them yourself. Health care providers should keep a copy of your living will in your medical record. You may want to give a copy to family members or friends. To alert caregivers in case of an emergency, you can place a card in your wallet to let them know that you have a living will and where they can find it. A living will is used if you become:  · Terminally ill.  · Incapacitated.  · Unable to communicate or make decisions.  Items to consider in your living will include:  · The use or non-use of life-sustaining equipment, such as dialysis machines and breathing machines (ventilators).  · A DNR or DNAR order, which is the instruction not to use cardiopulmonary resuscitation (CPR) if breathing or heartbeat stops.  · The use or non-use of tube feeding.  · Withholding of food and fluids.  · Comfort (palliative) care when the goal becomes comfort rather than a cure.  · Organ and tissue donation.  A living will does not give instructions for distributing your money and property if you should pass away. It is recommended that you seek the advice of a  when writing a will. Decisions about taxes, beneficiaries, and asset distribution will be legally binding. This process can relieve your family and friends of any concerns surrounding disputes or questions that may come up about  the distribution of your assets.  DNR or DNAR  A DNR or DNAR order is a request not to have CPR in the event that your heart stops beating or you stop breathing. If a DNR or DNAR order has not been made and shared, a health care provider will try to help any patient whose heart has stopped or who has stopped breathing. If you plan to have surgery, talk with your health care provider about how your DNR or DNAR order will be followed if problems occur.  Summary  · Advance directives are the legal documents that allow you to make choices ahead of time about your health care and medical treatment in case you become unable to communicate for yourself.  · The process of discussing and writing advance directives should happen over time. You can change the advance directives, even after you have signed them.  · Advance directives include DNR or DNAR orders, living dickson, and designating an agent as your medical power of .  This information is not intended to replace advice given to you by your health care provider. Make sure you discuss any questions you have with your health care provider.  Document Released: 03/26/2009 Document Revised: 11/06/2017 Document Reviewed: 11/06/2017  [employer] Patient Education © 2017 [employer]  Exercising to Lose Weight  Exercising can help you to lose weight. In order to lose weight through exercise, you need to do vigorous-intensity exercise. You can tell that you are exercising with vigorous intensity if you are breathing very hard and fast and cannot hold a conversation while exercising.  Moderate-intensity exercise helps to maintain your current weight. You can tell that you are exercising at a moderate level if you have a higher heart rate and faster breathing, but you are still able to hold a conversation.  How often should I exercise?  Choose an activity that you enjoy and set realistic goals. Your health care provider can help you to make an activity plan that  works for you. Exercise regularly as directed by your health care provider. This may include:  · Doing resistance training twice each week, such as:  ¨ Push-ups.  ¨ Sit-ups.  ¨ Lifting weights.  ¨ Using resistance bands.  · Doing a given intensity of exercise for a given amount of time. Choose from these options:  ¨ 150 minutes of moderate-intensity exercise every week.  ¨ 75 minutes of vigorous-intensity exercise every week.  ¨ A mix of moderate-intensity and vigorous-intensity exercise every week.  Children, pregnant women, people who are out of shape, people who are overweight, and older adults may need to consult a health care provider for individual recommendations. If you have any sort of medical condition, be sure to consult your health care provider before starting a new exercise program.  What are some activities that can help me to lose weight?  · Walking at a rate of at least 4.5 miles an hour.  · Jogging or running at a rate of 5 miles per hour.  · Biking at a rate of at least 10 miles per hour.  · Lap swimming.  · Roller-skating or in-line skating.  · Cross-country skiing.  · Vigorous competitive sports, such as football, basketball, and soccer.  · Jumping rope.  · Aerobic dancing.  How can I be more active in my day-to-day activities?  · Use the stairs instead of the elevator.  · Take a walk during your lunch break.  · If you drive, park your car farther away from work or school.  · If you take public transportation, get off one stop early and walk the rest of the way.  · Make all of your phone calls while standing up and walking around.  · Get up, stretch, and walk around every 30 minutes throughout the day.  What guidelines should I follow while exercising?  · Do not exercise so much that you hurt yourself, feel dizzy, or get very short of breath.  · Consult your health care provider prior to starting a new exercise program.  · Wear comfortable clothes and shoes with good support.  · Drink plenty of  water while you exercise to prevent dehydration or heat stroke. Body water is lost during exercise and must be replaced.  · Work out until you breathe faster and your heart beats faster.  This information is not intended to replace advice given to you by your health care provider. Make sure you discuss any questions you have with your health care provider.  Document Released: 01/20/2012 Document Revised: 05/25/2017 Document Reviewed: 05/21/2015  [employer] Interactive Patient Education © 2017 [employer]

## 2018-04-23 DIAGNOSIS — G47.33 OSA (OBSTRUCTIVE SLEEP APNEA): Primary | ICD-10-CM

## 2018-04-26 ENCOUNTER — HOSPITAL ENCOUNTER (OUTPATIENT)
Dept: MRI IMAGING | Facility: HOSPITAL | Age: 78
Discharge: HOME | End: 2018-04-26
Attending: INTERNAL MEDICINE | Admitting: INTERNAL MEDICINE

## 2018-04-26 VITALS
HEART RATE: 62 BPM | OXYGEN SATURATION: 96 % | SYSTOLIC BLOOD PRESSURE: 132 MMHG | DIASTOLIC BLOOD PRESSURE: 66 MMHG | RESPIRATION RATE: 20 BRPM

## 2018-04-26 PROCEDURE — A9576 INJ PROHANCE MULTIPACK: HCPCS | Performed by: INTERNAL MEDICINE

## 2018-04-26 PROCEDURE — 25010000002 GADOTERIDOL PER 1 ML: Performed by: INTERNAL MEDICINE

## 2018-04-26 PROCEDURE — 72158 MRI LUMBAR SPINE W/O & W/DYE: CPT

## 2018-04-26 RX ADMIN — GADOTERIDOL 20 ML: 279.3 INJECTION, SOLUTION INTRAVENOUS at 11:45

## 2018-04-27 DIAGNOSIS — M54.10 BACK PAIN WITH RIGHT-SIDED RADICULOPATHY: Primary | ICD-10-CM

## 2018-04-27 DIAGNOSIS — N28.89 RIGHT KIDNEY MASS: Primary | ICD-10-CM

## 2018-05-04 ENCOUNTER — HOSPITAL ENCOUNTER (OUTPATIENT)
Dept: CT IMAGING | Facility: HOSPITAL | Age: 78
Discharge: HOME | End: 2018-05-04
Attending: INTERNAL MEDICINE | Admitting: INTERNAL MEDICINE

## 2018-05-04 DIAGNOSIS — N28.89 RIGHT KIDNEY MASS: ICD-10-CM

## 2018-05-04 PROCEDURE — 74170 CT ABD WO CNTRST FLWD CNTRST: CPT

## 2018-05-04 PROCEDURE — 25010000002 IOPAMIDOL 61 % SOLUTION: Performed by: INTERNAL MEDICINE

## 2018-05-04 RX ADMIN — IOPAMIDOL 90 ML: 612 INJECTION, SOLUTION INTRAVENOUS at 08:19

## 2018-05-07 ENCOUNTER — TELEPHONE (OUTPATIENT)
Dept: NEUROSURGERY | Age: 78
End: 2018-05-07

## 2018-05-17 ENCOUNTER — TELEPHONE (OUTPATIENT)
Dept: INTERNAL MEDICINE | Facility: CLINIC | Age: 78
End: 2018-05-17

## 2018-05-17 RX ORDER — OMEPRAZOLE 20 MG/1
20 CAPSULE, DELAYED RELEASE ORAL DAILY
Qty: 90 CAPSULE | Refills: 1 | Status: SHIPPED | OUTPATIENT
Start: 2018-05-17 | End: 2018-10-22 | Stop reason: SDUPTHER

## 2018-05-21 LAB
ARTICHOKE IGE QN: 44 MG/DL (ref 1–129)
CHOLEST SERPL-MCNC: 78 MG/DL (ref 0–199)
HBA1C MFR BLD: 6.2 % (ref 4–5.6)
HDLC SERPL-MCNC: 23 MG/DL (ref 60–200)
LDLC/HDLC SERPL: 1.65 {RATIO} (ref 0–3.55)
TRIGL SERPL-MCNC: 85 MG/DL (ref 20–199)

## 2018-05-21 PROCEDURE — 83036 HEMOGLOBIN GLYCOSYLATED A1C: CPT | Performed by: INTERNAL MEDICINE

## 2018-05-21 PROCEDURE — 80061 LIPID PANEL: CPT | Performed by: INTERNAL MEDICINE

## 2018-05-21 PROCEDURE — 36415 COLL VENOUS BLD VENIPUNCTURE: CPT | Performed by: FAMILY MEDICINE

## 2018-05-23 ENCOUNTER — TELEPHONE (OUTPATIENT)
Dept: INTERNAL MEDICINE | Facility: CLINIC | Age: 78
End: 2018-05-23

## 2018-05-23 NOTE — TELEPHONE ENCOUNTER
Spoke with pt let him know labs show cholesterol was ok but hba1c was a little high but fbs was ok ,DR MATHEW said watch carbs in diet lose weight and try to increase physical activities and pt ststed they did call from [facility] about his back and they see him on may 31st

## 2018-05-29 ENCOUNTER — HOSPITAL ENCOUNTER (OUTPATIENT)
Dept: GENERAL RADIOLOGY | Age: 78
Discharge: HOME | End: 2018-05-29
Payer: MEDICARE

## 2018-05-29 ENCOUNTER — OFFICE VISIT (OUTPATIENT)
Dept: NEUROSURGERY | Age: 78
End: 2018-05-29
Payer: MEDICARE

## 2018-05-29 VITALS
OXYGEN SATURATION: 94 % | HEART RATE: 64 BPM | DIASTOLIC BLOOD PRESSURE: 72 MMHG | SYSTOLIC BLOOD PRESSURE: 139 MMHG | HEIGHT: 71 IN | BODY MASS INDEX: 27.16 KG/M2 | WEIGHT: 194 LBS

## 2018-05-29 DIAGNOSIS — R29.818 FINE MOTOR IMPAIRMENT: ICD-10-CM

## 2018-05-29 DIAGNOSIS — M79.604 RIGHT LEG PAIN: ICD-10-CM

## 2018-05-29 DIAGNOSIS — H53.9 VISION CHANGES: ICD-10-CM

## 2018-05-29 DIAGNOSIS — M54.41 ACUTE MIDLINE LOW BACK PAIN WITH RIGHT-SIDED SCIATICA: ICD-10-CM

## 2018-05-29 DIAGNOSIS — M43.17 SPONDYLOLISTHESIS AT L5-S1 LEVEL: Primary | ICD-10-CM

## 2018-05-29 DIAGNOSIS — M79.601 RIGHT ARM PAIN: ICD-10-CM

## 2018-05-29 DIAGNOSIS — M43.17 SPONDYLOLISTHESIS AT L5-S1 LEVEL: ICD-10-CM

## 2018-05-29 DIAGNOSIS — R29.898 FINE MOTOR IMPAIRMENT: ICD-10-CM

## 2018-05-29 DIAGNOSIS — H53.60: ICD-10-CM

## 2018-05-29 DIAGNOSIS — M54.2 NECK PAIN: ICD-10-CM

## 2018-05-29 DIAGNOSIS — R51.9 FREQUENT HEADACHES: ICD-10-CM

## 2018-05-29 PROCEDURE — 72110 X-RAY EXAM L-2 SPINE 4/>VWS: CPT

## 2018-05-29 PROCEDURE — 99204 OFFICE O/P NEW MOD 45 MIN: CPT | Performed by: NURSE PRACTITIONER

## 2018-05-29 RX ORDER — ASPIRIN 81 MG/1
81 TABLET, CHEWABLE ORAL DAILY
COMMUNITY

## 2018-05-29 RX ORDER — OXYBUTYNIN CHLORIDE 5 MG/1
5 TABLET ORAL 2 TIMES DAILY
COMMUNITY

## 2018-05-29 RX ORDER — ALLOPURINOL 100 MG/1
100 TABLET ORAL 4 TIMES DAILY
COMMUNITY

## 2018-05-29 RX ORDER — OMEPRAZOLE 20 MG/1
20 CAPSULE, DELAYED RELEASE ORAL DAILY
COMMUNITY

## 2018-05-29 RX ORDER — SOTALOL HYDROCHLORIDE 80 MG/1
80 TABLET ORAL 2 TIMES DAILY
COMMUNITY

## 2018-05-29 RX ORDER — TAMSULOSIN HYDROCHLORIDE 0.4 MG/1
0.4 CAPSULE ORAL DAILY
COMMUNITY

## 2018-05-29 ASSESSMENT — ENCOUNTER SYMPTOMS
HEMOPTYSIS: 0
CONSTIPATION: 0
EYE PAIN: 0
BACK PAIN: 1
ABDOMINAL PAIN: 1
SPUTUM PRODUCTION: 0
HEARTBURN: 0
STRIDOR: 0
SORE THROAT: 0
EYE DISCHARGE: 0
SINUS PAIN: 0
SHORTNESS OF BREATH: 1
DOUBLE VISION: 0
VOMITING: 1
DIARRHEA: 0
PHOTOPHOBIA: 1
EYE REDNESS: 0
BLOOD IN STOOL: 0
NAUSEA: 1
BLURRED VISION: 1
COUGH: 0
WHEEZING: 0

## 2018-05-30 ENCOUNTER — TELEPHONE (OUTPATIENT)
Dept: NEUROSURGERY | Age: 78
End: 2018-05-30

## 2018-06-07 ENCOUNTER — OFFICE VISIT (OUTPATIENT)
Dept: ONCOLOGY | Facility: CLINIC | Age: 78
End: 2018-06-07

## 2018-06-07 ENCOUNTER — LAB (OUTPATIENT)
Dept: ONCOLOGY | Facility: HOSPITAL | Age: 78
End: 2018-06-07

## 2018-06-07 VITALS
WEIGHT: 199.2 LBS | HEIGHT: 71 IN | TEMPERATURE: 98.3 F | DIASTOLIC BLOOD PRESSURE: 84 MMHG | BODY MASS INDEX: 27.89 KG/M2 | RESPIRATION RATE: 18 BRPM | SYSTOLIC BLOOD PRESSURE: 126 MMHG | HEART RATE: 78 BPM

## 2018-06-07 DIAGNOSIS — C93.10 CHRONIC MYELOMONOCYTIC LEUKEMIA NOT HAVING ACHIEVED REMISSION (HCC): ICD-10-CM

## 2018-06-07 DIAGNOSIS — D50.8 OTHER IRON DEFICIENCY ANEMIA: Primary | ICD-10-CM

## 2018-06-07 DIAGNOSIS — R21 RASH OF UNKNOWN CAUSE: ICD-10-CM

## 2018-06-07 LAB
ALBUMIN SERPL-MCNC: 4.4 G/DL (ref 3.4–4.8)
ALBUMIN/GLOB SERPL: 1.6 G/DL (ref 1.1–1.8)
ALP SERPL-CCNC: 47 U/L (ref 38–126)
ALT SERPL W P-5'-P-CCNC: 29 U/L (ref 21–72)
ANION GAP SERPL CALCULATED.3IONS-SCNC: 12 MMOL/L (ref 5–15)
ANISOCYTOSIS BLD QL: ABNORMAL
AST SERPL-CCNC: 22 U/L (ref 17–59)
BASOPHILS # BLD MANUAL: 0.15 10*3/MM3 (ref 0–0.2)
BASOPHILS NFR BLD AUTO: 1 % (ref 0–2)
BILIRUB SERPL-MCNC: 0.9 MG/DL (ref 0.2–1.3)
BUN BLD-MCNC: 9 MG/DL (ref 7–21)
BUN/CREAT SERPL: 12.2 (ref 7–25)
CALCIUM SPEC-SCNC: 8.6 MG/DL (ref 8.4–10.2)
CHLORIDE SERPL-SCNC: 106 MMOL/L (ref 95–110)
CO2 SERPL-SCNC: 22 MMOL/L (ref 22–31)
CREAT BLD-MCNC: 0.74 MG/DL (ref 0.7–1.3)
DEPRECATED RDW RBC AUTO: 93.4 FL (ref 35.1–43.9)
EOSINOPHIL # BLD MANUAL: 0.29 10*3/MM3 (ref 0–0.7)
EOSINOPHIL NFR BLD MANUAL: 2 % (ref 0–7)
ERYTHROCYTE [DISTWIDTH] IN BLOOD BY AUTOMATED COUNT: 25.8 % (ref 11.5–14.5)
FERRITIN SERPL-MCNC: 169 NG/ML (ref 17.9–464)
GFR SERPL CREATININE-BSD FRML MDRD: 103 ML/MIN/1.73 (ref 42–98)
GLOBULIN UR ELPH-MCNC: 2.7 GM/DL (ref 2.3–3.5)
GLUCOSE BLD-MCNC: 104 MG/DL (ref 60–100)
HCT VFR BLD AUTO: 30.3 % (ref 39–49)
HGB BLD-MCNC: 10.7 G/DL (ref 13.7–17.3)
IRON 24H UR-MRATE: 89 MCG/DL (ref 49–181)
IRON SATN MFR SERPL: 31 % (ref 20–55)
LYMPHOCYTES # BLD MANUAL: 3.09 10*3/MM3 (ref 0.6–4.2)
LYMPHOCYTES NFR BLD MANUAL: 15 % (ref 0–12)
LYMPHOCYTES NFR BLD MANUAL: 21 % (ref 10–50)
MACROCYTES BLD QL SMEAR: ABNORMAL
MCH RBC QN AUTO: 35.7 PG (ref 26.5–34)
MCHC RBC AUTO-ENTMCNC: 35.3 G/DL (ref 31.5–36.3)
MCV RBC AUTO: 101 FL (ref 80–98)
METAMYELOCYTES NFR BLD MANUAL: 2 % (ref 0–0)
MONOCYTES # BLD AUTO: 2.21 10*3/MM3 (ref 0–0.9)
MYELOCYTES NFR BLD MANUAL: 3 % (ref 0–0)
NEUTROPHILS # BLD AUTO: 8.09 10*3/MM3 (ref 2–8.6)
NEUTROPHILS NFR BLD MANUAL: 39 % (ref 37–80)
NEUTS BAND NFR BLD MANUAL: 16 % (ref 0–5)
NRBC SPEC MANUAL: 1 /100 WBC (ref 0–0)
PLATELET # BLD AUTO: 176 10*3/MM3 (ref 150–450)
PMV BLD AUTO: ABNORMAL FL (ref 8–12)
POIKILOCYTOSIS BLD QL SMEAR: ABNORMAL
POTASSIUM BLD-SCNC: 3.9 MMOL/L (ref 3.5–5.1)
PROT SERPL-MCNC: 7.1 G/DL (ref 6.3–8.6)
RBC # BLD AUTO: 3 10*6/MM3 (ref 4.37–5.74)
SMALL PLATELETS BLD QL SMEAR: ADEQUATE
SODIUM BLD-SCNC: 140 MMOL/L (ref 137–145)
TIBC SERPL-MCNC: 286 MCG/DL (ref 261–462)
VARIANT LYMPHS NFR BLD MANUAL: 1 % (ref 0–5)
WBC MORPH BLD: NORMAL
WBC NRBC COR # BLD: 14.71 10*3/MM3 (ref 3.2–9.8)

## 2018-06-07 PROCEDURE — 83540 ASSAY OF IRON: CPT | Performed by: NURSE PRACTITIONER

## 2018-06-07 PROCEDURE — 80053 COMPREHEN METABOLIC PANEL: CPT | Performed by: NURSE PRACTITIONER

## 2018-06-07 PROCEDURE — G0463 HOSPITAL OUTPT CLINIC VISIT: HCPCS | Performed by: NURSE PRACTITIONER

## 2018-06-07 PROCEDURE — 99214 OFFICE O/P EST MOD 30 MIN: CPT | Performed by: NURSE PRACTITIONER

## 2018-06-07 PROCEDURE — 85007 BL SMEAR W/DIFF WBC COUNT: CPT | Performed by: NURSE PRACTITIONER

## 2018-06-07 PROCEDURE — 85025 COMPLETE CBC W/AUTO DIFF WBC: CPT | Performed by: NURSE PRACTITIONER

## 2018-06-07 PROCEDURE — 82728 ASSAY OF FERRITIN: CPT | Performed by: NURSE PRACTITIONER

## 2018-06-07 PROCEDURE — 83550 IRON BINDING TEST: CPT | Performed by: NURSE PRACTITIONER

## 2018-06-07 NOTE — PROGRESS NOTES
DATE OF VISIT: 6/7/2018    REASON FOR VISIT: 3 month follow-up for CMML     HISTORY OF PRESENT ILLNESS: 77 yr old male with past medical history of  CMML type 1. He has never required treatment.  He was sent to [place] to Dr. Kimbrough to see if he was a transplant candidate and he was found not to be a candidate. He was offered Vidaza in past but he refused. He remains on observation at this time. Clinically he has been doing very well. He denies any weight loss, no night sweats and no fever or chills. Appetite is good.     Since here last he states he had prostate biopsy and was negative for any malignancy. Still having back pain, Dr. Gaona is handling and has referred pt to a neurosurgeon in [place]. Denies any unexplained weight loss or night sweats.     PAST MEDICAL HISTORY:    Past Medical History:   Diagnosis Date   • Atrophy of testis    • Benign prostatic hyperplasia    • Borderline glaucoma    • Chronic myelomonocytic leukemia    • Degenerative joint disease involving multiple joints    • Gout    • History of echocardiogram 05/08/2012    Normal left ventricular systolic function, EF 60%. Early diastolic dysfunction. Mild aortic and pulmonic regurgitation. Trace mitral and mild tricuspid regurgitation. No intracardiac mass pericardial effusion or cardiac thrombus.   • History of Holter monitoring 01/18/2011   • Impotence    • Lightheadedness    • Neck pain, musculoskeletal    • Sleep apnea     Probable reason for fatigue          SOCIAL HISTORY:    Social History   Substance Use Topics   • Smoking status: Never Smoker   • Smokeless tobacco: Never Used   • Alcohol use No       Surgical History :  Past Surgical History:   Procedure Laterality Date   • CARDIAC CATHETERIZATION  09/30/2009    No epicardial coronary artery disease noted that would explain patient's chest pain of the abnormal stress test noted. Normal left ventricular systolic function with no wall motion abnormalities   • CARDIAC CATHETERIZATION   05/24/1989    Normal catheterization, false-positive exercise treadmill. Noncardiac chest pain   • CARDIAC ELECTROPHYSIOLOGY PROCEDURE N/A 5/18/2017    Procedure: PPM generator change - dual;  Surgeon: Geneva Day MD;  Location: [facility] INVASIVE LOCATION;  Service:    • CARDIAC PACEMAKER PLACEMENT  06/2008    Dual chamber permanent pacemaker implantation   • COLECTOMY PARTIAL / TOTAL  04/14/2000    Cecal diverticulitis. Removal of small segment of terminal ileum, cecum and right colon, sent to pathology   • COLON SURGERY  03/27/2016   • COLONOSCOPY  05/25/2012   • CYSTOSCOPY  11/15/2000    Urinary retention on the basis of medications and benign prostatic hypertrophy   • INGUINAL HERNIA REPAIR  02/15/2007    Recurrent right inguinal hernia. Repair of recurrent inguinal hernia with EPS Prolene mesh system.   • LAPAROSCOPIC CHOLECYSTECTOMY  10/26/2006    Gallstones. Laparoscopic cholecystectomy with operative cholangiogram   • PROSTATECTOMY  11/17/2000    Benign prostatic hypertrophy with urinary retention. Prostatitis. transurethral resection of prostate.   • STEROID INJECTION  10/21/2010    Decadron (Gout)    • STEROID INJECTION  07/23/2013    Kenalog (Gout) (4)       ALLERGIES:    Allergies   Allergen Reactions   • Sulfa Antibiotics Swelling     facial   • Other Rash     SILK TAPE   • Soma [Carisoprodol] Swelling and Rash       REVIEW OF SYSTEMS:      CONSTITUTIONAL:  No fever, chills, or night sweats.     HEENT:  No epistaxis, mouth sores, or difficulty swallowing.    RESPIRATORY:  No new shortness of breath or cough at present.    CARDIOVASCULAR:  No chest pain or palpitations.    GASTROINTESTINAL:  No abdominal pain, nausea, vomiting, or blood in the stool.    GENITOURINARY:  No dysuria or hematuria.    MUSCULOSKELETAL:  No any new back pain or arthralgias.     NEUROLOGICAL:  No tingling or numbness. No new headache or dizziness.     LYMPHATICS:  Denies any abnormal swollen and anywhere in the  "body.    SKIN:  Positive for generalized rash    PHYSICAL EXAMINATION:      VITAL SIGNS:  /84   Pulse 78   Temp 98.3 °F (36.8 °C) (Temporal Artery )   Resp 18   Ht 180.3 cm (70.98\")   Wt 90.4 kg (199 lb 3.2 oz)   BMI 27.80 kg/m²     GENERAL:  Not in any distress.    HEENT:  Normocephalic, Atraumatic.Mild Conjunctival pallor. No icterus. Extraocular Movements Intact. No Facial Asymmetry noted.    NECK:  No adenopathy. No JVD.    RESPIRATORY:  Fair air entry bilateral. No rhonchi or wheezing.    CARDIOVASCULAR:  S1, S2. Regular rate and rhythm. No murmur or gallop appreciated.    ABDOMEN:  Soft, obese, nontender. Bowel sounds present in all four quadrants.  No organomegaly appreciated.    EXTREMITIES:  No edema.No Calf Tenderness.    NEUROLOGIC:  Alert, awake and oriented ×3.  No  Motor or sensory deficit appreciated. Cranial Nerves 2-12 grossly intact.    SKIN : small red flat rash scattered on upper torso;         Glucose   Date Value Ref Range Status   06/07/2018 104 (H) 60 - 100 mg/dL Final     Sodium   Date Value Ref Range Status   06/07/2018 140 137 - 145 mmol/L Final     Potassium   Date Value Ref Range Status   06/07/2018 3.9 3.5 - 5.1 mmol/L Final     CO2   Date Value Ref Range Status   06/07/2018 22.0 22.0 - 31.0 mmol/L Final     Chloride   Date Value Ref Range Status   06/07/2018 106 95 - 110 mmol/L Final     Anion Gap   Date Value Ref Range Status   06/07/2018 12.0 5.0 - 15.0 mmol/L Final     Creatinine   Date Value Ref Range Status   06/07/2018 0.74 0.70 - 1.30 mg/dL Final     BUN   Date Value Ref Range Status   06/07/2018 9 7 - 21 mg/dL Final     BUN/Creatinine Ratio   Date Value Ref Range Status   06/07/2018 12.2 7.0 - 25.0 Final     Calcium   Date Value Ref Range Status   06/07/2018 8.6 8.4 - 10.2 mg/dL Final     eGFR Non  Amer   Date Value Ref Range Status   06/07/2018 103 (H) 42 - 98 mL/min/1.73 Final     Alkaline Phosphatase   Date Value Ref Range Status   06/07/2018 47 38 - 126 " U/L Final     Total Protein   Date Value Ref Range Status   06/07/2018 7.1 6.3 - 8.6 g/dL Final     ALT (SGPT)   Date Value Ref Range Status   06/07/2018 29 21 - 72 U/L Final     AST (SGOT)   Date Value Ref Range Status   06/07/2018 22 17 - 59 U/L Final     Total Bilirubin   Date Value Ref Range Status   06/07/2018 0.9 0.2 - 1.3 mg/dL Final     Albumin   Date Value Ref Range Status   06/07/2018 4.40 3.40 - 4.80 g/dL Final     Globulin   Date Value Ref Range Status   06/07/2018 2.7 2.3 - 3.5 gm/dL Final     A/G Ratio   Date Value Ref Range Status   06/07/2018 1.6 1.1 - 1.8 g/dL Final     Lab Results   Component Value Date    WBC 14.71 (H) 06/07/2018    HGB 10.7 (L) 06/07/2018    HCT 30.3 (L) 06/07/2018    .0 (H) 06/07/2018     06/07/2018     Lab Results   Component Value Date    NEUTROABS 8.09 06/07/2018    IRON 89 06/07/2018    TIBC 286 06/07/2018    LABIRON 31 06/07/2018     Lab Results   Component Value Date    HCGQUANT <1 12/16/2014   ]          ASSESSMENT AND PLAN:      1.CMML; Chronic myelomonocytic leukemia, type I. Patient remains on observation. He has not been interested in any treatment and wants to continue with observation for now. His WBC and nuetorphil counts are stable today and he is not having any B symptoms; will continue with observation and recheck his labs again in 3 months.    2.  New rash, pt denies any new products at home; will refer to dermatology.    3. Health maintenance, he does not smoke and he has had colonoscopy within past 5 yrs.    Patient's Body mass index is 27.8 kg/m². BMI is above normal parameters. Recommendations include: nutrition counseling.       This document has been signed by ISABELL Torres on June 7, 2018 11:13 AM

## 2018-06-12 ENCOUNTER — OFFICE VISIT (OUTPATIENT)
Dept: CARDIOLOGY | Facility: CLINIC | Age: 78
End: 2018-06-12

## 2018-06-12 VITALS
BODY MASS INDEX: 27.86 KG/M2 | WEIGHT: 199 LBS | SYSTOLIC BLOOD PRESSURE: 120 MMHG | HEIGHT: 71 IN | HEART RATE: 84 BPM | DIASTOLIC BLOOD PRESSURE: 60 MMHG | OXYGEN SATURATION: 98 %

## 2018-06-12 DIAGNOSIS — I48.0 PAROXYSMAL ATRIAL FIBRILLATION (HCC): ICD-10-CM

## 2018-06-12 DIAGNOSIS — I49.5 SICK SINUS SYNDROME (HCC): ICD-10-CM

## 2018-06-12 DIAGNOSIS — I10 ESSENTIAL HYPERTENSION: Primary | ICD-10-CM

## 2018-06-12 PROCEDURE — 99214 OFFICE O/P EST MOD 30 MIN: CPT | Performed by: INTERNAL MEDICINE

## 2018-06-12 NOTE — PROGRESS NOTES
Jan Humphrey  78 y.o. male    06/12/2018  1. Essential hypertension    2. Paroxysmal atrial fibrillation    3. Sick sinus syndrome        History of Present Illness    Mr. Humphrey is here for follow-up of his above stated problems.  He denied any chest pain, shortness of breath, palpitation, dizziness or syncope.  His pacemaker was checked in March this year and pacing and sensing parameters were in the normal range.  There has been no reoccurrence of atrial fibrillation.  His lipid profiles in the normal range.  Her blood pressure was normal and no signs of congestive heart failure was noted.        SUBJECTIVE    Allergies   Allergen Reactions   • Sulfa Antibiotics Swelling     facial   • Other Rash     SILK TAPE   • Soma [Carisoprodol] Swelling and Rash         Past Medical History:   Diagnosis Date   • Atrophy of testis    • Benign prostatic hyperplasia    • Borderline glaucoma    • Chronic myelomonocytic leukemia    • Degenerative joint disease involving multiple joints    • Gout    • History of echocardiogram 05/08/2012    Normal left ventricular systolic function, EF 60%. Early diastolic dysfunction. Mild aortic and pulmonic regurgitation. Trace mitral and mild tricuspid regurgitation. No intracardiac mass pericardial effusion or cardiac thrombus.   • History of Holter monitoring 01/18/2011   • Impotence    • Lightheadedness    • Neck pain, musculoskeletal    • Sleep apnea     Probable reason for fatigue            Past Surgical History:   Procedure Laterality Date   • CARDIAC CATHETERIZATION  09/30/2009    No epicardial coronary artery disease noted that would explain patient's chest pain of the abnormal stress test noted. Normal left ventricular systolic function with no wall motion abnormalities   • CARDIAC CATHETERIZATION  05/24/1989    Normal catheterization, false-positive exercise treadmill. Noncardiac chest pain   • CARDIAC ELECTROPHYSIOLOGY PROCEDURE N/A 5/18/2017    Procedure: PPM generator  change - dual;  Surgeon: Geneva Day MD;  Location: [facility] INVASIVE LOCATION;  Service:    • CARDIAC PACEMAKER PLACEMENT  06/2008    Dual chamber permanent pacemaker implantation   • COLECTOMY PARTIAL / TOTAL  04/14/2000    Cecal diverticulitis. Removal of small segment of terminal ileum, cecum and right colon, sent to pathology   • COLON SURGERY  03/27/2016   • COLONOSCOPY  05/25/2012   • CYSTOSCOPY  11/15/2000    Urinary retention on the basis of medications and benign prostatic hypertrophy   • INGUINAL HERNIA REPAIR  02/15/2007    Recurrent right inguinal hernia. Repair of recurrent inguinal hernia with EPS Prolene mesh system.   • LAPAROSCOPIC CHOLECYSTECTOMY  10/26/2006    Gallstones. Laparoscopic cholecystectomy with operative cholangiogram   • PROSTATECTOMY  11/17/2000    Benign prostatic hypertrophy with urinary retention. Prostatitis. transurethral resection of prostate.   • STEROID INJECTION  10/21/2010    Decadron (Gout)    • STEROID INJECTION  07/23/2013    Kenalog (Gout) (4)         Family History   Problem Relation Age of Onset   • Cancer Other    • Colon cancer Other         parent   • Coronary artery disease Other    • Heart disease Other    • Hypertension Other    • Cholelithiasis Other    • Other Other         colon problems         Social History     Social History   • Marital status:      Spouse name: N/A   • Number of children: N/A   • Years of education: N/A     Occupational History   • Not on file.     Social History Main Topics   • Smoking status: Never Smoker   • Smokeless tobacco: Never Used   • Alcohol use No   • Drug use: No   • Sexual activity: Defer     Other Topics Concern   • Not on file     Social History Narrative   • No narrative on file         Current Outpatient Prescriptions   Medication Sig Dispense Refill   • allopurinol (ZYLOPRIM) 100 MG tablet Take 1 tablet by mouth 4 (Four) Times a Day. 360 tablet 1   • aspirin 81 MG chewable tablet Chew 81 mg  "daily.     • colchicine 0.6 MG tablet Take 1 tablet by mouth Daily. (Patient taking differently: Take 0.6 mg by mouth As Needed.) 90 tablet 1   • diclofenac (VOLTAREN) 50 MG EC tablet Take 1 tablet by mouth 2 (Two) Times a Day. 60 tablet 1   • omeprazole (PRILOSEC) 20 MG capsule Take 1 capsule by mouth Daily. 90 capsule 1   • oxybutynin (DITROPAN) 5 MG tablet      • sotalol (BETAPACE) 80 MG tablet Take 0.5 tablets by mouth 2 (Two) Times a Day. 90 tablet 3   • tamsulosin (FLOMAX) 0.4 MG capsule 24 hr capsule Take 1 capsule by mouth every night.       No current facility-administered medications for this visit.          OBJECTIVE    /60 (BP Location: Left arm, Patient Position: Sitting)   Pulse 84   Ht 180.3 cm (70.98\")   Wt 90.3 kg (199 lb)   SpO2 98%   BMI 27.77 kg/m²         Review of Systems     Constitutional:  Denies recent weight loss, weight gain, fever or chills, no change in exercise tolerance     HENT:  Denies any hearing loss, epistaxis, hoarseness, or difficulty speaking.     Eyes: Wears eyeglasses or contact lenses     Respiratory:  Denies dyspnea with exertion,no cough, wheezing, or hemoptysis.     Cardiovascular: Negative for palpations, chest pain, orthopnea, PND, peripheral edema, syncope, or claudication.     Gastrointestinal:  Denies change in bowel habits, dyspepsia, ulcer disease, hematochezia, or melena.     Endocrine: Negative for cold intolerance, heat intolerance, polydipsia, polyphagia and polyuria. Denies any history of weight change, heat/cold intolerance, polydipsia, polyuria     Genitourinary: Negative.      Musculoskeletal: DJD    Skin:  Denies any change in hair or nails, rashes, or skin lesions.     Allergic/Immunologic: Negative.  Negative for environmental allergies, food allergies and immunocompromised state.     Neurological:  Denies any history of recurrent headaches, strokes, TIA, or seizure disorder.     Hematological: CML    Psychiatric/Behavioral: Denies any " history of depression, substance abuse, or change in cognitive function.         Physical Exam     Constitutional: Cooperative, alert and oriented, well-developed, well-nourished, in no acute distress.     HENT:   Head: Normocephalic, normal hair patterns, no masses or tenderness.  Ears, Nose, and Throat: No gross abnormalities. No pallor or cyanosis. Eyes: EOMS intact, PERRL, conjunctivae and lids unremarkable. Fundoscopic exam and visual fields not performed.   Neck: No palpable masses or adenopathy, no thyromegaly, no JVD, carotid pulses are full and equal bilaterally and without  Bruits.     Cardiovascular: Regular rhythm, S1 and S2 normal, no S3 or S4. 2/6 systolic murmur, no gallops, or rubs detected.     Pulmonary/Chest: Chest: normal symmetry,  normal respiratory excursion, no intercostal retraction, no use of accessory muscles.            Pulmonary: Normal breath sounds. No rales or ronchi.    Abdominal: Abdomen soft, bowel sounds normoactive, no masses, no hepatosplenomegaly, non-tender, no bruits.     Musculoskeletal: No deformities, clubbing, cyanosis, erythema, or edema observed.     Neurological: No gross motor or sensory deficits noted, affect appropriate, oriented to time, person, place.     Skin: Warm and dry to the touch, no apparent skin lesions or masses noted.     Psychiatric: He has a normal mood and affect. His behavior is normal. Judgment and thought content normal.         Procedures      Lab Results   Component Value Date    WBC 14.71 (H) 06/07/2018    HGB 10.7 (L) 06/07/2018    HCT 30.3 (L) 06/07/2018    .0 (H) 06/07/2018     06/07/2018     Lab Results   Component Value Date    GLUCOSE 104 (H) 06/07/2018    BUN 9 06/07/2018    CREATININE 0.74 06/07/2018    EGFRIFNONA 103 (H) 06/07/2018    BCR 12.2 06/07/2018    CO2 22.0 06/07/2018    CALCIUM 8.6 06/07/2018    ALBUMIN 4.40 06/07/2018    LABIL2 1.6 06/07/2018    AST 22 06/07/2018    ALT 29 06/07/2018     Lab Results   Component  Value Date    CHOL 78 05/21/2018    CHOL 96 03/21/2017     Lab Results   Component Value Date    TRIG 85 05/21/2018    TRIG 170 03/21/2017    TRIG 113 09/22/2015     Lab Results   Component Value Date    HDL 23 (L) 05/21/2018    HDL 26 (L) 03/21/2017     No components found for: LDLCALC  Lab Results   Component Value Date    LDL 44 05/21/2018    LDL 45 03/21/2017    LDL 62 09/22/2015     No results found for: HDLLDLRATIO  No components found for: CHOLHDL  Lab Results   Component Value Date    HGBA1C 6.2 (H) 05/21/2018     Lab Results   Component Value Date    TSH 1.79 12/16/2014           ASSESSMENT AND PLAN  Mr. Humphrey is stable with regards to his heart with no evidence of angina, reoccurrence of arrhythmia or congestive heart failure.  His pacemaker is functioning well.  Antiplatelet therapy with aspirin, antiarrhythmic therapy with sotalol has been continued.  His lipid profiles are in the normal range.    Jan was seen today for follow-up.    Diagnoses and all orders for this visit:    Essential hypertension    Paroxysmal atrial fibrillation    Sick sinus syndrome        Geneva Day MD  6/12/2018  11:19 AM

## 2018-06-13 DIAGNOSIS — Z92.89 HISTORY OF MRI: Primary | ICD-10-CM

## 2018-06-15 ENCOUNTER — TELEPHONE (OUTPATIENT)
Dept: NEUROLOGY | Age: 78
End: 2018-06-15

## 2018-07-03 ENCOUNTER — OFFICE VISIT (OUTPATIENT)
Dept: NEUROSURGERY | Age: 78
End: 2018-07-03
Payer: MEDICARE

## 2018-07-03 VITALS
SYSTOLIC BLOOD PRESSURE: 131 MMHG | OXYGEN SATURATION: 100 % | HEIGHT: 71 IN | HEART RATE: 61 BPM | BODY MASS INDEX: 27.86 KG/M2 | DIASTOLIC BLOOD PRESSURE: 75 MMHG | WEIGHT: 199 LBS

## 2018-07-03 DIAGNOSIS — R20.0 NUMBNESS AND TINGLING IN RIGHT HAND: ICD-10-CM

## 2018-07-03 DIAGNOSIS — M79.601 RIGHT ARM PAIN: ICD-10-CM

## 2018-07-03 DIAGNOSIS — R10.9 LEFT FLANK PAIN: ICD-10-CM

## 2018-07-03 DIAGNOSIS — R20.2 NUMBNESS AND TINGLING IN RIGHT HAND: ICD-10-CM

## 2018-07-03 DIAGNOSIS — H53.60: ICD-10-CM

## 2018-07-03 DIAGNOSIS — M54.6 ACUTE LEFT-SIDED THORACIC BACK PAIN: Primary | ICD-10-CM

## 2018-07-03 DIAGNOSIS — R51.9 FREQUENT HEADACHES: ICD-10-CM

## 2018-07-03 DIAGNOSIS — H53.9 VISION CHANGES: ICD-10-CM

## 2018-07-03 PROCEDURE — 99214 OFFICE O/P EST MOD 30 MIN: CPT | Performed by: NURSE PRACTITIONER

## 2018-07-03 NOTE — PROGRESS NOTES
a seated to standing position. His pain is not changed with walking. His pain is worsened when lying flat. Overall, indicative that the patient does have a mechanical nature to their pain. He states that 50% of his pain is located in the back and 50% is leg pain. Regarding his neck, he states that he has neck pain and right arm pain. He states that the right arm pain will radiate into the the tricep, forearm, and the last 3 digits. He states that it is hard to  small items such as his fork or spoon. He also reports frequent headaches that start in the occipital region and radiate into the front of the head. He feels like his vision is changing, such as he cannot see in the dark, his hearing is getting worse. Sometimes he just feels like he is \"blind folded\". He states that it is time for him to go to the eye doctor. The patient states that he can no longer get comfortable or work without severe pain which has dramatically affected his quality of life. The patient has underwent a non-operative treatment course that has included:  NSAIDs  (ibuprofen occasionally, cannot take a lot)  Muscle Relaxers (helped for a short time)  Opiates (does not want to take, took in past \"did not do good with it\")  IM Steroids (helped temporarily)  Physical Therapy with core strengthening (in the past, short term relief)      Of note he does not use tobacco and does take blood thinning medications (ASA).                 Past Medical History:   Diagnosis Date    Cancer ([facility] [address]     Osteoarthritis        Past Surgical History:   Procedure Laterality Date    CHOLECYSTECTOMY      COLON SURGERY      resection    HERNIA REPAIR         Current Outpatient Prescriptions   Medication Sig Dispense Refill    allopurinol (ZYLOPRIM) 100 MG tablet Take 100 mg by mouth 4 times daily       sotalol (BETAPACE) 80 MG tablet Take 80 mg by mouth 2 times daily      tamsulosin (FLOMAX) 0.4 MG capsule Take 0.4 mg by mouth daily  oxybutynin (DITROPAN) 5 MG tablet Take 5 mg by mouth 2 times daily      omeprazole (PRILOSEC) 20 MG delayed release capsule Take 20 mg by mouth daily      aspirin 81 MG chewable tablet Take 81 mg by mouth daily       No current facility-administered medications for this visit. Allergies:  Penicillins; Sulfa antibiotics; Carisoprodol; and Other    Social History:   History   Smoking Status    Never Smoker   Smokeless Tobacco    Never Used     History   Alcohol Use No         Family History:   History reviewed. No pertinent family history. REVIEW OF SYSTEMS:  ROS   Constitutional: Positive for malaise/fatigue. Negative for chills, diaphoresis, fever and weight loss. HENT: Positive for ear pain and hearing loss. Negative for congestion, ear discharge, nosebleeds, sinus pain, sore throat and tinnitus. Trouble swallowing   Eyes: Positive for blurred vision and photophobia. Negative for double vision, pain, discharge and redness. Respiratory: Positive for shortness of breath. Negative for cough, hemoptysis, sputum production, wheezing and stridor. Difficulty breathing, sleep apnea   Cardiovascular: Negative. Gastrointestinal: Positive for abdominal pain, nausea and vomiting. Negative for blood in stool, constipation, diarrhea, heartburn and melena. Change in bowel habits   Genitourinary: Positive for dysuria. Negative for flank pain, frequency, hematuria and urgency. Bladder control, sexual dysfunction   Musculoskeletal: Positive for back pain, joint pain and myalgias. Negative for falls and neck pain. Skin: Negative. Neurological: Positive for tingling and tremors. Negative for dizziness, sensory change, speech change, focal weakness, seizures, loss of consciousness, weakness and headaches. Endo/Heme/Allergies: Negative. Heat intolerance   Psychiatric/Behavioral: Positive for hallucinations and memory loss.  Negative for depression, substance abuse and there is hypertrophy of the right superior facet that is resulting in right lateral recess stenosis   L5-S1 there is a retrolisthesis, there is hypertrophy of the right superior facet that is resulting in right lateral recess stenosis     ASSESSMENT:    Donovan Soto is a 69 y.o. male with complaints of severe low back pain and right leg pain that has been present and worsening over the last 3 months. ICD-10-CM ICD-9-CM    1. Acute left-sided thoracic back pain M54.6 724.1 MRI THORACIC SPINE W WO CONTRAST   2. Left flank pain R10.9 789.09 MRI THORACIC SPINE W WO CONTRAST   3. Frequent headaches R51 784.0    4. Vision changes H53.9 368.9    5. Difficulty seeing at night H53.60 368.60    6. Right arm pain M79.601 729.5    7.  Numbness and tingling in right hand R20.0 782.0     R20.2         PLAN:  -Obtain MRI thoracic spine ([address])  -He is to schedule the MRI of the brain that has already been approved  -Follow up in 1 month      SCARLETT Chandler

## 2018-07-09 ENCOUNTER — TELEPHONE (OUTPATIENT)
Dept: NEUROLOGY | Age: 78
End: 2018-07-09

## 2018-07-13 ENCOUNTER — HOSPITAL ENCOUNTER (OUTPATIENT)
Dept: MRI IMAGING | Facility: HOSPITAL | Age: 78
Discharge: HOME | End: 2018-07-13

## 2018-07-13 ENCOUNTER — HOSPITAL ENCOUNTER (OUTPATIENT)
Dept: MRI IMAGING | Facility: HOSPITAL | Age: 78
Discharge: HOME | End: 2018-07-13
Admitting: NURSE PRACTITIONER

## 2018-07-13 VITALS
SYSTOLIC BLOOD PRESSURE: 114 MMHG | HEART RATE: 80 BPM | OXYGEN SATURATION: 94 % | RESPIRATION RATE: 18 BRPM | DIASTOLIC BLOOD PRESSURE: 69 MMHG

## 2018-07-13 DIAGNOSIS — H53.60: ICD-10-CM

## 2018-07-13 DIAGNOSIS — R10.9 FLANK PAIN: ICD-10-CM

## 2018-07-13 DIAGNOSIS — H53.9 VISION CHANGES: ICD-10-CM

## 2018-07-13 DIAGNOSIS — R29.898 FINE MOTOR IMPAIRMENT: ICD-10-CM

## 2018-07-13 DIAGNOSIS — M54.6 PAIN IN THORACIC SPINE: ICD-10-CM

## 2018-07-13 DIAGNOSIS — R51.9 FREQUENT HEADACHES: ICD-10-CM

## 2018-07-13 DIAGNOSIS — R29.818 FINE MOTOR IMPAIRMENT: ICD-10-CM

## 2018-07-13 PROCEDURE — A9576 INJ PROHANCE MULTIPACK: HCPCS | Performed by: NURSE PRACTITIONER

## 2018-07-13 PROCEDURE — 70553 MRI BRAIN STEM W/O & W/DYE: CPT

## 2018-07-13 PROCEDURE — 25010000002 GADOTERIDOL PER 1 ML: Performed by: NURSE PRACTITIONER

## 2018-07-13 PROCEDURE — 72157 MRI CHEST SPINE W/O & W/DYE: CPT

## 2018-07-13 RX ADMIN — GADOTERIDOL 19 ML: 279.3 INJECTION, SOLUTION INTRAVENOUS at 11:21

## 2018-07-18 ENCOUNTER — OFFICE VISIT (OUTPATIENT)
Dept: INTERNAL MEDICINE | Facility: CLINIC | Age: 78
End: 2018-07-18

## 2018-07-18 VITALS
HEIGHT: 71 IN | DIASTOLIC BLOOD PRESSURE: 60 MMHG | BODY MASS INDEX: 26.25 KG/M2 | SYSTOLIC BLOOD PRESSURE: 110 MMHG | WEIGHT: 187.5 LBS

## 2018-07-18 DIAGNOSIS — M54.2 NECK PAIN: Primary | ICD-10-CM

## 2018-07-18 DIAGNOSIS — G89.29 CHRONIC BILATERAL THORACIC BACK PAIN: ICD-10-CM

## 2018-07-18 DIAGNOSIS — R93.7 ABNORMAL MRI, THORACIC SPINE: ICD-10-CM

## 2018-07-18 DIAGNOSIS — M54.6 ACUTE LEFT-SIDED THORACIC BACK PAIN: ICD-10-CM

## 2018-07-18 DIAGNOSIS — R51.9 FREQUENT HEADACHES: ICD-10-CM

## 2018-07-18 DIAGNOSIS — H53.60: ICD-10-CM

## 2018-07-18 DIAGNOSIS — R10.9 LEFT FLANK PAIN: ICD-10-CM

## 2018-07-18 DIAGNOSIS — M54.6 CHRONIC BILATERAL THORACIC BACK PAIN: ICD-10-CM

## 2018-07-18 DIAGNOSIS — R29.818 FINE MOTOR IMPAIRMENT: ICD-10-CM

## 2018-07-18 DIAGNOSIS — H53.9 VISION CHANGES: ICD-10-CM

## 2018-07-18 DIAGNOSIS — R29.898 FINE MOTOR IMPAIRMENT: ICD-10-CM

## 2018-07-18 DIAGNOSIS — C93.11 CHRONIC MYELOMONOCYTIC LEUKEMIA IN REMISSION (HCC): ICD-10-CM

## 2018-07-18 PROCEDURE — 99213 OFFICE O/P EST LOW 20 MIN: CPT | Performed by: INTERNAL MEDICINE

## 2018-07-20 DIAGNOSIS — R20.0 NUMBNESS AND TINGLING IN RIGHT HAND: ICD-10-CM

## 2018-07-20 DIAGNOSIS — M54.2 NECK PAIN: Primary | ICD-10-CM

## 2018-07-20 DIAGNOSIS — R20.2 NUMBNESS AND TINGLING IN RIGHT HAND: ICD-10-CM

## 2018-07-20 DIAGNOSIS — R29.898 FINE MOTOR IMPAIRMENT: ICD-10-CM

## 2018-07-20 DIAGNOSIS — R29.818 FINE MOTOR IMPAIRMENT: ICD-10-CM

## 2018-07-20 DIAGNOSIS — M79.601 RIGHT ARM PAIN: ICD-10-CM

## 2018-07-24 ENCOUNTER — TELEPHONE (OUTPATIENT)
Dept: ONCOLOGY | Facility: HOSPITAL | Age: 78
End: 2018-07-24

## 2018-07-24 ENCOUNTER — TELEPHONE (OUTPATIENT)
Dept: NEUROSURGERY | Age: 78
End: 2018-07-24

## 2018-07-24 NOTE — TELEPHONE ENCOUNTER
----- Message from Jordin Roblero MD sent at 7/24/2018  8:10 AM CDT -----  Regarding: RE: test results of biopsy  Contact: [phone]  Bring him in tomorrow. I will talk to him. Thanks  ----- Message -----  From: Helena Clements RN  Sent: 7/23/2018   2:10 PM  To: Jordin Roblero MD  Subject: FW: test results of biopsy                       Spoke with patient. He states that he had a biopsy done by Dr. Chen and was told that he had too much histamine and that it may have something to do with leukemia. Patient would like the path report to be reviewed and your recommendation. Path report is scanned under media dated 6/20/18. Thanks    ----- Message -----  From: Natalia Villa  Sent: 7/18/2018   4:16 PM  To: [facility]  Subject: test results of biopsy                           Patient called, says Goldie sent patient to see Dr Tej Chen at [facility] for a biopsy and is wanting to be contacted with results.Patient very concerned

## 2018-07-25 ENCOUNTER — HOSPITAL ENCOUNTER (OUTPATIENT)
Dept: MRI IMAGING | Facility: HOSPITAL | Age: 78
Discharge: HOME | End: 2018-07-25
Admitting: NURSE PRACTITIONER

## 2018-07-25 ENCOUNTER — OFFICE VISIT (OUTPATIENT)
Dept: ONCOLOGY | Facility: CLINIC | Age: 78
End: 2018-07-25

## 2018-07-25 ENCOUNTER — TELEPHONE (OUTPATIENT)
Dept: NEUROSURGERY | Age: 78
End: 2018-07-25

## 2018-07-25 ENCOUNTER — APPOINTMENT (OUTPATIENT)
Dept: ONCOLOGY | Facility: HOSPITAL | Age: 78
End: 2018-07-25

## 2018-07-25 VITALS
RESPIRATION RATE: 18 BRPM | HEART RATE: 79 BPM | DIASTOLIC BLOOD PRESSURE: 71 MMHG | BODY MASS INDEX: 25.88 KG/M2 | WEIGHT: 185.5 LBS | TEMPERATURE: 97.7 F | SYSTOLIC BLOOD PRESSURE: 108 MMHG

## 2018-07-25 DIAGNOSIS — M79.601 RIGHT ARM PAIN: ICD-10-CM

## 2018-07-25 DIAGNOSIS — R29.818 FINE MOTOR IMPAIRMENT: ICD-10-CM

## 2018-07-25 DIAGNOSIS — R20.2 NUMBNESS AND TINGLING IN RIGHT HAND: ICD-10-CM

## 2018-07-25 DIAGNOSIS — R71.8 OTHER ABNORMALITY OF RED BLOOD CELLS: ICD-10-CM

## 2018-07-25 DIAGNOSIS — D47.02 SYSTEMIC MASTOCYTOSIS WITH ASSOCIATED CLONAL HEMATOLOGICAL NON-MAST CELL LINEAGE DISEASE: ICD-10-CM

## 2018-07-25 DIAGNOSIS — D47.02 SYSTEMIC MASTOCYTOSIS WITH ASSOCIATED CLONAL HEMATOLOGICAL NON-MAST CELL LINEAGE DISEASE: Primary | ICD-10-CM

## 2018-07-25 DIAGNOSIS — R20.0 NUMBNESS AND TINGLING IN RIGHT HAND: ICD-10-CM

## 2018-07-25 DIAGNOSIS — R29.898 FINE MOTOR IMPAIRMENT: ICD-10-CM

## 2018-07-25 DIAGNOSIS — C93.10 CHRONIC MYELOMONOCYTIC LEUKEMIA NOT HAVING ACHIEVED REMISSION (HCC): ICD-10-CM

## 2018-07-25 DIAGNOSIS — M54.2 NECK PAIN: ICD-10-CM

## 2018-07-25 DIAGNOSIS — D63.0 ANEMIA IN NEOPLASTIC DISEASE: ICD-10-CM

## 2018-07-25 PROBLEM — D64.9 ANEMIA: Status: ACTIVE | Noted: 2018-07-25

## 2018-07-25 LAB
ALBUMIN SERPL-MCNC: 4.9 G/DL (ref 3.4–4.8)
ALBUMIN/GLOB SERPL: 1.7 G/DL (ref 1.1–1.8)
ALP SERPL-CCNC: 45 U/L (ref 38–126)
ALT SERPL W P-5'-P-CCNC: 34 U/L (ref 21–72)
ANION GAP SERPL CALCULATED.3IONS-SCNC: 11 MMOL/L (ref 5–15)
ANISOCYTOSIS BLD QL: ABNORMAL
AST SERPL-CCNC: 25 U/L (ref 17–59)
BILIRUB SERPL-MCNC: 1.2 MG/DL (ref 0.2–1.3)
BLASTS NFR BLD MANUAL: 1 % (ref 0–0)
BUN BLD-MCNC: 13 MG/DL (ref 7–21)
BUN/CREAT SERPL: 14.3 (ref 7–25)
CALCIUM SPEC-SCNC: 9.4 MG/DL (ref 8.4–10.2)
CHLORIDE SERPL-SCNC: 103 MMOL/L (ref 95–110)
CO2 SERPL-SCNC: 26 MMOL/L (ref 22–31)
CREAT BLD-MCNC: 0.91 MG/DL (ref 0.7–1.3)
DEPRECATED RDW RBC AUTO: 96.5 FL (ref 35.1–43.9)
ERYTHROCYTE [DISTWIDTH] IN BLOOD BY AUTOMATED COUNT: 26.5 % (ref 11.5–14.5)
FERRITIN SERPL-MCNC: 264 NG/ML (ref 17.9–464)
FOLATE SERPL-MCNC: 7.02 NG/ML (ref 2.76–21)
GFR SERPL CREATININE-BSD FRML MDRD: 81 ML/MIN/1.73 (ref 42–98)
GLOBULIN UR ELPH-MCNC: 2.9 GM/DL (ref 2.3–3.5)
GLUCOSE BLD-MCNC: 102 MG/DL (ref 60–100)
HCT VFR BLD AUTO: 31.8 % (ref 39–49)
HGB BLD-MCNC: 10.9 G/DL (ref 13.7–17.3)
IRON 24H UR-MRATE: 107 MCG/DL (ref 49–181)
IRON SATN MFR SERPL: 39 % (ref 20–55)
LDH SERPL-CCNC: 455 U/L (ref 313–618)
LYMPHOCYTES # BLD MANUAL: 1.78 10*3/MM3 (ref 0.6–4.2)
LYMPHOCYTES NFR BLD MANUAL: 13 % (ref 10–50)
LYMPHOCYTES NFR BLD MANUAL: 20 % (ref 0–12)
MCH RBC QN AUTO: 34.8 PG (ref 26.5–34)
MCHC RBC AUTO-ENTMCNC: 34.3 G/DL (ref 31.5–36.3)
MCV RBC AUTO: 101.6 FL (ref 80–98)
METAMYELOCYTES NFR BLD MANUAL: 3 % (ref 0–0)
MONOCYTES # BLD AUTO: 2.74 10*3/MM3 (ref 0–0.9)
MYELOCYTES NFR BLD MANUAL: 7 % (ref 0–0)
NEUTROPHILS # BLD AUTO: 7.66 10*3/MM3 (ref 2–8.6)
NEUTROPHILS NFR BLD MANUAL: 52 % (ref 37–80)
NEUTS BAND NFR BLD MANUAL: 4 % (ref 0–5)
PLATELET # BLD AUTO: 182 10*3/MM3 (ref 150–450)
PMV BLD AUTO: ABNORMAL FL (ref 8–12)
POTASSIUM BLD-SCNC: 4.8 MMOL/L (ref 3.5–5.1)
PROT SERPL-MCNC: 7.8 G/DL (ref 6.3–8.6)
RBC # BLD AUTO: 3.13 10*6/MM3 (ref 4.37–5.74)
SMALL PLATELETS BLD QL SMEAR: ADEQUATE
SODIUM BLD-SCNC: 140 MMOL/L (ref 137–145)
TIBC SERPL-MCNC: 276 MCG/DL (ref 261–462)
VIT B12 BLD-MCNC: 856 PG/ML (ref 239–931)
WBC MORPH BLD: NORMAL
WBC NRBC COR # BLD: 13.68 10*3/MM3 (ref 3.2–9.8)

## 2018-07-25 PROCEDURE — 99214 OFFICE O/P EST MOD 30 MIN: CPT | Performed by: INTERNAL MEDICINE

## 2018-07-25 PROCEDURE — 83550 IRON BINDING TEST: CPT | Performed by: INTERNAL MEDICINE

## 2018-07-25 PROCEDURE — 25010000002 GADOTERIDOL PER 1 ML: Performed by: NURSE PRACTITIONER

## 2018-07-25 PROCEDURE — G0463 HOSPITAL OUTPT CLINIC VISIT: HCPCS | Performed by: INTERNAL MEDICINE

## 2018-07-25 PROCEDURE — 83540 ASSAY OF IRON: CPT | Performed by: INTERNAL MEDICINE

## 2018-07-25 PROCEDURE — 82607 VITAMIN B-12: CPT | Performed by: INTERNAL MEDICINE

## 2018-07-25 PROCEDURE — 80053 COMPREHEN METABOLIC PANEL: CPT | Performed by: INTERNAL MEDICINE

## 2018-07-25 PROCEDURE — A9576 INJ PROHANCE MULTIPACK: HCPCS | Performed by: NURSE PRACTITIONER

## 2018-07-25 PROCEDURE — 83615 LACTATE (LD) (LDH) ENZYME: CPT | Performed by: INTERNAL MEDICINE

## 2018-07-25 PROCEDURE — 72156 MRI NECK SPINE W/O & W/DYE: CPT

## 2018-07-25 PROCEDURE — 85025 COMPLETE CBC W/AUTO DIFF WBC: CPT | Performed by: INTERNAL MEDICINE

## 2018-07-25 PROCEDURE — 83520 IMMUNOASSAY QUANT NOS NONAB: CPT | Performed by: INTERNAL MEDICINE

## 2018-07-25 PROCEDURE — 82746 ASSAY OF FOLIC ACID SERUM: CPT | Performed by: INTERNAL MEDICINE

## 2018-07-25 PROCEDURE — 85007 BL SMEAR W/DIFF WBC COUNT: CPT | Performed by: INTERNAL MEDICINE

## 2018-07-25 PROCEDURE — 82728 ASSAY OF FERRITIN: CPT | Performed by: INTERNAL MEDICINE

## 2018-07-25 RX ORDER — FOLIC ACID 1 MG/1
1 TABLET ORAL DAILY
Qty: 90 TABLET | Refills: 1 | Status: SHIPPED | OUTPATIENT
Start: 2018-07-25 | End: 2018-10-22 | Stop reason: SDUPTHER

## 2018-07-25 RX ADMIN — GADOTERIDOL 17 ML: 279.3 INJECTION, SOLUTION INTRAVENOUS at 11:02

## 2018-07-25 NOTE — PROGRESS NOTES
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  Patient was also evaluated by Dr. Man Kimbrough at [place] in 2012.  Patient has been on observation since then without any active chemotherapy.  In November 2014 in view of worsening leukocytosis and anemia he had a repeat bone marrow biopsy done by Dr. Shore which showed systemic mastocytosis with associated clonal hematologic non-mass cell lineage disease, CMML-1.  Patient has not been on any chemotherapy since then.  In March 2018 patient was seen here at clinic by Goldie WHYTE.  In view of sclerotic lesion on lower extremity as well as skin lesion patient was referred to dermatology.  Biopsy done by Dr. Chen on June 20, 2018 showed increased MAST cells consistent with urticaria pigmentosa.  Patient has been referred to [facility] for further evaluation and recommendation regarding urticaria pigmentosa.  Patient complains of diffuse skeletal pain for which she has undergone MRI of thoracic spine, lumbar spine, and had a MRI of cervical spine done earlier today on July 25, 2018.  Complains of skin lesion on chest, abdomen, back as well as lower extremity.  Denies any drenching night sweats.  Denies any major weight loss recently.  Denies any blood in the stool or urine.  Complains of fatigue.  Denies any new lymph node enlargement.  Denies any early satiety.    PAST MEDICAL HISTORY:    Past Medical History:   Diagnosis Date   • Atrophy of testis    • Benign prostatic hyperplasia    • Borderline  glaucoma    • Chronic myelomonocytic leukemia (CMS/HCC)    • Degenerative joint disease involving multiple joints    • Gout    • History of echocardiogram 05/08/2012    Normal left ventricular systolic function, EF 60%. Early diastolic dysfunction. Mild aortic and pulmonic regurgitation. Trace mitral and mild tricuspid regurgitation. No intracardiac mass pericardial effusion or cardiac thrombus.   • History of Holter monitoring 01/18/2011   • Impotence    • Lightheadedness    • Neck pain, musculoskeletal    • Sleep apnea     Probable reason for fatigue          SOCIAL HISTORY:    Social History   Substance Use Topics   • Smoking status: Never Smoker   • Smokeless tobacco: Never Used   • Alcohol use No       Surgical History :  Past Surgical History:   Procedure Laterality Date   • CARDIAC CATHETERIZATION  09/30/2009    No epicardial coronary artery disease noted that would explain patient's chest pain of the abnormal stress test noted. Normal left ventricular systolic function with no wall motion abnormalities   • CARDIAC CATHETERIZATION  05/24/1989    Normal catheterization, false-positive exercise treadmill. Noncardiac chest pain   • CARDIAC ELECTROPHYSIOLOGY PROCEDURE N/A 5/18/2017    Procedure: PPM generator change - dual;  Surgeon: Geneva Day MD;  Location: [facility] INVAS[facility] LOC[facility];  Service:    • CARDIAC PACEMAKER PLACEMENT  06/2008    Dual chamber permanent pacemaker implantation   • COLECTOMY PARTIAL / TOTAL  04/14/2000    Cecal diverticulitis. Removal of small segment of terminal ileum, cecum and right colon, sent to pathology   • COLON SURGERY  03/27/2016   • COLONOSCOPY  05/25/2012   • CYSTOSCOPY  11/15/2000    Urinary retention on the basis of medications and benign prostatic hypertrophy   • INGUINAL HERNIA REPAIR  02/15/2007    Recurrent right inguinal hernia. Repair of recurrent inguinal hernia with EPS Prolene mesh system.   • LAPAROSCOPIC CHOLECYSTECTOMY  10/26/2006    Gallstones.  Laparoscopic cholecystectomy with operative cholangiogram   • PROSTATECTOMY  11/17/2000    Benign prostatic hypertrophy with urinary retention. Prostatitis. transurethral resection of prostate.   • STEROID INJECTION  10/21/2010    Decadron (Gout)    • STEROID INJECTION  07/23/2013    Kenalog (Gout) (4)       ALLERGIES:    Allergies   Allergen Reactions   • Sulfa Antibiotics Swelling     facial   • Other Rash     SILK TAPE   • Soma [Carisoprodol] Swelling and Rash         FAMILY HISTORY:  Family History   Problem Relation Age of Onset   • Cancer Other    • Colon cancer Other         parent   • Coronary artery disease Other    • Heart disease Other    • Hypertension Other    • Cholelithiasis Other    • Other Other         colon problems           REVIEW OF SYSTEMS:      CONSTITUTIONAL:  Complains of fatigue. Denies any fever, chills, drenching night sweats or weight loss.     EYES: No visual disturbances. No discharge. No new lesions    ENMT:  No epistaxis, mouth sores or difficulty swallowing.    RESPIRATORY:  Complains of shortness of breath with exertion. No new cough or hemoptysis.    CARDIOVASCULAR:  No chest pain or palpitations.    GASTROINTESTINAL:  No abdominal pain nausea, vomiting or blood in the stool.    GENITOURINARY: No Dysuria or Hematuria.    MUSCULOSKELETAL:  Complains of worsening back pain.    LYMPHATICS:  Denies any abnormal swollen glands anywhere in the body.    NEUROLOGICAL : No tingling or numbness. No headache or dizziness. No seizures or balance problems.    SKIN: Erythematous lesion present on chest, abdomen, back, upper and lower extremity.            PHYSICAL EXAMINATION:      VITAL SIGNS:  /71   Pulse 79   Temp 97.7 °F (36.5 °C)   Resp 18   Wt 84.1 kg (185 lb 8 oz)   BMI 25.88 kg/m²   1    07/25/18  1153   Weight: 84.1 kg (185 lb 8 oz)         ECOG performance status: 2    CONSTITUTIONAL:  Not in any distress.    EYES: Mild conjunctival Pallor. No Icterus. No Pterygium.  Extraocular Movements intact.No ptosis.    ENMT:  Normocephalic, Atraumatic.No Facial Asymmetry noted.    NECK:  No adenopathy.Trachea midline. NO JVD.    RESPIRATORY:  Fair air entry bilateral. No rhonchi or wheezing.Fair respiratory effort.    CARDIOVASCULAR:  S1, S2. Regular rate and rhythm. No murmur or gallop appreciated.Pacemaker present on left chest wall.    ABDOMEN:  Soft, obese, nontender. Bowel sounds present in all four quadrants.  No Hepatosplenomegaly appreciated.    MUSCULOSKELETAL:  No edema.No Calf Tenderness.Decreased range of motion.    NEUROLOGIC:    No  Motor  deficit appreciated. Cranial Nerves 2-12 grossly intact.    SKIN : Skin lesion consistent with urticaria pigmentosa present on chest, abdomen, back, upper and lower extremity.    LYMPHATICS: No new enlarged lymph nodes in neck or supraclavicular area.    PSYCHIATRY: Alert, awake and oriented ×3.Normal affect.  Normal judgment.  Makes good eye contact.        DIAGNOSTIC DATA:    Glucose   Date Value Ref Range Status   06/07/2018 104 (H) 60 - 100 mg/dL Final     Sodium   Date Value Ref Range Status   06/07/2018 140 137 - 145 mmol/L Final     Potassium   Date Value Ref Range Status   06/07/2018 3.9 3.5 - 5.1 mmol/L Final     CO2   Date Value Ref Range Status   06/07/2018 22.0 22.0 - 31.0 mmol/L Final     Chloride   Date Value Ref Range Status   06/07/2018 106 95 - 110 mmol/L Final     Anion Gap   Date Value Ref Range Status   06/07/2018 12.0 5.0 - 15.0 mmol/L Final     Creatinine   Date Value Ref Range Status   06/07/2018 0.74 0.70 - 1.30 mg/dL Final     BUN   Date Value Ref Range Status   06/07/2018 9 7 - 21 mg/dL Final     BUN/Creatinine Ratio   Date Value Ref Range Status   06/07/2018 12.2 7.0 - 25.0 Final     Calcium   Date Value Ref Range Status   06/07/2018 8.6 8.4 - 10.2 mg/dL Final     eGFR Non  Amer   Date Value Ref Range Status   06/07/2018 103 (H) 42 - 98 mL/min/1.73 Final     Alkaline Phosphatase   Date Value Ref Range  Status   06/07/2018 47 38 - 126 U/L Final     Total Protein   Date Value Ref Range Status   06/07/2018 7.1 6.3 - 8.6 g/dL Final     ALT (SGPT)   Date Value Ref Range Status   06/07/2018 29 21 - 72 U/L Final     AST (SGOT)   Date Value Ref Range Status   06/07/2018 22 17 - 59 U/L Final     Total Bilirubin   Date Value Ref Range Status   06/07/2018 0.9 0.2 - 1.3 mg/dL Final     Albumin   Date Value Ref Range Status   06/07/2018 4.40 3.40 - 4.80 g/dL Final     Globulin   Date Value Ref Range Status   06/07/2018 2.7 2.3 - 3.5 gm/dL Final     Lab Results   Component Value Date    WBC 14.71 (H) 06/07/2018    HGB 10.7 (L) 06/07/2018    HCT 30.3 (L) 06/07/2018    .0 (H) 06/07/2018     06/07/2018     Lab Results   Component Value Date    NEUTROABS 8.09 06/07/2018    IRON 89 06/07/2018    TIBC 286 06/07/2018    LABIRON 31 06/07/2018    FERRITIN 169.00 06/07/2018     Lab Results   Component Value Date    HCGQUANT <1 12/16/2014           PATHOLOGY:  Pathology report from November 13, 2014 showed:  FINAL DIAGNOSIS:   PERIPHERAL SMEAR, REVIEW:        MONOCYTOSIS WITH LEFT MYELOID SHIFT.        ANEMIA, BORDERLINE.   BONE MARROW ASPIRATION AND BIOPSY:        SYSTEMIC MASTOCYTOSIS WITH ASSOCIATED CLONAL             HEMATOLOGIC NON-MAST CELL LINEAGE DISEASE,             CHRONIC MYELOMONOCYTIC LEUKEMIA-1.      RADIOLOGY DATA :  CT of abdomen with and without contrast done on May 14, 2018 showed:  The liver is normal. The gallbladder surgically absent. The  biliary system appears within normal limits status post  cholecystectomy. The pancreas is normal. The spleen is normal.  Bilateral adrenal glands are normal. Right kidney mid zone 4.7 mm  nonobstructive renal calculi. Stable right kidney lower pole oval  hyperdense lesion on unenhanced imaging which has Hounsfield  units of  49. Following enhancement this lesion has Hounsfield  units of 49 and 51 suggesting no significant enhancement. This  lesion measures 2.36 cm in  greatest diameter and is not  appreciably changed. Otherwise right kidney and ureter are  unremarkable. Left kidney and visualized ureter are normal.  Visualized hollow viscera is normal. No lymphadenopathy in the  abdomen. No acute osseous abnormalities.     IMPRESSION:  1. Stable right kidney lower pole oval hyperdense lesion which  does not enhance measuring 2.36 cm in greatest diameter. This  interval stability and appearance is most consistent with benign  hyperdense cyst.  2. Right kidney mid zone 4.7 mm stable nonobstructive renal  calculi..  3. Otherwise unremarkable CT abdomen study with without  contrast..      CT of left lower extremity with contrast done on March 23, 2018 showed:  COMMENTS:              A CT examination was performed of the lower extremities, with  images coned to the left femur.     There is a marrow-based focal sclerotic lesion involving the  proximal/mid femur, measuring approximately 2.6 cm in  craniocaudal extent. There is no evidence of endosteal  scalloping, or features to suggest an aggressive process. There  is no abnormal periosteal reaction.     The remainder the examination is unremarkable for age.     .     IMPRESSION:  CONCLUSION:          1. Focal sclerotic bone lesion which is marrow based in the  diaphysis of the left femur in the proximal/midportion. This is  likely a benign lesion and correlation with plain film  radiographs are suggested.            CT chest with contrast done on January 26, 2018 showed:  IMPRESSION:  CONCLUSION:  Multiple sclerotic lesions in the spine and right RIBS,  concerning for metastatic prostate cancer. Recommend correlation  with PSA levels and nuclear medicine bone scan.           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 Shore.  -Patient rafaela far not require any chemotherapy or any other treatment.  -Recently in view of worsening skin lesion on the chest, abdomen and back he underwent a biopsy by Dr. 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  -Patient is also found to have worsening anemia recently on blood work done from June 2018.  -Patient is also found to have multiple sclerotic lesion on the spine and drapes on CT scan done from January 2018 until June 2018.  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  -We will refer patient back to Dr. Man Kimbrough at [place] for an opinion regarding his condition and treatment recommendation.  -We will do blood work today with CBC, CMP, LDH, serum tryptase level, sequela mutation testing.  We will ask patient to return to clinic in 3 weeks to go over the result of blood work and treatment recommendation after being evaluated by Dr. Man Kimbrough      2.  Anemia: Most likely secondary to #1  -We will do anemia workup today with iron studies, ferritin, vitamin B12 and folate.  If there is any nutritional deficiency will replace it.    3.  History of bradycardia status post pacemaker in 2008.    4.  Health maintenance: Patient does not smoke.  Had a colonoscopy in 2014 by Dr. Guzman.           Jordin Roblero MD  7/25/2018  11:53 AM        EMR Dragon/Transcription disclaimer:   Much of this encounter note is an electronic transcription/translation of spoken language to printed text. The electronic translation of spoken language may permit erroneous, or at times, nonsensical words or phrases to be inadvertently transcribed; Although I have reviewed the note for such errors, some may still exist.

## 2018-07-26 ENCOUNTER — DOCUMENTATION (OUTPATIENT)
Dept: ONCOLOGY | Facility: HOSPITAL | Age: 78
End: 2018-07-26

## 2018-07-27 LAB — TRYPTASE SERPL-MCNC: 93.9 UG/L (ref 2.2–13.2)

## 2018-07-31 ENCOUNTER — OFFICE VISIT (OUTPATIENT)
Dept: NEUROSURGERY | Age: 78
End: 2018-07-31
Payer: MEDICARE

## 2018-07-31 VITALS
OXYGEN SATURATION: 96 % | HEART RATE: 62 BPM | SYSTOLIC BLOOD PRESSURE: 103 MMHG | DIASTOLIC BLOOD PRESSURE: 55 MMHG | BODY MASS INDEX: 26.18 KG/M2 | HEIGHT: 71 IN | WEIGHT: 187 LBS

## 2018-07-31 DIAGNOSIS — M54.50 CHRONIC MIDLINE LOW BACK PAIN WITHOUT SCIATICA: Primary | ICD-10-CM

## 2018-07-31 DIAGNOSIS — M54.2 NECK PAIN: ICD-10-CM

## 2018-07-31 DIAGNOSIS — G89.29 CHRONIC MIDLINE LOW BACK PAIN WITHOUT SCIATICA: Primary | ICD-10-CM

## 2018-07-31 DIAGNOSIS — M79.601 RIGHT ARM PAIN: ICD-10-CM

## 2018-07-31 PROCEDURE — 99213 OFFICE O/P EST LOW 20 MIN: CPT | Performed by: NEUROLOGICAL SURGERY

## 2018-07-31 NOTE — PROGRESS NOTES
[facility]  Office Visit      Chief Complaint   Patient presents with    Follow-up     Review MRI B, C, and T Spine     7/31/2018: Mr. Russell Boss returns to clinic today to review the results of the MRI brain, cervical, and thoracic spine. He states that he was told the he has too much histamine in his body and that is what is causing his papular rash that he states is associated with leukemia. He also states that he was referred to [facility] and has an appointment with them within the next few weeks. He reports that his arm pain and hand numbness has improved. He states today that his main concern is his low back pain. 7/03/2018: Mr. Russell Boss returns to clinic today stating that he has severe pain into his right low back that radiates to the left flank that has been present for about 1 month. Today he denies leg pain. He states that different activities aggravate his pain. He reports numbness in his bilateral hands. He had a CT of his abdomen and kidney's back in May 2018 and revealed stable right kidney lesion and right calculi with an unremarkable left kidney. He now has an erythematous maculopapular rash on his upper torso and upper legs that he recently had a biopsy of the lesions in which he has a follow up scheduled. He continues to state that he has frequent headaches, occasional blurry vision, and trouble focusing. Unfortunately, the MRI of the brain was not completed. HISTORY OF PRESENT ILLNESS:      Jose Cruz Cerrato is a 69 y.o. retired male who presents with low back and right leg pain that has been gradually worsening over the last 3 months. The pain does radiate into the right buttock, posterolateral thigh, lateral shin, and to the ankle and will occasionally travel to his toes. He states that his pain is worse at night when he lies down in bed. The patient complains of numbness within his right arm and hand.   He states that he just cannot be still at all while (ASA). Past Medical History:   Diagnosis Date    Cancer ([facility] [address]     Osteoarthritis        Past Surgical History:   Procedure Laterality Date    CHOLECYSTECTOMY      COLON SURGERY      resection    HERNIA REPAIR         Current Outpatient Prescriptions   Medication Sig Dispense Refill    allopurinol (ZYLOPRIM) 100 MG tablet Take 100 mg by mouth 4 times daily       sotalol (BETAPACE) 80 MG tablet Take 80 mg by mouth 2 times daily      tamsulosin (FLOMAX) 0.4 MG capsule Take 0.4 mg by mouth daily      oxybutynin (DITROPAN) 5 MG tablet Take 5 mg by mouth 2 times daily      omeprazole (PRILOSEC) 20 MG delayed release capsule Take 20 mg by mouth daily      aspirin 81 MG chewable tablet Take 81 mg by mouth daily       No current facility-administered medications for this visit. Allergies:  Penicillins; Sulfa antibiotics; Carisoprodol; and Other    Social History:   History   Smoking Status    Never Smoker   Smokeless Tobacco    Never Used     History   Alcohol Use No         Family History:   History reviewed. No pertinent family history. REVIEW OF SYSTEMS:  ROS   Constitutional: Positive for malaise/fatigue. Negative for chills, diaphoresis, fever and weight loss. HENT: Positive for ear pain and hearing loss. Negative for congestion, ear discharge, nosebleeds, sinus pain, sore throat and tinnitus. Trouble swallowing   Eyes: Positive for blurred vision and photophobia. Negative for double vision, pain, discharge and redness. Respiratory: Positive for shortness of breath. Negative for cough, hemoptysis, sputum production, wheezing and stridor. Difficulty breathing, sleep apnea   Cardiovascular: Negative. Gastrointestinal: Positive for abdominal pain, nausea and vomiting. Negative for blood in stool, constipation, diarrhea, heartburn and melena. Change in bowel habits   Genitourinary: Positive for dysuria.  Negative for flank pain, frequency, hematuria and

## 2018-08-02 ENCOUNTER — TELEPHONE (OUTPATIENT)
Dept: INTERNAL MEDICINE | Facility: CLINIC | Age: 78
End: 2018-08-02

## 2018-08-14 ENCOUNTER — APPOINTMENT (OUTPATIENT)
Dept: ONCOLOGY | Facility: HOSPITAL | Age: 78
End: 2018-08-14

## 2018-08-21 ENCOUNTER — TELEPHONE (OUTPATIENT)
Dept: NEUROLOGY | Age: 78
End: 2018-08-21

## 2018-08-21 NOTE — TELEPHONE ENCOUNTER
Patient called and wanted us to know that he went to [facility] yesterday, 8/20/18. The oncologist told him that if Dr. Luis Robles wanted to do due surgery on his lower back it will need to be now or sooner rather than later. The patient stated that he will be starting treatment soon and will need the surgery before he can do the treatments.

## 2018-08-21 NOTE — TELEPHONE ENCOUNTER
Patient has appointment next Tuesday. If we even do surgery, it won't be this month.  I have called for the records from his oncologist.

## 2018-08-28 ENCOUNTER — APPOINTMENT (OUTPATIENT)
Dept: ONCOLOGY | Facility: HOSPITAL | Age: 78
End: 2018-08-28

## 2018-08-28 ENCOUNTER — OFFICE VISIT (OUTPATIENT)
Dept: NEUROSURGERY | Age: 78
End: 2018-08-28
Payer: MEDICARE

## 2018-08-28 VITALS
HEIGHT: 71 IN | OXYGEN SATURATION: 96 % | HEART RATE: 72 BPM | DIASTOLIC BLOOD PRESSURE: 80 MMHG | BODY MASS INDEX: 27.44 KG/M2 | WEIGHT: 196 LBS | SYSTOLIC BLOOD PRESSURE: 133 MMHG

## 2018-08-28 DIAGNOSIS — M54.50 CHRONIC MIDLINE LOW BACK PAIN WITHOUT SCIATICA: Primary | ICD-10-CM

## 2018-08-28 DIAGNOSIS — G89.29 CHRONIC MIDLINE LOW BACK PAIN WITHOUT SCIATICA: Primary | ICD-10-CM

## 2018-08-28 DIAGNOSIS — M43.17 SPONDYLOLISTHESIS AT L5-S1 LEVEL: ICD-10-CM

## 2018-08-28 PROCEDURE — 99213 OFFICE O/P EST LOW 20 MIN: CPT | Performed by: NEUROLOGICAL SURGERY

## 2018-08-28 NOTE — PROGRESS NOTES
for shortness of breath. Negative for cough, hemoptysis, sputum production, wheezing and stridor. Difficulty breathing, sleep apnea   Cardiovascular: Negative. Gastrointestinal: Positive for abdominal pain, nausea and vomiting. Negative for blood in stool, constipation, diarrhea, heartburn and melena. Change in bowel habits   Genitourinary: Positive for dysuria. Negative for flank pain, frequency, hematuria and urgency. Bladder control, sexual dysfunction   Musculoskeletal: Positive for back pain, joint pain and myalgias. Negative for falls and neck pain. Skin: Negative. Neurological: Positive for tingling and tremors. Negative for dizziness, sensory change, speech change, focal weakness, seizures, loss of consciousness, weakness and headaches. Endo/Heme/Allergies: Negative. Heat intolerance   Psychiatric/Behavioral: Positive for hallucinations and memory loss. Negative for depression, substance abuse and suicidal ideas. The patient is not nervous/anxious and does not have insomnia. PHYSICAL EXAM:  Vitals:    08/28/18 1137   BP: 133/80   Pulse: 72   SpO2: 96%     Constitutional: appears well-developed and well-nourished. Eyes  conjunctiva normal.  Pupils react to light  Ear, nose, throat -hearing intact to finger rub, No scars, masses, or lesions over external nose or ears, no atrophy of tongue  Neck-symmetric, no masses noted, no jugular vein distension  Respiration- chest wall appears symmetric, good expansion, normal effort without use of accessory muscles  Musculoskeletal  no significant wasting of muscles noted, no bony deformities, gait no gross ataxia  Extremities-no clubbing, cyanosis or edema  Skin  warm, dry, and intact. No rash, erythema, or pallor.   Psychiatric  mood, affect, and behavior appear normal.     Neurologic Examination  Awake, Alert and oriented x 3  Normal speech pattern, following commands  Motor 4-/5 right iliopsoas, 4+ knee flexor and

## 2018-08-30 ENCOUNTER — TELEPHONE (OUTPATIENT)
Dept: ONCOLOGY | Facility: CLINIC | Age: 78
End: 2018-08-30

## 2018-08-30 NOTE — TELEPHONE ENCOUNTER
Called patient and informed that Dr. Roblero will see him on Tuesday and go over everything from [place] at that time. Patient states understanding.

## 2018-09-04 ENCOUNTER — APPOINTMENT (OUTPATIENT)
Dept: ONCOLOGY | Facility: HOSPITAL | Age: 78
End: 2018-09-04

## 2018-09-07 ENCOUNTER — APPOINTMENT (OUTPATIENT)
Dept: ONCOLOGY | Facility: HOSPITAL | Age: 78
End: 2018-09-07

## 2018-09-07 ENCOUNTER — APPOINTMENT (OUTPATIENT)
Dept: ONCOLOGY | Facility: CLINIC | Age: 78
End: 2018-09-07

## 2018-09-11 ENCOUNTER — LAB (OUTPATIENT)
Dept: ONCOLOGY | Facility: HOSPITAL | Age: 78
End: 2018-09-11

## 2018-09-11 ENCOUNTER — OFFICE VISIT (OUTPATIENT)
Dept: ONCOLOGY | Facility: CLINIC | Age: 78
End: 2018-09-11

## 2018-09-11 VITALS
SYSTOLIC BLOOD PRESSURE: 122 MMHG | DIASTOLIC BLOOD PRESSURE: 67 MMHG | HEART RATE: 78 BPM | HEIGHT: 71 IN | BODY MASS INDEX: 27.05 KG/M2 | RESPIRATION RATE: 16 BRPM | TEMPERATURE: 97.8 F | WEIGHT: 193.2 LBS | OXYGEN SATURATION: 96 %

## 2018-09-11 DIAGNOSIS — C93.10 CHRONIC MYELOMONOCYTIC LEUKEMIA NOT HAVING ACHIEVED REMISSION (HCC): ICD-10-CM

## 2018-09-11 DIAGNOSIS — D63.0 ANEMIA IN NEOPLASTIC DISEASE: ICD-10-CM

## 2018-09-11 DIAGNOSIS — D47.02 SYSTEMIC MASTOCYTOSIS WITH ASSOCIATED CLONAL HEMATOLOGICAL NON-MAST CELL LINEAGE DISEASE: Primary | ICD-10-CM

## 2018-09-11 DIAGNOSIS — D50.8 OTHER IRON DEFICIENCY ANEMIA: ICD-10-CM

## 2018-09-11 LAB
ALBUMIN SERPL-MCNC: 4.1 G/DL (ref 3.4–4.8)
ALBUMIN/GLOB SERPL: 1.5 G/DL (ref 1.1–1.8)
ALP SERPL-CCNC: 48 U/L (ref 38–126)
ALT SERPL W P-5'-P-CCNC: 25 U/L (ref 21–72)
ANION GAP SERPL CALCULATED.3IONS-SCNC: 11 MMOL/L (ref 5–15)
ANISOCYTOSIS BLD QL: ABNORMAL
AST SERPL-CCNC: 19 U/L (ref 17–59)
BILIRUB SERPL-MCNC: 0.7 MG/DL (ref 0.2–1.3)
BUN BLD-MCNC: 13 MG/DL (ref 7–21)
BUN/CREAT SERPL: 14.4 (ref 7–25)
CALCIUM SPEC-SCNC: 8.7 MG/DL (ref 8.4–10.2)
CHLORIDE SERPL-SCNC: 104 MMOL/L (ref 95–110)
CO2 SERPL-SCNC: 25 MMOL/L (ref 22–31)
CREAT BLD-MCNC: 0.9 MG/DL (ref 0.7–1.3)
DACRYOCYTES BLD QL SMEAR: ABNORMAL
DEPRECATED RDW RBC AUTO: 97.8 FL (ref 35.1–43.9)
ERYTHROCYTE [DISTWIDTH] IN BLOOD BY AUTOMATED COUNT: 26 % (ref 11.5–14.5)
FOLATE SERPL-MCNC: >20 NG/ML (ref 2.76–21)
GFR SERPL CREATININE-BSD FRML MDRD: 82 ML/MIN/1.73 (ref 42–98)
GLOBULIN UR ELPH-MCNC: 2.8 GM/DL (ref 2.3–3.5)
GLUCOSE BLD-MCNC: 98 MG/DL (ref 60–100)
HCT VFR BLD AUTO: 30.3 % (ref 39–49)
HGB BLD-MCNC: 10.6 G/DL (ref 13.7–17.3)
HYPOCHROMIA BLD QL: ABNORMAL
LYMPHOCYTES # BLD MANUAL: 2.67 10*3/MM3 (ref 0.6–4.2)
LYMPHOCYTES NFR BLD MANUAL: 16 % (ref 0–12)
LYMPHOCYTES NFR BLD MANUAL: 20 % (ref 10–50)
MCH RBC QN AUTO: 35.9 PG (ref 26.5–34)
MCHC RBC AUTO-ENTMCNC: 35 G/DL (ref 31.5–36.3)
MCV RBC AUTO: 102.7 FL (ref 80–98)
METAMYELOCYTES NFR BLD MANUAL: 8 % (ref 0–0)
MONOCYTES # BLD AUTO: 2.13 10*3/MM3 (ref 0–0.9)
MYELOCYTES NFR BLD MANUAL: 1 % (ref 0–0)
NEUTROPHILS # BLD AUTO: 7.2 10*3/MM3 (ref 2–8.6)
NEUTROPHILS NFR BLD MANUAL: 50 % (ref 37–80)
NEUTS BAND NFR BLD MANUAL: 4 % (ref 0–5)
NRBC SPEC MANUAL: 2 /100 WBC (ref 0–0)
PLATELET # BLD AUTO: 183 10*3/MM3 (ref 150–450)
PMV BLD AUTO: ABNORMAL FL (ref 8–12)
POLYCHROMASIA BLD QL SMEAR: ABNORMAL
POTASSIUM BLD-SCNC: 4 MMOL/L (ref 3.5–5.1)
PROT SERPL-MCNC: 6.9 G/DL (ref 6.3–8.6)
RBC # BLD AUTO: 2.95 10*6/MM3 (ref 4.37–5.74)
SMALL PLATELETS BLD QL SMEAR: ADEQUATE
SODIUM BLD-SCNC: 140 MMOL/L (ref 137–145)
VARIANT LYMPHS NFR BLD MANUAL: 1 % (ref 0–5)
VIT B12 BLD-MCNC: 803 PG/ML (ref 239–931)
WBC MORPH BLD: NORMAL
WBC NRBC COR # BLD: 13.34 10*3/MM3 (ref 3.2–9.8)

## 2018-09-11 PROCEDURE — 85025 COMPLETE CBC W/AUTO DIFF WBC: CPT

## 2018-09-11 PROCEDURE — 82746 ASSAY OF FOLIC ACID SERUM: CPT

## 2018-09-11 PROCEDURE — 1123F ACP DISCUSS/DSCN MKR DOCD: CPT | Performed by: INTERNAL MEDICINE

## 2018-09-11 PROCEDURE — G8420 CALC BMI NORM PARAMETERS: HCPCS | Performed by: INTERNAL MEDICINE

## 2018-09-11 PROCEDURE — G0463 HOSPITAL OUTPT CLINIC VISIT: HCPCS | Performed by: INTERNAL MEDICINE

## 2018-09-11 PROCEDURE — 99214 OFFICE O/P EST MOD 30 MIN: CPT | Performed by: INTERNAL MEDICINE

## 2018-09-11 PROCEDURE — 80053 COMPREHEN METABOLIC PANEL: CPT

## 2018-09-11 PROCEDURE — 85007 BL SMEAR W/DIFF WBC COUNT: CPT

## 2018-09-11 PROCEDURE — 82607 VITAMIN B-12: CPT

## 2018-09-11 RX ORDER — ONDANSETRON 4 MG/1
4 TABLET, FILM COATED ORAL 4 TIMES DAILY PRN
Qty: 40 TABLET | Refills: 3 | Status: SHIPPED | OUTPATIENT
Start: 2018-09-11 | End: 2019-01-01 | Stop reason: SDUPTHER

## 2018-09-11 NOTE — PROGRESS NOTES
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  Patient was also evaluated by Dr. Man Kimbrough at [place] in 2012.  Patient has been on observation since then without any active chemotherapy.  In November 2014 in view of worsening leukocytosis and anemia he had a repeat bone marrow biopsy done by Dr. Shore which showed systemic mastocytosis with associated clonal hematologic non-mass cell lineage disease, CMML-1.  Patient has not been on any chemotherapy since then.  In March 2018 patient was seen here at clinic by Goldie WHYTE.  In view of sclerotic lesion on lower extremity as well as skin lesion patient was referred to dermatology.  Biopsy done by Dr. Chen on June 20, 2018 showed increased MAST cells consistent with urticaria pigmentosa.  A she was last seen here at clinic on July 25, 2018.  After that patient was referred to [place] clinic when he was seen by Dr. Kimbrough on August 20, 2018 and a bone marrow biopsy done on August 23, 2018.  He is here to discuss the results and further recommendation.  Complains of fatigue.  Complains of worsening neck pain.  Complains of skin lesion on chest, abdomen, upper and lower extremity.    PAST MEDICAL HISTORY:    Past Medical History:   Diagnosis Date   • Atrophy of testis    • Benign prostatic hyperplasia    • Borderline glaucoma    • Chronic myelomonocytic leukemia (CMS/HCC)    • Degenerative joint disease involving multiple joints    • Gout    • History of echocardiogram 05/08/2012    Normal left  ventricular systolic function, EF 60%. Early diastolic dysfunction. Mild aortic and pulmonic regurgitation. Trace mitral and mild tricuspid regurgitation. No intracardiac mass pericardial effusion or cardiac thrombus.   • History of Holter monitoring 01/18/2011   • Impotence    • Lightheadedness    • Neck pain, musculoskeletal    • Sleep apnea     Probable reason for fatigue          SOCIAL HISTORY:    Social History   Substance Use Topics   • Smoking status: Never Smoker   • Smokeless tobacco: Never Used   • Alcohol use No       Surgical History :  Past Surgical History:   Procedure Laterality Date   • CARDIAC CATHETERIZATION  09/30/2009    No epicardial coronary artery disease noted that would explain patient's chest pain of the abnormal stress test noted. Normal left ventricular systolic function with no wall motion abnormalities   • CARDIAC CATHETERIZATION  05/24/1989    Normal catheterization, false-positive exercise treadmill. Noncardiac chest pain   • CARDIAC ELECTROPHYSIOLOGY PROCEDURE N/A 5/18/2017    Procedure: PPM generator change - dual;  Surgeon: Geneva Day MD;  Location: [facility] INVASIVE LOCATION;  Service:    • CARDIAC PACEMAKER PLACEMENT  06/2008    Dual chamber permanent pacemaker implantation   • COLECTOMY PARTIAL / TOTAL  04/14/2000    Cecal diverticulitis. Removal of small segment of terminal ileum, cecum and right colon, sent to pathology   • COLON SURGERY  03/27/2016   • COLONOSCOPY  05/25/2012   • CYSTOSCOPY  11/15/2000    Urinary retention on the basis of medications and benign prostatic hypertrophy   • INGUINAL HERNIA REPAIR  02/15/2007    Recurrent right inguinal hernia. Repair of recurrent inguinal hernia with EPS Prolene mesh system.   • LAPAROSCOPIC CHOLECYSTECTOMY  10/26/2006    Gallstones. Laparoscopic cholecystectomy with operative cholangiogram   • PROSTATECTOMY  11/17/2000    Benign prostatic hypertrophy with urinary retention. Prostatitis. transurethral resection  "of prostate.   • STEROID INJECTION  10/21/2010    Decadron (Gout)    • STEROID INJECTION  07/23/2013    Kenalog (Gout) (4)       ALLERGIES:    Allergies   Allergen Reactions   • Sulfa Antibiotics Swelling     facial   • Other Rash     SILK TAPE   • Soma [Carisoprodol] Swelling and Rash         FAMILY HISTORY:  Family History   Problem Relation Age of Onset   • Cancer Other    • Colon cancer Other         parent   • Coronary artery disease Other    • Heart disease Other    • Hypertension Other    • Cholelithiasis Other    • Other Other         colon problems           REVIEW OF SYSTEMS:      CONSTITUTIONAL:  Complains of fatigue. Denies any fever, chills, drenching night sweats or weight loss.     EYES: No visual disturbances. No discharge. No new lesions    ENMT:  No epistaxis, mouth sores or difficulty swallowing.    RESPIRATORY:  Complains of shortness of breath with exertion. No new cough or hemoptysis.    CARDIOVASCULAR:  No chest pain or palpitations.    GASTROINTESTINAL:  No abdominal pain nausea, vomiting or blood in the stool.    GENITOURINARY: No Dysuria or Hematuria.    MUSCULOSKELETAL:  Complains of worsening back pain.  Complains of worsening pain in neck region.    LYMPHATICS:  Denies any abnormal swollen glands anywhere in the body.    NEUROLOGICAL : No tingling or numbness. No headache or dizziness. No seizures or balance problems.    SKIN: Erythematous lesion present on chest, abdomen, back, upper and lower extremity.            PHYSICAL EXAMINATION:      VITAL SIGNS:  /67   Pulse 78   Temp 97.8 °F (36.6 °C) (Temporal Artery )   Resp 16   Ht 180.3 cm (70.98\")   Wt 87.6 kg (193 lb 3.2 oz)   SpO2 96%   BMI 26.96 kg/m²   1    09/11/18  0820   Weight: 87.6 kg (193 lb 3.2 oz)         ECOG performance status: 2    CONSTITUTIONAL:  Not in any distress.    EYES: Mild conjunctival Pallor. No Icterus. No Pterygium. Extraocular Movements intact.No ptosis.    ENMT:  Normocephalic, Atraumatic.No " Facial Asymmetry noted.    NECK:  No adenopathy.Trachea midline. NO JVD.    RESPIRATORY:  Fair air entry bilateral. No rhonchi or wheezing.Fair respiratory effort.    CARDIOVASCULAR:  S1, S2. Regular rate and rhythm. No murmur or gallop appreciated.Pacemaker present on left chest wall.    ABDOMEN:  Soft, obese, nontender. Bowel sounds present in all four quadrants.  No Hepatosplenomegaly appreciated.    MUSCULOSKELETAL:  No edema.No Calf Tenderness.Decreased range of motion.    NEUROLOGIC:    No  Motor  deficit appreciated. Cranial Nerves 2-12 grossly intact.    SKIN : Skin lesion consistent with urticaria pigmentosa present on chest, abdomen, back, upper and lower extremity.    LYMPHATICS: No new enlarged lymph nodes in neck or supraclavicular area.    PSYCHIATRY: Alert, awake and oriented ×3.Normal affect.  Normal judgment.  Makes good eye contact.        DIAGNOSTIC DATA:    Glucose   Date Value Ref Range Status   07/25/2018 102 (H) 60 - 100 mg/dL Final     Sodium   Date Value Ref Range Status   07/25/2018 140 137 - 145 mmol/L Final     Potassium   Date Value Ref Range Status   07/25/2018 4.8 3.5 - 5.1 mmol/L Final     CO2   Date Value Ref Range Status   07/25/2018 26.0 22.0 - 31.0 mmol/L Final     Chloride   Date Value Ref Range Status   07/25/2018 103 95 - 110 mmol/L Final     Anion Gap   Date Value Ref Range Status   07/25/2018 11.0 5.0 - 15.0 mmol/L Final     Creatinine   Date Value Ref Range Status   07/25/2018 0.91 0.70 - 1.30 mg/dL Final     BUN   Date Value Ref Range Status   07/25/2018 13 7 - 21 mg/dL Final     BUN/Creatinine Ratio   Date Value Ref Range Status   07/25/2018 14.3 7.0 - 25.0 Final     Calcium   Date Value Ref Range Status   07/25/2018 9.4 8.4 - 10.2 mg/dL Final     eGFR Non  Amer   Date Value Ref Range Status   07/25/2018 81 42 - 98 mL/min/1.73 Final     Alkaline Phosphatase   Date Value Ref Range Status   07/25/2018 45 38 - 126 U/L Final     Total Protein   Date Value Ref Range  Status   07/25/2018 7.8 6.3 - 8.6 g/dL Final     ALT (SGPT)   Date Value Ref Range Status   07/25/2018 34 21 - 72 U/L Final     AST (SGOT)   Date Value Ref Range Status   07/25/2018 25 17 - 59 U/L Final     Total Bilirubin   Date Value Ref Range Status   07/25/2018 1.2 0.2 - 1.3 mg/dL Final     Albumin   Date Value Ref Range Status   07/25/2018 4.90 (H) 3.40 - 4.80 g/dL Final     Globulin   Date Value Ref Range Status   07/25/2018 2.9 2.3 - 3.5 gm/dL Final     Lab Results   Component Value Date    WBC 13.34 (H) 09/11/2018    HGB 10.6 (L) 09/11/2018    HCT 30.3 (L) 09/11/2018    .7 (H) 09/11/2018     09/11/2018     Lab Results   Component Value Date    NEUTROABS 7.66 07/25/2018    IRON 107 07/25/2018    TIBC 276 07/25/2018    LABIRON 39 07/25/2018    FERRITIN 264.00 07/25/2018    [id number] 856 07/25/2018    FOLATE 7.02 07/25/2018     Lab Results   Component Value Date    HCGQUANT <1 12/16/2014       Tryptase    Ref Range & Units 1mo ago   Tryptase 2.2 - 13.2 ug/L 93.9     Resulting Agency  [facility]   Narrative     Performed at:   - [facility]  [id number]  : Tej Matias MD, Phone:  [phone]      Specimen Collected: 07/25/18 12:34 Last Resulted: 07/27/18 13:16                  PATHOLOGY:  Pathology report from August 23, 2018 at [place] showed:  Diagnosis:  BONE MARROW - PERIPHERAL BLOOD SMEAR, ASPIRATE SMEAR, PARTICLE PREPARATION, BIOPSY, AND FLOW CYTOMETRY: MARKEDLY HYPERCELLULAR MARROW WITH INVOLVEMENT BY SYSTEMIC MASTOCYTOSIS WITH AN ASSOCIATED HEMATOLOGICAL NEOPLASM, CHRONIC MYELOMONOCYTIC LEUKEMIA-1; SEE IMPRESSION     IMPRESSION:  The findings are of a markedly hypercellular marrow (90% cellular) with maturing trilineage hematopoiesis and multifocal mast cell aggregates, including spindled forms. Mast cells are positive for CD2 and CD25 by flow cytometric analysis. Recent testing at an outside institution showed an elevated tryptase  level. The overall findings are consistent with involvement by systemic mastocytosis. In addition, there is multilineage dysplasia with mildly increased blasts and blast equivalents (6.1% of cellularity in total) and a persistent relative and absolute peripheral monocytosis. The combined findings are consistent with involvement by chronic myelomonocytic leukemia-1 (CMML-1), proliferative type. Final diagnosis requires correlation with pending additional testing, as documented below, which will be included in the comprehensive hematopathology report.    PERIPHERAL BLOOD SMEAR:  CBC : WBC 18.9 k/microL, Hgb 9.7 g/dL, Plt-Ct 187 k/microL,  fL, RDW 26.3%.    A Yi's stained peripheral smear is reviewed. Erythrocytes are decreased and are macrocytic and normochromic with anisopoikilocytosis, including target cells and teardrop cells. Polychromasia is present. Nucleated red blood cells are present. Leukocytes are increased and include mature neutrophils, lymphocytes, and monocytes, with a relative and absolute monocytosis. Neutrophils show dysplasia (nuclear hypolobation, hypogranulation). Left-shifted myeloid elements are present. There are no circulating blasts or plasma cells. Platelets are adequate in number and show variation in size.     ASPIRATE SMEARS AND TOUCH PREPARATIONS:  Yi's stained aspirate smears and touch preparations are reviewed. The material is hypercellular and particulate. Myeloid elements are adequate and show dysplasia (bvjbueb-mz-zxzkzldrvse dyssynchrony). Erythroid elements are adequate and show left-shifted maturation with mild dysplasia (megaloblastoid change, occasional nuclear membrane irregularity). The myeloid:erythroid ratio is 1.1 to 1. Megakaryocytes are increased in number, and demonstrate no significant atypia. Blasts and blast equivalents (monoblasts, promonocytes) comprise 6.1% of cellularity in total (600 cell count). There is no increase in plasma cells or  lymphocytes. Focal particles show significantly increased mast cells, including occasional spindle forms. A Prussian blue iron stain is performed on the sample, revealing increased storage iron. Sideroblastic iron is present. Ring sideroblasts are present (approximately 40-50%).     MARROW SMEAR DIFFERENTIAL:  Blasts (0-4): 3.8%  Monoblasts/promonocytes: 2.3%  Promyelocytes (1-8): 2.2%  Myelocytes and metamyelocytes (20-30): 15.8%  Bands and segmented neutrophils (12-25): 23.0%  Eosinophils and eosinophilic precursors (1-5): 0.3%  Basophils and basophilic precursors (0-1): 0.0%  Monocytes and monocytic precursors (0-2): 5.8%  Lymphocytes (10-15): 3.0%  Plasma cells (0-1): 0.2%  Erythroid precursors (15-27): 43.5%  Total cells counted: 600    BONE MARROW BIOPSY AND PARTICLE PREPARATION:  H&E and PAS stained bone marrow biopsy and particle preparation sections are reviewed. The material is adequate and markedly hypercellular (90% cellular). Myeloid elements are present in normal proportion and exhibit maturation. Erythroid elements are present in normal proportion and exhibit left-shifted maturation. Megakaryocytes are increased and include occasional hypolobated forms and forms with abnormal nuclear lobe separation. CD34-positive blasts comprise approximately 3-5% of marrow cellularity. Multifocal dense mast cell aggregates are present, including spindled forms, as confirmed by  and tryptase immunostains. A CD68 stain highlights increased monocytic/histiocytic forms. Bony trabeculae are normal for the patient's age. All stains performed with appropriate controls.     FLOW CYTOMETRY STUDIES:  IP ID:   Viability: 94.1%  DIAGNOSIS: No increase in blasts; abnormal mast cells present    COMMENT: Myeloblasts are not increased (0.9% of total cells), with the following normal immunophenotype: positive for CD33 (moderate), CD34, and CD45 (dim); and negative for CD19. B cells are not increased (0.1% of total  cells), with the following normal immunophenotype: positive for CD19 (bright) and CD45 (bright); and negative for CD34. T cells are not increased (3.1% of total cells), with the following normal immunophenotype: positive for CD45 (bright); and negative for CD34. Mast cells are present (0.03% of total cells), with the following abnormal immunophenotype: positive for CD2, CD25, CD45,  (bright), and FcER1.    CYTOGENETICS AND MOLECULAR DIAGNOSTIC RESULTS:  Additional testing (Karyotype, Myeloid NGS) is pending. Results will be included in the comprehensive hematopathology report.         **Electronically signed out by ELVIA TO,TYLER**on 8/27/2018  MS/EM/haritha  Case reviewed by Attending Pathologist        Pathology report from November 13, 2014 showed:  FINAL DIAGNOSIS:   PERIPHERAL SMEAR, REVIEW:        MONOCYTOSIS WITH LEFT MYELOID SHIFT.        ANEMIA, BORDERLINE.   BONE MARROW ASPIRATION AND BIOPSY:        SYSTEMIC MASTOCYTOSIS WITH ASSOCIATED CLONAL             HEMATOLOGIC NON-MAST CELL LINEAGE DISEASE,             CHRONIC MYELOMONOCYTIC LEUKEMIA-1.            RADIOLOGY DATA :  CT of abdomen with and without contrast done on May 14, 2018 showed:  The liver is normal. The gallbladder surgically absent. The  biliary system appears within normal limits status post  cholecystectomy. The pancreas is normal. The spleen is normal.  Bilateral adrenal glands are normal. Right kidney mid zone 4.7 mm  nonobstructive renal calculi. Stable right kidney lower pole oval  hyperdense lesion on unenhanced imaging which has Hounsfield  units of  49. Following enhancement this lesion has Hounsfield  units of 49 and 51 suggesting no significant enhancement. This  lesion measures 2.36 cm in greatest diameter and is not  appreciably changed. Otherwise right kidney and ureter are  unremarkable. Left kidney and visualized ureter are normal.  Visualized hollow viscera is normal. No lymphadenopathy in the  abdomen. No acute osseous  abnormalities.     IMPRESSION:  1. Stable right kidney lower pole oval hyperdense lesion which  does not enhance measuring 2.36 cm in greatest diameter. This  interval stability and appearance is most consistent with benign  hyperdense cyst.  2. Right kidney mid zone 4.7 mm stable nonobstructive renal  calculi..  3. Otherwise unremarkable CT abdomen study with without  contrast..      CT of left lower extremity with contrast done on March 23, 2018 showed:  COMMENTS:              A CT examination was performed of the lower extremities, with  images coned to the left femur.     There is a marrow-based focal sclerotic lesion involving the  proximal/mid femur, measuring approximately 2.6 cm in  craniocaudal extent. There is no evidence of endosteal  scalloping, or features to suggest an aggressive process. There  is no abnormal periosteal reaction.     The remainder the examination is unremarkable for age.     .     IMPRESSION:  CONCLUSION:          1. Focal sclerotic bone lesion which is marrow based in the  diaphysis of the left femur in the proximal/midportion. This is  likely a benign lesion and correlation with plain film  radiographs are suggested.            CT chest with contrast done on January 26, 2018 showed:  IMPRESSION:  CONCLUSION:  Multiple sclerotic lesions in the spine and right RIBS,  concerning for metastatic prostate cancer. Recommend correlation  with PSA levels and nuclear medicine bone scan.           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 Shore.  -Patient hasso far not require any chemotherapy or any other treatment.  -Recently in view of worsening skin lesion on the chest,  abdomen and back he underwent a biopsy by Dr. 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  -Patient is also found to have worsening anemia recently on blood work done from June 2018.  -Patient is also found to have multiple sclerotic lesion on the spine and hips on CT scan done from January 2018 until June 2018.  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  -Patient was evaluated by Dr. Kimbrough at [facility] on August 20, 2018 and had a bone marrow biopsy done on August 23, 2018 that again showed systemic mastocytosis with CMML-1.  -In view of elevated tryptase level, his case was discussed at tumor board at [facility].  -Case was discussed with Dr. Kimbrough on September 10, 2018, is recommending treatment with Vidaza for now.  Side effect of Vidaza were discussed with patient today.  -Patient will be provided information to read about Vidaza today.  He is requesting a referral to Dr. Babb for port placement.  -Once patient gets port placement will start chemotherapy either next Monday or Monday following that.      2.  Anemia: Most likely secondary to #1  -Remains on folic acid 1 mg by mouth daily.  Hemoglobin is 10.6.  We'll monitored with CBC.    3.  History of bradycardia status post pacemaker in 2008.    4.  Health maintenance: Patient does not smoke.  Had a colonoscopy in 2014 by Dr. Guzman.    5. BMI: Patient's Body mass index is 26.96 kg/m². BMI is in reference range.    6. Advance Care Planning: For now patient remains full code and is able to make  His decisions.  Patient has health care surrogate mentioned on chart.         Jordin Roblero MD  9/11/2018  8:29 AM        EMR Dragon/Transcription disclaimer:   Much of this encounter note is an electronic transcription/translation of spoken language to printed text. The electronic translation  of spoken language may permit erroneous, or at times, nonsensical words or phrases to be inadvertently transcribed; Although I have reviewed the note for such errors, some may still exist.

## 2018-09-11 NOTE — PATIENT INSTRUCTIONS
"[employer] instructions printed from www.[employer].com and given to patient.    Implanted Port Home Guide  An implanted port is a type of central line that is placed under the skin. Central lines are used to provide IV access when treatment or nutrition needs to be given through a person’s veins. Implanted ports are used for long-term IV access. An implanted port may be placed because:  · You need IV medicine that would be irritating to the small veins in your hands or arms.  · You need long-term IV medicines, such as antibiotics.  · You need IV nutrition for a long period.  · You need frequent blood draws for lab tests.  · You need dialysis.    Implanted ports are usually placed in the chest area, but they can also be placed in the upper arm, the abdomen, or the leg. An implanted port has two main parts:  · Annetta. The reservoir is round and will appear as a small, raised area under your skin. The reservoir is the part where a needle is inserted to give medicines or draw blood.  · Catheter. The catheter is a thin, flexible tube that extends from the reservoir. The catheter is placed into a large vein. Medicine that is inserted into the reservoir goes into the catheter and then into the vein.    How will I care for my incision site?  Do not get the incision site wet. Bathe or shower as directed by your health care provider.  How is my port accessed?  Special steps must be taken to access the port:  · Before the port is accessed, a numbing cream can be placed on the skin. This helps numb the skin over the port site.  · Your health care provider uses a sterile technique to access the port.  ? Your health care provider must put on a mask and sterile gloves.  ? The skin over your port is cleaned carefully with an antiseptic and allowed to dry.  ? The port is gently pinched between sterile gloves, and a needle is inserted into the port.  · Only \"non-coring\" port needles should be used to access the port. Once the port is " accessed, a blood return should be checked. This helps ensure that the port is in the vein and is not clogged.  · If your port needs to remain accessed for a constant infusion, a clear (transparent) bandage will be placed over the needle site. The bandage and needle will need to be changed every week, or as directed by your health care provider.  · Keep the bandage covering the needle clean and dry. Do not get it wet. Follow your health care provider’s instructions on how to take a shower or bath while the port is accessed.  · If your port does not need to stay accessed, no bandage is needed over the port.    What is flushing?  Flushing helps keep the port from getting clogged. Follow your health care provider’s instructions on how and when to flush the port. Ports are usually flushed with saline solution or a medicine called heparin. The need for flushing will depend on how the port is used.  · If the port is used for intermittent medicines or blood draws, the port will need to be flushed:  ? After medicines have been given.  ? After blood has been drawn.  ? As part of routine maintenance.  · If a constant infusion is running, the port may not need to be flushed.    How long will my port stay implanted?  The port can stay in for as long as your health care provider thinks it is needed. When it is time for the port to come out, surgery will be done to remove it. The procedure is similar to the one performed when the port was put in.  When should I seek immediate medical care?  When you have an implanted port, you should seek immediate medical care if:  · You notice a bad smell coming from the incision site.  · You have swelling, redness, or drainage at the incision site.  · You have more swelling or pain at the port site or the surrounding area.  · You have a fever that is not controlled with medicine.    This information is not intended to replace advice given to you by your health care provider. Make sure you  discuss any questions you have with your health care provider.  Document Released: 12/18/2006 Document Revised: 05/25/2017 Document Reviewed: 08/25/2014  [employer] Interactive Patient Education © 2017 [employer]    Implanted Port Insertion  Implanted port insertion is a procedure to put in a port and catheter. The port is a device with an injectable disk that can be accessed by your health care provider. The port is connected to a vein in the chest or neck by a small flexible tube (catheter). There are different types of ports. The implanted port may be used as a long-term IV access for:  · Medicines, such as chemotherapy.  · Fluids.  · Liquid nutrition, such as total parenteral nutrition (TPN).  · Blood samples.    Having a port means that your health care provider will not need to use the veins in your arms for these procedures.  Tell a health care provider about:  · Any allergies you have.  · All medicines you are taking, especially blood thinners, as well as any vitamins, herbs, eye drops, creams, over-the-counter medicines, and steroids.  · Any problems you or family members have had with anesthetic medicines.  · Any blood disorders you have.  · Any surgeries you have had.  · Any medical conditions you have, including diabetes or kidney problems.  · Whether you are pregnant or may be pregnant.  What are the risks?  Generally, this is a safe procedure. However, problems may occur, including:  · Allergic reactions to medicines or dyes.  · Damage to other structures or organs.  · Infection.  · Damage to the blood vessel, bruising, or bleeding at the puncture site.  · Blood clot.  · Breakdown of the skin over the port.  · A collection of air in the chest that can cause one of the lungs to collapse (pneumothorax). This is rare.    What happens before the procedure?  Staying hydrated  Follow instructions from your health care provider about hydration, which may include:  · Up to 2 hours before the procedure - you  may continue to drink clear liquids, such as water, clear fruit juice, black coffee, and plain tea.    Eating and drinking restrictions  · Follow instructions from your health care provider about eating and drinking, which may include:  ? 8 hours before the procedure - stop eating heavy meals or foods such as meat, fried foods, or fatty foods.  ? 6 hours before the procedure - stop eating light meals or foods, such as toast or cereal.  ? 6 hours before the procedure - stop drinking milk or drinks that contain milk.  ? 2 hours before the procedure - stop drinking clear liquids.  Medicines  · Ask your health care provider about:  ? Changing or stopping your regular medicines. This is especially important if you are taking diabetes medicines or blood thinners.  ? Taking medicines such as aspirin and ibuprofen. These medicines can thin your blood. Do not take these medicines before your procedure if your health care provider instructs you not to.  · You may be given antibiotic medicine to help prevent infection.  General instructions  · Plan to have someone take you home from the hospital or clinic.  · If you will be going home right after the procedure, plan to have someone with you for 24 hours.  · You may have blood tests.  · You may be asked to shower with a germ-killing soap.  What happens during the procedure?  · To lower your risk of infection:  ? Your health care team will wash or sanitize their hands.  ? Your skin will be washed with soap.  ? Hair may be removed from the surgical area.  · An IV tube will be inserted into one of your veins.  · You will be given one or more of the following:  ? A medicine to help you relax (sedative).  ? A medicine to numb the area (local anesthetic).  · Two small cuts (incisions) will be made to insert the port.  ? One incision will be made in your neck to get access to the vein where the catheter will lie.  ? The other incision will be made in the upper chest. This is where the  port will lie.  · The procedure may be done using continuous X-ray (fluoroscopy) or other imaging tools for guidance.  · The port and catheter will be placed. There may be a small, raised area where the port is.  · The port will be flushed with a salt solution (saline), and blood will be drawn to make sure that it is working correctly.  · The incisions will be closed.  · Bandages (dressings) may be placed over the incisions.  The procedure may vary among health care providers and hospitals.  What happens after the procedure?  · Your blood pressure, heart rate, breathing rate, and blood oxygen level will be monitored until the medicines you were given have worn off.  · Do not drive for 24 hours if you were given a sedative.  · You will be given a 's information card for the type of port that you have. Keep this with you.  · Your port will need to be flushed and checked as told by your health care provider, usually every few weeks.  · A chest X-ray will be done to:  ? Check the placement of the port.  ? Make sure there is no injury to your lung.  Summary  · Implanted port insertion is a procedure to put in a port and catheter.  · The implanted port is used as a long-term IV access.  · The port will need to be flushed and checked as told by your health care provider, usually every few weeks.  · Keep your 's information card with you at all times.  This information is not intended to replace advice given to you by your health care provider. Make sure you discuss any questions you have with your health care provider.  Document Released: 10/08/2014 Document Revised: 11/08/2017 Document Reviewed: 11/08/2017  [employer] Interactive Patient Education © 2017 [employer]    Implanted Port Insertion, Care After  This sheet gives you information about how to care for yourself after your procedure. Your health care provider may also give you more specific instructions. If you have problems or questions,  contact your health care provider.  What can I expect after the procedure?  After your procedure, it is common to have:  · Discomfort at the port insertion site.  · Bruising on the skin over the port. This should improve over 3-4 days.    Follow these instructions at home:  Port care  · After your port is placed, you will get a 's information card. The card has information about your port. Keep this card with you at all times.  · Take care of the port as told by your health care provider. Ask your health care provider if you or a family member can get training for taking care of the port at home. A home health care nurse may also take care of the port.  · Make sure to remember what type of port you have.  Incision care  · Follow instructions from your health care provider about how to take care of your port insertion site. Make sure you:  ? Wash your hands with soap and water before you change your bandage (dressing). If soap and water are not available, use hand .  ? Change your dressing as told by your health care provider.  ? Leave stitches (sutures), skin glue, or adhesive strips in place. These skin closures may need to stay in place for 2 weeks or longer. If adhesive strip edges start to loosen and curl up, you may trim the loose edges. Do not remove adhesive strips completely unless your health care provider tells you to do that.  · Check your port insertion site every day for signs of infection. Check for:  ? More redness, swelling, or pain.  ? More fluid or blood.  ? Warmth.  ? Pus or a bad smell.  General instructions  · Do not take baths, swim, or use a hot tub until your health care provider approves.  · Do not lift anything that is heavier than 10 lb (4.5 kg) for a week, or as told by your health care provider.  · Ask your health care provider when it is okay to:  ? Return to work or school.  ? Resume usual physical activities or sports.  · Do not drive for 24 hours if you were  given a medicine to help you relax (sedative).  · Take over-the-counter and prescription medicines only as told by your health care provider.  · Wear a medical alert bracelet in case of an emergency. This will tell any health care providers that you have a port.  · Keep all follow-up visits as told by your health care provider. This is important.  Contact a health care provider if:  · You cannot flush your port with saline as directed, or you cannot draw blood from the port.  · You have a fever or chills.  · You have more redness, swelling, or pain around your port insertion site.  · You have more fluid or blood coming from your port insertion site.  · Your port insertion site feels warm to the touch.  · You have pus or a bad smell coming from the port insertion site.  Get help right away if:  · You have chest pain or shortness of breath.  · You have bleeding from your port that you cannot control.  Summary  · Take care of the port as told by your health care provider.  · Change your dressing as told by your health care provider.  · Keep all follow-up visits as told by your health care provider.  This information is not intended to replace advice given to you by your health care provider. Make sure you discuss any questions you have with your health care provider.  Document Released: 10/08/2014 Document Revised: 11/08/2017 Document Reviewed: 11/08/2017  [employer] Interactive Patient Education © 2017 [employer]

## 2018-09-12 ENCOUNTER — CONSULT (OUTPATIENT)
Dept: SURGERY | Facility: CLINIC | Age: 78
End: 2018-09-12

## 2018-09-12 ENCOUNTER — APPOINTMENT (OUTPATIENT)
Dept: PREADMISSION TESTING | Facility: HOSPITAL | Age: 78
End: 2018-09-12

## 2018-09-12 ENCOUNTER — ANESTHESIA EVENT (OUTPATIENT)
Dept: PERIOP | Facility: HOSPITAL | Age: 78
End: 2018-09-12

## 2018-09-12 VITALS
OXYGEN SATURATION: 97 % | DIASTOLIC BLOOD PRESSURE: 50 MMHG | HEIGHT: 71 IN | SYSTOLIC BLOOD PRESSURE: 110 MMHG | HEART RATE: 67 BPM | WEIGHT: 192 LBS | BODY MASS INDEX: 26.88 KG/M2 | RESPIRATION RATE: 16 BRPM

## 2018-09-12 VITALS
TEMPERATURE: 97.1 F | HEIGHT: 71 IN | BODY MASS INDEX: 26.99 KG/M2 | WEIGHT: 192.8 LBS | SYSTOLIC BLOOD PRESSURE: 128 MMHG | DIASTOLIC BLOOD PRESSURE: 82 MMHG | HEART RATE: 71 BPM

## 2018-09-12 DIAGNOSIS — C95.92 LEUKEMIA IN RELAPSE, UNSPECIFIED LEUKEMIA TYPE (HCC): Primary | ICD-10-CM

## 2018-09-12 DIAGNOSIS — C95.92 LEUKEMIA IN RELAPSE, UNSPECIFIED LEUKEMIA TYPE (HCC): ICD-10-CM

## 2018-09-12 PROCEDURE — 93005 ELECTROCARDIOGRAM TRACING: CPT

## 2018-09-12 PROCEDURE — 99203 OFFICE O/P NEW LOW 30 MIN: CPT | Performed by: SURGERY

## 2018-09-12 PROCEDURE — 93010 ELECTROCARDIOGRAM REPORT: CPT | Performed by: INTERNAL MEDICINE

## 2018-09-12 RX ORDER — ACETAMINOPHEN 325 MG/1
1 TABLET ORAL AS NEEDED
COMMUNITY
Start: 2012-04-26

## 2018-09-12 RX ORDER — ASPIRIN 81 MG/1
81 TABLET ORAL DAILY
COMMUNITY

## 2018-09-12 RX ORDER — FINASTERIDE 5 MG/1
1 TABLET, FILM COATED ORAL NIGHTLY
COMMUNITY
Start: 2012-04-26 | End: 2018-09-12

## 2018-09-12 RX ORDER — COLCHICINE 0.6 MG/1
0.6 TABLET ORAL DAILY PRN
COMMUNITY

## 2018-09-12 RX ORDER — SODIUM CHLORIDE 0.9 % (FLUSH) 0.9 %
1-10 SYRINGE (ML) INJECTION AS NEEDED
Status: CANCELLED | OUTPATIENT
Start: 2018-09-13

## 2018-09-12 RX ORDER — SODIUM CHLORIDE, SODIUM LACTATE, POTASSIUM CHLORIDE, CALCIUM CHLORIDE 600; 310; 30; 20 MG/100ML; MG/100ML; MG/100ML; MG/100ML
100 INJECTION, SOLUTION INTRAVENOUS CONTINUOUS
Status: CANCELLED | OUTPATIENT
Start: 2018-09-13

## 2018-09-12 RX ORDER — SOTALOL HYDROCHLORIDE 80 MG/1
40 TABLET ORAL
Qty: 90 TABLET | Refills: 3 | Status: SHIPPED | OUTPATIENT
Start: 2018-09-12 | End: 2019-01-01 | Stop reason: SDUPTHER

## 2018-09-12 NOTE — PATIENT INSTRUCTIONS

## 2018-09-12 NOTE — DISCHARGE INSTRUCTIONS
[facility]  Pre-op Information and Guidelines    You will be called after 2 p.m. the day before your surgery (Friday for Monday surgery) and notified of your time for arrival and approximate surgery time.  If you have not received a call by 4P.M., please contact Same Day Surgery at [phone] of if outside [facility] call [phone].    Please Follow these Important Safety Guidelines:    • The morning of your procedure, take only the medications listed below with   A sip of water:_____________________________________________       ___SOTALOL, PRILOSEC_______________________    • DO NOT eat or drink anything after 12:00 midnight the night before surgery  Specific instructions concerning drinking clear liquids will be discussed during  the pre-surgery instruction call the day before your surgery.    • If you take a blood thinner (ex. Plavix, Coumadin, aspirin), ask your doctor when to stop it before surgery  STOP DATE: _________________    • Only 2 visitors are allowed in patient rooms at a time  Your visitors will be asked to wait in the lobby until the admission process is complete with the exception of a parent with a child and patients in need of special assistance.    • YOU CANNOT DRIVE YOURSELF HOME  You must be accompanied by someone who will be responsible for driving you home after surgery and for your care at home.    • DO NOT chew gum, use breath mints, hard candy, or smoke the day of surgery  • DO NOT drink alcohol for at least 24 hours before your surgery  • DO NOT wear any jewelry and remove all body piercing before coming to the hospital  • DO NOT wear make-up to the hospital  • If you are having surgery on an extremity (arm/leg/foot) remove nail polish/artificial nails on the surgical side  • Clothing, glasses, contacts, dentures, and hairpieces must be removed before surgery  • Bathe the night before or the morning of your surgery and do not use powders/lotions on  skin.

## 2018-09-13 ENCOUNTER — HOSPITAL ENCOUNTER (OUTPATIENT)
Facility: HOSPITAL | Age: 78
Setting detail: HOSPITAL OUTPATIENT SURGERY
Discharge: HOME | End: 2018-09-13
Attending: SURGERY | Admitting: SURGERY

## 2018-09-13 ENCOUNTER — ANESTHESIA (OUTPATIENT)
Dept: PERIOP | Facility: HOSPITAL | Age: 78
End: 2018-09-13

## 2018-09-13 ENCOUNTER — APPOINTMENT (OUTPATIENT)
Dept: GENERAL RADIOLOGY | Facility: HOSPITAL | Age: 78
End: 2018-09-13

## 2018-09-13 ENCOUNTER — TELEPHONE (OUTPATIENT)
Dept: ONCOLOGY | Facility: CLINIC | Age: 78
End: 2018-09-13

## 2018-09-13 VITALS
RESPIRATION RATE: 20 BRPM | HEIGHT: 71 IN | DIASTOLIC BLOOD PRESSURE: 71 MMHG | WEIGHT: 191.36 LBS | TEMPERATURE: 97.3 F | HEART RATE: 61 BPM | OXYGEN SATURATION: 94 % | BODY MASS INDEX: 26.79 KG/M2 | SYSTOLIC BLOOD PRESSURE: 145 MMHG

## 2018-09-13 DIAGNOSIS — C95.92 LEUKEMIA IN RELAPSE, UNSPECIFIED LEUKEMIA TYPE (HCC): ICD-10-CM

## 2018-09-13 PROCEDURE — 25010000002 ONDANSETRON PER 1 MG: Performed by: NURSE ANESTHETIST, CERTIFIED REGISTERED

## 2018-09-13 PROCEDURE — C1788 PORT, INDWELLING, IMP: HCPCS | Performed by: SURGERY

## 2018-09-13 PROCEDURE — 25010000002 HYDROMORPHONE PER 4 MG: Performed by: NURSE ANESTHETIST, CERTIFIED REGISTERED

## 2018-09-13 PROCEDURE — 25010000002 FENTANYL CITRATE (PF) 100 MCG/2ML SOLUTION: Performed by: NURSE ANESTHETIST, CERTIFIED REGISTERED

## 2018-09-13 PROCEDURE — 25010000002 DEXAMETHASONE PER 1 MG: Performed by: NURSE ANESTHETIST, CERTIFIED REGISTERED

## 2018-09-13 PROCEDURE — 77001 FLUOROGUIDE FOR VEIN DEVICE: CPT | Performed by: SURGERY

## 2018-09-13 PROCEDURE — 25010000002 PROPOFOL 10 MG/ML EMULSION: Performed by: NURSE ANESTHETIST, CERTIFIED REGISTERED

## 2018-09-13 PROCEDURE — 36561 INSERT TUNNELED CV CATH: CPT | Performed by: SURGERY

## 2018-09-13 PROCEDURE — 76000 FLUOROSCOPY <1 HR PHYS/QHP: CPT

## 2018-09-13 PROCEDURE — 76937 US GUIDE VASCULAR ACCESS: CPT | Performed by: SURGERY

## 2018-09-13 PROCEDURE — 25010000002 HEPARIN FLUSH (PORCINE) 100 UNIT/ML SOLUTION: Performed by: SURGERY

## 2018-09-13 DEVICE — POWERPORT M.R.I. IMPLANTABLE PORT WITH PRE-ATTACHED 9.6F  OPEN-ENDED SINGLE-LUMEN VENOUS CATHETER. INTERMEDIATE KIT (WITH SUTURE PLUGS)
Type: IMPLANTABLE DEVICE | Status: FUNCTIONAL
Brand: POWERPORT M.R.I.

## 2018-09-13 RX ORDER — HYDROCODONE BITARTRATE AND ACETAMINOPHEN 7.5; 325 MG/1; MG/1
1 TABLET ORAL ONCE AS NEEDED
Status: COMPLETED | OUTPATIENT
Start: 2018-09-13 | End: 2018-09-13

## 2018-09-13 RX ORDER — DIPHENHYDRAMINE HYDROCHLORIDE 50 MG/ML
12.5 INJECTION INTRAMUSCULAR; INTRAVENOUS
Status: DISCONTINUED | OUTPATIENT
Start: 2018-09-13 | End: 2018-09-13 | Stop reason: HOSPADM

## 2018-09-13 RX ORDER — SODIUM CHLORIDE, SODIUM LACTATE, POTASSIUM CHLORIDE, CALCIUM CHLORIDE 600; 310; 30; 20 MG/100ML; MG/100ML; MG/100ML; MG/100ML
100 INJECTION, SOLUTION INTRAVENOUS CONTINUOUS
Status: DISCONTINUED | OUTPATIENT
Start: 2018-09-13 | End: 2018-09-13 | Stop reason: HOSPADM

## 2018-09-13 RX ORDER — LABETALOL HYDROCHLORIDE 5 MG/ML
5 INJECTION, SOLUTION INTRAVENOUS
Status: DISCONTINUED | OUTPATIENT
Start: 2018-09-13 | End: 2018-09-13 | Stop reason: HOSPADM

## 2018-09-13 RX ORDER — HYDROCODONE BITARTRATE AND ACETAMINOPHEN 7.5; 325 MG/1; MG/1
1 TABLET ORAL EVERY 4 HOURS PRN
Qty: 15 TABLET | Refills: 0 | Status: SHIPPED | OUTPATIENT
Start: 2018-09-13 | End: 2018-10-22

## 2018-09-13 RX ORDER — DEXAMETHASONE SODIUM PHOSPHATE 4 MG/ML
INJECTION, SOLUTION INTRA-ARTICULAR; INTRALESIONAL; INTRAMUSCULAR; INTRAVENOUS; SOFT TISSUE AS NEEDED
Status: DISCONTINUED | OUTPATIENT
Start: 2018-09-13 | End: 2018-09-13 | Stop reason: SURG

## 2018-09-13 RX ORDER — PROPOFOL 10 MG/ML
VIAL (ML) INTRAVENOUS AS NEEDED
Status: DISCONTINUED | OUTPATIENT
Start: 2018-09-13 | End: 2018-09-13 | Stop reason: SURG

## 2018-09-13 RX ORDER — ONDANSETRON 2 MG/ML
4 INJECTION INTRAMUSCULAR; INTRAVENOUS ONCE AS NEEDED
Status: DISCONTINUED | OUTPATIENT
Start: 2018-09-13 | End: 2018-09-13 | Stop reason: HOSPADM

## 2018-09-13 RX ORDER — ONDANSETRON 2 MG/ML
INJECTION INTRAMUSCULAR; INTRAVENOUS AS NEEDED
Status: DISCONTINUED | OUTPATIENT
Start: 2018-09-13 | End: 2018-09-13 | Stop reason: SURG

## 2018-09-13 RX ORDER — FLUMAZENIL 0.1 MG/ML
0.2 INJECTION INTRAVENOUS AS NEEDED
Status: DISCONTINUED | OUTPATIENT
Start: 2018-09-13 | End: 2018-09-13 | Stop reason: HOSPADM

## 2018-09-13 RX ORDER — MEPERIDINE HYDROCHLORIDE 50 MG/ML
12.5 INJECTION INTRAMUSCULAR; INTRAVENOUS; SUBCUTANEOUS
Status: DISCONTINUED | OUTPATIENT
Start: 2018-09-13 | End: 2018-09-13 | Stop reason: HOSPADM

## 2018-09-13 RX ORDER — NALOXONE HCL 0.4 MG/ML
0.2 VIAL (ML) INJECTION AS NEEDED
Status: DISCONTINUED | OUTPATIENT
Start: 2018-09-13 | End: 2018-09-13 | Stop reason: HOSPADM

## 2018-09-13 RX ORDER — SODIUM CHLORIDE 0.9 % (FLUSH) 0.9 %
1-10 SYRINGE (ML) INJECTION AS NEEDED
Status: DISCONTINUED | OUTPATIENT
Start: 2018-09-13 | End: 2018-09-13 | Stop reason: HOSPADM

## 2018-09-13 RX ORDER — FENTANYL CITRATE 50 UG/ML
INJECTION, SOLUTION INTRAMUSCULAR; INTRAVENOUS AS NEEDED
Status: DISCONTINUED | OUTPATIENT
Start: 2018-09-13 | End: 2018-09-13 | Stop reason: SURG

## 2018-09-13 RX ORDER — ACETAMINOPHEN 325 MG/1
650 TABLET ORAL ONCE AS NEEDED
Status: DISCONTINUED | OUTPATIENT
Start: 2018-09-13 | End: 2018-09-13 | Stop reason: HOSPADM

## 2018-09-13 RX ORDER — LIDOCAINE HYDROCHLORIDE 20 MG/ML
INJECTION, SOLUTION INFILTRATION; PERINEURAL AS NEEDED
Status: DISCONTINUED | OUTPATIENT
Start: 2018-09-13 | End: 2018-09-13 | Stop reason: SURG

## 2018-09-13 RX ORDER — ACETAMINOPHEN 650 MG/1
650 SUPPOSITORY RECTAL ONCE AS NEEDED
Status: DISCONTINUED | OUTPATIENT
Start: 2018-09-13 | End: 2018-09-13 | Stop reason: HOSPADM

## 2018-09-13 RX ORDER — EPHEDRINE SULFATE 50 MG/ML
5 INJECTION, SOLUTION INTRAVENOUS ONCE AS NEEDED
Status: DISCONTINUED | OUTPATIENT
Start: 2018-09-13 | End: 2018-09-13 | Stop reason: HOSPADM

## 2018-09-13 RX ADMIN — FENTANYL CITRATE 50 MCG: 50 INJECTION, SOLUTION INTRAMUSCULAR; INTRAVENOUS at 12:40

## 2018-09-13 RX ADMIN — LIDOCAINE HYDROCHLORIDE 50 MG: 20 INJECTION, SOLUTION INFILTRATION; PERINEURAL at 12:24

## 2018-09-13 RX ADMIN — HYDROMORPHONE HYDROCHLORIDE 0.5 MG: 1 INJECTION, SOLUTION INTRAMUSCULAR; INTRAVENOUS; SUBCUTANEOUS at 13:47

## 2018-09-13 RX ADMIN — ONDANSETRON 4 MG: 2 INJECTION INTRAMUSCULAR; INTRAVENOUS at 12:53

## 2018-09-13 RX ADMIN — HYDROMORPHONE HYDROCHLORIDE 0.5 MG: 1 INJECTION, SOLUTION INTRAMUSCULAR; INTRAVENOUS; SUBCUTANEOUS at 13:53

## 2018-09-13 RX ADMIN — SODIUM CHLORIDE, POTASSIUM CHLORIDE, SODIUM LACTATE AND CALCIUM CHLORIDE 100 ML/HR: 600; 310; 30; 20 INJECTION, SOLUTION INTRAVENOUS at 09:15

## 2018-09-13 RX ADMIN — PROPOFOL 150 MG: 10 INJECTION, EMULSION INTRAVENOUS at 12:24

## 2018-09-13 RX ADMIN — HYDROCODONE BITARTRATE AND ACETAMINOPHEN 1 TABLET: 7.5; 325 TABLET ORAL at 14:41

## 2018-09-13 RX ADMIN — FENTANYL CITRATE 50 MCG: 50 INJECTION, SOLUTION INTRAMUSCULAR; INTRAVENOUS at 12:19

## 2018-09-13 RX ADMIN — CEFAZOLIN 1 G: 1 INJECTION, POWDER, FOR SOLUTION INTRAMUSCULAR; INTRAVENOUS; PARENTERAL at 12:27

## 2018-09-13 RX ADMIN — DEXAMETHASONE SODIUM PHOSPHATE 4 MG: 4 INJECTION, SOLUTION INTRAMUSCULAR; INTRAVENOUS at 12:28

## 2018-09-13 NOTE — ANESTHESIA PROCEDURE NOTES
Airway  Urgency: elective    Airway not difficult    General Information and Staff    Patient location during procedure: OR  CRNA: DEBORAH JEFFRIES    Indications and Patient Condition    Preoxygenated: yes  Mask difficulty assessment: 0 - not attempted    Final Airway Details  Final airway type: supraglottic airway      Successful airway: classic  Size 4    Number of attempts at approach: 1    Additional Comments  Lips and teeth in preanesthetic condition.

## 2018-09-13 NOTE — DISCHARGE INSTRUCTIONS
Dr. Luis E Babb  [address] [place] [address]  [phone] (office)  [phone] (hospital)  [phone] (cell)    Discharge Instructions for Port Placement      1. Go home, rest and take it easy today; however, you should get up and move about several times today to reduce the risk of developing a clot in your legs.      2. You may experience some dizziness or memory loss from the anesthesia.  This may last for the next 24 hours.  Someone should plan on staying with you for the first 24 hours for your safety.    3. Do not make any important legal decisions or sign any legal papers for the next 24 hours.      4. Eat and drink lightly today.  Start off with liquids, jello, soup, crackers or other bland foods at first. You may advance your diet tomorrow as tolerated as long as you do not experience any nausea or vomiting.     5. You may remove your outer dressings in 3-4 days or until your first treatment whichever comes first. If you remove the dressing before your first treatment, please leave the little white tapes known as steri-strips alone.  They usually fall off in 1-2 weeks.  Do not worry if they come off sooner.     6. You may notice some bleeding/drainage on your outer dressings. A little bloody drainage is normal. If the bleeding/drainage is such that the bandage cannot absorb it, remove the dressing, apply clean gauze and apply firm pressure for a full 15 minutes.  If the bleeding continues, please call me.    7. You may shower tomorrow.  No tub baths until your incisions are completely healed.    8. You may have received a prescription for a narcotic pain medicine, as you may have some pain/discomfort following surgery. You will not be totally pain free, but your pain medicine should make the pain tolerable.  Please take your pain medicine as prescribed and always take your pills with food to prevent nausea. If you are having severe pain that cannot be controlled by the pain medicine, please  contact me.  If the pain is such that narcotic pain medicine is not required, you may take Tylenol or Ibuprofen as directed unless indicated otherwise.      9. You may have also received a prescription for an anti-nausea medicine.  Please take this as prescribed for any nausea or vomiting.  Nausea could be a result of the anesthesia or a result of the narcotic pain medicine.  If you experience severe nausea and vomiting that cannot be controlled by the nausea medicine, please call me.      10. No driving for 24 hours and for as long as you are taking your prescription pain medicine.        11. No surgical follow-up is needed unless a problem (such as drainage, redness of the incision, malfunction of the port) arises.              12. Remember to contact me for any of the following:    • Fever> 101 degrees  • Severe pain that cannot be controlled by taking your pain pills  • Severe nausea or vomiting that cannot be controlled by taking your nausea medicine  • Significant bleeding from your incision  • Drainage that has a bad smell or is yellow or green in appearance  • Any other questions or concerns        May repeat Norco at 6:30 pm today as needed

## 2018-09-13 NOTE — ANESTHESIA PROCEDURE NOTES
Peripheral IV    Patient location during procedure: OR  Start time: 9/13/2018 12:21 PM  End time: 9/13/2018 12:25 PM  Line placed for Fluids/Medication Admin.  Performed By   CRNA: DEBORAH JEFFRIES  Preanesthetic Checklist  Completed: patient identified, surgical consent, pre-op evaluation, IV checked, risks and benefits discussed and monitors and equipment checked  Peripheral IV Prep   Patient position: supine   Prep: ChloraPrep  Patient monitoring: heart rate, cardiac monitor and continuous pulse ox  Peripheral IV Procedure   Laterality:left  Location:  Forearm  Catheter size: 20 G         Post Assessment   Dressing Type: transparent.    IV Dressing/Site: clean, dry and intact

## 2018-09-13 NOTE — PROGRESS NOTES
Chief Complaint   Patient presents with   • Mediport Placement        HPI  Hx of leukemia requiring chemotheraputic infusion. Scheduled for treatments next week so port is planned for tomorrow.  Past Medical History:   Diagnosis Date   • Atrophy of testis    • Benign prostatic hyperplasia    • Borderline glaucoma    • Chronic myelomonocytic leukemia (CMS/HCC)    • Degenerative joint disease involving multiple joints    • GERD (gastroesophageal reflux disease)    • Gout    • History of echocardiogram 05/08/2012    Normal left ventricular systolic function, EF 60%. Early diastolic dysfunction. Mild aortic and pulmonic regurgitation. Trace mitral and mild tricuspid regurgitation. No intracardiac mass pericardial effusion or cardiac thrombus.   • History of Holter monitoring 01/18/2011   • Impotence    • Lightheadedness    • Neck pain, musculoskeletal    • Sleep apnea     NOT WEARING C-PAP   • TIA (transient ischemic attack) 2014       Past Surgical History:   Procedure Laterality Date   • CARDIAC CATHETERIZATION  09/30/2009    No epicardial coronary artery disease noted that would explain patient's chest pain of the abnormal stress test noted. Normal left ventricular systolic function with no wall motion abnormalities   • CARDIAC CATHETERIZATION  05/24/1989    Normal catheterization, false-positive exercise treadmill. Noncardiac chest pain   • CARDIAC ELECTROPHYSIOLOGY PROCEDURE N/A 5/18/2017    Procedure: PPM generator change - dual;  Surgeon: Geneva Day MD;  Location: [facility] INVASIVE LOCATION;  Service:    • CARDIAC PACEMAKER PLACEMENT  06/2008    Dual chamber permanent pacemaker implantation   • COLECTOMY PARTIAL / TOTAL  04/14/2000    Cecal diverticulitis. Removal of small segment of terminal ileum, cecum and right colon, sent to pathology   • COLON SURGERY  03/27/2016   • COLONOSCOPY  05/25/2012   • CYSTOSCOPY  11/15/2000    Urinary retention on the basis of medications and benign prostatic  hypertrophy   • INGUINAL HERNIA REPAIR  02/15/2007    Recurrent right inguinal hernia. Repair of recurrent inguinal hernia with EPS Prolene mesh system.   • INGUINAL HERNIA REPAIR Right 1957   • LAPAROSCOPIC CHOLECYSTECTOMY  10/26/2006    Gallstones. Laparoscopic cholecystectomy with operative cholangiogram   • PROSTATECTOMY  11/17/2000    Benign prostatic hypertrophy with urinary retention. Prostatitis. transurethral resection of prostate.   • STEROID INJECTION  10/21/2010    Decadron (Gout)    • STEROID INJECTION  07/23/2013    Kenalog (Gout) (4)         Current Outpatient Prescriptions:   •  acetaminophen (TYLENOL) 325 MG tablet, Take 1 tablet by mouth As Needed., Disp: , Rfl:   •  allopurinol (ZYLOPRIM) 100 MG tablet, Take 1 tablet by mouth 4 (Four) Times a Day., Disp: 360 tablet, Rfl: 1  •  diclofenac (VOLTAREN) 50 MG EC tablet, Take 1 tablet by mouth 2 (Two) Times a Day., Disp: 60 tablet, Rfl: 1  •  folic acid (FOLVITE) 1 MG tablet, Take 1 tablet by mouth Daily., Disp: 90 tablet, Rfl: 1  •  omeprazole (PRILOSEC) 20 MG capsule, Take 1 capsule by mouth Daily., Disp: 90 capsule, Rfl: 1  •  ondansetron (ZOFRAN) 4 MG tablet, Take 1 tablet by mouth 4 (Four) Times a Day As Needed for Nausea or Vomiting., Disp: 40 tablet, Rfl: 3  •  oxybutynin (DITROPAN) 5 MG tablet, Take 5 mg by mouth 2 (Two) Times a Day., Disp: , Rfl:   •  tamsulosin (FLOMAX) 0.4 MG capsule 24 hr capsule, Take 1 capsule by mouth every night., Disp: , Rfl:   •  aspirin 81 MG EC tablet, Take 81 mg by mouth Daily., Disp: , Rfl:   •  colchicine 0.6 MG tablet, Take 0.6 mg by mouth Daily As Needed for Muscle / Joint Pain., Disp: , Rfl:   •  sotalol (BETAPACE) 80 MG tablet, Take 0.5 tablets by mouth 2 (Two) Times a Day., Disp: 90 tablet, Rfl: 3    Allergies   Allergen Reactions   • Doxycycline Hyclate Other (See Comments)     Other reaction(s): lip swollen   • Sulfa Antibiotics Swelling     facial   • Other Rash     SILK TAPE   • Penicillins Rash      Reaction: rash   • Soma [Carisoprodol] Swelling and Rash       Family History   Problem Relation Age of Onset   • Cancer Other    • Colon cancer Other         parent   • Coronary artery disease Other    • Heart disease Other    • Hypertension Other    • Cholelithiasis Other    • Other Other         colon problems       Social History     Social History   • Marital status:      Spouse name: N/A   • Number of children: N/A   • Years of education: N/A     Occupational History   • Not on file.     Social History Main Topics   • Smoking status: Never Smoker   • Smokeless tobacco: Never Used   • Alcohol use No   • Drug use: No   • Sexual activity: Defer     Other Topics Concern   • Not on file     Social History Narrative   • No narrative on file       Review of Systems   Constitutional: Negative for activity change, appetite change, chills and fever.   HENT: Negative for hearing loss, nosebleeds and trouble swallowing.    Cardiovascular: Negative for chest pain, palpitations and leg swelling.   Gastrointestinal: Negative for abdominal distention, abdominal pain, anal bleeding, blood in stool, constipation, diarrhea, nausea, rectal pain and vomiting.   Endocrine: Negative for cold intolerance, heat intolerance, polydipsia and polyuria.   Genitourinary: Negative for decreased urine volume, difficulty urinating, dysuria, enuresis, frequency, hematuria and urgency.   Musculoskeletal: Negative for arthralgias, back pain, gait problem, myalgias and neck pain.   Skin: Negative for pallor, rash and wound.   Allergic/Immunologic: Negative for immunocompromised state.   Neurological: Negative for dizziness, seizures, weakness, light-headedness, numbness and headaches.   Psychiatric/Behavioral: Negative for agitation and behavioral problems. The patient is not nervous/anxious.        Physical Exam   Constitutional: He is oriented to person, place, and time. He appears well-developed and well-nourished.   HENT:   Head:  Normocephalic and atraumatic.   Nose: Nose normal.   Eyes: Conjunctivae and EOM are normal. Right eye exhibits no discharge. Left eye exhibits no discharge.   Neck: Trachea normal, normal range of motion and phonation normal. Neck supple. No JVD present. No tracheal deviation and no edema present. No thyromegaly present.   Cardiovascular: Normal rate, regular rhythm and normal heart sounds.  Exam reveals no gallop and no friction rub.    No murmur heard.  Pulmonary/Chest: Effort normal and breath sounds normal. No accessory muscle usage. No respiratory distress. He has no decreased breath sounds. He has no wheezes. He has no rales. He exhibits no tenderness.   Abdominal: Soft. He exhibits no distension, no fluid wave, no ascites, no pulsatile midline mass and no mass. There is no tenderness. There is no rebound and no guarding. No hernia.   Musculoskeletal: Normal range of motion. He exhibits no edema, tenderness or deformity.   Lymphadenopathy:     He has no cervical adenopathy.        Left: No supraclavicular adenopathy present.   Neurological: He is alert and oriented to person, place, and time. He has normal strength. No cranial nerve deficit.   Skin: Skin is warm and dry. No rash noted. He is not diaphoretic. No erythema. No pallor.   Psychiatric: He has a normal mood and affect. His speech is normal and behavior is normal. Judgment and thought content normal. Cognition and memory are normal.   Vitals reviewed.        ASSESSMENT    Jan was seen today for mediport placement.    Diagnoses and all orders for this visit:    Leukemia in relapse, unspecified leukemia type (CMS/HCC)  -     ECG 12 Lead; Future  -     sodium chloride 0.9 % flush 1-10 mL; Infuse 1-10 mL into a venous catheter As Needed for Line Care.  -     lactated ringers infusion; Infuse 100 mL/hr into a venous catheter Continuous.  -     ceFAZolin (ANCEF) 1 g/100 mL 0.9% NS IVPB (mbp); Infuse 100 mL into a venous catheter 1 (One) Time.  -     Case  Request; Standing  -     Case Request    Other orders  -     Follow anesthesia standing orders.  -     Provide instructions to patient on NPO status  -     Follow anesthesia standing orders.; Standing  -     Insert Peripheral IV; Standing  -     Saline Lock & Maintain IV Access; Standing  -     Verify NPO Status; Standing  -     Obtain informed consent; Standing        PLAN    1. Mediport RIJ tomorrow    What are the indications that have led your doctor to the opinion that an operation is necessary?    A mediport is a device placed under the skin, that connects to a large vein in the chest or neck. It is used for the administration of fluid or medication.     What, if any, alternative treatments are available for your condition?    Peripheral veins or a peripherally placed PICC line may be used.    What will be the likely result if you don't have the operation?    It may not be possible to administer needed medications. Toxic medications may be harmful to the veins if given in a peripheral vein.    What are the basic procedures involved in the operation?    After anesthesia, a needle is passed into a large vein in the neck or chest. Ultrasound may be used to find the veins of the neck, but not the chest. Once accessed, a wire is passed through the needle and into the central veins using fluoroscopy.  Sheath is passed over the wire and the wire is removed. The tubing is passed under the skin and through the sheath. The sheath is pealed away and the tubing is left in the vein. Placement is confirmed with xray.    What are the risks?    Risks of the procedure include but are not limited to bleeding, infection, pneumothorax, blood clots, poor wound healing, poor port function, skin erosion, and pain. Facial and arm swelling require immediate evaluation.    How is the operation expected to improve your health or quality of life?    IV access is more easily obtained.    Is hospitalization necessary and, if so, how long  can you expect to be hospitalized?    The procedure is performed on an outpatient basis.    What can you expect during your recovery period?    Minimal pain is usually controlled with non-narcotic pain medication.    When can you expect to resume normal activities?    Normal activity may occur within a few days. The port may be accessed immediately for treatment.    Are there likely to be residual effects from the operation?    There are usually no residual effects of operation.    He understands, agrees, and desires to proceed.              This document has been electronically signed by Luis E Babb MD on September 12, 2018 11:58 PM

## 2018-09-13 NOTE — OP NOTE
INSERTION VENOUS ACCESS DEVICE  Procedure Note    Jan Humphrey  9/13/2018    Pre-op Diagnosis:   Leukemia in relapse, unspecified leukemia type (CMS/HCC) [C95.92]    Post-op Diagnosis:     Post-Op Diagnosis Codes:     * Leukemia in relapse, unspecified leukemia type (CMS/HCC) [C95.92]    Procedure:  ULTRASOUND ASSISTED RIGHT JUGIULAR INSERTION VENOUS ACCESS DEVICE  FLUOROSCOPY      Surgeon(s):  Luis E Babb MD    Anesthesia: General    Staff:   Circulator: Mayelin Fermin RN  Scrub Person: Binh Gonzalez Douglas A  Assistant: Jossie Cruz CSA    Estimated Blood Loss: none    Specimens:                None      Drains:  None    Findings: None    Complications: None    Indications: Chemotherapy for leukemia    Description of procedure: The patient was brought to the operating room and placed supine on the operating table.  The neck and chest wall were prepped and draped in sterile manner.  Briefing and timeout were performed and all parties were in agreement    Ultrasound was used to localize the right internal jugular vein and a needle was passed under ultrasound guidance into the jugular vein.  A wire was passed through the needle and directed centrally.  Its good position was confirmed with fluoroscopy. The needle was removed. A chest incision was made transversely beneath the clavicle.  A subcutaneous pocket was created on the chest wall and a port was placed into the pocket and sutured in place with 3-0 Prolene.  The tubing was passed subcutaneously to the wire insertion site and cut to proper length.  Under fluoroscopic guidance, a dilator and sheath were passed over the wire and directed centrally. The wire and the dilator were removed leaving the sheath in excellent position.  The tubing was then passed through the sheath and the sheath peeled away leaving the tubing in excellent position in the superior vena cava.  Excellent flow and aspiration were achieved through the port and good  position was confirmed fluoroscopically. No pneumothorax was detected. The Prolene sutures were tied.  The neck wound was closed with 4-0 subcuticular Vicryl on skin.  The chest wound was closed with interrupted 3-0 Vicryl deep and continuous 4-0 subcuticular Vicryl on skin.      Procedure was terminated. The patient tolerated well.  Sponge and needle counts were correct and the patient was returned to recovery in satisfactory condition.          This document has been electronically signed by Lui sE Babb MD on September 13, 2018 1:18 PM        Date: 9/13/2018  Time: 1:17 PM

## 2018-09-13 NOTE — ANESTHESIA PREPROCEDURE EVALUATION
Anesthesia Evaluation     Patient summary reviewed and Nursing notes reviewed   no history of anesthetic complications:  NPO Solid Status: > 8 hours  NPO Liquid Status: > 8 hours           Airway   Mallampati: II  TM distance: >3 FB  Neck ROM: full  possible difficult intubation  Dental    (+) poor dentation    Comment: Upper and lower chipped and fractured dentition. Overall poor repair.    Pulmonary - normal exam    breath sounds clear to auscultation  (+) sleep apnea,   (-) not a smoker    ROS comment: FINDINGS:     Life-support devices: Redemonstration of a dual lead left  subclavian approach pacemaking device.      Lungs/pleura: There is decreased inspiratory effort on the  current exam which contributes to the bronchovascular marking  accentuation. No parenchymal consolidation, pleural effusion, or  pneumothorax.     Heart, hilar and mediastinal structures: Heart size and  mediastinal contours within limits of normal. The trachea is  midline.     Skeletal Structures: Degenerative changes within the left greater  than right shoulder, AC joints, and the spine. No free air  beneath the diaphragm.        IMPRESSION:  No acute pulmonary or pleural finding. Bronchovascular  accentuation on the basis of the inspiratory effort.     Electronically signed by:  Justin Verdin MD  1/21/2018 2:26 PM  Cardiovascular - normal exam    ECG reviewed  Patient on routine beta blocker and Beta blocker given within 24 hours of surgery  Rhythm: regular  Rate: normal    (+) pacemaker pacemaker interrogated >year ago, hypertension 2 medications or greater, dysrhythmias Paroxysmal Atrial Fib,   (-) murmur    ROS comment: Normal left ventricular systolic function, EF 60%. Early diastolic dysfunction. Mild aortic and pulmonic regurgitation. Trace mitral and mild tricuspid regurgitation. No intracardiac mass pericardial effusion or cardiac thrombus                                                                                  Atrial-paced rhythm with prolonged AV conduction  Abnormal ECG  When compared with ECG of 21-JAN-2018 12:38,  No significant change was found    Referred By:             Confirmed By:     Specimen Collected: 09/12/18 14:10          Neuro/Psych  (+) TIA,     GI/Hepatic/Renal/Endo    (+)  GERD well controlled,      Musculoskeletal     Abdominal    Substance History - negative use     OB/GYN negative ob/gyn ROS         Other   (+) arthritis (Gouty arthriitis.)   history of cancer (Leukemia.) active      Other Comment: HGB 10.6 HCT 30.3                  Anesthesia Plan    ASA 4     general     Anesthetic plan, all risks, benefits, and alternatives have been provided, discussed and informed consent has been obtained with: patient and child.

## 2018-09-13 NOTE — ANESTHESIA POSTPROCEDURE EVALUATION
Patient: Jan Humphrey    Procedure Summary     Date:  09/13/18 Room / Location:  [facility] OR 03 / [facility]    Anesthesia Start:  1216 Anesthesia Stop:  1326    Procedure:  ULTRASOUND ASSISTED RIGHT JUGULAR INSERTION VENOUS ACCESS DEVICE (N/A ) Diagnosis:       Leukemia in relapse, unspecified leukemia type (CMS/HCC)      (Leukemia in relapse, unspecified leukemia type (CMS/HCC) [C95.92])    Surgeon:  Luis E Babb MD Provider:  David Perez MD    Anesthesia Type:  general ASA Status:  4          Anesthesia Type: general  Last vitals  BP   135/66 (09/13/18 1423)   Temp   97.5 °F (36.4 °C) (09/13/18 1423)   Pulse   64 (09/13/18 1423)   Resp   18 (09/13/18 1423)     SpO2   96 % (09/13/18 1423)     Post Anesthesia Care and Evaluation    Patient location during evaluation: bedside  Patient participation: complete - patient participated  Level of consciousness: awake and alert  Pain score: 0  Pain management: adequate  Airway patency: patent  Anesthetic complications: No anesthetic complications  PONV Status: none  Cardiovascular status: acceptable  Respiratory status: acceptable  Hydration status: acceptable

## 2018-09-17 ENCOUNTER — APPOINTMENT (OUTPATIENT)
Dept: ONCOLOGY | Facility: HOSPITAL | Age: 78
End: 2018-09-17

## 2018-09-18 ENCOUNTER — APPOINTMENT (OUTPATIENT)
Dept: ONCOLOGY | Facility: HOSPITAL | Age: 78
End: 2018-09-18

## 2018-09-19 ENCOUNTER — APPOINTMENT (OUTPATIENT)
Dept: ONCOLOGY | Facility: HOSPITAL | Age: 78
End: 2018-09-19

## 2018-09-20 ENCOUNTER — APPOINTMENT (OUTPATIENT)
Dept: ONCOLOGY | Facility: HOSPITAL | Age: 78
End: 2018-09-20

## 2018-09-21 ENCOUNTER — APPOINTMENT (OUTPATIENT)
Dept: ONCOLOGY | Facility: HOSPITAL | Age: 78
End: 2018-09-21

## 2018-09-24 ENCOUNTER — INFUSION (OUTPATIENT)
Dept: ONCOLOGY | Facility: HOSPITAL | Age: 78
End: 2018-09-24

## 2018-09-24 VITALS
SYSTOLIC BLOOD PRESSURE: 142 MMHG | RESPIRATION RATE: 18 BRPM | TEMPERATURE: 97 F | DIASTOLIC BLOOD PRESSURE: 67 MMHG | HEART RATE: 80 BPM

## 2018-09-24 DIAGNOSIS — D47.02 SYSTEMIC MASTOCYTOSIS WITH ASSOCIATED CLONAL HEMATOLOGICAL NON-MAST CELL LINEAGE DISEASE: Primary | ICD-10-CM

## 2018-09-24 DIAGNOSIS — D47.02 SYSTEMIC MASTOCYTOSIS WITH ASSOCIATED CLONAL HEMATOLOGICAL NON-MAST CELL LINEAGE DISEASE: ICD-10-CM

## 2018-09-24 DIAGNOSIS — Z45.2 ENCOUNTER FOR VENOUS ACCESS DEVICE CARE: ICD-10-CM

## 2018-09-24 DIAGNOSIS — C93.10 CHRONIC MYELOMONOCYTIC LEUKEMIA NOT HAVING ACHIEVED REMISSION (HCC): ICD-10-CM

## 2018-09-24 LAB
ALBUMIN SERPL-MCNC: 4 G/DL (ref 3.4–4.8)
ALBUMIN/GLOB SERPL: 1.4 G/DL (ref 1.1–1.8)
ALP SERPL-CCNC: 50 U/L (ref 38–126)
ALT SERPL W P-5'-P-CCNC: 22 U/L (ref 21–72)
ANION GAP SERPL CALCULATED.3IONS-SCNC: 9 MMOL/L (ref 5–15)
ANISOCYTOSIS BLD QL: ABNORMAL
AST SERPL-CCNC: 22 U/L (ref 17–59)
BASOPHILS # BLD MANUAL: 0.18 10*3/MM3 (ref 0–0.2)
BASOPHILS NFR BLD AUTO: 1 % (ref 0–2)
BILIRUB SERPL-MCNC: 0.7 MG/DL (ref 0.2–1.3)
BUN BLD-MCNC: 14 MG/DL (ref 7–21)
BUN/CREAT SERPL: 15.7 (ref 7–25)
CALCIUM SPEC-SCNC: 8.6 MG/DL (ref 8.4–10.2)
CHLORIDE SERPL-SCNC: 103 MMOL/L (ref 95–110)
CO2 SERPL-SCNC: 24 MMOL/L (ref 22–31)
CREAT BLD-MCNC: 0.89 MG/DL (ref 0.7–1.3)
DACRYOCYTES BLD QL SMEAR: ABNORMAL
DEPRECATED RDW RBC AUTO: 103.2 FL (ref 35.1–43.9)
ERYTHROCYTE [DISTWIDTH] IN BLOOD BY AUTOMATED COUNT: 27.6 % (ref 11.5–14.5)
GFR SERPL CREATININE-BSD FRML MDRD: 83 ML/MIN/1.73 (ref 42–98)
GLOBULIN UR ELPH-MCNC: 2.9 GM/DL (ref 2.3–3.5)
GLUCOSE BLD-MCNC: 90 MG/DL (ref 60–100)
HCT VFR BLD AUTO: 28.7 % (ref 39–49)
HGB BLD-MCNC: 9.8 G/DL (ref 13.7–17.3)
LARGE PLATELETS: ABNORMAL
LYMPHOCYTES # BLD MANUAL: 2.64 10*3/MM3 (ref 0.6–4.2)
LYMPHOCYTES NFR BLD MANUAL: 10 % (ref 0–12)
LYMPHOCYTES NFR BLD MANUAL: 15 % (ref 10–50)
MACROCYTES BLD QL SMEAR: ABNORMAL
MCH RBC QN AUTO: 35.8 PG (ref 26.5–34)
MCHC RBC AUTO-ENTMCNC: 34.1 G/DL (ref 31.5–36.3)
MCV RBC AUTO: 104.7 FL (ref 80–98)
METAMYELOCYTES NFR BLD MANUAL: 5 % (ref 0–0)
MONOCYTES # BLD AUTO: 1.76 10*3/MM3 (ref 0–0.9)
MYELOCYTES NFR BLD MANUAL: 1 % (ref 0–0)
NEUTROPHILS # BLD AUTO: 11.8 10*3/MM3 (ref 2–8.6)
NEUTROPHILS NFR BLD MANUAL: 46 % (ref 37–80)
NEUTS BAND NFR BLD MANUAL: 21 % (ref 0–5)
NRBC SPEC MANUAL: 1 /100 WBC (ref 0–0)
PLATELET # BLD AUTO: 193 10*3/MM3 (ref 150–450)
PMV BLD AUTO: ABNORMAL FL (ref 8–12)
POLYCHROMASIA BLD QL SMEAR: ABNORMAL
POTASSIUM BLD-SCNC: 4.2 MMOL/L (ref 3.5–5.1)
PROMYELOCYTES NFR BLD MANUAL: 1 % (ref 0–0)
PROT SERPL-MCNC: 6.9 G/DL (ref 6.3–8.6)
RBC # BLD AUTO: 2.74 10*6/MM3 (ref 4.37–5.74)
SCHISTOCYTES BLD QL SMEAR: ABNORMAL
SMALL PLATELETS BLD QL SMEAR: ADEQUATE
SODIUM BLD-SCNC: 136 MMOL/L (ref 137–145)
WBC MORPH BLD: NORMAL
WBC NRBC COR # BLD: 17.61 10*3/MM3 (ref 3.2–9.8)

## 2018-09-24 PROCEDURE — 96375 TX/PRO/DX INJ NEW DRUG ADDON: CPT | Performed by: INTERNAL MEDICINE

## 2018-09-24 PROCEDURE — 85025 COMPLETE CBC W/AUTO DIFF WBC: CPT

## 2018-09-24 PROCEDURE — 36415 COLL VENOUS BLD VENIPUNCTURE: CPT

## 2018-09-24 PROCEDURE — 85007 BL SMEAR W/DIFF WBC COUNT: CPT

## 2018-09-24 PROCEDURE — 25010000002 AZACITIDINE 100 MG RECONSTITUTED SUSPENSION 1 EACH VIAL: Performed by: INTERNAL MEDICINE

## 2018-09-24 PROCEDURE — 25010000002 PROMETHAZINE PER 50 MG: Performed by: INTERNAL MEDICINE

## 2018-09-24 PROCEDURE — 80053 COMPREHEN METABOLIC PANEL: CPT

## 2018-09-24 PROCEDURE — 96413 CHEMO IV INFUSION 1 HR: CPT | Performed by: INTERNAL MEDICINE

## 2018-09-24 PROCEDURE — 25010000002 HEPARIN FLUSH (PORCINE) 100 UNIT/ML SOLUTION: Performed by: INTERNAL MEDICINE

## 2018-09-24 RX ORDER — SODIUM CHLORIDE 9 MG/ML
250 INJECTION, SOLUTION INTRAVENOUS ONCE
Status: CANCELLED | OUTPATIENT
Start: 2018-09-28

## 2018-09-24 RX ORDER — PROMETHAZINE HYDROCHLORIDE 25 MG/ML
25 INJECTION, SOLUTION INTRAMUSCULAR; INTRAVENOUS ONCE
Status: CANCELLED
Start: 2018-09-24 | End: 2018-09-24

## 2018-09-24 RX ORDER — PROMETHAZINE HYDROCHLORIDE 25 MG/ML
25 INJECTION, SOLUTION INTRAMUSCULAR; INTRAVENOUS ONCE
Status: CANCELLED
Start: 2018-09-28 | End: 2018-09-28

## 2018-09-24 RX ORDER — PROMETHAZINE HYDROCHLORIDE 25 MG/ML
25 INJECTION, SOLUTION INTRAMUSCULAR; INTRAVENOUS ONCE
Status: CANCELLED
Start: 2018-09-26 | End: 2018-09-26

## 2018-09-24 RX ORDER — PALONOSETRON 0.05 MG/ML
0.25 INJECTION, SOLUTION INTRAVENOUS ONCE
Status: CANCELLED | OUTPATIENT
Start: 2018-09-28

## 2018-09-24 RX ORDER — PROMETHAZINE HYDROCHLORIDE 25 MG/ML
25 INJECTION, SOLUTION INTRAMUSCULAR; INTRAVENOUS ONCE
Status: CANCELLED
Start: 2018-09-27 | End: 2018-09-27

## 2018-09-24 RX ORDER — PROMETHAZINE HYDROCHLORIDE 25 MG/ML
25 INJECTION, SOLUTION INTRAMUSCULAR; INTRAVENOUS ONCE
Status: COMPLETED | OUTPATIENT
Start: 2018-09-24 | End: 2018-09-24

## 2018-09-24 RX ORDER — SODIUM CHLORIDE 9 MG/ML
250 INJECTION, SOLUTION INTRAVENOUS ONCE
Status: CANCELLED | OUTPATIENT
Start: 2018-09-25 | End: 2018-09-25

## 2018-09-24 RX ORDER — SODIUM CHLORIDE 0.9 % (FLUSH) 0.9 %
10 SYRINGE (ML) INJECTION AS NEEDED
Status: CANCELLED | OUTPATIENT
Start: 2018-09-25

## 2018-09-24 RX ORDER — SODIUM CHLORIDE 9 MG/ML
250 INJECTION, SOLUTION INTRAVENOUS ONCE
Status: CANCELLED | OUTPATIENT
Start: 2018-09-24

## 2018-09-24 RX ORDER — SODIUM CHLORIDE 9 MG/ML
250 INJECTION, SOLUTION INTRAVENOUS ONCE
Status: CANCELLED | OUTPATIENT
Start: 2018-09-27 | End: 2018-09-27

## 2018-09-24 RX ORDER — SODIUM CHLORIDE 9 MG/ML
250 INJECTION, SOLUTION INTRAVENOUS ONCE
Status: COMPLETED | OUTPATIENT
Start: 2018-09-24 | End: 2018-09-24

## 2018-09-24 RX ORDER — PROMETHAZINE HYDROCHLORIDE 25 MG/ML
25 INJECTION, SOLUTION INTRAMUSCULAR; INTRAVENOUS ONCE
Status: CANCELLED
Start: 2018-09-25 | End: 2018-09-25

## 2018-09-24 RX ORDER — SODIUM CHLORIDE 9 MG/ML
250 INJECTION, SOLUTION INTRAVENOUS ONCE
Status: CANCELLED | OUTPATIENT
Start: 2018-09-26 | End: 2018-09-26

## 2018-09-24 RX ORDER — SODIUM CHLORIDE 0.9 % (FLUSH) 0.9 %
10 SYRINGE (ML) INJECTION AS NEEDED
Status: DISCONTINUED | OUTPATIENT
Start: 2018-09-24 | End: 2018-09-24 | Stop reason: HOSPADM

## 2018-09-24 RX ADMIN — Medication 10 ML: at 17:05

## 2018-09-24 RX ADMIN — SODIUM CHLORIDE 250 ML: 9 INJECTION, SOLUTION INTRAVENOUS at 15:53

## 2018-09-24 RX ADMIN — AZACITIDINE 155 MG: 100 INJECTION, POWDER, LYOPHILIZED, FOR SOLUTION INTRAVENOUS; SUBCUTANEOUS at 16:24

## 2018-09-24 RX ADMIN — PROMETHAZINE HYDROCHLORIDE 25 MG: 25 INJECTION, SOLUTION INTRAMUSCULAR; INTRAVENOUS at 16:12

## 2018-09-24 RX ADMIN — HEPARIN SODIUM (PORCINE) LOCK FLUSH IV SOLN 100 UNIT/ML 500 UNITS: 100 SOLUTION at 17:05

## 2018-09-25 ENCOUNTER — TELEPHONE (OUTPATIENT)
Dept: ONCOLOGY | Facility: CLINIC | Age: 78
End: 2018-09-25

## 2018-09-25 ENCOUNTER — INFUSION (OUTPATIENT)
Dept: ONCOLOGY | Facility: HOSPITAL | Age: 78
End: 2018-09-25

## 2018-09-25 VITALS — SYSTOLIC BLOOD PRESSURE: 146 MMHG | DIASTOLIC BLOOD PRESSURE: 65 MMHG | TEMPERATURE: 97.2 F | HEART RATE: 73 BPM

## 2018-09-25 DIAGNOSIS — D47.02 SYSTEMIC MASTOCYTOSIS WITH ASSOCIATED CLONAL HEMATOLOGICAL NON-MAST CELL LINEAGE DISEASE: Primary | ICD-10-CM

## 2018-09-25 DIAGNOSIS — Z45.2 ENCOUNTER FOR VENOUS ACCESS DEVICE CARE: ICD-10-CM

## 2018-09-25 DIAGNOSIS — C93.10 CHRONIC MYELOMONOCYTIC LEUKEMIA NOT HAVING ACHIEVED REMISSION (HCC): ICD-10-CM

## 2018-09-25 PROCEDURE — 25010000002 PROCHLORPERAZINE EDISYLATE PER 10 MG: Performed by: INTERNAL MEDICINE

## 2018-09-25 PROCEDURE — 96375 TX/PRO/DX INJ NEW DRUG ADDON: CPT | Performed by: INTERNAL MEDICINE

## 2018-09-25 PROCEDURE — 25010000002 AZACITIDINE 100 MG RECONSTITUTED SUSPENSION 1 EACH VIAL: Performed by: INTERNAL MEDICINE

## 2018-09-25 PROCEDURE — 96413 CHEMO IV INFUSION 1 HR: CPT | Performed by: INTERNAL MEDICINE

## 2018-09-25 PROCEDURE — 25010000002 HEPARIN FLUSH (PORCINE) 100 UNIT/ML SOLUTION: Performed by: INTERNAL MEDICINE

## 2018-09-25 RX ORDER — SODIUM CHLORIDE 0.9 % (FLUSH) 0.9 %
10 SYRINGE (ML) INJECTION AS NEEDED
Status: DISCONTINUED | OUTPATIENT
Start: 2018-09-25 | End: 2018-09-25 | Stop reason: HOSPADM

## 2018-09-25 RX ORDER — PROMETHAZINE HYDROCHLORIDE 25 MG/ML
25 INJECTION, SOLUTION INTRAMUSCULAR; INTRAVENOUS ONCE
Status: DISCONTINUED | OUTPATIENT
Start: 2018-09-25 | End: 2018-09-25

## 2018-09-25 RX ORDER — SODIUM CHLORIDE 0.9 % (FLUSH) 0.9 %
10 SYRINGE (ML) INJECTION AS NEEDED
Status: CANCELLED | OUTPATIENT
Start: 2018-09-26

## 2018-09-25 RX ORDER — SODIUM CHLORIDE 9 MG/ML
250 INJECTION, SOLUTION INTRAVENOUS ONCE
Status: COMPLETED | OUTPATIENT
Start: 2018-09-25 | End: 2018-09-25

## 2018-09-25 RX ADMIN — SODIUM CHLORIDE 250 ML: 9 INJECTION, SOLUTION INTRAVENOUS at 12:08

## 2018-09-25 RX ADMIN — AZACITIDINE 155 MG: 100 INJECTION, POWDER, LYOPHILIZED, FOR SOLUTION INTRAVENOUS; SUBCUTANEOUS at 12:08

## 2018-09-25 RX ADMIN — HEPARIN SODIUM (PORCINE) LOCK FLUSH IV SOLN 100 UNIT/ML 500 UNITS: 100 SOLUTION at 12:49

## 2018-09-25 RX ADMIN — PROCHLORPERAZINE EDISYLATE 10 MG: 5 INJECTION INTRAMUSCULAR; INTRAVENOUS at 11:54

## 2018-09-25 RX ADMIN — Medication 10 ML: at 12:49

## 2018-09-26 ENCOUNTER — INFUSION (OUTPATIENT)
Dept: ONCOLOGY | Facility: HOSPITAL | Age: 78
End: 2018-09-26

## 2018-09-26 VITALS
SYSTOLIC BLOOD PRESSURE: 135 MMHG | TEMPERATURE: 97.6 F | HEART RATE: 76 BPM | DIASTOLIC BLOOD PRESSURE: 65 MMHG | RESPIRATION RATE: 18 BRPM

## 2018-09-26 DIAGNOSIS — C93.10 CHRONIC MYELOMONOCYTIC LEUKEMIA NOT HAVING ACHIEVED REMISSION (HCC): ICD-10-CM

## 2018-09-26 DIAGNOSIS — Z45.2 ENCOUNTER FOR VENOUS ACCESS DEVICE CARE: ICD-10-CM

## 2018-09-26 DIAGNOSIS — D47.02 SYSTEMIC MASTOCYTOSIS WITH ASSOCIATED CLONAL HEMATOLOGICAL NON-MAST CELL LINEAGE DISEASE: Primary | ICD-10-CM

## 2018-09-26 PROCEDURE — 96375 TX/PRO/DX INJ NEW DRUG ADDON: CPT | Performed by: INTERNAL MEDICINE

## 2018-09-26 PROCEDURE — 96413 CHEMO IV INFUSION 1 HR: CPT | Performed by: INTERNAL MEDICINE

## 2018-09-26 PROCEDURE — 25010000002 AZACITIDINE 100 MG RECONSTITUTED SUSPENSION 1 EACH VIAL: Performed by: INTERNAL MEDICINE

## 2018-09-26 PROCEDURE — 25010000002 HEPARIN FLUSH (PORCINE) 100 UNIT/ML SOLUTION: Performed by: INTERNAL MEDICINE

## 2018-09-26 RX ORDER — SODIUM CHLORIDE 0.9 % (FLUSH) 0.9 %
10 SYRINGE (ML) INJECTION AS NEEDED
Status: CANCELLED | OUTPATIENT
Start: 2018-09-27

## 2018-09-26 RX ORDER — SODIUM CHLORIDE 9 MG/ML
250 INJECTION, SOLUTION INTRAVENOUS ONCE
Status: COMPLETED | OUTPATIENT
Start: 2018-09-26 | End: 2018-09-26

## 2018-09-26 RX ORDER — SODIUM CHLORIDE 0.9 % (FLUSH) 0.9 %
10 SYRINGE (ML) INJECTION AS NEEDED
Status: DISCONTINUED | OUTPATIENT
Start: 2018-09-26 | End: 2018-09-26 | Stop reason: HOSPADM

## 2018-09-26 RX ADMIN — SODIUM CHLORIDE 250 ML: 9 INJECTION, SOLUTION INTRAVENOUS at 10:49

## 2018-09-26 RX ADMIN — HEPARIN SODIUM (PORCINE) LOCK FLUSH IV SOLN 100 UNIT/ML 500 UNITS: 100 SOLUTION at 12:14

## 2018-09-26 RX ADMIN — Medication 10 ML: at 12:14

## 2018-09-26 RX ADMIN — AZACITIDINE 155 MG: 100 INJECTION, POWDER, LYOPHILIZED, FOR SOLUTION INTRAVENOUS; SUBCUTANEOUS at 11:28

## 2018-09-27 ENCOUNTER — INFUSION (OUTPATIENT)
Dept: ONCOLOGY | Facility: HOSPITAL | Age: 78
End: 2018-09-27

## 2018-09-27 VITALS
RESPIRATION RATE: 20 BRPM | TEMPERATURE: 97.3 F | SYSTOLIC BLOOD PRESSURE: 144 MMHG | DIASTOLIC BLOOD PRESSURE: 69 MMHG | HEART RATE: 88 BPM

## 2018-09-27 DIAGNOSIS — Z45.2 ENCOUNTER FOR VENOUS ACCESS DEVICE CARE: ICD-10-CM

## 2018-09-27 DIAGNOSIS — D47.02 SYSTEMIC MASTOCYTOSIS WITH ASSOCIATED CLONAL HEMATOLOGICAL NON-MAST CELL LINEAGE DISEASE: Primary | ICD-10-CM

## 2018-09-27 DIAGNOSIS — C93.10 CHRONIC MYELOMONOCYTIC LEUKEMIA NOT HAVING ACHIEVED REMISSION (HCC): ICD-10-CM

## 2018-09-27 PROCEDURE — 96413 CHEMO IV INFUSION 1 HR: CPT | Performed by: INTERNAL MEDICINE

## 2018-09-27 PROCEDURE — 25010000002 AZACITIDINE 100 MG RECONSTITUTED SUSPENSION 1 EACH VIAL: Performed by: INTERNAL MEDICINE

## 2018-09-27 PROCEDURE — 25010000002 HEPARIN FLUSH (PORCINE) 100 UNIT/ML SOLUTION: Performed by: INTERNAL MEDICINE

## 2018-09-27 RX ORDER — SODIUM CHLORIDE 0.9 % (FLUSH) 0.9 %
10 SYRINGE (ML) INJECTION AS NEEDED
Status: DISCONTINUED | OUTPATIENT
Start: 2018-09-27 | End: 2018-09-27 | Stop reason: HOSPADM

## 2018-09-27 RX ORDER — SODIUM CHLORIDE 0.9 % (FLUSH) 0.9 %
10 SYRINGE (ML) INJECTION AS NEEDED
Status: CANCELLED | OUTPATIENT
Start: 2018-09-27

## 2018-09-27 RX ORDER — SODIUM CHLORIDE 9 MG/ML
250 INJECTION, SOLUTION INTRAVENOUS ONCE
Status: COMPLETED | OUTPATIENT
Start: 2018-09-27 | End: 2018-09-27

## 2018-09-27 RX ADMIN — AZACITIDINE 155 MG: 100 INJECTION, POWDER, LYOPHILIZED, FOR SOLUTION INTRAVENOUS; SUBCUTANEOUS at 10:49

## 2018-09-27 RX ADMIN — SODIUM CHLORIDE 250 ML: 9 INJECTION, SOLUTION INTRAVENOUS at 10:44

## 2018-09-27 RX ADMIN — HEPARIN SODIUM (PORCINE) LOCK FLUSH IV SOLN 100 UNIT/ML 500 UNITS: 100 SOLUTION at 11:24

## 2018-09-27 RX ADMIN — Medication 10 ML: at 11:24

## 2018-09-28 ENCOUNTER — INFUSION (OUTPATIENT)
Dept: ONCOLOGY | Facility: HOSPITAL | Age: 78
End: 2018-09-28

## 2018-09-28 VITALS
DIASTOLIC BLOOD PRESSURE: 72 MMHG | RESPIRATION RATE: 20 BRPM | SYSTOLIC BLOOD PRESSURE: 157 MMHG | TEMPERATURE: 97.3 F | HEART RATE: 75 BPM

## 2018-09-28 DIAGNOSIS — Z45.2 ENCOUNTER FOR VENOUS ACCESS DEVICE CARE: ICD-10-CM

## 2018-09-28 DIAGNOSIS — D47.02 SYSTEMIC MASTOCYTOSIS WITH ASSOCIATED CLONAL HEMATOLOGICAL NON-MAST CELL LINEAGE DISEASE: Primary | ICD-10-CM

## 2018-09-28 DIAGNOSIS — C93.10 CHRONIC MYELOMONOCYTIC LEUKEMIA NOT HAVING ACHIEVED REMISSION (HCC): ICD-10-CM

## 2018-09-28 PROCEDURE — 96413 CHEMO IV INFUSION 1 HR: CPT | Performed by: INTERNAL MEDICINE

## 2018-09-28 PROCEDURE — 25010000002 HEPARIN FLUSH (PORCINE) 100 UNIT/ML SOLUTION: Performed by: INTERNAL MEDICINE

## 2018-09-28 PROCEDURE — 25010000002 AZACITIDINE 100 MG RECONSTITUTED SUSPENSION 1 EACH VIAL: Performed by: INTERNAL MEDICINE

## 2018-09-28 RX ORDER — SODIUM CHLORIDE 0.9 % (FLUSH) 0.9 %
10 SYRINGE (ML) INJECTION AS NEEDED
Status: DISCONTINUED | OUTPATIENT
Start: 2018-09-28 | End: 2018-09-28 | Stop reason: HOSPADM

## 2018-09-28 RX ORDER — SODIUM CHLORIDE 9 MG/ML
250 INJECTION, SOLUTION INTRAVENOUS ONCE
Status: COMPLETED | OUTPATIENT
Start: 2018-09-28 | End: 2018-09-28

## 2018-09-28 RX ORDER — SODIUM CHLORIDE 0.9 % (FLUSH) 0.9 %
10 SYRINGE (ML) INJECTION AS NEEDED
Status: CANCELLED | OUTPATIENT
Start: 2018-10-22

## 2018-09-28 RX ADMIN — SODIUM CHLORIDE 250 ML: 9 INJECTION, SOLUTION INTRAVENOUS at 15:00

## 2018-09-28 RX ADMIN — Medication 10 ML: at 15:46

## 2018-09-28 RX ADMIN — AZACITIDINE 155 MG: 100 INJECTION, POWDER, LYOPHILIZED, FOR SOLUTION INTRAVENOUS; SUBCUTANEOUS at 15:04

## 2018-09-28 RX ADMIN — HEPARIN SODIUM (PORCINE) LOCK FLUSH IV SOLN 100 UNIT/ML 500 UNITS: 100 SOLUTION at 15:48

## 2018-10-03 ENCOUNTER — TELEPHONE (OUTPATIENT)
Dept: ONCOLOGY | Facility: CLINIC | Age: 78
End: 2018-10-03

## 2018-10-03 NOTE — TELEPHONE ENCOUNTER
DR Roblero states for pt to wait until the end of next week. Pt called and informed. Pt voiced understanding.

## 2018-10-22 ENCOUNTER — APPOINTMENT (OUTPATIENT)
Dept: ONCOLOGY | Facility: HOSPITAL | Age: 78
End: 2018-10-22

## 2018-10-22 ENCOUNTER — OFFICE VISIT (OUTPATIENT)
Dept: FAMILY MEDICINE CLINIC | Facility: CLINIC | Age: 78
End: 2018-10-22

## 2018-10-22 ENCOUNTER — TELEPHONE (OUTPATIENT)
Dept: ONCOLOGY | Facility: CLINIC | Age: 78
End: 2018-10-22

## 2018-10-22 DIAGNOSIS — C93.10 CHRONIC MYELOMONOCYTIC LEUKEMIA NOT HAVING ACHIEVED REMISSION (HCC): Primary | ICD-10-CM

## 2018-10-22 PROCEDURE — 90674 CCIIV4 VAC NO PRSV 0.5 ML IM: CPT | Performed by: FAMILY MEDICINE

## 2018-10-22 PROCEDURE — G0008 ADMIN INFLUENZA VIRUS VAC: HCPCS | Performed by: FAMILY MEDICINE

## 2018-10-22 PROCEDURE — 99203 OFFICE O/P NEW LOW 30 MIN: CPT | Performed by: FAMILY MEDICINE

## 2018-10-22 RX ORDER — FOLIC ACID 1 MG/1
1 TABLET ORAL DAILY
Qty: 90 TABLET | Refills: 3 | Status: SHIPPED | OUTPATIENT
Start: 2018-10-22 | End: 2019-01-01 | Stop reason: SDUPTHER

## 2018-10-22 RX ORDER — ALLOPURINOL 100 MG/1
100 TABLET ORAL 4 TIMES DAILY
Qty: 360 TABLET | Refills: 1 | Status: SHIPPED | OUTPATIENT
Start: 2018-10-22 | End: 2019-01-01 | Stop reason: SDUPTHER

## 2018-10-22 RX ORDER — OMEPRAZOLE 20 MG/1
20 CAPSULE, DELAYED RELEASE ORAL DAILY
Qty: 90 CAPSULE | Refills: 3 | Status: SHIPPED | OUTPATIENT
Start: 2018-10-22 | End: 2019-01-01 | Stop reason: SDUPTHER

## 2018-10-22 NOTE — TELEPHONE ENCOUNTER
Called patient. He would like to reschedule for two weeks from today. Transferred phone to PAR team to give new appointment times.   DC instructions

## 2018-10-22 NOTE — TELEPHONE ENCOUNTER
His chemotherapy will be 5 days in a row.  May be next Monday or Monday following that whenever patient can come for chemotherapy.Thanks

## 2018-10-22 NOTE — PROGRESS NOTES
Angelo Julio Workman is a 78 y.o. male.     History of Present Illness     Patient has cml.  Taking care of wife who has had a stroke with right side weakness.  Needs refills for her.    Needs refills for himself  Dr ortega is monitoring him  He had psa 1/2018 and it was less than 1.00  Pmh:  cml  Psh:  Colon resection, hernia, pacemaker, port placement, prostate, gallbladder  Sh:  No smoking or etoh, retired  Fh:  Non contributory    Review of Systems   Constitutional: Negative for chills, fatigue and fever.   HENT: Negative for congestion, ear discharge, ear pain, facial swelling, hearing loss, postnasal drip, rhinorrhea, sinus pressure, sore throat, trouble swallowing and voice change.    Eyes: Negative for discharge, redness and visual disturbance.   Respiratory: Negative for cough, chest tightness, shortness of breath and wheezing.    Cardiovascular: Negative for chest pain and palpitations.   Gastrointestinal: Negative for abdominal pain, blood in stool, constipation, diarrhea, nausea and vomiting.   Endocrine: Negative for polydipsia and polyuria.   Genitourinary: Negative for dysuria, flank pain, hematuria and urgency.   Musculoskeletal: Negative for arthralgias, back pain, joint swelling and myalgias.   Skin: Negative for rash.   Neurological: Negative for dizziness, weakness, numbness and headaches.   Hematological: Negative for adenopathy.   Psychiatric/Behavioral: Negative for confusion and sleep disturbance. The patient is not nervous/anxious.            There were no vitals taken for this visit.      Objective     Physical Exam   Constitutional: He is oriented to person, place, and time. He appears well-developed and well-nourished.   HENT:   Head: Normocephalic and atraumatic.   Right Ear: External ear normal.   Left Ear: External ear normal.   Nose: Nose normal.   Eyes: Pupils are equal, round, and reactive to light. Conjunctivae and EOM are normal.   Neck: Normal range of motion. Neck  supple.   Cardiovascular: Normal rate, regular rhythm and normal heart sounds.  Exam reveals no gallop and no friction rub.    No murmur heard.  Pulmonary/Chest: Effort normal and breath sounds normal.   Abdominal: Soft. Bowel sounds are normal. He exhibits no distension. There is no tenderness. There is no rebound and no guarding.   Musculoskeletal: Normal range of motion. He exhibits no edema or deformity.   Neurological: He is alert and oriented to person, place, and time. No cranial nerve deficit.   Skin: Skin is warm and dry. No rash noted. No erythema.   Psychiatric: He has a normal mood and affect. His behavior is normal. Judgment and thought content normal.   Nursing note and vitals reviewed.          PAST MEDICAL HISTORY     Past Medical History:   Diagnosis Date   • Atrophy of testis    • Benign prostatic hyperplasia    • Borderline glaucoma    • Chronic myelomonocytic leukemia (CMS/HCC)    • Degenerative joint disease involving multiple joints    • GERD (gastroesophageal reflux disease)    • Gout    • History of echocardiogram 05/08/2012    Normal left ventricular systolic function, EF 60%. Early diastolic dysfunction. Mild aortic and pulmonic regurgitation. Trace mitral and mild tricuspid regurgitation. No intracardiac mass pericardial effusion or cardiac thrombus.   • History of Holter monitoring 01/18/2011   • Impotence    • Lightheadedness    • Neck pain, musculoskeletal    • Sleep apnea     NOT WEARING C-PAP   • TIA (transient ischemic attack) 2014      PAST SURGICAL HISTORY     Past Surgical History:   Procedure Laterality Date   • CARDIAC CATHETERIZATION  09/30/2009    No epicardial coronary artery disease noted that would explain patient's chest pain of the abnormal stress test noted. Normal left ventricular systolic function with no wall motion abnormalities   • CARDIAC CATHETERIZATION  05/24/1989    Normal catheterization, false-positive exercise treadmill. Noncardiac chest pain   • CARDIAC  ELECTROPHYSIOLOGY PROCEDURE N/A 5/18/2017    Procedure: PPM generator change - dual;  Surgeon: Geneva Day MD;  Location: [facility] CATH INVASIVE LOCATION;  Service:    • CARDIAC PACEMAKER PLACEMENT  06/2008    Dual chamber permanent pacemaker implantation   • COLECTOMY PARTIAL / TOTAL  04/14/2000    Cecal diverticulitis. Removal of small segment of terminal ileum, cecum and right colon, sent to pathology   • COLON SURGERY  03/27/2016   • COLONOSCOPY  05/25/2012   • CYSTOSCOPY  11/15/2000    Urinary retention on the basis of medications and benign prostatic hypertrophy   • INGUINAL HERNIA REPAIR  02/15/2007    Recurrent right inguinal hernia. Repair of recurrent inguinal hernia with EPS Prolene mesh system.   • INGUINAL HERNIA REPAIR Right 1957   • LAPAROSCOPIC CHOLECYSTECTOMY  10/26/2006    Gallstones. Laparoscopic cholecystectomy with operative cholangiogram   • PROSTATECTOMY  11/17/2000    Benign prostatic hypertrophy with urinary retention. Prostatitis. transurethral resection of prostate.   • STEROID INJECTION  10/21/2010    Decadron (Gout)    • STEROID INJECTION  07/23/2013    Kenalog (Gout) (4)   • VENOUS ACCESS DEVICE (PORT) INSERTION N/A 9/13/2018    Procedure: ULTRASOUND ASSISTED RIGHT JUGULAR INSERTION VENOUS ACCESS DEVICE;  Surgeon: Luis E Babb MD;  Location: [facility] OR;  Service: General      SOCIAL HISTORY     Social History     Social History   • Marital status:      Social History Main Topics   • Smoking status: Never Smoker   • Smokeless tobacco: Never Used   • Alcohol use No   • Drug use: No   • Sexual activity: Defer     Other Topics Concern   • Not on file      ALLERGIES   Doxycycline hyclate; Sulfa antibiotics; Other; Penicillins; and Soma [carisoprodol]   MEDICATIONS     Current Outpatient Prescriptions   Medication Sig Dispense Refill   • allopurinol (ZYLOPRIM) 100 MG tablet Take 1 tablet by mouth 4 (Four) Times a Day. 360 tablet 1   • aspirin 81 MG EC tablet Take 81 mg by  mouth Daily.     • colchicine 0.6 MG tablet Take 0.6 mg by mouth Daily As Needed for Muscle / Joint Pain.     • diclofenac (VOLTAREN) 50 MG EC tablet Take 1 tablet by mouth 2 (Two) Times a Day. 60 tablet 11   • folic acid (FOLVITE) 1 MG tablet Take 1 tablet by mouth Daily. 90 tablet 3   • omeprazole (PRILOSEC) 20 MG capsule Take 1 capsule by mouth Daily. 90 capsule 3   • ondansetron (ZOFRAN) 4 MG tablet Take 1 tablet by mouth 4 (Four) Times a Day As Needed for Nausea or Vomiting. 40 tablet 3   • oxybutynin (DITROPAN) 5 MG tablet Take 5 mg by mouth 2 (Two) Times a Day.     • sotalol (BETAPACE) 80 MG tablet Take 0.5 tablets by mouth 2 (Two) Times a Day. 90 tablet 3   • tamsulosin (FLOMAX) 0.4 MG capsule 24 hr capsule Take 1 capsule by mouth every night.     • acetaminophen (TYLENOL) 325 MG tablet Take 1 tablet by mouth As Needed.       No current facility-administered medications for this visit.         The following portions of the patient's history were reviewed and updated as appropriate: allergies, current medications, past family history, past medical history, past social history, past surgical history and problem list.        Assessment/Plan   Jan was seen today for establish care.    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  -     allopurinol (ZYLOPRIM) 100 MG tablet; Take 1 tablet by mouth 4 (Four) Times a Day.  -     folic acid (FOLVITE) 1 MG tablet; Take 1 tablet by mouth Daily.  -     diclofenac (VOLTAREN) 50 MG EC tablet; Take 1 tablet by mouth 2 (Two) Times a Day.        Follow up 1 yr               No Follow-up on file.                  This document has been electronically signed by Seferino Tan MD on October 22, 2018 2:10 PM

## 2018-10-29 ENCOUNTER — APPOINTMENT (OUTPATIENT)
Dept: ONCOLOGY | Facility: HOSPITAL | Age: 78
End: 2018-10-29

## 2018-10-30 ENCOUNTER — APPOINTMENT (OUTPATIENT)
Dept: ONCOLOGY | Facility: HOSPITAL | Age: 78
End: 2018-10-30

## 2018-11-05 ENCOUNTER — TELEPHONE (OUTPATIENT)
Dept: ONCOLOGY | Facility: HOSPITAL | Age: 78
End: 2018-11-05

## 2018-11-05 ENCOUNTER — APPOINTMENT (OUTPATIENT)
Dept: ONCOLOGY | Facility: HOSPITAL | Age: 78
End: 2018-11-05

## 2018-11-05 NOTE — TELEPHONE ENCOUNTER
Patient called and states that he has had nausea for past few days. He states that he started vomiting early Saturday morning and still has nausea. He wanted to know if he needed to come in today for follow up and chemo. Spoke with Dr. Roblero and he said that since it has been so long since chemo, he didn't think it was related. He said if patient still feels bad then we can reschedule him for next week. Informed patient and he states understanding. Transferred call to PAR team to reschedule appointments.

## 2018-11-06 ENCOUNTER — APPOINTMENT (OUTPATIENT)
Dept: ONCOLOGY | Facility: HOSPITAL | Age: 78
End: 2018-11-06

## 2018-11-06 ENCOUNTER — TELEPHONE (OUTPATIENT)
Dept: ONCOLOGY | Facility: CLINIC | Age: 78
End: 2018-11-06

## 2018-11-06 NOTE — TELEPHONE ENCOUNTER
See note. Patient called yesterday saying he had been vomiting and nauseated for a few days. Do you want him to come in or have him go to ER?

## 2018-11-06 NOTE — TELEPHONE ENCOUNTER
Called patient and informed that Dr. Roblero wants him to go to the ER. Patient states understanding.

## 2018-11-07 ENCOUNTER — APPOINTMENT (OUTPATIENT)
Dept: ONCOLOGY | Facility: HOSPITAL | Age: 78
End: 2018-11-07

## 2018-11-08 ENCOUNTER — APPOINTMENT (OUTPATIENT)
Dept: ONCOLOGY | Facility: HOSPITAL | Age: 78
End: 2018-11-08

## 2018-11-09 ENCOUNTER — APPOINTMENT (OUTPATIENT)
Dept: ONCOLOGY | Facility: HOSPITAL | Age: 78
End: 2018-11-09

## 2018-11-12 ENCOUNTER — OFFICE VISIT (OUTPATIENT)
Dept: ONCOLOGY | Facility: CLINIC | Age: 78
End: 2018-11-12

## 2018-11-12 ENCOUNTER — APPOINTMENT (OUTPATIENT)
Dept: GENERAL RADIOLOGY | Facility: HOSPITAL | Age: 78
End: 2018-11-12

## 2018-11-12 ENCOUNTER — INFUSION (OUTPATIENT)
Dept: ONCOLOGY | Facility: HOSPITAL | Age: 78
End: 2018-11-12

## 2018-11-12 ENCOUNTER — HOSPITAL ENCOUNTER (INPATIENT)
Facility: HOSPITAL | Age: 78
LOS: 4 days | Discharge: HOME | End: 2018-11-16
Attending: FAMILY MEDICINE | Admitting: FAMILY MEDICINE

## 2018-11-12 VITALS
WEIGHT: 182.2 LBS | SYSTOLIC BLOOD PRESSURE: 140 MMHG | RESPIRATION RATE: 18 BRPM | TEMPERATURE: 97.1 F | HEART RATE: 73 BPM | BODY MASS INDEX: 25.41 KG/M2 | DIASTOLIC BLOOD PRESSURE: 76 MMHG

## 2018-11-12 DIAGNOSIS — C93.10 CHRONIC MYELOMONOCYTIC LEUKEMIA NOT HAVING ACHIEVED REMISSION (HCC): ICD-10-CM

## 2018-11-12 DIAGNOSIS — Z45.2 ENCOUNTER FOR VENOUS ACCESS DEVICE CARE: ICD-10-CM

## 2018-11-12 DIAGNOSIS — D47.02 SYSTEMIC MASTOCYTOSIS WITH ASSOCIATED CLONAL HEMATOLOGICAL NON-MAST CELL LINEAGE DISEASE: ICD-10-CM

## 2018-11-12 DIAGNOSIS — Z71.3 WEIGHT LOSS COUNSELING, ENCOUNTER FOR: ICD-10-CM

## 2018-11-12 DIAGNOSIS — D47.02 SYSTEMIC MASTOCYTOSIS WITH ASSOCIATED CLONAL HEMATOLOGICAL NON-MAST CELL LINEAGE DISEASE: Primary | ICD-10-CM

## 2018-11-12 DIAGNOSIS — T80.212A PORT OR RESERVOIR INFECTION: ICD-10-CM

## 2018-11-12 DIAGNOSIS — C92.10 CML (CHRONIC MYELOCYTIC LEUKEMIA) (HCC): ICD-10-CM

## 2018-11-12 DIAGNOSIS — C93.10 CHRONIC MYELOMONOCYTIC LEUKEMIA NOT HAVING ACHIEVED REMISSION (HCC): Primary | ICD-10-CM

## 2018-11-12 DIAGNOSIS — R68.83 CHILLS WITHOUT FEVER: ICD-10-CM

## 2018-11-12 DIAGNOSIS — Z45.2 ENCOUNTER FOR CARE RELATED TO PORT-A-CATH: Primary | ICD-10-CM

## 2018-11-12 LAB
ALBUMIN SERPL-MCNC: 4.1 G/DL (ref 3.4–4.8)
ALBUMIN SERPL-MCNC: 4.3 G/DL (ref 3.4–4.8)
ALBUMIN/GLOB SERPL: 1.5 G/DL (ref 1.1–1.8)
ALBUMIN/GLOB SERPL: 1.5 G/DL (ref 1.1–1.8)
ALP SERPL-CCNC: 53 U/L (ref 38–126)
ALP SERPL-CCNC: 54 U/L (ref 38–126)
ALT SERPL W P-5'-P-CCNC: 16 U/L (ref 21–72)
ALT SERPL W P-5'-P-CCNC: 17 U/L (ref 21–72)
ANION GAP SERPL CALCULATED.3IONS-SCNC: 14 MMOL/L (ref 5–15)
ANION GAP SERPL CALCULATED.3IONS-SCNC: 8 MMOL/L (ref 5–15)
ANISOCYTOSIS BLD QL: ABNORMAL
AST SERPL-CCNC: 20 U/L (ref 17–59)
AST SERPL-CCNC: 20 U/L (ref 17–59)
BASOPHILS # BLD AUTO: 0.12 10*3/MM3 (ref 0–0.2)
BASOPHILS NFR BLD AUTO: 0.5 % (ref 0–2)
BILIRUB SERPL-MCNC: 0.6 MG/DL (ref 0.2–1.3)
BILIRUB SERPL-MCNC: 0.9 MG/DL (ref 0.2–1.3)
BILIRUB UR QL STRIP: NEGATIVE
BLASTS NFR BLD MANUAL: 1 % (ref 0–0)
BUN BLD-MCNC: 14 MG/DL (ref 7–21)
BUN BLD-MCNC: 15 MG/DL (ref 7–21)
BUN/CREAT SERPL: 18.7 (ref 7–25)
BUN/CREAT SERPL: 20.3 (ref 7–25)
CALCIUM SPEC-SCNC: 9.3 MG/DL (ref 8.4–10.2)
CALCIUM SPEC-SCNC: 9.4 MG/DL (ref 8.4–10.2)
CHLORIDE SERPL-SCNC: 104 MMOL/L (ref 95–110)
CHLORIDE SERPL-SCNC: 105 MMOL/L (ref 95–110)
CLARITY UR: CLEAR
CO2 SERPL-SCNC: 18 MMOL/L (ref 22–31)
CO2 SERPL-SCNC: 23 MMOL/L (ref 22–31)
COLOR UR: YELLOW
CREAT BLD-MCNC: 0.74 MG/DL (ref 0.7–1.3)
CREAT BLD-MCNC: 0.75 MG/DL (ref 0.7–1.3)
D-LACTATE SERPL-SCNC: 3.7 MMOL/L (ref 0.5–2)
D-LACTATE SERPL-SCNC: 5.7 MMOL/L (ref 0.5–2)
DEPRECATED RDW RBC AUTO: 85.4 FL (ref 35.1–43.9)
DEPRECATED RDW RBC AUTO: 90.4 FL (ref 35.1–43.9)
EOSINOPHIL # BLD AUTO: 0.17 10*3/MM3 (ref 0–0.7)
EOSINOPHIL # BLD MANUAL: 0.16 10*3/MM3 (ref 0–0.7)
EOSINOPHIL # BLD MANUAL: 0.45 10*3/MM3 (ref 0–0.7)
EOSINOPHIL NFR BLD AUTO: 0.8 % (ref 0–7)
EOSINOPHIL NFR BLD MANUAL: 1 % (ref 0–7)
EOSINOPHIL NFR BLD MANUAL: 2 % (ref 0–7)
ERYTHROCYTE [DISTWIDTH] IN BLOOD BY AUTOMATED COUNT: 24 % (ref 11.5–14.5)
ERYTHROCYTE [DISTWIDTH] IN BLOOD BY AUTOMATED COUNT: 24.7 % (ref 11.5–14.5)
GFR SERPL CREATININE-BSD FRML MDRD: 101 ML/MIN/1.73 (ref 42–98)
GFR SERPL CREATININE-BSD FRML MDRD: 103 ML/MIN/1.73 (ref 42–98)
GLOBULIN UR ELPH-MCNC: 2.7 GM/DL (ref 2.3–3.5)
GLOBULIN UR ELPH-MCNC: 2.9 GM/DL (ref 2.3–3.5)
GLUCOSE BLD-MCNC: 104 MG/DL (ref 60–100)
GLUCOSE BLD-MCNC: 92 MG/DL (ref 60–100)
GLUCOSE BLDC GLUCOMTR-MCNC: 101 MG/DL (ref 70–130)
GLUCOSE UR STRIP-MCNC: NEGATIVE MG/DL
HCT VFR BLD AUTO: 28.6 % (ref 39–49)
HCT VFR BLD AUTO: 29.5 % (ref 39–49)
HGB BLD-MCNC: 10.2 G/DL (ref 13.7–17.3)
HGB BLD-MCNC: 9.9 G/DL (ref 13.7–17.3)
HGB UR QL STRIP.AUTO: NEGATIVE
HOLD SPECIMEN: NORMAL
KETONES UR QL STRIP: NEGATIVE
LEUKOCYTE ESTERASE UR QL STRIP.AUTO: NEGATIVE
LIPASE SERPL-CCNC: 48 U/L (ref 23–300)
LYMPHOCYTES # BLD AUTO: ABNORMAL 10*3/MM3 (ref 0.6–4.2)
LYMPHOCYTES # BLD MANUAL: 1.58 10*3/MM3 (ref 0.6–4.2)
LYMPHOCYTES # BLD MANUAL: 3.77 10*3/MM3 (ref 0.6–4.2)
LYMPHOCYTES NFR BLD AUTO: ABNORMAL % (ref 10–50)
LYMPHOCYTES NFR BLD MANUAL: 18 % (ref 0–12)
LYMPHOCYTES NFR BLD MANUAL: 23 % (ref 0–12)
LYMPHOCYTES NFR BLD MANUAL: 23 % (ref 10–50)
LYMPHOCYTES NFR BLD MANUAL: 7 % (ref 10–50)
MCH RBC QN AUTO: 34.7 PG (ref 26.5–34)
MCH RBC QN AUTO: 34.9 PG (ref 26.5–34)
MCHC RBC AUTO-ENTMCNC: 34.6 G/DL (ref 31.5–36.3)
MCHC RBC AUTO-ENTMCNC: 34.6 G/DL (ref 31.5–36.3)
MCV RBC AUTO: 100.3 FL (ref 80–98)
MCV RBC AUTO: 100.7 FL (ref 80–98)
METAMYELOCYTES NFR BLD MANUAL: 5 % (ref 0–0)
METAMYELOCYTES NFR BLD MANUAL: 8 % (ref 0–0)
MONOCYTES # BLD AUTO: 0.79 10*3/MM3 (ref 0–0.9)
MONOCYTES # BLD AUTO: 3.77 10*3/MM3 (ref 0–0.9)
MONOCYTES # BLD AUTO: 4.08 10*3/MM3 (ref 0–0.9)
MONOCYTES NFR BLD AUTO: 3.5 % (ref 0–12)
MYELOCYTES NFR BLD MANUAL: 1 % (ref 0–0)
MYELOCYTES NFR BLD MANUAL: 2 % (ref 0–0)
NEUTROPHILS # BLD AUTO: 14.72 10*3/MM3 (ref 2–8.6)
NEUTROPHILS # BLD AUTO: 6.88 10*3/MM3 (ref 2–8.6)
NEUTROPHILS # BLD AUTO: ABNORMAL 10*3/MM3 (ref 2–8.6)
NEUTROPHILS NFR BLD AUTO: ABNORMAL % (ref 37–80)
NEUTROPHILS NFR BLD MANUAL: 37 % (ref 37–80)
NEUTROPHILS NFR BLD MANUAL: 60 % (ref 37–80)
NEUTS BAND NFR BLD MANUAL: 5 % (ref 0–5)
NEUTS BAND NFR BLD MANUAL: 5 % (ref 0–5)
NITRITE UR QL STRIP: NEGATIVE
NRBC SPEC MANUAL: 1 /100 WBC (ref 0–0)
NRBC SPEC MANUAL: 2 /100 WBC (ref 0–0)
PH UR STRIP.AUTO: <=5 [PH] (ref 5–9)
PLATELET # BLD AUTO: 164 10*3/MM3 (ref 150–450)
PLATELET # BLD AUTO: 178 10*3/MM3 (ref 150–450)
POLYCHROMASIA BLD QL SMEAR: ABNORMAL
POLYCHROMASIA BLD QL SMEAR: ABNORMAL
POTASSIUM BLD-SCNC: 3.9 MMOL/L (ref 3.5–5.1)
POTASSIUM BLD-SCNC: 4 MMOL/L (ref 3.5–5.1)
PROT SERPL-MCNC: 6.8 G/DL (ref 6.3–8.6)
PROT SERPL-MCNC: 7.2 G/DL (ref 6.3–8.6)
PROT UR QL STRIP: NEGATIVE
RBC # BLD AUTO: 2.84 10*6/MM3 (ref 4.37–5.74)
RBC # BLD AUTO: 2.94 10*6/MM3 (ref 4.37–5.74)
SMALL PLATELETS BLD QL SMEAR: ADEQUATE
SMALL PLATELETS BLD QL SMEAR: ADEQUATE
SODIUM BLD-SCNC: 136 MMOL/L (ref 137–145)
SODIUM BLD-SCNC: 136 MMOL/L (ref 137–145)
SP GR UR STRIP: 1.02 (ref 1–1.03)
UROBILINOGEN UR QL STRIP: NORMAL
VARIANT LYMPHS NFR BLD MANUAL: 2 % (ref 0–5)
WBC MORPH BLD: NORMAL
WBC MORPH BLD: NORMAL
WBC NRBC COR # BLD: 16.39 10*3/MM3 (ref 3.2–9.8)
WBC NRBC COR # BLD: 22.64 10*3/MM3 (ref 3.2–9.8)
WHOLE BLOOD HOLD SPECIMEN: NORMAL

## 2018-11-12 PROCEDURE — 80053 COMPREHEN METABOLIC PANEL: CPT

## 2018-11-12 PROCEDURE — 81003 URINALYSIS AUTO W/O SCOPE: CPT | Performed by: PHYSICIAN ASSISTANT

## 2018-11-12 PROCEDURE — 25010000002 VANCOMYCIN 5 G RECONSTITUTED SOLUTION: Performed by: HOSPITALIST

## 2018-11-12 PROCEDURE — G0463 HOSPITAL OUTPT CLINIC VISIT: HCPCS | Performed by: INTERNAL MEDICINE

## 2018-11-12 PROCEDURE — 87040 BLOOD CULTURE FOR BACTERIA: CPT | Performed by: HOSPITALIST

## 2018-11-12 PROCEDURE — G0378 HOSPITAL OBSERVATION PER HR: HCPCS

## 2018-11-12 PROCEDURE — 85007 BL SMEAR W/DIFF WBC COUNT: CPT | Performed by: PHYSICIAN ASSISTANT

## 2018-11-12 PROCEDURE — 83605 ASSAY OF LACTIC ACID: CPT | Performed by: PHYSICIAN ASSISTANT

## 2018-11-12 PROCEDURE — 36591 DRAW BLOOD OFF VENOUS DEVICE: CPT | Performed by: INTERNAL MEDICINE

## 2018-11-12 PROCEDURE — 83690 ASSAY OF LIPASE: CPT | Performed by: PHYSICIAN ASSISTANT

## 2018-11-12 PROCEDURE — 71046 X-RAY EXAM CHEST 2 VIEWS: CPT

## 2018-11-12 PROCEDURE — 82962 GLUCOSE BLOOD TEST: CPT

## 2018-11-12 PROCEDURE — 25010000002 VANCOMYCIN 5 G RECONSTITUTED SOLUTION: Performed by: PHYSICIAN ASSISTANT

## 2018-11-12 PROCEDURE — 85025 COMPLETE CBC W/AUTO DIFF WBC: CPT

## 2018-11-12 PROCEDURE — 87040 BLOOD CULTURE FOR BACTERIA: CPT | Performed by: PHYSICIAN ASSISTANT

## 2018-11-12 PROCEDURE — 25010000002 HEPARIN (PORCINE) PER 1000 UNITS: Performed by: HOSPITALIST

## 2018-11-12 PROCEDURE — 85007 BL SMEAR W/DIFF WBC COUNT: CPT

## 2018-11-12 PROCEDURE — 87077 CULTURE AEROBIC IDENTIFY: CPT | Performed by: PHYSICIAN ASSISTANT

## 2018-11-12 PROCEDURE — 87186 SC STD MICRODIL/AGAR DIL: CPT | Performed by: PHYSICIAN ASSISTANT

## 2018-11-12 PROCEDURE — G8420 CALC BMI NORM PARAMETERS: HCPCS | Performed by: INTERNAL MEDICINE

## 2018-11-12 PROCEDURE — 99214 OFFICE O/P EST MOD 30 MIN: CPT | Performed by: INTERNAL MEDICINE

## 2018-11-12 PROCEDURE — 87150 DNA/RNA AMPLIFIED PROBE: CPT | Performed by: PHYSICIAN ASSISTANT

## 2018-11-12 PROCEDURE — 85025 COMPLETE CBC W/AUTO DIFF WBC: CPT | Performed by: PHYSICIAN ASSISTANT

## 2018-11-12 PROCEDURE — 80053 COMPREHEN METABOLIC PANEL: CPT | Performed by: PHYSICIAN ASSISTANT

## 2018-11-12 PROCEDURE — 99284 EMERGENCY DEPT VISIT MOD MDM: CPT

## 2018-11-12 PROCEDURE — 25010000002 CEFEPIME PER 500 MG: Performed by: HOSPITALIST

## 2018-11-12 RX ORDER — FOLIC ACID 1 MG/1
1 TABLET ORAL DAILY
Status: DISCONTINUED | OUTPATIENT
Start: 2018-11-12 | End: 2018-11-16 | Stop reason: HOSPADM

## 2018-11-12 RX ORDER — ASPIRIN 81 MG/1
81 TABLET ORAL DAILY
Status: DISCONTINUED | OUTPATIENT
Start: 2018-11-12 | End: 2018-11-16 | Stop reason: HOSPADM

## 2018-11-12 RX ORDER — SODIUM CHLORIDE 0.9 % (FLUSH) 0.9 %
10 SYRINGE (ML) INJECTION AS NEEDED
Status: CANCELLED | OUTPATIENT
Start: 2018-11-26

## 2018-11-12 RX ORDER — SODIUM CHLORIDE 0.9 % (FLUSH) 0.9 %
10 SYRINGE (ML) INJECTION AS NEEDED
Status: DISCONTINUED | OUTPATIENT
Start: 2018-11-12 | End: 2018-11-16 | Stop reason: HOSPADM

## 2018-11-12 RX ORDER — PANTOPRAZOLE SODIUM 40 MG/1
40 TABLET, DELAYED RELEASE ORAL EVERY MORNING
Status: DISCONTINUED | OUTPATIENT
Start: 2018-11-13 | End: 2018-11-16 | Stop reason: HOSPADM

## 2018-11-12 RX ORDER — ONDANSETRON 4 MG/1
4 TABLET, FILM COATED ORAL 4 TIMES DAILY PRN
Status: DISCONTINUED | OUTPATIENT
Start: 2018-11-12 | End: 2018-11-16 | Stop reason: HOSPADM

## 2018-11-12 RX ORDER — HEPARIN SODIUM 5000 [USP'U]/ML
5000 INJECTION, SOLUTION INTRAVENOUS; SUBCUTANEOUS EVERY 8 HOURS SCHEDULED
Status: DISCONTINUED | OUTPATIENT
Start: 2018-11-12 | End: 2018-11-14

## 2018-11-12 RX ORDER — NAPROXEN 500 MG/1
500 TABLET ORAL 2 TIMES DAILY WITH MEALS
Status: DISCONTINUED | OUTPATIENT
Start: 2018-11-12 | End: 2018-11-16 | Stop reason: HOSPADM

## 2018-11-12 RX ORDER — SODIUM CHLORIDE 0.9 % (FLUSH) 0.9 %
3 SYRINGE (ML) INJECTION EVERY 12 HOURS SCHEDULED
Status: DISCONTINUED | OUTPATIENT
Start: 2018-11-12 | End: 2018-11-16 | Stop reason: HOSPADM

## 2018-11-12 RX ORDER — SODIUM CHLORIDE 0.9 % (FLUSH) 0.9 %
10 SYRINGE (ML) INJECTION AS NEEDED
Status: DISCONTINUED | OUTPATIENT
Start: 2018-11-12 | End: 2018-11-12 | Stop reason: HOSPADM

## 2018-11-12 RX ORDER — ACETAMINOPHEN 325 MG/1
650 TABLET ORAL EVERY 6 HOURS PRN
Status: DISCONTINUED | OUTPATIENT
Start: 2018-11-12 | End: 2018-11-16 | Stop reason: HOSPADM

## 2018-11-12 RX ORDER — TAMSULOSIN HYDROCHLORIDE 0.4 MG/1
0.4 CAPSULE ORAL NIGHTLY
Status: DISCONTINUED | OUTPATIENT
Start: 2018-11-12 | End: 2018-11-16 | Stop reason: HOSPADM

## 2018-11-12 RX ORDER — SOTALOL HYDROCHLORIDE 80 MG/1
40 TABLET ORAL
Status: DISCONTINUED | OUTPATIENT
Start: 2018-11-12 | End: 2018-11-16 | Stop reason: HOSPADM

## 2018-11-12 RX ORDER — ACETAMINOPHEN 325 MG/1
325 TABLET ORAL AS NEEDED
Status: DISCONTINUED | OUTPATIENT
Start: 2018-11-12 | End: 2018-11-12 | Stop reason: SDUPTHER

## 2018-11-12 RX ORDER — OXYBUTYNIN CHLORIDE 5 MG/1
5 TABLET ORAL 2 TIMES DAILY
Status: DISCONTINUED | OUTPATIENT
Start: 2018-11-12 | End: 2018-11-16 | Stop reason: HOSPADM

## 2018-11-12 RX ORDER — ALLOPURINOL 100 MG/1
100 TABLET ORAL 4 TIMES DAILY
Status: DISCONTINUED | OUTPATIENT
Start: 2018-11-12 | End: 2018-11-16 | Stop reason: HOSPADM

## 2018-11-12 RX ORDER — COLCHICINE 0.6 MG/1
0.6 TABLET ORAL DAILY PRN
Status: DISCONTINUED | OUTPATIENT
Start: 2018-11-12 | End: 2018-11-16 | Stop reason: HOSPADM

## 2018-11-12 RX ORDER — SODIUM CHLORIDE 0.9 % (FLUSH) 0.9 %
3-10 SYRINGE (ML) INJECTION AS NEEDED
Status: DISCONTINUED | OUTPATIENT
Start: 2018-11-12 | End: 2018-11-16 | Stop reason: HOSPADM

## 2018-11-12 RX ADMIN — ASPIRIN 81 MG: 81 TABLET, COATED ORAL at 18:59

## 2018-11-12 RX ADMIN — OXYBUTYNIN CHLORIDE 5 MG: 5 TABLET ORAL at 21:41

## 2018-11-12 RX ADMIN — HEPARIN SODIUM 5000 UNITS: 5000 INJECTION INTRAVENOUS; SUBCUTANEOUS at 21:41

## 2018-11-12 RX ADMIN — ALLOPURINOL 100 MG: 100 TABLET ORAL at 21:41

## 2018-11-12 RX ADMIN — SODIUM CHLORIDE 1000 ML: 900 INJECTION, SOLUTION INTRAVENOUS at 11:17

## 2018-11-12 RX ADMIN — ACETAMINOPHEN 650 MG: 325 TABLET ORAL at 19:05

## 2018-11-12 RX ADMIN — ALLOPURINOL 100 MG: 100 TABLET ORAL at 18:59

## 2018-11-12 RX ADMIN — CEFEPIME HYDROCHLORIDE 2 G: 2 INJECTION, POWDER, FOR SOLUTION INTRAVENOUS at 21:43

## 2018-11-12 RX ADMIN — VANCOMYCIN HYDROCHLORIDE 1250 MG: 5 INJECTION, POWDER, LYOPHILIZED, FOR SOLUTION INTRAVENOUS at 23:53

## 2018-11-12 RX ADMIN — TAMSULOSIN HYDROCHLORIDE 0.4 MG: 0.4 CAPSULE ORAL at 21:41

## 2018-11-12 RX ADMIN — Medication 3 ML: at 21:44

## 2018-11-12 RX ADMIN — FOLIC ACID 1 MG: 1 TABLET ORAL at 18:59

## 2018-11-12 RX ADMIN — Medication 10 ML: at 08:31

## 2018-11-12 RX ADMIN — VANCOMYCIN HYDROCHLORIDE 1750 MG: 500 INJECTION, POWDER, LYOPHILIZED, FOR SOLUTION INTRAVENOUS at 12:26

## 2018-11-12 RX ADMIN — ACETAMINOPHEN 650 MG: 325 TABLET ORAL at 11:56

## 2018-11-12 RX ADMIN — SODIUM CHLORIDE 1000 ML: 900 INJECTION, SOLUTION INTRAVENOUS at 12:47

## 2018-11-12 RX ADMIN — NAPROXEN 500 MG: 500 TABLET ORAL at 18:59

## 2018-11-12 NOTE — ED NOTES
Called pharmacy to check on status of vanc. Said they had to compound it and they would get it sent down as soon as they could.     Megan Ro RN  11/12/18 [id number]

## 2018-11-12 NOTE — ED NOTES
Lab requested for BC. Patient provided urinal for urine sample.      Megan Zurita, RN  11/12/18 [id number]

## 2018-11-12 NOTE — H&P
[facility] Medicine Admission      Date of Admission: 11/12/2018      Primary Care Physician: Seferino Tan MD      Chief Complaint:  Chest pain and chills    HPI:  Patient is a 78-year-old  male who presented to the [facility] today for his chemotherapy.  After his port was flushed he was waiting in the waiting room, and developed chest pain and shaking chills.  He had neither of those symptoms prior to arrival.  He has been feeling well as of late.  He did not have a fever at that time.  He was evaluated by Dr. Roblero and sent to the emergency department for possible line infection.  While in the emergency department it was discovered that his Port-A-Cath is now directed cranially.  At time of my interview patient states that he is feeling okay and has no complaints.  No alleviating or exacerbating factors.     Concurrent Medical History:  has a past medical history of Atrophy of testis, Benign prostatic hyperplasia, Borderline glaucoma, Chronic myelomonocytic leukemia (CMS/HCC), Degenerative joint disease involving multiple joints, GERD (gastroesophageal reflux disease), Gout, History of echocardiogram (05/08/2012), History of Holter monitoring (01/18/2011), Impotence, Lightheadedness, Neck pain, musculoskeletal, Sleep apnea, and TIA (transient ischemic attack) (2014).    Past Surgical History:  has a past surgical history that includes Cardiac pacemaker placement (06/2008); Prostatectomy (11/17/2000); Laparoscopic cholecystectomy (10/26/2006); Colectomy partial / total (04/14/2000); Cystoscopy (11/15/2000); Steroid Injection (10/21/2010); Steroid Injection (07/23/2013); Cardiac catheterization (09/30/2009); Cardiac catheterization (05/24/1989); Colon surgery (03/27/2016); Colonoscopy (05/25/2012); Inguinal hernia repair (02/15/2007); Inguinal hernia repair (Right, 1957); ULTRASOUND ASSISTED RIGHT JUGULAR INSERTION VENOUS ACCESS DEVICE (N/A, 9/13/2018); and  PPM generator change - dual (N/A, 5/18/2017).    Family History: family history includes Cancer in his other; Cholelithiasis in his other; Colon cancer in his other; Coronary artery disease in his other; Heart disease in his other; Hypertension in his other; Other in his other.     Social History:  reports that  has never smoked. he has never used smokeless tobacco. He reports that he does not drink alcohol or use drugs.    Allergies:   Allergies   Allergen Reactions   • Doxycycline Hyclate Other (See Comments)     Other reaction(s): lip swollen   • Sulfa Antibiotics Swelling     facial   • Other Rash     SILK TAPE   • Penicillins Rash     Reaction: rash   • Soma [Carisoprodol] Swelling and Rash       Medications:   Medications Prior to Admission   Medication Sig Dispense Refill Last Dose   • acetaminophen (TYLENOL) 325 MG tablet Take 1 tablet by mouth As Needed.   Taking   • allopurinol (ZYLOPRIM) 100 MG tablet Take 1 tablet by mouth 4 (Four) Times a Day. 360 tablet 1 Taking   • aspirin 81 MG EC tablet Take 81 mg by mouth Daily.   Taking   • colchicine 0.6 MG tablet Take 0.6 mg by mouth Daily As Needed for Muscle / Joint Pain.   Taking   • diclofenac (VOLTAREN) 50 MG EC tablet Take 1 tablet by mouth 2 (Two) Times a Day. 60 tablet 11 Taking   • folic acid (FOLVITE) 1 MG tablet Take 1 tablet by mouth Daily. 90 tablet 3 Taking   • omeprazole (PRILOSEC) 20 MG capsule Take 1 capsule by mouth Daily. 90 capsule 3 Taking   • ondansetron (ZOFRAN) 4 MG tablet Take 1 tablet by mouth 4 (Four) Times a Day As Needed for Nausea or Vomiting. 40 tablet 3 Taking   • oxybutynin (DITROPAN) 5 MG tablet Take 5 mg by mouth 2 (Two) Times a Day.   Taking   • sotalol (BETAPACE) 80 MG tablet Take 0.5 tablets by mouth 2 (Two) Times a Day. 90 tablet 3 Taking   • tamsulosin (FLOMAX) 0.4 MG capsule 24 hr capsule Take 1 capsule by mouth every night.   Taking       Review of Systems:  Review of Systems   Constitutional: Positive for chills.  Negative for appetite change, fatigue, fever and unexpected weight change.   HENT: Negative for congestion, facial swelling, hearing loss, nosebleeds, rhinorrhea, sneezing, trouble swallowing and voice change.    Eyes: Negative for photophobia and visual disturbance.   Respiratory: Negative for apnea, cough, choking, chest tightness, shortness of breath, wheezing and stridor.    Cardiovascular: Positive for chest pain. Negative for palpitations and leg swelling.   Gastrointestinal: Negative for abdominal pain, blood in stool, constipation, diarrhea, nausea and vomiting.   Endocrine: Negative for cold intolerance, heat intolerance, polydipsia, polyphagia and polyuria.   Genitourinary: Negative for dysuria, flank pain and hematuria.   Musculoskeletal: Negative for arthralgias, back pain, myalgias and neck pain.   Skin: Negative for rash and wound.   Allergic/Immunologic: Negative for immunocompromised state.   Neurological: Negative for dizziness, seizures, syncope, speech difficulty, weakness, light-headedness, numbness and headaches.   Hematological: Does not bruise/bleed easily.   Psychiatric/Behavioral: Negative for agitation, behavioral problems, confusion, decreased concentration, hallucinations, self-injury and suicidal ideas. The patient is not nervous/anxious.       Otherwise complete ROS is negative except as mentioned above.    Physical Exam:   Temp:  [96.6 °F (35.9 °C)-98.3 °F (36.8 °C)] 97.6 °F (36.4 °C)  Heart Rate:  [62-98] 62  Resp:  [18-25] 18  BP: (105-190)/(49-81) 105/52     Physical Exam   Constitutional: He is oriented to person, place, and time. He appears well-developed and well-nourished.   HENT:   Head: Normocephalic and atraumatic.   Nose: Nose normal.   Mouth/Throat: Oropharynx is clear and moist.   Eyes: Conjunctivae, EOM and lids are normal. Pupils are equal, round, and reactive to light. No scleral icterus.   Neck: Normal range of motion. Neck supple. No JVD present. No tracheal  tenderness and no spinous process tenderness present. No neck rigidity. No tracheal deviation, no edema and normal range of motion present.   Cardiovascular: Normal rate, regular rhythm, S1 normal, S2 normal, normal heart sounds and normal pulses. Exam reveals no gallop and no friction rub.   No murmur heard.  Pulmonary/Chest: Effort normal and breath sounds normal. No accessory muscle usage. No respiratory distress. He has no decreased breath sounds. He has no wheezes. He has no rales. He exhibits no tenderness.   Port in right chest   Abdominal: Soft. Bowel sounds are normal. He exhibits no distension and no mass. There is no tenderness. There is no rebound and no guarding.   Musculoskeletal: He exhibits no edema or tenderness.        Right shoulder: He exhibits normal range of motion, no tenderness and no pain.   Neurological: He is alert and oriented to person, place, and time. He has normal reflexes. He displays no atrophy and normal reflexes. No cranial nerve deficit or sensory deficit. He exhibits normal muscle tone. He displays no seizure activity. Coordination normal.   Skin: Skin is warm. No rash noted. No pallor.   Psychiatric: He has a normal mood and affect. His behavior is normal. Judgment and thought content normal.         Results Reviewed:  I have personally reviewed current lab, radiology, and data and agree with results.  Lab Results (last 24 hours)     Procedure Component Value Units Date/Time    Lactic Acid, Reflex [[id number]]  (Abnormal) Collected:  11/12/18 1412    Specimen:  Blood Updated:  11/12/18 1439     Lactate 3.7 mmol/L     Lactic Acid, Reflex Timer (This will reflex a repeat order 3-3:15 hours after ordered.) [[id number]] Collected:  11/12/18 1004    Specimen:  Blood Updated:  11/12/18 1400     Extra Tube Hold for add-ons.     Comment: Auto resulted.       CBC & Differential [[id number]] Collected:  11/12/18 1004    Specimen:  Blood Updated:  11/12/18 1159    Narrative:       The  following orders were created for panel order CBC & Differential.  Procedure                               Abnormality         Status                     ---------                               -----------         ------                     Manual Differential[[id number]]          Abnormal            Final result               Scan Slide[[id number]]                                                                  CBC Auto Differential[[id number]]        Abnormal            Final result                 Please view results for these tests on the individual orders.    Manual Differential [[id number]]  (Abnormal) Collected:  11/12/18 1004    Specimen:  Blood Updated:  11/12/18 1159     Neutrophil % 60.0 %      Lymphocyte % 7.0 %      Monocyte % 18.0 %      Eosinophil % 2.0 %      Bands %  5.0 %      Metamyelocyte % 5.0 %      Myelocyte % 1.0 %      Atypical Lymphocyte % 2.0 %      Neutrophils Absolute 14.72 10*3/mm3      Lymphocytes Absolute 1.58 10*3/mm3      Monocytes Absolute 4.08 10*3/mm3      Eosinophils Absolute 0.45 10*3/mm3      nRBC 1.0 /100 WBC      Anisocytosis Slight/1+     Comment: Few Teardrop cells        Polychromasia Slight/1+     WBC Morphology Normal     Platelet Estimate Adequate    Extra Tubes [[id number]] Collected:  11/12/18 1004    Specimen:  Blood, Venous Line Updated:  11/12/18 1116    Narrative:       The following orders were created for panel order Extra Tubes.  Procedure                               Abnormality         Status                     ---------                               -----------         ------                     Light Blue Top[[id number]]                                   Final result                 Please view results for these tests on the individual orders.    Light Blue Top [[id number]] Collected:  11/12/18 1004    Specimen:  Blood Updated:  11/12/18 1116     Extra Tube hold for add-on     Comment: Auto resulted       Urinalysis With Microscopic If Indicated (No Culture) -  Urine, Clean Catch [[id number]]  (Normal) Collected:  11/12/18 1103    Specimen:  Urine, Clean Catch Updated:  11/12/18 1110     Color, UA Yellow     Appearance, UA Clear     pH, UA <=5.0     Specific Gravity, UA 1.022     Glucose, UA Negative     Ketones, UA Negative     Bilirubin, UA Negative     Blood, UA Negative     Protein, UA Negative     Leuk Esterase, UA Negative     Nitrite, UA Negative     Urobilinogen, UA 0.2 E.U./dL    Narrative:       Urine microscopic not indicated.    Lactic Acid, Plasma [[id number]]  (Abnormal) Collected:  11/12/18 1004    Specimen:  Blood Updated:  11/12/18 1050     Lactate 5.7 mmol/L     Comprehensive Metabolic Panel [[id number]]  (Abnormal) Collected:  11/12/18 1004    Specimen:  Blood Updated:  11/12/18 1043     Glucose 92 mg/dL      BUN 14 mg/dL      Creatinine 0.75 mg/dL      Sodium 136 mmol/L      Potassium 3.9 mmol/L      Chloride 104 mmol/L      CO2 18.0 mmol/L      Calcium 9.4 mg/dL      Total Protein 6.8 g/dL      Albumin 4.10 g/dL      ALT (SGPT) 17 U/L      AST (SGOT) 20 U/L      Alkaline Phosphatase 54 U/L      Total Bilirubin 0.9 mg/dL      eGFR Non African Amer 101 mL/min/1.73      Globulin 2.7 gm/dL      A/G Ratio 1.5 g/dL      BUN/Creatinine Ratio 18.7     Anion Gap 14.0 mmol/L     Narrative:       The MDRD GFR formula is only valid for adults with stable renal function between ages 18 and 70.    Lipase [[id number]]  (Normal) Collected:  11/12/18 1004    Specimen:  Blood Updated:  11/12/18 1043     Lipase 48 U/L     Blood Culture - Blood, Blood, Venous Line [[id number]] Collected:  11/12/18 1033    Specimen:  Blood, Venous Line Updated:  11/12/18 1037    Blood Culture - Blood, Blood, Venous Line [[id number]] Collected:  11/12/18 1026    Specimen:  Blood, Venous Line Updated:  11/12/18 1036    CBC Auto Differential [[id number]]  (Abnormal) Collected:  11/12/18 1004    Specimen:  Blood Updated:  11/12/18 1032     WBC 22.64 10*3/mm3      RBC 2.94 10*6/mm3      Hemoglobin  10.2 g/dL      Hematocrit 29.5 %      .3 fL      MCH 34.7 pg      MCHC 34.6 g/dL      RDW 24.0 %      RDW-SD 85.4 fl      Platelets 178 10*3/mm3      Neutrophil % -- %      Lymphocyte % -- %      Monocyte % 3.5 %      Eosinophil % 0.8 %      Basophil % 0.5 %      Neutrophils, Absolute -- 10*3/mm3      Lymphocytes, Absolute -- 10*3/mm3      Monocytes, Absolute 0.79 10*3/mm3      Eosinophils, Absolute 0.17 10*3/mm3      Basophils, Absolute 0.12 10*3/mm3         Imaging Results (last 24 hours)     Procedure Component Value Units Date/Time    XR Chest PA & Lateral [[id number]] Collected:  11/12/18 1000     Updated:  11/12/18 1044    Narrative:       Patient Name:  AICHA BRUCE  Patient ID:  [id number]   Ordering:  YAW GALVEZ  Attending:  GOKUL WASSERMAN  Referring:  YAW GALVEZ  ------------------------------------------------  Chest PA and lateral views    INDICATION: Chest pain    COMPARISON: September 13, 2018    FINDINGS: Films -  2    Right chest wall Port-A-Cath noted with the tip of the  Port-A-Cath directed cranially though not visualized on these  images.    Left chest wall pacemaker device also noted, unchanged.    The bilateral lungs are grossly clear. No focal airspace opacity  seen. No pleural effusion or pneumothorax seen. The cardiac  silhouette is unremarkable.    No acute bony abnormality is seen.      Impression:       CONCLUSION:    1.   No acute cardiopulmonary pathology is identified.  2. Right chest wall Port-A-Cath noted. The tip is not visualized  and is directed cranially. Recommended correlation and  repositioning.    These findings were conveyed telephonically to Haley Cardoza PA-C, at 11:40 AM Eastern standard time.    Electronically signed by:  Ciriol Kelsey  11/12/2018 10:43 AM  [facility] Workstation: [id number]            Assessment:    Active Hospital Problems    Diagnosis   • Encounter for care related to Port-a-Cath             Plan:  1.  Sepsis:  Patient with chills and chest pain after flushing the port.  Concern for line infection.  Patient has had blood cultures and empiric antibiotics started.  We'll continue broad-spectrum antibiotics.  Depending on culture results may need echocardiography.  2.  Cranially directed Port-A-Cath tip: Dr. Arevalo was consulted from the emergency department  3.  CML:  On active chemotherapy.  Followed by Dr. Roblero.  4.  DVT Prophylaxis:  Heparin    I discussed the patient's findings and my recommendations with:  Patient        This document has been electronically signed by Uche Obrien MD on November 12, 2018 4:54 PM

## 2018-11-12 NOTE — ED PROVIDER NOTES
Subjective   Patient presents to emergency department for chills and tachycardia.  Patient was at [facility] and was getting a chemotherapy infusion.  Patient had abdominal pain radiating to right chest, chills, and tachycardia.  Dr Roblero is concerned about possible infection and wants a sepsis work up.  Endorses dysuria but no other symptoms.  Patient getting chemotherapy for CML.        History provided by:  Patient   used: No    Nausea   The primary symptoms include nausea. Primary symptoms do not include fever, fatigue, abdominal pain, vomiting, diarrhea or dysuria. The illness began today.   The illness is also significant for chills. The illness does not include back pain.   Chest Pain   Pain location:  R chest  Pain quality comment:  When port a cath was flushed  Pain severity:  Mild  Progression:  Resolved  Chronicity:  New  Associated symptoms: nausea    Associated symptoms: no abdominal pain, no back pain, no fatigue, no fever, no shortness of breath and no vomiting        Review of Systems   Constitutional: Positive for chills. Negative for fatigue and fever.   Respiratory: Negative for shortness of breath and wheezing.    Cardiovascular: Positive for chest pain.   Gastrointestinal: Positive for nausea. Negative for abdominal pain, diarrhea and vomiting.   Genitourinary: Negative for dysuria and flank pain.   Musculoskeletal: Negative for back pain.   Skin: Negative for color change.   Allergic/Immunologic: Positive for immunocompromised state.   Hematological: Does not bruise/bleed easily.   Psychiatric/Behavioral: Negative for confusion.       Past Medical History:   Diagnosis Date   • Atrophy of testis    • Benign prostatic hyperplasia    • Borderline glaucoma    • Chronic myelomonocytic leukemia (CMS/HCC)    • Degenerative joint disease involving multiple joints    • GERD (gastroesophageal reflux disease)    • Gout    • History of echocardiogram 05/08/2012    Normal left  ventricular systolic function, EF 60%. Early diastolic dysfunction. Mild aortic and pulmonic regurgitation. Trace mitral and mild tricuspid regurgitation. No intracardiac mass pericardial effusion or cardiac thrombus.   • History of Holter monitoring 01/18/2011   • Impotence    • Lightheadedness    • Neck pain, musculoskeletal    • Sleep apnea     NOT WEARING C-PAP   • TIA (transient ischemic attack) 2014       Allergies   Allergen Reactions   • Doxycycline Hyclate Other (See Comments)     Other reaction(s): lip swollen   • Sulfa Antibiotics Swelling     facial   • Other Rash     SILK TAPE   • Penicillins Rash     Reaction: rash   • Soma [Carisoprodol] Swelling and Rash       Past Surgical History:   Procedure Laterality Date   • CARDIAC CATHETERIZATION  09/30/2009    No epicardial coronary artery disease noted that would explain patient's chest pain of the abnormal stress test noted. Normal left ventricular systolic function with no wall motion abnormalities   • CARDIAC CATHETERIZATION  05/24/1989    Normal catheterization, false-positive exercise treadmill. Noncardiac chest pain   • CARDIAC PACEMAKER PLACEMENT  06/2008    Dual chamber permanent pacemaker implantation   • COLECTOMY PARTIAL / TOTAL  04/14/2000    Cecal diverticulitis. Removal of small segment of terminal ileum, cecum and right colon, sent to pathology   • COLON SURGERY  03/27/2016   • COLONOSCOPY  05/25/2012   • CYSTOSCOPY  11/15/2000    Urinary retention on the basis of medications and benign prostatic hypertrophy   • INGUINAL HERNIA REPAIR  02/15/2007    Recurrent right inguinal hernia. Repair of recurrent inguinal hernia with EPS Prolene mesh system.   • INGUINAL HERNIA REPAIR Right 1957   • LAPAROSCOPIC CHOLECYSTECTOMY  10/26/2006    Gallstones. Laparoscopic cholecystectomy with operative cholangiogram   • PROSTATECTOMY  11/17/2000    Benign prostatic hypertrophy with urinary retention. Prostatitis. transurethral resection of prostate.   •  "STEROID INJECTION  10/21/2010    Decadron (Gout)    • STEROID INJECTION  07/23/2013    Kenalog (Gout) (4)       Family History   Problem Relation Age of Onset   • Cancer Other    • Colon cancer Other         parent   • Coronary artery disease Other    • Heart disease Other    • Hypertension Other    • Cholelithiasis Other    • Other Other         colon problems       Social History     Socioeconomic History   • Marital status:      Spouse name: Not on file   • Number of children: Not on file   • Years of education: Not on file   • Highest education level: Not on file   Tobacco Use   • Smoking status: Never Smoker   • Smokeless tobacco: Never Used   Substance and Sexual Activity   • Alcohol use: No   • Drug use: No   • Sexual activity: Defer           Objective      /62 (Patient Position: Lying)   Pulse 68   Temp 98.3 °F (36.8 °C) (Oral)   Resp 18   Ht 180.3 cm (70.98\")   Wt 82.6 kg (182 lb 3.2 oz)   SpO2 93%   BMI 25.42 kg/m²     Physical Exam   Constitutional: He is oriented to person, place, and time. He appears well-developed and well-nourished. No distress.   HENT:   Head: Normocephalic and atraumatic.   Eyes: Conjunctivae are normal.   Neck: Normal range of motion.   Cardiovascular: Normal rate, regular rhythm, normal heart sounds and intact distal pulses.   Pulmonary/Chest: Effort normal and breath sounds normal. No stridor. No respiratory distress.   Abdominal: Soft. Bowel sounds are normal. He exhibits no distension. There is no tenderness.   Musculoskeletal: He exhibits no edema.   Neurological: He is alert and oriented to person, place, and time.   Skin: Skin is warm. Capillary refill takes less than 2 seconds.   Psychiatric: He has a normal mood and affect. His behavior is normal. Thought content normal.   Nursing note and vitals reviewed.      Procedures           ED Course  ED Course as of Nov 12 1209   Mon Nov 12, 2018   1156 Discussed with Dr Arevalo.  Discussed with Dr Obrien " who accepted patient for admission.    [LIGIA]      ED Course User Index  [LIGIA] Sony De Leon PA-C      Results for orders placed or performed during the hospital encounter of 11/12/18   Comprehensive Metabolic Panel   Result Value Ref Range    Glucose 92 60 - 100 mg/dL    BUN 14 7 - 21 mg/dL    Creatinine 0.75 0.70 - 1.30 mg/dL    Sodium 136 (L) 137 - 145 mmol/L    Potassium 3.9 3.5 - 5.1 mmol/L    Chloride 104 95 - 110 mmol/L    CO2 18.0 (L) 22.0 - 31.0 mmol/L    Calcium 9.4 8.4 - 10.2 mg/dL    Total Protein 6.8 6.3 - 8.6 g/dL    Albumin 4.10 3.40 - 4.80 g/dL    ALT (SGPT) 17 (L) 21 - 72 U/L    AST (SGOT) 20 17 - 59 U/L    Alkaline Phosphatase 54 38 - 126 U/L    Total Bilirubin 0.9 0.2 - 1.3 mg/dL    eGFR Non  Amer 101 (H) 42 - 98 mL/min/1.73    Globulin 2.7 2.3 - 3.5 gm/dL    A/G Ratio 1.5 1.1 - 1.8 g/dL    BUN/Creatinine Ratio 18.7 7.0 - 25.0    Anion Gap 14.0 5.0 - 15.0 mmol/L   Lipase   Result Value Ref Range    Lipase 48 23 - 300 U/L   Urinalysis With Microscopic If Indicated (No Culture) - Urine, Clean Catch   Result Value Ref Range    Color, UA Yellow Yellow, Straw, Dark Yellow, Clarice    Appearance, UA Clear Clear    pH, UA <=5.0 5.0 - 9.0    Specific Gravity, UA 1.022 1.003 - 1.030    Glucose, UA Negative Negative    Ketones, UA Negative Negative    Bilirubin, UA Negative Negative    Blood, UA Negative Negative    Protein, UA Negative Negative    Leuk Esterase, UA Negative Negative    Nitrite, UA Negative Negative    Urobilinogen, UA 0.2 E.U./dL 0.2 - 1.0 E.U./dL   Lactic Acid, Plasma   Result Value Ref Range    Lactate 5.7 (C) 0.5 - 2.0 mmol/L   CBC Auto Differential   Result Value Ref Range    WBC 22.64 (H) 3.20 - 9.80 10*3/mm3    RBC 2.94 (L) 4.37 - 5.74 10*6/mm3    Hemoglobin 10.2 (L) 13.7 - 17.3 g/dL    Hematocrit 29.5 (L) 39.0 - 49.0 %    .3 (H) 80.0 - 98.0 fL    MCH 34.7 (H) 26.5 - 34.0 pg    MCHC 34.6 31.5 - 36.3 g/dL    RDW 24.0 (H) 11.5 - 14.5 %    RDW-SD 85.4 (H) 35.1 - 43.9 fl     Platelets 178 150 - 450 10*3/mm3    Neutrophil %  37.0 - 80.0 %    Lymphocyte %  10.0 - 50.0 %    Monocyte % 3.5 0.0 - 12.0 %    Eosinophil % 0.8 0.0 - 7.0 %    Basophil % 0.5 0.0 - 2.0 %    Neutrophils, Absolute  2.00 - 8.60 10*3/mm3    Lymphocytes, Absolute  0.60 - 4.20 10*3/mm3    Monocytes, Absolute 0.79 0.00 - 0.90 10*3/mm3    Eosinophils, Absolute 0.17 0.00 - 0.70 10*3/mm3    Basophils, Absolute 0.12 0.00 - 0.20 10*3/mm3   Light Blue Top   Result Value Ref Range    Extra Tube hold for add-on    Manual Differential   Result Value Ref Range    Neutrophil % 60.0 37.0 - 80.0 %    Lymphocyte % 7.0 (L) 10.0 - 50.0 %    Monocyte % 18.0 (H) 0.0 - 12.0 %    Eosinophil % 2.0 0.0 - 7.0 %    Bands %  5.0 0.0 - 5.0 %    Metamyelocyte % 5.0 (H) 0.0 - 0.0 %    Myelocyte % 1.0 (H) 0.0 - 0.0 %    Atypical Lymphocyte % 2.0 0.0 - 5.0 %    Neutrophils Absolute 14.72 (H) 2.00 - 8.60 10*3/mm3    Lymphocytes Absolute 1.58 0.60 - 4.20 10*3/mm3    Monocytes Absolute 4.08 (H) 0.00 - 0.90 10*3/mm3    Eosinophils Absolute 0.45 0.00 - 0.70 10*3/mm3    nRBC 1.0 (H) 0.0 - 0.0 /100 WBC    Anisocytosis Slight/1+ None Seen    Polychromasia Slight/1+ None Seen    WBC Morphology Normal Normal    Platelet Estimate Adequate Normal     Xr Chest Pa & Lateral    Result Date: 11/12/2018  Narrative: Patient Name:  AICHA BRUCE Patient ID:  [id number] Ordering:  YAW GALVEZ Attending:  GOKUL WASSERMAN Referring:  YAW GALVEZ ------------------------------------------------ Chest PA and lateral views INDICATION: Chest pain COMPARISON: September 13, 2018 FINDINGS: Films -  2 Right chest wall Port-A-Cath noted with the tip of the Port-A-Cath directed cranially though not visualized on these images. Left chest wall pacemaker device also noted, unchanged. The bilateral lungs are grossly clear. No focal airspace opacity seen. No pleural effusion or pneumothorax seen. The cardiac silhouette is unremarkable. No acute bony abnormality is  seen.     Impression: CONCLUSION: 1.   No acute cardiopulmonary pathology is identified. 2. Right chest wall Port-A-Cath noted. The tip is not visualized and is directed cranially. Recommended correlation and repositioning. These findings were conveyed telephonically to Haley Cardoza PA-C, at 11:40 AM Eastern standard time. Electronically signed by:  Cirilo Kelsey  11/12/2018 10:43 AM [facility] Workstation: [id number]                [facility]      Final diagnoses:   Port-a-Cath malfunction   CML (chronic myelocytic leukemia) (CMS/HCC)            Sony De Leon PA-C  11/12/18 120

## 2018-11-12 NOTE — ED TRIAGE NOTES
Patient presents to the ED from the [facility] with complaints of nausea/vomiting that just started not to long ago. Patients states he felt fine this morning prior to arriving to the hospital. Report from Nurse at [facility] is as follows: patient presents to [facility]. Initial HR was 75bpm. Patient's port was accessed and when RN flushed patient's port, patient had severe pain starting at his diaphragm, moving up to the right side of his chest. Patient denies this ever happening before today. Patient then became tachycardic and short of breath. Per [facility] Nurse, DR. Roblero wanted patient to be worked up for sepsis. When patient presents to ED he is anxious and cannot stop shaking. Initial heart rate was 98. After patient was placed in ED stretcher and was able to calm down, heart rate is now 78. Oxygen saturation is stable at 96-98% on room air.

## 2018-11-12 NOTE — ED NOTES
Per ED provider, he does not think pt is septic, he thinks it is related to his disease. But following sepsis protocol at this time.     Megan Ro RN  11/12/18 4431

## 2018-11-12 NOTE — ED NOTES
Port accessed from by [facility].     Paulina Stephens, RN  11/12/18 0121

## 2018-11-13 ENCOUNTER — ANESTHESIA EVENT (OUTPATIENT)
Dept: PERIOP | Facility: HOSPITAL | Age: 78
End: 2018-11-13

## 2018-11-13 ENCOUNTER — PREP FOR SURGERY (OUTPATIENT)
Dept: OTHER | Facility: HOSPITAL | Age: 78
End: 2018-11-13

## 2018-11-13 ENCOUNTER — APPOINTMENT (OUTPATIENT)
Dept: ONCOLOGY | Facility: HOSPITAL | Age: 78
End: 2018-11-13

## 2018-11-13 ENCOUNTER — APPOINTMENT (OUTPATIENT)
Dept: CARDIOLOGY | Facility: HOSPITAL | Age: 78
End: 2018-11-13
Attending: FAMILY MEDICINE

## 2018-11-13 DIAGNOSIS — T80.212A PORT OR RESERVOIR INFECTION: Primary | ICD-10-CM

## 2018-11-13 PROBLEM — A41.9 SEPSIS: Status: ACTIVE | Noted: 2018-11-13

## 2018-11-13 LAB
ANION GAP SERPL CALCULATED.3IONS-SCNC: 8 MMOL/L (ref 5–15)
ANISOCYTOSIS BLD QL: ABNORMAL
BACTERIA BLD CULT: NORMAL
BASOPHILS # BLD MANUAL: 0.45 10*3/MM3 (ref 0–0.2)
BASOPHILS NFR BLD AUTO: 2 % (ref 0–2)
BH CV ECHO MEAS - ACS: 2 CM
BH CV ECHO MEAS - AI DEC SLOPE: 105 CM/SEC^2
BH CV ECHO MEAS - AI MAX PG: 36.7 MMHG
BH CV ECHO MEAS - AI MAX VEL: 303 CM/SEC
BH CV ECHO MEAS - AI P1/2T: 845.2 MSEC
BH CV ECHO MEAS - AO MAX PG (FULL): 6.2 MMHG
BH CV ECHO MEAS - AO MAX PG: 12.7 MMHG
BH CV ECHO MEAS - AO MEAN PG (FULL): 3 MMHG
BH CV ECHO MEAS - AO MEAN PG: 6 MMHG
BH CV ECHO MEAS - AO ROOT AREA (BSA CORRECTED): 1.6
BH CV ECHO MEAS - AO ROOT AREA: 8.6 CM^2
BH CV ECHO MEAS - AO ROOT DIAM: 3.3 CM
BH CV ECHO MEAS - AO V2 MAX: 178 CM/SEC
BH CV ECHO MEAS - AO V2 MEAN: 116 CM/SEC
BH CV ECHO MEAS - AO V2 VTI: 41.2 CM
BH CV ECHO MEAS - AVA(I,A): 2.1 CM^2
BH CV ECHO MEAS - AVA(I,D): 2.1 CM^2
BH CV ECHO MEAS - AVA(V,A): 2.2 CM^2
BH CV ECHO MEAS - AVA(V,D): 2.2 CM^2
BH CV ECHO MEAS - BSA(HAYCOCK): 2 M^2
BH CV ECHO MEAS - BSA: 2 M^2
BH CV ECHO MEAS - BZI_BMI: 25.4 KILOGRAMS/M^2
BH CV ECHO MEAS - BZI_METRIC_HEIGHT: 180.3 CM
BH CV ECHO MEAS - BZI_METRIC_WEIGHT: 82.6 KG
BH CV ECHO MEAS - EDV(CUBED): 120.6 ML
BH CV ECHO MEAS - EDV(TEICH): 115 ML
BH CV ECHO MEAS - EF(CUBED): 63.2 %
BH CV ECHO MEAS - EF(TEICH): 54.5 %
BH CV ECHO MEAS - ESV(CUBED): 44.4 ML
BH CV ECHO MEAS - ESV(TEICH): 52.3 ML
BH CV ECHO MEAS - FS: 28.3 %
BH CV ECHO MEAS - IVS/LVPW: 1
BH CV ECHO MEAS - IVSD: 1.1 CM
BH CV ECHO MEAS - LV MASS(C)D: 194.3 GRAMS
BH CV ECHO MEAS - LV MASS(C)DI: 95.9 GRAMS/M^2
BH CV ECHO MEAS - LV MAX PG: 6.5 MMHG
BH CV ECHO MEAS - LV MEAN PG: 3 MMHG
BH CV ECHO MEAS - LV V1 MAX: 127 CM/SEC
BH CV ECHO MEAS - LV V1 MEAN: 77.2 CM/SEC
BH CV ECHO MEAS - LV V1 VTI: 27.4 CM
BH CV ECHO MEAS - LVIDD: 4.9 CM
BH CV ECHO MEAS - LVIDS: 3.5 CM
BH CV ECHO MEAS - LVOT AREA (M): 3.1 CM^2
BH CV ECHO MEAS - LVOT AREA: 3.1 CM^2
BH CV ECHO MEAS - LVOT DIAM: 2 CM
BH CV ECHO MEAS - LVPWD: 1.1 CM
BH CV ECHO MEAS - MR MAX PG: 78.9 MMHG
BH CV ECHO MEAS - MR MAX VEL: 444 CM/SEC
BH CV ECHO MEAS - MV A MAX VEL: 86.8 CM/SEC
BH CV ECHO MEAS - MV DEC SLOPE: 421 CM/SEC^2
BH CV ECHO MEAS - MV E MAX VEL: 70.8 CM/SEC
BH CV ECHO MEAS - MV E/A: 0.82
BH CV ECHO MEAS - MV MAX PG: 3.6 MMHG
BH CV ECHO MEAS - MV MEAN PG: 2 MMHG
BH CV ECHO MEAS - MV P1/2T MAX VEL: 95.3 CM/SEC
BH CV ECHO MEAS - MV P1/2T: 66.3 MSEC
BH CV ECHO MEAS - MV V2 MAX: 94.7 CM/SEC
BH CV ECHO MEAS - MV V2 MEAN: 59.3 CM/SEC
BH CV ECHO MEAS - MV V2 VTI: 32.1 CM
BH CV ECHO MEAS - MVA P1/2T LCG: 2.3 CM^2
BH CV ECHO MEAS - MVA(P1/2T): 3.3 CM^2
BH CV ECHO MEAS - MVA(VTI): 2.7 CM^2
BH CV ECHO MEAS - PA MAX PG: 5 MMHG
BH CV ECHO MEAS - PA V2 MAX: 112 CM/SEC
BH CV ECHO MEAS - PI END-D VEL: 67.6 CM/SEC
BH CV ECHO MEAS - RAP SYSTOLE: 5 MMHG
BH CV ECHO MEAS - RVDD: 3.9 CM
BH CV ECHO MEAS - RVSP: 41.7 MMHG
BH CV ECHO MEAS - SI(AO): 173.9 ML/M^2
BH CV ECHO MEAS - SI(CUBED): 37.6 ML/M^2
BH CV ECHO MEAS - SI(LVOT): 42.5 ML/M^2
BH CV ECHO MEAS - SI(TEICH): 30.9 ML/M^2
BH CV ECHO MEAS - SV(AO): 352.4 ML
BH CV ECHO MEAS - SV(CUBED): 76.2 ML
BH CV ECHO MEAS - SV(LVOT): 86.1 ML
BH CV ECHO MEAS - SV(TEICH): 62.7 ML
BH CV ECHO MEAS - TR MAX VEL: 303 CM/SEC
BUN BLD-MCNC: 16 MG/DL (ref 7–21)
BUN/CREAT SERPL: 18.4 (ref 7–25)
CALCIUM SPEC-SCNC: 8.4 MG/DL (ref 8.4–10.2)
CHLORIDE SERPL-SCNC: 108 MMOL/L (ref 95–110)
CO2 SERPL-SCNC: 20 MMOL/L (ref 22–31)
CREAT BLD-MCNC: 0.87 MG/DL (ref 0.7–1.3)
D-LACTATE SERPL-SCNC: 1.6 MMOL/L (ref 0.5–2)
DEPRECATED RDW RBC AUTO: 84.4 FL (ref 35.1–43.9)
EOSINOPHIL # BLD MANUAL: 0.45 10*3/MM3 (ref 0–0.7)
EOSINOPHIL NFR BLD MANUAL: 2 % (ref 0–7)
ERYTHROCYTE [DISTWIDTH] IN BLOOD BY AUTOMATED COUNT: 24.1 % (ref 11.5–14.5)
GFR SERPL CREATININE-BSD FRML MDRD: 85 ML/MIN/1.73 (ref 42–98)
GLUCOSE BLD-MCNC: 95 MG/DL (ref 60–100)
HCT VFR BLD AUTO: 26.2 % (ref 39–49)
HGB BLD-MCNC: 9.3 G/DL (ref 13.7–17.3)
HOLD SPECIMEN: NORMAL
LV EF 2D ECHO EST: 58 %
LYMPHOCYTES # BLD MANUAL: 2.04 10*3/MM3 (ref 0.6–4.2)
LYMPHOCYTES NFR BLD MANUAL: 25 % (ref 0–12)
LYMPHOCYTES NFR BLD MANUAL: 9 % (ref 10–50)
MAXIMAL PREDICTED HEART RATE: 143 BPM
MCH RBC QN AUTO: 35.2 PG (ref 26.5–34)
MCHC RBC AUTO-ENTMCNC: 35.5 G/DL (ref 31.5–36.3)
MCV RBC AUTO: 99.2 FL (ref 80–98)
METAMYELOCYTES NFR BLD MANUAL: 3 % (ref 0–0)
MONOCYTES # BLD AUTO: 5.67 10*3/MM3 (ref 0–0.9)
MYELOCYTES NFR BLD MANUAL: 7 % (ref 0–0)
NEUTROPHILS # BLD AUTO: 11.79 10*3/MM3 (ref 2–8.6)
NEUTROPHILS NFR BLD MANUAL: 49 % (ref 37–80)
NEUTS BAND NFR BLD MANUAL: 3 % (ref 0–5)
PLATELET # BLD AUTO: 133 10*3/MM3 (ref 150–450)
PMV BLD AUTO: ABNORMAL FL (ref 8–12)
POTASSIUM BLD-SCNC: 3.9 MMOL/L (ref 3.5–5.1)
RBC # BLD AUTO: 2.64 10*6/MM3 (ref 4.37–5.74)
SMALL PLATELETS BLD QL SMEAR: ABNORMAL
SODIUM BLD-SCNC: 136 MMOL/L (ref 137–145)
STRESS TARGET HR: 122 BPM
VANCOMYCIN PEAK SERPL-MCNC: 28.13 MCG/ML (ref 30–40)
VANCOMYCIN TROUGH SERPL-MCNC: 16.94 MCG/ML (ref 10–15)
WBC MORPH BLD: NORMAL
WBC NRBC COR # BLD: 22.67 10*3/MM3 (ref 3.2–9.8)

## 2018-11-13 PROCEDURE — 25010000002 HEPARIN (PORCINE) PER 1000 UNITS: Performed by: HOSPITALIST

## 2018-11-13 PROCEDURE — 80202 ASSAY OF VANCOMYCIN: CPT | Performed by: HOSPITALIST

## 2018-11-13 PROCEDURE — 85007 BL SMEAR W/DIFF WBC COUNT: CPT | Performed by: HOSPITALIST

## 2018-11-13 PROCEDURE — 85025 COMPLETE CBC W/AUTO DIFF WBC: CPT | Performed by: HOSPITALIST

## 2018-11-13 PROCEDURE — 25010000002 CEFEPIME PER 500 MG: Performed by: HOSPITALIST

## 2018-11-13 PROCEDURE — 80048 BASIC METABOLIC PNL TOTAL CA: CPT | Performed by: HOSPITALIST

## 2018-11-13 PROCEDURE — 25010000002 VANCOMYCIN 5 G RECONSTITUTED SOLUTION: Performed by: HOSPITALIST

## 2018-11-13 PROCEDURE — 93306 TTE W/DOPPLER COMPLETE: CPT

## 2018-11-13 PROCEDURE — 83605 ASSAY OF LACTIC ACID: CPT | Performed by: FAMILY MEDICINE

## 2018-11-13 PROCEDURE — 93306 TTE W/DOPPLER COMPLETE: CPT | Performed by: INTERNAL MEDICINE

## 2018-11-13 RX ADMIN — CEFEPIME HYDROCHLORIDE 2 G: 2 INJECTION, POWDER, FOR SOLUTION INTRAVENOUS at 23:00

## 2018-11-13 RX ADMIN — HEPARIN SODIUM 5000 UNITS: 5000 INJECTION INTRAVENOUS; SUBCUTANEOUS at 05:11

## 2018-11-13 RX ADMIN — TAMSULOSIN HYDROCHLORIDE 0.4 MG: 0.4 CAPSULE ORAL at 20:03

## 2018-11-13 RX ADMIN — ALLOPURINOL 100 MG: 100 TABLET ORAL at 20:03

## 2018-11-13 RX ADMIN — Medication 3 ML: at 20:03

## 2018-11-13 RX ADMIN — ASPIRIN 81 MG: 81 TABLET, COATED ORAL at 08:41

## 2018-11-13 RX ADMIN — OXYBUTYNIN CHLORIDE 5 MG: 5 TABLET ORAL at 20:03

## 2018-11-13 RX ADMIN — ALLOPURINOL 100 MG: 100 TABLET ORAL at 08:41

## 2018-11-13 RX ADMIN — CEFEPIME HYDROCHLORIDE 2 G: 2 INJECTION, POWDER, FOR SOLUTION INTRAVENOUS at 05:11

## 2018-11-13 RX ADMIN — ALLOPURINOL 100 MG: 100 TABLET ORAL at 17:24

## 2018-11-13 RX ADMIN — PANTOPRAZOLE SODIUM 40 MG: 40 TABLET, DELAYED RELEASE ORAL at 08:58

## 2018-11-13 RX ADMIN — ALLOPURINOL 100 MG: 100 TABLET ORAL at 13:05

## 2018-11-13 RX ADMIN — VANCOMYCIN HYDROCHLORIDE 1250 MG: 5 INJECTION, POWDER, LYOPHILIZED, FOR SOLUTION INTRAVENOUS at 13:32

## 2018-11-13 RX ADMIN — OXYBUTYNIN CHLORIDE 5 MG: 5 TABLET ORAL at 08:41

## 2018-11-13 RX ADMIN — NAPROXEN 500 MG: 500 TABLET ORAL at 08:40

## 2018-11-13 RX ADMIN — Medication 40 MG: at 08:40

## 2018-11-13 RX ADMIN — FOLIC ACID 1 MG: 1 TABLET ORAL at 08:40

## 2018-11-13 RX ADMIN — Medication 40 MG: at 22:01

## 2018-11-13 RX ADMIN — CEFEPIME HYDROCHLORIDE 2 G: 2 INJECTION, POWDER, FOR SOLUTION INTRAVENOUS at 17:24

## 2018-11-13 RX ADMIN — NAPROXEN 500 MG: 500 TABLET ORAL at 17:24

## 2018-11-13 NOTE — H&P (VIEW-ONLY)
Referring Provider: Dr Hummel      Patient Care Team:  Seferino Tan MD as PCP - General (Family Medicine)  Jordin Roblero MD as Consulting Physician (Hematology and Oncology)  Goldie Beverly APRN as Nurse Practitioner (Oncology)      Subjective .     History of present illness:  78-year-old male who had a MediPort placed 3 months ago per Dr. Babb for treatment for CML.  Patient was admitted yesterday with sepsis and also of note his port catheter as stated been position and into the superior vena cava had flipped up into his internal jugular.  Some question whether the port is infected or not but it clearly is malpositioned.  Patient has improved with his treatment for sepsis.    Review of Systems   Constitutional: Negative.    HENT: Negative for hearing loss, nosebleeds and trouble swallowing.    Respiratory: Negative for apnea, chest tightness and shortness of breath.    Cardiovascular: Negative for chest pain and palpitations.   Gastrointestinal: Negative for abdominal distention, abdominal pain and vomiting.   Genitourinary: Negative for difficulty urinating, dysuria and frequency.   Musculoskeletal: Negative for back pain, joint swelling and neck pain.   Skin: Negative for rash.   Neurological: Negative for dizziness and weakness.   Hematological: Negative for adenopathy.   Psychiatric/Behavioral: Negative for agitation. The patient is not nervous/anxious.          History  Past Medical History:   Diagnosis Date   • Atrophy of testis    • Benign prostatic hyperplasia    • Borderline glaucoma    • Chronic myelomonocytic leukemia (CMS/HCC)    • Degenerative joint disease involving multiple joints    • GERD (gastroesophageal reflux disease)    • Gout    • History of echocardiogram 05/08/2012    Normal left ventricular systolic function, EF 60%. Early diastolic dysfunction. Mild aortic and pulmonic regurgitation. Trace mitral and mild tricuspid regurgitation. No intracardiac mass pericardial effusion or  cardiac thrombus.   • History of Holter monitoring 01/18/2011   • Impotence    • Lightheadedness    • Neck pain, musculoskeletal    • Sleep apnea     NOT WEARING C-PAP   • TIA (transient ischemic attack) 2014   , Past Surgical History:   Procedure Laterality Date   • CARDIAC CATHETERIZATION  09/30/2009    No epicardial coronary artery disease noted that would explain patient's chest pain of the abnormal stress test noted. Normal left ventricular systolic function with no wall motion abnormalities   • CARDIAC CATHETERIZATION  05/24/1989    Normal catheterization, false-positive exercise treadmill. Noncardiac chest pain   • CARDIAC PACEMAKER PLACEMENT  06/2008    Dual chamber permanent pacemaker implantation   • COLECTOMY PARTIAL / TOTAL  04/14/2000    Cecal diverticulitis. Removal of small segment of terminal ileum, cecum and right colon, sent to pathology   • COLON SURGERY  03/27/2016   • COLONOSCOPY  05/25/2012   • CYSTOSCOPY  11/15/2000    Urinary retention on the basis of medications and benign prostatic hypertrophy   • INGUINAL HERNIA REPAIR  02/15/2007    Recurrent right inguinal hernia. Repair of recurrent inguinal hernia with EPS Prolene mesh system.   • INGUINAL HERNIA REPAIR Right 1957   • LAPAROSCOPIC CHOLECYSTECTOMY  10/26/2006    Gallstones. Laparoscopic cholecystectomy with operative cholangiogram   • PROSTATECTOMY  11/17/2000    Benign prostatic hypertrophy with urinary retention. Prostatitis. transurethral resection of prostate.   • STEROID INJECTION  10/21/2010    Decadron (Gout)    • STEROID INJECTION  07/23/2013    Kenalog (Gout) (4)   , Family History   Problem Relation Age of Onset   • Cancer Other    • Colon cancer Other         parent   • Coronary artery disease Other    • Heart disease Other    • Hypertension Other    • Cholelithiasis Other    • Other Other         colon problems   , Social History     Tobacco Use   • Smoking status: Never Smoker   • Smokeless tobacco: Never Used   Substance  Use Topics   • Alcohol use: No   • Drug use: No   , Home Medications:  Prior to Admission medications    Medication Sig Start Date End Date Taking? Authorizing Provider   acetaminophen (TYLENOL) 325 MG tablet Take 1 tablet by mouth As Needed. 4/26/12  Yes Sherin Silveira MD   allopurinol (ZYLOPRIM) 100 MG tablet Take 1 tablet by mouth 4 (Four) Times a Day. 10/22/18  Yes Seferino Tan MD   aspirin 81 MG EC tablet Take 81 mg by mouth Daily.   Yes Sherin Silveira MD   colchicine 0.6 MG tablet Take 0.6 mg by mouth Daily As Needed for Muscle / Joint Pain.   Yes Sherin Silveira MD   folic acid (FOLVITE) 1 MG tablet Take 1 tablet by mouth Daily. 10/22/18  Yes Seferino Tan MD   omeprazole (PRILOSEC) 20 MG capsule Take 1 capsule by mouth Daily. 10/22/18  Yes Seferino Tan MD   ondansetron (ZOFRAN) 4 MG tablet Take 1 tablet by mouth 4 (Four) Times a Day As Needed for Nausea or Vomiting. 9/11/18  Yes Jordin Roblero MD   oxybutynin (DITROPAN) 5 MG tablet Take 5 mg by mouth 2 (Two) Times a Day. 5/31/17  Yes Sherin Silveira MD   sotalol (BETAPACE) 80 MG tablet Take 0.5 tablets by mouth 2 (Two) Times a Day. 9/12/18  Yes Geneva Day MD   tamsulosin (FLOMAX) 0.4 MG capsule 24 hr capsule Take 1 capsule by mouth every night.   Yes Sherin Silveira MD   diclofenac (VOLTAREN) 50 MG EC tablet Take 1 tablet by mouth 2 (Two) Times a Day.  Patient not taking: Reported on 11/12/2018 10/22/18   Seferino Tan MD   , Scheduled Meds:    allopurinol 100 mg Oral 4x Daily   aspirin 81 mg Oral Daily   cefepime 2 g Intravenous Q8H   folic acid 1 mg Oral Daily   heparin (porcine) 5,000 Units Subcutaneous Q8H   naproxen 500 mg Oral BID With Meals   oxybutynin 5 mg Oral BID   pantoprazole 40 mg Oral QAM   sodium chloride 3 mL Intravenous Q12H   sotalol 40 mg Oral BID - RT   tamsulosin 0.4 mg Oral Nightly   vancomycin 15 mg/kg Intravenous Q12H   , Continuous Infusions:    Pharmacy to dose  vancomycin    , PRN Meds:  •  acetaminophen  •  colchicine  •  ondansetron  •  [DISCONTINUED] vancomycin **AND** Pharmacy to dose vancomycin  •  [COMPLETED] Insert peripheral IV **AND** sodium chloride  •  sodium chloride and Allergies:  Allergies   Allergen Reactions   • Doxycycline Hyclate Other (See Comments)     Other reaction(s): lip swollen   • Sulfa Antibiotics Swelling     facial   • Other Rash     SILK TAPE   • Penicillins Rash     Reaction: rash   • Soma [Carisoprodol] Swelling and Rash       Objective     Vital Signs   Temp:  [97.1 °F (36.2 °C)-98.9 °F (37.2 °C)] 97.1 °F (36.2 °C)  Heart Rate:  [61-74] 61  Resp:  [18] 18  BP: (103-110)/(55-59) 108/57    Physical Exam:     General Appearance:    Alert, cooperative, in no acute distress   Head:    Normocephalic, without obvious abnormality, atraumatic   Eyes:            Lids and lashes normal, conjunctivae and sclerae normal, no   icterus, no pallor, corneas clear   Ears:    Ears appear intact with no abnormalities noted   Throat:   No oral lesions, no thrush, oral mucosa moist   Neck:   No adenopathy, supple, trachea midline, no thyromegaly,   no JVD   Lungs:     Clear to auscultation,respirations regular, even and                  unlabored    Heart:    Regular rhythm and normal rate, normal S1 and S2, no            murmur, no gallop, no rub, no click   Chest Wall:    No abnormalities observed   Abdomen:     Normal bowel sounds, no masses, no organomegaly, soft        non-tender, non-distended, no guarding, no rebound                tenderness   Extremities:   Moves all extremities well, no edema, no cyanosis, no             redness   Skin:   No bleeding, bruising or rash   Lymph nodes:   No palpable adenopathy   Neurologic:  Grossly intact, sensation intact       Results Review:   I reviewed the patient's new clinical results.      Assessment/Plan       Sepsis (CMS/[facility])    Encounter for care related to Port-a-Cath        I discussed the patients findings  and my recommendations with patient, nursing staff and consulting provider     reCommend removal of MediPort and wound infection to clear and port can be replaced at a later time.  I fully discussed the procedure alternatives risk benefits with the patient and he clearly understands and wishes to proceed patient also has a pacemaker which we need to have addressed prior to have any type of surgery        This document has been electronically signed by Tono Arevalo MD on November 13, 2018 4:44 PM     Tono Arevalo MD  11/13/18  4:44 PM

## 2018-11-13 NOTE — PLAN OF CARE
Problem: Patient Care Overview  Goal: Plan of Care Review  Outcome: Ongoing (interventions implemented as appropriate)   11/13/18 0450   Coping/Psychosocial   Plan of Care Reviewed With patient   Plan of Care Review   Progress no change   OTHER   Outcome Summary Pt reports appetite improved at present but has had decreased appetite r/t chemo and taste changes. Declines supplements. Encouarged protein/nanda intake with milk, icecream, CIB etc.

## 2018-11-13 NOTE — PLAN OF CARE
Problem: Patient Care Overview  Goal: Plan of Care Review  Outcome: Ongoing (interventions implemented as appropriate)   11/13/18 0020   Coping/Psychosocial   Plan of Care Reviewed With patient     Goal: Individualization and Mutuality  Outcome: Ongoing (interventions implemented as appropriate)    Goal: Discharge Needs Assessment  Outcome: Ongoing (interventions implemented as appropriate)    Goal: Interprofessional Rounds/Family Conf  Outcome: Ongoing (interventions implemented as appropriate)      Problem: Fall Risk (Adult)  Goal: Identify Related Risk Factors and Signs and Symptoms  Outcome: Ongoing (interventions implemented as appropriate)    Goal: Absence of Fall  Outcome: Ongoing (interventions implemented as appropriate)      Problem: Sepsis/Septic Shock (Adult)  Goal: Signs and Symptoms of Listed Potential Problems Will be Absent, Minimized or Managed (Sepsis/Septic Shock)  Outcome: Ongoing (interventions implemented as appropriate)

## 2018-11-13 NOTE — CONSULTS
Adult Nutrition  Assessment    Patient Name:  Jan Humphrey  YOB: 1940  MRN: [id number]  Admit Date:  11/12/2018    Assessment Date:  11/13/2018    Comments:  Pt is a 78 year old male with concurrent hx of recurring CLL admitted with infection and possible sepsis of port-a-cath with surgery consulted.  Pt reports appetite improved since admit but indicates decreased appetite since starting chemotherapy, in part due to taste changes.  Wt is down 9lb over past month and 20lb in less than one year.  Fat loss and muscle wasting visualized in orbital and temporal regions.  Pt has tried supplements in the past but felt they had made him sick once.  RD encouraged protein/nanda intake via milk, icecream, and encouraged pt to try [employer] Instant Breakfast.  Provided pt with icecream for snack and advised of menu options available.  MD ok to liberalize to regular diet.  RD will monitor.    Reason for Assessment     Row Name 11/13/18 1548          Reason for Assessment    Reason For Assessment  identified at risk by screening criteria     Diagnosis  cancer diagnosis/related complications     Identified At Risk by Screening Criteria  MST SCORE 2+;unintentional loss of 10 lbs or more in the past 2 mos;reduced oral intake over the last month         Nutrition/Diet History     Row Name 11/13/18 1545          Nutrition/Diet History    Typical Food/Fluid Intake  Pt reports decreased appetite and wt loss since starting chemotherapy, in part due to taste changes.  Appetite improved in hospital.         Anthropometrics     Row Name 11/13/18 1547          Usual Body Weight (UBW)    Weight Loss  unintentional     Weight Loss Time Frame  9lb in 1 month; 20lb <1 year         Labs/Tests/Procedures/Meds     Row Name 11/13/18 1545          Labs/Procedures/Meds    Lab Results Reviewed  reviewed, pertinent        Medications    Pertinent Medications Reviewed  reviewed, pertinent         Physical Findings     Row Name  11/13/18 1548          Physical Findings    Overall Physical Appearance  generalized wasting         Estimated/Assessed Needs     Row Name 11/13/18 1548          Calculation Measurements    Weight Used For Calculations  82.6 kg (182 lb 1.6 oz)        Estimated/Assessed Needs    Additional Documentation  KCAL/KG (Group);Fluid Requirements (Group);Protein Requirements (Group)        KCAL/KG    14 Kcal/Kg (kcal)  1156.4     15 Kcal/Kg (kcal)  1239     18 Kcal/Kg (kcal)  1486.8     20 Kcal/Kg (kcal)  1652     25 Kcal/Kg (kcal)  2065     30 Kcal/Kg (kcal)  2478     35 Kcal/Kg (kcal)  2891     40 Kcal/Kg (kcal)  3304     45 Kcal/Kg (kcal)  3717     50 Kcal/Kg (kcal)  4130     kcal/kg (Specify)  25        Blackford-St. Jeor Equation    RMR (Blackford-St. Jeor Equation)  1573.13        Protein Requirements    Est Protein Requirement Amount (gms/kg)  1.2 gm protein     Estimated Protein Requirements (gms/day)  99.12        Fluid Requirements    Estimated Fluid Requirements (mL/day)  2065     Estimated Fluid Requirement Method  RDA Method     RDA Method (mL)  2065     Lobo-Ekta Method (over 20 kg)  3152         Nutrition Prescription Ordered     Row Name 11/13/18 1549          Nutrition Prescription PO    Current PO Diet  Regular     Common Modifiers  Cardiac         Evaluation of Received Nutrient/Fluid Intake     Row Name 11/13/18 1549 11/13/18 1548       Calculation Measurements    Weight Used For Calculations  --  82.6 kg (182 lb 1.6 oz)       PO Evaluation    Number of Days PO Intake Evaluated  1 day  --    Number of Meals  1  --    % PO Intake  75  --        Evaluation of Prescribed Nutrient/Fluid Intake     Row Name 11/13/18 1548          Calculation Measurements    Weight Used For Calculations  82.6 kg (182 lb 1.6 oz)             Electronically signed by:  Sara Valladares RD  11/13/18 3:49 PM

## 2018-11-13 NOTE — PROGRESS NOTES
"Pharmacokinetics by Pharmacy - Vancomycin Initial Consult    Jan Humphrey is a 78 y.o. male being initiated on vancomycin for sepsis possibly secondary to line infection . Patient is also receiving cefepime.      Objective:     [Ht: 180.3 cm (71\"); Wt: 82.6 kg (182 lb 3.2 oz)]     Lab Results   Component Value Date    WBC 22.67 (H) 11/13/2018    WBC 22.64 (H) 11/12/2018    WBC 16.39 (H) 11/12/2018      Lab Results   Component Value Date    LACTATE 3.7 (C) 11/12/2018    LACTATE 5.7 (C) 11/12/2018      Temp Readings from Last 1 Encounters:   11/13/18 98.6 °F (37 °C) (Oral)     Estimated Creatinine Clearance: 83.1 mL/min (by C-G formula based on SCr of 0.87 mg/dL).   Lab Results   Component Value Date    CREATININE 0.87 11/13/2018    CREATININE 0.75 11/12/2018    CREATININE 0.74 11/12/2018       Baseline culture results:  Microbiology Results (last 10 days)       Procedure Component Value - Date/Time    Blood Culture - Blood, Arm, Right [[id number]] Collected:  11/12/18 1723    Lab Status:  Preliminary result Specimen:  Blood from Arm, Right Updated:  11/13/18 0501     Blood Culture No growth at less than 24 hours    Blood Culture - Blood, Arm, Right [[id number]] Collected:  11/12/18 1640    Lab Status:  Preliminary result Specimen:  Blood from Arm, Right Updated:  11/13/18 0501     Blood Culture No growth at less than 24 hours    Blood Culture - Blood, Blood, Venous Line [[id number]]  (Abnormal) Collected:  11/12/18 1033    Lab Status:  Preliminary result Specimen:  Blood, Venous Line Updated:  11/13/18 0554     Blood Culture Abnormal Stain     Gram Stain Aerobic Bottle Gram positive bacilli     Comment: 1 positive for gram positive bacillus of 3 drawn       Narrative:       Blood culture does not meet the specified criteria for PCR identification.  If pregnant, immunocompromised, or clinical concern for meningitis, call lab to run BCID for Listeria monocytogenes.    Blood Culture - Blood, Blood, Venous Line " [[id number]] Collected:  11/12/18 1026    Lab Status:  Preliminary result Specimen:  Blood, Venous Line Updated:  11/12/18 1500     Blood Culture No growth at less than 24 hours               Assessment  Patient experienced chills and chest pain after flushing port.  Patient on active chemotherapy for CML.  1 of 4 blood cultures showing gram positive bacilli (usually a contaminant, may represent actual infection in immunocompromised patients) will continue to follow cultures.  Patient is currently on broad spectrum antibiotics with vancomycin and cefepime.  Will give vancomycin 1250 mg every 12 hours based on site/severity of infection, patient weight, and renal function.     AUC goal 400-600    Plan  1. Give vancomycin 1750mg IV x 1 followed by vancomycin 1250mg IV Q 12 hours  2. Will order vancomycin peak after the 3rd dose and vancomycin trough prior to the 4th dose  3. Pharmacy will monitor renal function and adjust dose accordingly.    Barrett Chacko III, [facility]  11/13/18 9:49 AM

## 2018-11-13 NOTE — CONSULTS
Referring Provider: Dr Hummel      Patient Care Team:  Seferino Tan MD as PCP - General (Family Medicine)  Jordin Roblero MD as Consulting Physician (Hematology and Oncology)  Goldie Beverly APRN as Nurse Practitioner (Oncology)      Subjective .     History of present illness:  78-year-old male who had a MediPort placed 3 months ago per Dr. Babb for treatment for CML.  Patient was admitted yesterday with sepsis and also of note his port catheter as stated been position and into the superior vena cava had flipped up into his internal jugular.  Some question whether the port is infected or not but it clearly is malpositioned.  Patient has improved with his treatment for sepsis.    Review of Systems   Constitutional: Negative.    HENT: Negative for hearing loss, nosebleeds and trouble swallowing.    Respiratory: Negative for apnea, chest tightness and shortness of breath.    Cardiovascular: Negative for chest pain and palpitations.   Gastrointestinal: Negative for abdominal distention, abdominal pain and vomiting.   Genitourinary: Negative for difficulty urinating, dysuria and frequency.   Musculoskeletal: Negative for back pain, joint swelling and neck pain.   Skin: Negative for rash.   Neurological: Negative for dizziness and weakness.   Hematological: Negative for adenopathy.   Psychiatric/Behavioral: Negative for agitation. The patient is not nervous/anxious.          History  Past Medical History:   Diagnosis Date   • Atrophy of testis    • Benign prostatic hyperplasia    • Borderline glaucoma    • Chronic myelomonocytic leukemia (CMS/HCC)    • Degenerative joint disease involving multiple joints    • GERD (gastroesophageal reflux disease)    • Gout    • History of echocardiogram 05/08/2012    Normal left ventricular systolic function, EF 60%. Early diastolic dysfunction. Mild aortic and pulmonic regurgitation. Trace mitral and mild tricuspid regurgitation. No intracardiac mass pericardial effusion or  cardiac thrombus.   • History of Holter monitoring 01/18/2011   • Impotence    • Lightheadedness    • Neck pain, musculoskeletal    • Sleep apnea     NOT WEARING C-PAP   • TIA (transient ischemic attack) 2014   , Past Surgical History:   Procedure Laterality Date   • CARDIAC CATHETERIZATION  09/30/2009    No epicardial coronary artery disease noted that would explain patient's chest pain of the abnormal stress test noted. Normal left ventricular systolic function with no wall motion abnormalities   • CARDIAC CATHETERIZATION  05/24/1989    Normal catheterization, false-positive exercise treadmill. Noncardiac chest pain   • CARDIAC PACEMAKER PLACEMENT  06/2008    Dual chamber permanent pacemaker implantation   • COLECTOMY PARTIAL / TOTAL  04/14/2000    Cecal diverticulitis. Removal of small segment of terminal ileum, cecum and right colon, sent to pathology   • COLON SURGERY  03/27/2016   • COLONOSCOPY  05/25/2012   • CYSTOSCOPY  11/15/2000    Urinary retention on the basis of medications and benign prostatic hypertrophy   • INGUINAL HERNIA REPAIR  02/15/2007    Recurrent right inguinal hernia. Repair of recurrent inguinal hernia with EPS Prolene mesh system.   • INGUINAL HERNIA REPAIR Right 1957   • LAPAROSCOPIC CHOLECYSTECTOMY  10/26/2006    Gallstones. Laparoscopic cholecystectomy with operative cholangiogram   • PROSTATECTOMY  11/17/2000    Benign prostatic hypertrophy with urinary retention. Prostatitis. transurethral resection of prostate.   • STEROID INJECTION  10/21/2010    Decadron (Gout)    • STEROID INJECTION  07/23/2013    Kenalog (Gout) (4)   , Family History   Problem Relation Age of Onset   • Cancer Other    • Colon cancer Other         parent   • Coronary artery disease Other    • Heart disease Other    • Hypertension Other    • Cholelithiasis Other    • Other Other         colon problems   , Social History     Tobacco Use   • Smoking status: Never Smoker   • Smokeless tobacco: Never Used   Substance  Use Topics   • Alcohol use: No   • Drug use: No   , Home Medications:  Prior to Admission medications    Medication Sig Start Date End Date Taking? Authorizing Provider   acetaminophen (TYLENOL) 325 MG tablet Take 1 tablet by mouth As Needed. 4/26/12  Yes Sherin Silveira MD   allopurinol (ZYLOPRIM) 100 MG tablet Take 1 tablet by mouth 4 (Four) Times a Day. 10/22/18  Yes Seferino Tan MD   aspirin 81 MG EC tablet Take 81 mg by mouth Daily.   Yes Sherin Silveira MD   colchicine 0.6 MG tablet Take 0.6 mg by mouth Daily As Needed for Muscle / Joint Pain.   Yes Sherin Silveira MD   folic acid (FOLVITE) 1 MG tablet Take 1 tablet by mouth Daily. 10/22/18  Yes Seferino Tan MD   omeprazole (PRILOSEC) 20 MG capsule Take 1 capsule by mouth Daily. 10/22/18  Yes Seferino Tan MD   ondansetron (ZOFRAN) 4 MG tablet Take 1 tablet by mouth 4 (Four) Times a Day As Needed for Nausea or Vomiting. 9/11/18  Yes Jordin Roblero MD   oxybutynin (DITROPAN) 5 MG tablet Take 5 mg by mouth 2 (Two) Times a Day. 5/31/17  Yes Sherin Silveira MD   sotalol (BETAPACE) 80 MG tablet Take 0.5 tablets by mouth 2 (Two) Times a Day. 9/12/18  Yes Geneva Day MD   tamsulosin (FLOMAX) 0.4 MG capsule 24 hr capsule Take 1 capsule by mouth every night.   Yes Sherin Silveira MD   diclofenac (VOLTAREN) 50 MG EC tablet Take 1 tablet by mouth 2 (Two) Times a Day.  Patient not taking: Reported on 11/12/2018 10/22/18   Seferino Tan MD   , Scheduled Meds:    allopurinol 100 mg Oral 4x Daily   aspirin 81 mg Oral Daily   cefepime 2 g Intravenous Q8H   folic acid 1 mg Oral Daily   heparin (porcine) 5,000 Units Subcutaneous Q8H   naproxen 500 mg Oral BID With Meals   oxybutynin 5 mg Oral BID   pantoprazole 40 mg Oral QAM   sodium chloride 3 mL Intravenous Q12H   sotalol 40 mg Oral BID - RT   tamsulosin 0.4 mg Oral Nightly   vancomycin 15 mg/kg Intravenous Q12H   , Continuous Infusions:    Pharmacy to dose  vancomycin    , PRN Meds:  •  acetaminophen  •  colchicine  •  ondansetron  •  [DISCONTINUED] vancomycin **AND** Pharmacy to dose vancomycin  •  [COMPLETED] Insert peripheral IV **AND** sodium chloride  •  sodium chloride and Allergies:  Allergies   Allergen Reactions   • Doxycycline Hyclate Other (See Comments)     Other reaction(s): lip swollen   • Sulfa Antibiotics Swelling     facial   • Other Rash     SILK TAPE   • Penicillins Rash     Reaction: rash   • Soma [Carisoprodol] Swelling and Rash       Objective     Vital Signs   Temp:  [97.1 °F (36.2 °C)-98.9 °F (37.2 °C)] 97.1 °F (36.2 °C)  Heart Rate:  [61-74] 61  Resp:  [18] 18  BP: (103-110)/(55-59) 108/57    Physical Exam:     General Appearance:    Alert, cooperative, in no acute distress   Head:    Normocephalic, without obvious abnormality, atraumatic   Eyes:            Lids and lashes normal, conjunctivae and sclerae normal, no   icterus, no pallor, corneas clear   Ears:    Ears appear intact with no abnormalities noted   Throat:   No oral lesions, no thrush, oral mucosa moist   Neck:   No adenopathy, supple, trachea midline, no thyromegaly,   no JVD   Lungs:     Clear to auscultation,respirations regular, even and                  unlabored    Heart:    Regular rhythm and normal rate, normal S1 and S2, no            murmur, no gallop, no rub, no click   Chest Wall:    No abnormalities observed   Abdomen:     Normal bowel sounds, no masses, no organomegaly, soft        non-tender, non-distended, no guarding, no rebound                tenderness   Extremities:   Moves all extremities well, no edema, no cyanosis, no             redness   Skin:   No bleeding, bruising or rash   Lymph nodes:   No palpable adenopathy   Neurologic:  Grossly intact, sensation intact       Results Review:   I reviewed the patient's new clinical results.      Assessment/Plan       Sepsis (CMS/[facility])    Encounter for care related to Port-a-Cath        I discussed the patients findings  and my recommendations with patient, nursing staff and consulting provider     reCommend removal of MediPort and wound infection to clear and port can be replaced at a later time.  I fully discussed the procedure alternatives risk benefits with the patient and he clearly understands and wishes to proceed patient also has a pacemaker which we need to have addressed prior to have any type of surgery        This document has been electronically signed by Tono Arevalo MD on November 13, 2018 4:44 PM     Tono Arevalo MD  11/13/18  4:44 PM

## 2018-11-13 NOTE — PLAN OF CARE
Problem: Patient Care Overview  Goal: Individualization and Mutuality  Outcome: Ongoing (interventions implemented as appropriate)      Problem: Sepsis/Septic Shock (Adult)  Goal: Signs and Symptoms of Listed Potential Problems Will be Absent, Minimized or Managed (Sepsis/Septic Shock)  Outcome: Ongoing (interventions implemented as appropriate)

## 2018-11-13 NOTE — PROGRESS NOTES
MEDICINE DAILY PROGRESS NOTE  NAME: Jan Humphrey  : 1940  MRN: [id number]     LOS: 0 days     PROVIDER OF SERVICE: Kin Hummel MD    Chief Complaint: Sepsis (CMS/HCC)    Subjective:   HPI: Patient presented to the [facility] today for chemotherapy and after his port was flushed he developed some chills and chest discomfort.  These symptoms resolved quickly and he has been chest pain free since admission.  His port-a-cath is cranially directed on imaging. Surgery to see.    Interval History:  History taken from: patient family RN  . Patient feeling much better. He desires to go home today, but understands that he is not likely ready.    Review of Systems:   Review of Systems   Constitutional: Positive for activity change, chills and fatigue. Negative for appetite change and fever.   HENT: Negative for ear pain and sore throat.    Eyes: Negative for pain and visual disturbance.   Respiratory: Negative for cough and shortness of breath.    Cardiovascular: Negative for chest pain and palpitations.   Gastrointestinal: Negative for abdominal pain and nausea.   Endocrine: Negative for cold intolerance and heat intolerance.   Genitourinary: Negative for difficulty urinating and dysuria.   Musculoskeletal: Positive for arthralgias. Negative for gait problem.   Skin: Negative for color change and rash.   Neurological: Negative for dizziness, weakness and headaches.   Hematological: Negative for adenopathy. Does not bruise/bleed easily.   Psychiatric/Behavioral: Negative for agitation, confusion and sleep disturbance.       Objective:     Vital Signs  Vitals:    18 1558 18 2140 18 2329 18 0404   BP: 105/52 103/57 110/59 106/55   BP Location: Right arm Left arm Left arm Left arm   Patient Position: Lying Lying Lying Lying   Pulse: 62 64 74 67   Resp:    Temp: 97.6 °F (36.4 °C) 98.8 °F (37.1 °C) 98.9 °F (37.2 °C) 98.6 °F (37 °C)   TempSrc: Oral Oral Oral Oral   SpO2: 98%  94% 95% 98%   Weight:       Height:           Physical Exam  Physical Exam   Constitutional: He is oriented to person, place, and time. He appears well-developed and well-nourished. No distress.   HENT:   Head: Normocephalic and atraumatic.   Right Ear: External ear normal.   Left Ear: External ear normal.   Nose: Nose normal.   Eyes: Conjunctivae and EOM are normal. Pupils are equal, round, and reactive to light.   Neck: Normal range of motion. Neck supple.   Cardiovascular: Normal rate, regular rhythm, normal heart sounds and intact distal pulses.   Pulmonary/Chest: Effort normal and breath sounds normal. No respiratory distress. He has no wheezes. He has no rales. He exhibits no tenderness.   Abdominal: Soft. Bowel sounds are normal. He exhibits no distension and no mass. There is no tenderness. There is no rebound and no guarding.   Musculoskeletal: Normal range of motion.   Neurological: He is alert and oriented to person, place, and time.   Skin: Skin is warm and dry. No rash noted. He is not diaphoretic. No erythema. No pallor.   Port site examined. Minimal erythema. Well healed scar.   Psychiatric: He has a normal mood and affect. His behavior is normal.   Nursing note and vitals reviewed.      Medication Review    Current Facility-Administered Medications:   •  acetaminophen (TYLENOL) tablet 650 mg, 650 mg, Oral, Q6H PRN, Sony De Leon PA-C, 650 mg at 11/12/18 1905  •  allopurinol (ZYLOPRIM) tablet 100 mg, 100 mg, Oral, 4x Daily, Uche Obrien MD, 100 mg at 11/13/18 1305  •  aspirin EC tablet 81 mg, 81 mg, Oral, Daily, Uche Obrien MD, 81 mg at 11/13/18 0841  •  cefepime (MAXIPIME) 2 g/100 mL 0.9% NS (mbp), 2 g, Intravenous, Q8H, Uche Obrien MD, Stopped at 11/13/18 0720  •  colchicine tablet 0.6 mg, 0.6 mg, Oral, Daily PRN, Uche Obrien MD  •  folic acid (FOLVITE) tablet 1 mg, 1 mg, Oral, Daily, Uche Obrien MD, 1 mg at 11/13/18 0840  •  heparin (porcine) 5000 UNIT/ML  injection 5,000 Units, 5,000 Units, Subcutaneous, Q8H, Uche Obrien MD, 5,000 Units at 11/13/18 0511  •  naproxen (NAPROSYN) tablet 500 mg, 500 mg, Oral, BID With Meals, Uche Obrien MD, 500 mg at 11/13/18 0840  •  ondansetron (ZOFRAN) tablet 4 mg, 4 mg, Oral, 4x Daily PRN, Uche Obrien MD  •  oxybutynin (DITROPAN) tablet 5 mg, 5 mg, Oral, BID, Uche Obrien MD, 5 mg at 11/13/18 0841  •  pantoprazole (PROTONIX) EC tablet 40 mg, 40 mg, Oral, QAM, Uche Obrien MD, 40 mg at 11/13/18 0858  •  [DISCONTINUED] vancomycin 1750 mg/500 mL 0.9% NS IVPB ([facility]), 20 mg/kg, Intravenous, Once **AND** Pharmacy to dose vancomycin, , Does not apply, Continuous PRN, Uche Obrien MD  •  [COMPLETED] Insert peripheral IV, , , Once **AND** sodium chloride 0.9 % flush 10 mL, 10 mL, Intravenous, PRN, Sony De Leon PA-C  •  sodium chloride 0.9 % flush 3 mL, 3 mL, Intravenous, Q12H, Uche Obrien MD, 3 mL at 11/12/18 2144  •  sodium chloride 0.9 % flush 3-10 mL, 3-10 mL, Intravenous, PRN, Uche Obrien MD  •  sotalol (BETAPACE) half tablet 40 mg, 40 mg, Oral, BID - RT, Uche Obrien MD, 40 mg at 11/13/18 0840  •  tamsulosin (FLOMAX) 24 hr capsule 0.4 mg, 0.4 mg, Oral, Nightly, Uche Obrien MD, 0.4 mg at 11/12/18 2141  •  vancomycin 1250 mg/250 mL 0.9% NS IVPB ([facility]), 15 mg/kg, Intravenous, Q12H, Uche Obrien MD, Last Rate: 0 mL/hr at 11/13/18 0317, 1,250 mg at 11/13/18 1332     Diagnostic Data    Lab Results (last 24 hours)     Procedure Component Value Units Date/Time    Blood Culture - Blood, Blood, Venous Line [[id number]]  (Abnormal) Collected:  11/12/18 1033    Specimen:  Blood, Venous Line Updated:  11/13/18 1329     Blood Culture Abnormal Stain     Gram Stain Aerobic Bottle Gram positive bacilli     Comment: 1 positive for gram positive bacillus of 3 drawn       Narrative:       Blood culture does not meet the specified criteria for PCR identification.  If pregnant,  immunocompromised, or clinical concern for meningitis, call lab to run BCID for Listeria monocytogenes.    Blood Culture - Blood, Blood, Venous Line [[id number]]  (Abnormal) Collected:  11/12/18 1026    Specimen:  Blood, Venous Line Updated:  11/13/18 1329     Blood Culture Abnormal Stain     Gram Stain Aerobic Bottle Gram negative bacilli     Comment: 2 bottles of 3 bottles drawn positive for gram neg bacillus       Blood Culture ID, PCR - Blood, Blood, Venous Line [[id number]]  (Normal) Collected:  11/12/18 1026    Specimen:  Blood, Venous Line Updated:  11/13/18 1328     BCID, PCR No organism detected by BCID PCR.    Extra Tubes [[id number]] Collected:  11/13/18 1014    Specimen:  Blood, Venous Line Updated:  11/13/18 1116    Narrative:       The following orders were created for panel order Extra Tubes.  Procedure                               Abnormality         Status                     ---------                               -----------         ------                     Gold Top - SST[[id number]]                                   Final result                 Please view results for these tests on the individual orders.    Gold Top - SST [[id number]] Collected:  11/13/18 1014    Specimen:  Blood Updated:  11/13/18 1116     Extra Tube Hold for add-ons.     Comment: Auto resulted.       Lactic Acid, Plasma [[id number]]  (Normal) Collected:  11/13/18 1016    Specimen:  Blood Updated:  11/13/18 1042     Lactate 1.6 mmol/L     CBC & Differential [[id number]] Collected:  11/13/18 0624    Specimen:  Blood Updated:  11/13/18 0959    Narrative:       The following orders were created for panel order CBC & Differential.  Procedure                               Abnormality         Status                     ---------                               -----------         ------                     Manual Differential[[id number]]          Abnormal            Final result               Scan Slide[[id number]]                                                                   CBC Auto Differential[[id number]]        Abnormal            Final result                 Please view results for these tests on the individual orders.    Manual Differential [[id number]]  (Abnormal) Collected:  11/13/18 0624    Specimen:  Blood Updated:  11/13/18 0959     Neutrophil % 49.0 %      Lymphocyte % 9.0 %      Monocyte % 25.0 %      Eosinophil % 2.0 %      Basophil % 2.0 %      Bands %  3.0 %      Metamyelocyte % 3.0 %      Myelocyte % 7.0 %      Neutrophils Absolute 11.79 10*3/mm3      Lymphocytes Absolute 2.04 10*3/mm3      Monocytes Absolute 5.67 10*3/mm3      Eosinophils Absolute 0.45 10*3/mm3      Basophils Absolute 0.45 10*3/mm3      Anisocytosis Slight/1+     Comment: Few polychromic cells, few teardrop cells        WBC Morphology Normal     Platelet Estimate Decreased    Basic Metabolic Panel [[id number]]  (Abnormal) Collected:  11/13/18 0624    Specimen:  Blood Updated:  11/13/18 0714     Glucose 95 mg/dL      BUN 16 mg/dL      Creatinine 0.87 mg/dL      Sodium 136 mmol/L      Potassium 3.9 mmol/L      Chloride 108 mmol/L      CO2 20.0 mmol/L      Calcium 8.4 mg/dL      eGFR Non African Amer 85 mL/min/1.73      BUN/Creatinine Ratio 18.4     Anion Gap 8.0 mmol/L     Narrative:       The MDRD GFR formula is only valid for adults with stable renal function between ages 18 and 70.    CBC Auto Differential [[id number]]  (Abnormal) Collected:  11/13/18 0624    Specimen:  Blood Updated:  11/13/18 0702     WBC 22.67 10*3/mm3      RBC 2.64 10*6/mm3      Hemoglobin 9.3 g/dL      Hematocrit 26.2 %      MCV 99.2 fL      MCH 35.2 pg      MCHC 35.5 g/dL      RDW 24.1 %      RDW-SD 84.4 fl      MPV -- fL      Comment: Instrument unable to calculate result        Platelets 133 10*3/mm3     Blood Culture - Blood, Arm, Right [[id number]] Collected:  11/12/18 1640    Specimen:  Blood from Arm, Right Updated:  11/13/18 0501     Blood Culture No growth at less than 24 hours    Blood  Culture - Blood, Arm, Right [[id number]] Collected:  11/12/18 1723    Specimen:  Blood from Arm, Right Updated:  11/13/18 0501     Blood Culture No growth at less than 24 hours    POC Glucose Once [[id number]]  (Normal) Collected:  11/12/18 2336    Specimen:  Blood Updated:  11/12/18 2348     Glucose 101 mg/dL      Comment: : [id number] JOSELUIS GINAMeter ID: [id number]       Lactic Acid, Reflex [[id number]]  (Abnormal) Collected:  11/12/18 1412    Specimen:  Blood Updated:  11/12/18 1439     Lactate 3.7 mmol/L     Lactic Acid, Reflex Timer (This will reflex a repeat order 3-3:15 hours after ordered.) [[id number]] Collected:  11/12/18 1004    Specimen:  Blood Updated:  11/12/18 1400     Extra Tube Hold for add-ons.     Comment: Auto resulted.             I reviewed the patient's new clinical results.    Assessment/Plan:     Active Hospital Problems    Diagnosis Date Noted   • **Sepsis (CMS/HCC) [A41.9] 11/13/2018   • Encounter for care related to Port-a-Cath [Z45.2] 11/12/2018       #. Sepsis. Vanc/Cefepime. Cultures pending. Mike results. ECHO.  #. Port-a-cath cranially directed. Surgery consulted. Appreciate input.  #. CML. Active chemo patient. Consult heme/onc as needed.    Will monitor patient's hospital course and adjust treatment as hospital course dictates.    DVT prophylaxis: Heparin  Code status is   Code Status and Medical Interventions:   Ordered at: 11/12/18 1644     Code Status:    CPR     Medical Interventions (Level of Support Prior to Arrest):    Full       Plan for disposition:Where: home and When:  2-3 days      Time:           This document has been electronically signed by Kin Hummel MD on November 13, 2018 1:56 PM

## 2018-11-13 NOTE — PAYOR COMM NOTE
"Jan Humphrey (78 y.o. Male)     Date of Birth Social Security Number Address Home Phone MRN    1940  [id number] [facility] [address] [phone] [id number]    [place] Marital Status          Voodoo        Admission Date Admission Type Admitting Provider Attending Provider Department, Room/Bed    11/12/18 Emergency Uche Obrien MD Williams, Kevin L, MD [address], 431/1    Discharge Date Discharge Disposition Discharge Destination                       Attending Provider:  Uche Obrien MD    Allergies:  Doxycycline Hyclate, Sulfa Antibiotics, Other, Penicillins, Soma [Carisoprodol]    Isolation:  None   Infection:  None   Code Status:  CPR    Ht:  180.3 cm (71\")   Wt:  82.6 kg (182 lb 3.2 oz)    Admission Cmt:  None   Principal Problem:  Sepsis (CMS/[facility]) [A41.9]                 Active Insurance as of 11/12/2018     Primary Coverage     Payor Plan Insurance Group Employer/Plan Group    ANTH MEDICARE REPLACEMENT ANTH MEDICARE ADVANTAGE [id number]     Payor Plan Address Payor Plan Phone Number Payor Plan Fax Number Effective Dates    [address] [phone]  1/1/2018 - None Entered    [facility] [address]       Subscriber Name Subscriber Birth Date Member ID       JAN HUMPHREY 1940 [id number]                 Emergency Contacts      (Rel.) Home Phone Work Phone Mobile Phone    Ranjith Humphrey (Son) [phone] -- [phone]    Lester Humphrey (Son) [phone] -- [phone]               History & Physical      Uche Obrien MD at 11/12/2018  4:51 PM                [facility] Medicine Admission      Date of Admission: 11/12/2018      Primary Care Physician: Seferino Tan MD      Chief Complaint:  Chest pain and chills    HPI:  Patient is a 78-year-old  male who presented to the [facility] today for his chemotherapy.  After his port was flushed he was " waiting in the waiting room, and developed chest pain and shaking chills.  He had neither of those symptoms prior to arrival.  He has been feeling well as of late.  He did not have a fever at that time.  He was evaluated by Dr. Roblero and sent to the emergency department for possible line infection.  While in the emergency department it was discovered that his Port-A-Cath is now directed cranially.  At time of my interview patient states that he is feeling okay and has no complaints.  No alleviating or exacerbating factors.     Concurrent Medical History:  has a past medical history of Atrophy of testis, Benign prostatic hyperplasia, Borderline glaucoma, Chronic myelomonocytic leukemia (CMS/HCC), Degenerative joint disease involving multiple joints, GERD (gastroesophageal reflux disease), Gout, History of echocardiogram (05/08/2012), History of Holter monitoring (01/18/2011), Impotence, Lightheadedness, Neck pain, musculoskeletal, Sleep apnea, and TIA (transient ischemic attack) (2014).    Past Surgical History:  has a past surgical history that includes Cardiac pacemaker placement (06/2008); Prostatectomy (11/17/2000); Laparoscopic cholecystectomy (10/26/2006); Colectomy partial / total (04/14/2000); Cystoscopy (11/15/2000); Steroid Injection (10/21/2010); Steroid Injection (07/23/2013); Cardiac catheterization (09/30/2009); Cardiac catheterization (05/24/1989); Colon surgery (03/27/2016); Colonoscopy (05/25/2012); Inguinal hernia repair (02/15/2007); Inguinal hernia repair (Right, 1957); ULTRASOUND ASSISTED RIGHT JUGULAR INSERTION VENOUS ACCESS DEVICE (N/A, 9/13/2018); and PPM generator change - dual (N/A, 5/18/2017).    Family History: family history includes Cancer in his other; Cholelithiasis in his other; Colon cancer in his other; Coronary artery disease in his other; Heart disease in his other; Hypertension in his other; Other in his other.     Social History:  reports that  has never smoked. he has never used  smokeless tobacco. He reports that he does not drink alcohol or use drugs.    Allergies:   Allergies   Allergen Reactions   • Doxycycline Hyclate Other (See Comments)     Other reaction(s): lip swollen   • Sulfa Antibiotics Swelling     facial   • Other Rash     SILK TAPE   • Penicillins Rash     Reaction: rash   • Soma [Carisoprodol] Swelling and Rash       Medications:   Medications Prior to Admission   Medication Sig Dispense Refill Last Dose   • acetaminophen (TYLENOL) 325 MG tablet Take 1 tablet by mouth As Needed.   Taking   • allopurinol (ZYLOPRIM) 100 MG tablet Take 1 tablet by mouth 4 (Four) Times a Day. 360 tablet 1 Taking   • aspirin 81 MG EC tablet Take 81 mg by mouth Daily.   Taking   • colchicine 0.6 MG tablet Take 0.6 mg by mouth Daily As Needed for Muscle / Joint Pain.   Taking   • diclofenac (VOLTAREN) 50 MG EC tablet Take 1 tablet by mouth 2 (Two) Times a Day. 60 tablet 11 Taking   • folic acid (FOLVITE) 1 MG tablet Take 1 tablet by mouth Daily. 90 tablet 3 Taking   • omeprazole (PRILOSEC) 20 MG capsule Take 1 capsule by mouth Daily. 90 capsule 3 Taking   • ondansetron (ZOFRAN) 4 MG tablet Take 1 tablet by mouth 4 (Four) Times a Day As Needed for Nausea or Vomiting. 40 tablet 3 Taking   • oxybutynin (DITROPAN) 5 MG tablet Take 5 mg by mouth 2 (Two) Times a Day.   Taking   • sotalol (BETAPACE) 80 MG tablet Take 0.5 tablets by mouth 2 (Two) Times a Day. 90 tablet 3 Taking   • tamsulosin (FLOMAX) 0.4 MG capsule 24 hr capsule Take 1 capsule by mouth every night.   Taking       Review of Systems:  Review of Systems   Constitutional: Positive for chills. Negative for appetite change, fatigue, fever and unexpected weight change.   HENT: Negative for congestion, facial swelling, hearing loss, nosebleeds, rhinorrhea, sneezing, trouble swallowing and voice change.    Eyes: Negative for photophobia and visual disturbance.   Respiratory: Negative for apnea, cough, choking, chest tightness, shortness of breath,  wheezing and stridor.    Cardiovascular: Positive for chest pain. Negative for palpitations and leg swelling.   Gastrointestinal: Negative for abdominal pain, blood in stool, constipation, diarrhea, nausea and vomiting.   Endocrine: Negative for cold intolerance, heat intolerance, polydipsia, polyphagia and polyuria.   Genitourinary: Negative for dysuria, flank pain and hematuria.   Musculoskeletal: Negative for arthralgias, back pain, myalgias and neck pain.   Skin: Negative for rash and wound.   Allergic/Immunologic: Negative for immunocompromised state.   Neurological: Negative for dizziness, seizures, syncope, speech difficulty, weakness, light-headedness, numbness and headaches.   Hematological: Does not bruise/bleed easily.   Psychiatric/Behavioral: Negative for agitation, behavioral problems, confusion, decreased concentration, hallucinations, self-injury and suicidal ideas. The patient is not nervous/anxious.       Otherwise complete ROS is negative except as mentioned above.    Physical Exam:   Temp:  [96.6 °F (35.9 °C)-98.3 °F (36.8 °C)] 97.6 °F (36.4 °C)  Heart Rate:  [62-98] 62  Resp:  [18-25] 18  BP: (105-190)/(49-81) 105/52     Physical Exam   Constitutional: He is oriented to person, place, and time. He appears well-developed and well-nourished.   HENT:   Head: Normocephalic and atraumatic.   Nose: Nose normal.   Mouth/Throat: Oropharynx is clear and moist.   Eyes: Conjunctivae, EOM and lids are normal. Pupils are equal, round, and reactive to light. No scleral icterus.   Neck: Normal range of motion. Neck supple. No JVD present. No tracheal tenderness and no spinous process tenderness present. No neck rigidity. No tracheal deviation, no edema and normal range of motion present.   Cardiovascular: Normal rate, regular rhythm, S1 normal, S2 normal, normal heart sounds and normal pulses. Exam reveals no gallop and no friction rub.   No murmur heard.  Pulmonary/Chest: Effort normal and breath sounds  normal. No accessory muscle usage. No respiratory distress. He has no decreased breath sounds. He has no wheezes. He has no rales. He exhibits no tenderness.   Port in right chest   Abdominal: Soft. Bowel sounds are normal. He exhibits no distension and no mass. There is no tenderness. There is no rebound and no guarding.   Musculoskeletal: He exhibits no edema or tenderness.        Right shoulder: He exhibits normal range of motion, no tenderness and no pain.   Neurological: He is alert and oriented to person, place, and time. He has normal reflexes. He displays no atrophy and normal reflexes. No cranial nerve deficit or sensory deficit. He exhibits normal muscle tone. He displays no seizure activity. Coordination normal.   Skin: Skin is warm. No rash noted. No pallor.   Psychiatric: He has a normal mood and affect. His behavior is normal. Judgment and thought content normal.         Results Reviewed:  I have personally reviewed current lab, radiology, and data and agree with results.  Lab Results (last 24 hours)     Procedure Component Value Units Date/Time    Lactic Acid, Reflex [[id number]]  (Abnormal) Collected:  11/12/18 1412    Specimen:  Blood Updated:  11/12/18 1439     Lactate 3.7 mmol/L     Lactic Acid, Reflex Timer (This will reflex a repeat order 3-3:15 hours after ordered.) [[id number]] Collected:  11/12/18 1004    Specimen:  Blood Updated:  11/12/18 1400     Extra Tube Hold for add-ons.     Comment: Auto resulted.       CBC & Differential [[id number]] Collected:  11/12/18 1004    Specimen:  Blood Updated:  11/12/18 1159    Narrative:       The following orders were created for panel order CBC & Differential.  Procedure                               Abnormality         Status                     ---------                               -----------         ------                     Manual Differential[[id number]]          Abnormal            Final result               Scan Slide[[id number]]                                                                   CBC Auto Differential[[id number]]        Abnormal            Final result                 Please view results for these tests on the individual orders.    Manual Differential [[id number]]  (Abnormal) Collected:  11/12/18 1004    Specimen:  Blood Updated:  11/12/18 1159     Neutrophil % 60.0 %      Lymphocyte % 7.0 %      Monocyte % 18.0 %      Eosinophil % 2.0 %      Bands %  5.0 %      Metamyelocyte % 5.0 %      Myelocyte % 1.0 %      Atypical Lymphocyte % 2.0 %      Neutrophils Absolute 14.72 10*3/mm3      Lymphocytes Absolute 1.58 10*3/mm3      Monocytes Absolute 4.08 10*3/mm3      Eosinophils Absolute 0.45 10*3/mm3      nRBC 1.0 /100 WBC      Anisocytosis Slight/1+     Comment: Few Teardrop cells        Polychromasia Slight/1+     WBC Morphology Normal     Platelet Estimate Adequate    Extra Tubes [[id number]] Collected:  11/12/18 1004    Specimen:  Blood, Venous Line Updated:  11/12/18 1116    Narrative:       The following orders were created for panel order Extra Tubes.  Procedure                               Abnormality         Status                     ---------                               -----------         ------                     Light Blue Top[[id number]]                                   Final result                 Please view results for these tests on the individual orders.    Light Blue Top [[id number]] Collected:  11/12/18 1004    Specimen:  Blood Updated:  11/12/18 1116     Extra Tube hold for add-on     Comment: Auto resulted       Urinalysis With Microscopic If Indicated (No Culture) - Urine, Clean Catch [[id number]]  (Normal) Collected:  11/12/18 1103    Specimen:  Urine, Clean Catch Updated:  11/12/18 1110     Color, UA Yellow     Appearance, UA Clear     pH, UA <=5.0     Specific Gravity, UA 1.022     Glucose, UA Negative     Ketones, UA Negative     Bilirubin, UA Negative     Blood, UA Negative     Protein, UA Negative     Leuk Esterase, UA  Negative     Nitrite, UA Negative     Urobilinogen, UA 0.2 E.U./dL    Narrative:       Urine microscopic not indicated.    Lactic Acid, Plasma [[id number]]  (Abnormal) Collected:  11/12/18 1004    Specimen:  Blood Updated:  11/12/18 1050     Lactate 5.7 mmol/L     Comprehensive Metabolic Panel [[id number]]  (Abnormal) Collected:  11/12/18 1004    Specimen:  Blood Updated:  11/12/18 1043     Glucose 92 mg/dL      BUN 14 mg/dL      Creatinine 0.75 mg/dL      Sodium 136 mmol/L      Potassium 3.9 mmol/L      Chloride 104 mmol/L      CO2 18.0 mmol/L      Calcium 9.4 mg/dL      Total Protein 6.8 g/dL      Albumin 4.10 g/dL      ALT (SGPT) 17 U/L      AST (SGOT) 20 U/L      Alkaline Phosphatase 54 U/L      Total Bilirubin 0.9 mg/dL      eGFR Non African Amer 101 mL/min/1.73      Globulin 2.7 gm/dL      A/G Ratio 1.5 g/dL      BUN/Creatinine Ratio 18.7     Anion Gap 14.0 mmol/L     Narrative:       The MDRD GFR formula is only valid for adults with stable renal function between ages 18 and 70.    Lipase [[id number]]  (Normal) Collected:  11/12/18 1004    Specimen:  Blood Updated:  11/12/18 1043     Lipase 48 U/L     Blood Culture - Blood, Blood, Venous Line [[id number]] Collected:  11/12/18 1033    Specimen:  Blood, Venous Line Updated:  11/12/18 1037    Blood Culture - Blood, Blood, Venous Line [[id number]] Collected:  11/12/18 1026    Specimen:  Blood, Venous Line Updated:  11/12/18 1036    CBC Auto Differential [[id number]]  (Abnormal) Collected:  11/12/18 1004    Specimen:  Blood Updated:  11/12/18 1032     WBC 22.64 10*3/mm3      RBC 2.94 10*6/mm3      Hemoglobin 10.2 g/dL      Hematocrit 29.5 %      .3 fL      MCH 34.7 pg      MCHC 34.6 g/dL      RDW 24.0 %      RDW-SD 85.4 fl      Platelets 178 10*3/mm3      Neutrophil % -- %      Lymphocyte % -- %      Monocyte % 3.5 %      Eosinophil % 0.8 %      Basophil % 0.5 %      Neutrophils, Absolute -- 10*3/mm3      Lymphocytes, Absolute -- 10*3/mm3      Monocytes, Absolute  0.79 10*3/mm3      Eosinophils, Absolute 0.17 10*3/mm3      Basophils, Absolute 0.12 10*3/mm3         Imaging Results (last 24 hours)     Procedure Component Value Units Date/Time    XR Chest PA & Lateral [[id number]] Collected:  11/12/18 1000     Updated:  11/12/18 1044    Narrative:       Patient Name:  AICHA BRUCE  Patient ID:  [id number]   Ordering:  YAW GALVEZ  Attending:  GOKUL WASSERMAN  Referring:  YAW GALVEZ  ------------------------------------------------  Chest PA and lateral views    INDICATION: Chest pain    COMPARISON: September 13, 2018    FINDINGS: Films -  2    Right chest wall Port-A-Cath noted with the tip of the  Port-A-Cath directed cranially though not visualized on these  images.    Left chest wall pacemaker device also noted, unchanged.    The bilateral lungs are grossly clear. No focal airspace opacity  seen. No pleural effusion or pneumothorax seen. The cardiac  silhouette is unremarkable.    No acute bony abnormality is seen.      Impression:       CONCLUSION:    1.   No acute cardiopulmonary pathology is identified.  2. Right chest wall Port-A-Cath noted. The tip is not visualized  and is directed cranially. Recommended correlation and  repositioning.    These findings were conveyed telephonically to Haley Cardoza PA-C, at 11:40 AM Eastern standard time.    Electronically signed by:  Cirilo Kelsey  11/12/2018 10:43 AM  [employer] Workstation: [employer]            Assessment:    Active Hospital Problems    Diagnosis   • Encounter for care related to Port-a-Cath             Plan:  1.  Sepsis: Patient with chills and chest pain after flushing the port.  Concern for line infection.  Patient has had blood cultures and empiric ecchymotic started.  We'll continue broad-spectrum antibiotics.  Depending on culture results many echocardiography.  2.  Cranially directed Port-A-Cath tip: Dr. Arevalo was consult from the emergency department  3.  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  Followed by Dr. Roblero.  4.  DVT Prophylaxis:  Heparin    I discussed the patient's findings and my recommendations with:  Patient        This document has been electronically signed by Uche Obrien MD on November 12, 2018 4:54 PM                    Electronically signed by Uche Obrien MD at 11/12/2018  5:34 PM          Emergency Department Notes      Alaina Bryson RN at 11/12/2018  9:42 AM        Patient presents to the ED from the [facility] with complaints of nausea/vomiting that just started not to long ago. Patients states he felt fine this morning prior to arriving to the hospital. Report from Nurse at [facility] is as follows: patient presents to [facility]. Initial HR was 75bpm. Patient's port was accessed and when RN flushed patient's port, patient had severe pain starting at his diaphragm, moving up to the right side of his chest. Patient denies this ever happening before today. Patient then became tachycardic and short of breath. Per [facility] Nurse, DR. Roblero wanted patient to be worked up for sepsis. When patient presents to ED he is anxious and cannot stop shaking. Initial heart rate was 98. After patient was placed in ED stretcher and was able to calm down, heart rate is now 78. Oxygen saturation is stable at 96-98% on room air.     Electronically signed by Alaina Bryson RN at 11/12/2018  9:45 AM     Paulina Stephens RN at 11/12/2018  9:55 AM        Port accessed from by [facility].     Paulina Stephens RN  11/12/18 0955      Electronically signed by Paulina Stephens RN at 11/12/2018  9:55 AM     Sony De Leon PA-C at 11/12/2018  9:56 AM          Subjective   Patient presents to emergency department for chills and tachycardia.  Patient was at [facility] and was getting a chemotherapy infusion.  Patient had abdominal pain radiating to right chest, chills, and tachycardia.  Dr Roblero is concerned about possible infection and wants a sepsis work up.   Endorses dysuria but no other symptoms.  Patient getting chemotherapy for CML.        History provided by:  Patient   used: No    Nausea   The primary symptoms include nausea. Primary symptoms do not include fever, fatigue, abdominal pain, vomiting, diarrhea or dysuria. The illness began today.   The illness is also significant for chills. The illness does not include back pain.   Chest Pain   Pain location:  R chest  Pain quality comment:  When port a cath was flushed  Pain severity:  Mild  Progression:  Resolved  Chronicity:  New  Associated symptoms: nausea    Associated symptoms: no abdominal pain, no back pain, no fatigue, no fever, no shortness of breath and no vomiting        Review of Systems   Constitutional: Positive for chills. Negative for fatigue and fever.   Respiratory: Negative for shortness of breath and wheezing.    Cardiovascular: Positive for chest pain.   Gastrointestinal: Positive for nausea. Negative for abdominal pain, diarrhea and vomiting.   Genitourinary: Negative for dysuria and flank pain.   Musculoskeletal: Negative for back pain.   Skin: Negative for color change.   Allergic/Immunologic: Positive for immunocompromised state.   Hematological: Does not bruise/bleed easily.   Psychiatric/Behavioral: Negative for confusion.       Past Medical History:   Diagnosis Date   • Atrophy of testis    • Benign prostatic hyperplasia    • Borderline glaucoma    • Chronic myelomonocytic leukemia (CMS/HCC)    • Degenerative joint disease involving multiple joints    • GERD (gastroesophageal reflux disease)    • Gout    • History of echocardiogram 05/08/2012    Normal left ventricular systolic function, EF 60%. Early diastolic dysfunction. Mild aortic and pulmonic regurgitation. Trace mitral and mild tricuspid regurgitation. No intracardiac mass pericardial effusion or cardiac thrombus.   • History of Holter monitoring 01/18/2011   • Impotence    • Lightheadedness    • Neck pain,  musculoskeletal    • Sleep apnea     NOT WEARING C-PAP   • TIA (transient ischemic attack) 2014       Allergies   Allergen Reactions   • Doxycycline Hyclate Other (See Comments)     Other reaction(s): lip swollen   • Sulfa Antibiotics Swelling     facial   • Other Rash     SILK TAPE   • Penicillins Rash     Reaction: rash   • Soma [Carisoprodol] Swelling and Rash       Past Surgical History:   Procedure Laterality Date   • CARDIAC CATHETERIZATION  09/30/2009    No epicardial coronary artery disease noted that would explain patient's chest pain of the abnormal stress test noted. Normal left ventricular systolic function with no wall motion abnormalities   • CARDIAC CATHETERIZATION  05/24/1989    Normal catheterization, false-positive exercise treadmill. Noncardiac chest pain   • CARDIAC PACEMAKER PLACEMENT  06/2008    Dual chamber permanent pacemaker implantation   • COLECTOMY PARTIAL / TOTAL  04/14/2000    Cecal diverticulitis. Removal of small segment of terminal ileum, cecum and right colon, sent to pathology   • COLON SURGERY  03/27/2016   • COLONOSCOPY  05/25/2012   • CYSTOSCOPY  11/15/2000    Urinary retention on the basis of medications and benign prostatic hypertrophy   • INGUINAL HERNIA REPAIR  02/15/2007    Recurrent right inguinal hernia. Repair of recurrent inguinal hernia with EPS Prolene mesh system.   • INGUINAL HERNIA REPAIR Right 1957   • LAPAROSCOPIC CHOLECYSTECTOMY  10/26/2006    Gallstones. Laparoscopic cholecystectomy with operative cholangiogram   • PROSTATECTOMY  11/17/2000    Benign prostatic hypertrophy with urinary retention. Prostatitis. transurethral resection of prostate.   • STEROID INJECTION  10/21/2010    Decadron (Gout)    • STEROID INJECTION  07/23/2013    Kenalog (Gout) (4)       Family History   Problem Relation Age of Onset   • Cancer Other    • Colon cancer Other         parent   • Coronary artery disease Other    • Heart disease Other    • Hypertension Other    • Cholelithiasis  "Other    • Other Other         colon problems       Social History     Socioeconomic History   • Marital status:      Spouse name: Not on file   • Number of children: Not on file   • Years of education: Not on file   • Highest education level: Not on file   Tobacco Use   • Smoking status: Never Smoker   • Smokeless tobacco: Never Used   Substance and Sexual Activity   • Alcohol use: No   • Drug use: No   • Sexual activity: Defer           Objective      /62 (Patient Position: Lying)   Pulse 68   Temp 98.3 °F (36.8 °C) (Oral)   Resp 18   Ht 180.3 cm (70.98\")   Wt 82.6 kg (182 lb 3.2 oz)   SpO2 93%   BMI 25.42 kg/m²      Physical Exam   Constitutional: He is oriented to person, place, and time. He appears well-developed and well-nourished. No distress.   HENT:   Head: Normocephalic and atraumatic.   Eyes: Conjunctivae are normal.   Neck: Normal range of motion.   Cardiovascular: Normal rate, regular rhythm, normal heart sounds and intact distal pulses.   Pulmonary/Chest: Effort normal and breath sounds normal. No stridor. No respiratory distress.   Abdominal: Soft. Bowel sounds are normal. He exhibits no distension. There is no tenderness.   Musculoskeletal: He exhibits no edema.   Neurological: He is alert and oriented to person, place, and time.   Skin: Skin is warm. Capillary refill takes less than 2 seconds.   Psychiatric: He has a normal mood and affect. His behavior is normal. Thought content normal.   Nursing note and vitals reviewed.      Procedures          ED Course  ED Course as of Nov 12 1209   Mon Nov 12, 2018   1156 Discussed with Dr Arevalo.  Discussed with Dr Obrien who accepted patient for admission.    [LIGIA]      ED Course User Index  [LIGIA] Sony De Leon PA-C      Results for orders placed or performed during the hospital encounter of 11/12/18   Comprehensive Metabolic Panel   Result Value Ref Range    Glucose 92 60 - 100 mg/dL    BUN 14 7 - 21 mg/dL    Creatinine 0.75 " 0.70 - 1.30 mg/dL    Sodium 136 (L) 137 - 145 mmol/L    Potassium 3.9 3.5 - 5.1 mmol/L    Chloride 104 95 - 110 mmol/L    CO2 18.0 (L) 22.0 - 31.0 mmol/L    Calcium 9.4 8.4 - 10.2 mg/dL    Total Protein 6.8 6.3 - 8.6 g/dL    Albumin 4.10 3.40 - 4.80 g/dL    ALT (SGPT) 17 (L) 21 - 72 U/L    AST (SGOT) 20 17 - 59 U/L    Alkaline Phosphatase 54 38 - 126 U/L    Total Bilirubin 0.9 0.2 - 1.3 mg/dL    eGFR Non  Amer 101 (H) 42 - 98 mL/min/1.73    Globulin 2.7 2.3 - 3.5 gm/dL    A/G Ratio 1.5 1.1 - 1.8 g/dL    BUN/Creatinine Ratio 18.7 7.0 - 25.0    Anion Gap 14.0 5.0 - 15.0 mmol/L   Lipase   Result Value Ref Range    Lipase 48 23 - 300 U/L   Urinalysis With Microscopic If Indicated (No Culture) - Urine, Clean Catch   Result Value Ref Range    Color, UA Yellow Yellow, Straw, Dark Yellow, Clarice    Appearance, UA Clear Clear    pH, UA <=5.0 5.0 - 9.0    Specific Gravity, UA 1.022 1.003 - 1.030    Glucose, UA Negative Negative    Ketones, UA Negative Negative    Bilirubin, UA Negative Negative    Blood, UA Negative Negative    Protein, UA Negative Negative    Leuk Esterase, UA Negative Negative    Nitrite, UA Negative Negative    Urobilinogen, UA 0.2 E.U./dL 0.2 - 1.0 E.U./dL   Lactic Acid, Plasma   Result Value Ref Range    Lactate 5.7 (C) 0.5 - 2.0 mmol/L   CBC Auto Differential   Result Value Ref Range    WBC 22.64 (H) 3.20 - 9.80 10*3/mm3    RBC 2.94 (L) 4.37 - 5.74 10*6/mm3    Hemoglobin 10.2 (L) 13.7 - 17.3 g/dL    Hematocrit 29.5 (L) 39.0 - 49.0 %    .3 (H) 80.0 - 98.0 fL    MCH 34.7 (H) 26.5 - 34.0 pg    MCHC 34.6 31.5 - 36.3 g/dL    RDW 24.0 (H) 11.5 - 14.5 %    RDW-SD 85.4 (H) 35.1 - 43.9 fl    Platelets 178 150 - 450 10*3/mm3    Neutrophil %  37.0 - 80.0 %    Lymphocyte %  10.0 - 50.0 %    Monocyte % 3.5 0.0 - 12.0 %    Eosinophil % 0.8 0.0 - 7.0 %    Basophil % 0.5 0.0 - 2.0 %    Neutrophils, Absolute  2.00 - 8.60 10*3/mm3    Lymphocytes, Absolute  0.60 - 4.20 10*3/mm3    Monocytes, Absolute 0.79  0.00 - 0.90 10*3/mm3    Eosinophils, Absolute 0.17 0.00 - 0.70 10*3/mm3    Basophils, Absolute 0.12 0.00 - 0.20 10*3/mm3   Light Blue Top   Result Value Ref Range    Extra Tube hold for add-on    Manual Differential   Result Value Ref Range    Neutrophil % 60.0 37.0 - 80.0 %    Lymphocyte % 7.0 (L) 10.0 - 50.0 %    Monocyte % 18.0 (H) 0.0 - 12.0 %    Eosinophil % 2.0 0.0 - 7.0 %    Bands %  5.0 0.0 - 5.0 %    Metamyelocyte % 5.0 (H) 0.0 - 0.0 %    Myelocyte % 1.0 (H) 0.0 - 0.0 %    Atypical Lymphocyte % 2.0 0.0 - 5.0 %    Neutrophils Absolute 14.72 (H) 2.00 - 8.60 10*3/mm3    Lymphocytes Absolute 1.58 0.60 - 4.20 10*3/mm3    Monocytes Absolute 4.08 (H) 0.00 - 0.90 10*3/mm3    Eosinophils Absolute 0.45 0.00 - 0.70 10*3/mm3    nRBC 1.0 (H) 0.0 - 0.0 /100 WBC    Anisocytosis Slight/1+ None Seen    Polychromasia Slight/1+ None Seen    WBC Morphology Normal Normal    Platelet Estimate Adequate Normal     Xr Chest Pa & Lateral    Result Date: 11/12/2018  Narrative: Patient Name:  AICHA BRUCE Patient ID:  [id number] Ordering:  YAW GALVEZ Attending:  GOKUL WASSERMAN Referring:  YAW GALVEZ ------------------------------------------------ Chest PA and lateral views INDICATION: Chest pain COMPARISON: September 13, 2018 FINDINGS: Films -  2 Right chest wall Port-A-Cath noted with the tip of the Port-A-Cath directed cranially though not visualized on these images. Left chest wall pacemaker device also noted, unchanged. The bilateral lungs are grossly clear. No focal airspace opacity seen. No pleural effusion or pneumothorax seen. The cardiac silhouette is unremarkable. No acute bony abnormality is seen.     Impression: CONCLUSION: 1.   No acute cardiopulmonary pathology is identified. 2. Right chest wall Port-A-Cath noted. The tip is not visualized and is directed cranially. Recommended correlation and repositioning. These findings were conveyed telephonically to Haley Cardoza PA-C, at 11:40 AM  Eastern standard time. Electronically signed by:  Cirilo Kelsey  11/12/2018 10:43 AM CST Workstation: [id number]                [facility]      Final diagnoses:   Port-a-Cath malfunction   CML (chronic myelocytic leukemia) (CMS/HCC)            Sony De Leon PA-C  11/12/18 1209      Electronically signed by Sony De Leon PA-C at 11/12/2018 12:09 PM     Megan Zurita, RN at 11/12/2018 10:05 AM        Lab requested for BC. Patient provided urinal for urine sample.      Megan Zurita, RN  11/12/18 1013      Electronically signed by Megan Zurita, RN at 11/12/2018 10:13 AM     Megan Ro, RN at 11/12/2018 11:49 AM        Per ED provider, he does not think pt is septic, he thinks it is related to his disease. But following sepsis protocol at this time.     Megan Ro, RN  11/12/18 1151      Electronically signed by Megan Ro, RN at 11/12/2018 11:51 AM     Megan Ro, RN at 11/12/2018 12:08 PM        Called pharmacy to check on status of vanc. Said they had to compound it and they would get it sent down as soon as they could.     Megan Ro, MERCEDES  11/12/18 1210      Electronically signed by Megan Ro, RN at 11/12/2018 12:10 PM     Megan Ro, RN at 11/12/2018 12:29 PM        Sent 2 bags of NS for bolus with pt to floor     Megan Ro RN  11/12/18 1229      Electronically signed by Megan Ro, RN at 11/12/2018 12:29 PM       Hospital Medications (all)       Dose Frequency Start End    acetaminophen (TYLENOL) tablet 650 mg 650 mg Every 6 Hours PRN 11/12/2018     Sig - Route: Take 2 tablets by mouth Every 6 (Six) Hours As Needed for Mild Pain . - Oral    Cosign for Ordering: Accepted by Roger Andino MD on 11/12/2018  6:53 PM    allopurinol (ZYLOPRIM) tablet 100 mg 100 mg 4 Times Daily 11/12/2018     Sig - Route: Take 1 tablet by mouth 4 (Four) Times a Day. - Oral     "aspirin EC tablet 81 mg 81 mg Daily 11/12/2018     Sig - Route: Take 1 tablet by mouth Daily. - Oral    cefepime (MAXIPIME) 2 g/100 mL 0.9% NS (mbp) 2 g Every 8 Hours 11/13/2018 11/20/2018    Sig - Route: Infuse 100 mL into a venous catheter Every 8 (Eight) Hours. - Intravenous    cefepime (MAXIPIME) 2 g/100 mL 0.9% NS (mbp) 2 g Once 11/12/2018 11/12/2018    Sig - Route: Infuse 100 mL into a venous catheter 1 (One) Time. - Intravenous    colchicine tablet 0.6 mg 0.6 mg Daily PRN 11/12/2018     Sig - Route: Take 1 tablet by mouth Daily As Needed for Muscle / Joint Pain. - Oral    folic acid (FOLVITE) tablet 1 mg 1 mg Daily 11/12/2018     Sig - Route: Take 1 tablet by mouth Daily. - Oral    heparin (porcine) 5000 UNIT/ML injection 5,000 Units 5,000 Units Every 8 Hours Scheduled 11/12/2018     Sig - Route: Inject 1 mL under the skin into the appropriate area as directed Every 8 (Eight) Hours. - Subcutaneous    naproxen (NAPROSYN) tablet 500 mg 500 mg 2 Times Daily With Meals 11/12/2018     Sig - Route: Take 1 tablet by mouth 2 (Two) Times a Day With Meals. - Oral    ondansetron (ZOFRAN) tablet 4 mg 4 mg 4 Times Daily PRN 11/12/2018     Sig - Route: Take 1 tablet by mouth 4 (Four) Times a Day As Needed for Nausea or Vomiting. - Oral    oxybutynin (DITROPAN) tablet 5 mg 5 mg 2 Times Daily 11/12/2018     Sig - Route: Take 1 tablet by mouth 2 (Two) Times a Day. - Oral    pantoprazole (PROTONIX) EC tablet 40 mg 40 mg Every Morning 11/13/2018     Sig - Route: Take 1 tablet by mouth Every Morning. - Oral    Pharmacy to dose vancomycin  Continuous PRN 11/12/2018 11/19/2018    Sig - Route: Continuous As Needed for Consult. - Does not apply    Linked Group 1:  \"And\" Linked Group Details        sodium chloride 0.9 % bolus 2,478 mL 30 mL/kg × 82.6 kg Once 11/12/2018 11/12/2018    Sig - Route: Infuse 2,478 mL into a venous catheter 1 (One) Time. - Intravenous    Cosign for Ordering: Accepted by Roger Andino MD on " "11/12/2018 11:41 AM    sodium chloride 0.9 % flush 10 mL 10 mL As Needed 11/12/2018     Sig - Route: Infuse 10 mL into a venous catheter As Needed for Line Care. - Intravenous    Cosign for Ordering: Accepted by Roger Andino MD on 11/12/2018 11:41 AM    Linked Group 2:  \"And\" Linked Group Details        sodium chloride 0.9 % flush 3 mL 3 mL Every 12 Hours Scheduled 11/12/2018     Sig - Route: Infuse 3 mL into a venous catheter Every 12 (Twelve) Hours. - Intravenous    sodium chloride 0.9 % flush 3-10 mL 3-10 mL As Needed 11/12/2018     Sig - Route: Infuse 3-10 mL into a venous catheter As Needed for Line Care. - Intravenous    sotalol (BETAPACE) half tablet 40 mg 40 mg 2 Times Daily - RT 11/12/2018     Sig - Route: Take 1 half tablet by mouth 2 (Two) Times a Day. - Oral    tamsulosin (FLOMAX) 24 hr capsule 0.4 mg 0.4 mg Nightly 11/12/2018     Sig - Route: Take 1 capsule by mouth Every Night. - Oral    vancomycin 1250 mg/250 mL 0.9% NS IVPB ([facility]) 15 mg/kg × 82.6 kg Every 12 Hours 11/12/2018 11/19/2018    Sig - Route: Infuse 250 mL into a venous catheter Every 12 (Twelve) Hours. - Intravenous    vancomycin 1750 mg/500 mL 0.9% NS IVPB ([facility]) 20 mg/kg × 82.6 kg Once 11/12/2018 11/12/2018    Sig - Route: Infuse 500 mL into a venous catheter 1 (One) Time. - Intravenous    Cosign for Ordering: Accepted by Roger Andino MD on 11/12/2018 11:41 AM    acetaminophen (TYLENOL) tablet 325 mg (Discontinued) 325 mg As Needed 11/12/2018 11/12/2018    Sig - Route: Take 1 tablet by mouth As Needed for Mild Pain  or Fever. - Oral    Reason for Discontinue: Duplicate order    cefepime (MAXIPIME) 2 g/100 mL 0.9% NS (mbp) (Discontinued) 2 g Once 11/12/2018 11/12/2018    Sig - Route: Infuse 100 mL into a venous catheter 1 (One) Time. - Intravenous    vancomycin 1750 mg/500 mL 0.9% NS IVPB ([facility]) (Discontinued) 20 mg/kg × 82.6 kg Once 11/12/2018 11/12/2018    Sig - Route: Infuse 500 mL into a venous catheter 1 (One) Time. - " "Intravenous    Reason for Discontinue: Duplicate order    Linked Group 1:  \"And\" Linked Group Details              Lab Results (last 72 hours)     Procedure Component Value Units Date/Time    Blood Culture - Blood, Blood, Venous Line [[id number]]  (Abnormal) Collected:  11/12/18 1033    Specimen:  Blood, Venous Line Updated:  11/13/18 1329     Blood Culture Abnormal Stain     Gram Stain Aerobic Bottle Gram positive bacilli     Comment: 1 positive for gram positive bacillus of 3 drawn       Narrative:       Blood culture does not meet the specified criteria for PCR identification.  If pregnant, immunocompromised, or clinical concern for meningitis, call lab to run BCID for Listeria monocytogenes.    Blood Culture - Blood, Blood, Venous Line [[id number]]  (Abnormal) Collected:  11/12/18 1026    Specimen:  Blood, Venous Line Updated:  11/13/18 1329     Blood Culture Abnormal Stain     Gram Stain Aerobic Bottle Gram negative bacilli     Comment: 2 bottles of 3 bottles drawn positive for gram neg bacillus       Blood Culture ID, PCR - Blood, Blood, Venous Line [[id number]]  (Normal) Collected:  11/12/18 1026    Specimen:  Blood, Venous Line Updated:  11/13/18 1328     BCID, PCR No organism detected by BCID PCR.    Extra Tubes [[id number]] Collected:  11/13/18 1014    Specimen:  Blood, Venous Line Updated:  11/13/18 1116    Narrative:       The following orders were created for panel order Extra Tubes.  Procedure                               Abnormality         Status                     ---------                               -----------         ------                     Gold Top - SST[[id number]]                                   Final result                 Please view results for these tests on the individual orders.    Gold Top - SST [[id number]] Collected:  11/13/18 1014    Specimen:  Blood Updated:  11/13/18 1116     Extra Tube Hold for add-ons.     Comment: Auto resulted.       Lactic Acid, Plasma [[id number]]  " (Normal) Collected:  11/13/18 1016    Specimen:  Blood Updated:  11/13/18 1042     Lactate 1.6 mmol/L     CBC & Differential [[id number]] Collected:  11/13/18 0624    Specimen:  Blood Updated:  11/13/18 0959    Narrative:       The following orders were created for panel order CBC & Differential.  Procedure                               Abnormality         Status                     ---------                               -----------         ------                     Manual Differential[[id number]]          Abnormal            Final result               Scan Slide[[id number]]                                                                  CBC Auto Differential[[id number]]        Abnormal            Final result                 Please view results for these tests on the individual orders.    Manual Differential [[id number]]  (Abnormal) Collected:  11/13/18 0624    Specimen:  Blood Updated:  11/13/18 0959     Neutrophil % 49.0 %      Lymphocyte % 9.0 %      Monocyte % 25.0 %      Eosinophil % 2.0 %      Basophil % 2.0 %      Bands %  3.0 %      Metamyelocyte % 3.0 %      Myelocyte % 7.0 %      Neutrophils Absolute 11.79 10*3/mm3      Lymphocytes Absolute 2.04 10*3/mm3      Monocytes Absolute 5.67 10*3/mm3      Eosinophils Absolute 0.45 10*3/mm3      Basophils Absolute 0.45 10*3/mm3      Anisocytosis Slight/1+     Comment: Few polychromic cells, few teardrop cells        WBC Morphology Normal     Platelet Estimate Decreased    Basic Metabolic Panel [[id number]]  (Abnormal) Collected:  11/13/18 0624    Specimen:  Blood Updated:  11/13/18 0714     Glucose 95 mg/dL      BUN 16 mg/dL      Creatinine 0.87 mg/dL      Sodium 136 mmol/L      Potassium 3.9 mmol/L      Chloride 108 mmol/L      CO2 20.0 mmol/L      Calcium 8.4 mg/dL      eGFR Non African Amer 85 mL/min/1.73      BUN/Creatinine Ratio 18.4     Anion Gap 8.0 mmol/L     Narrative:       The MDRD GFR formula is only valid for adults with stable renal function between ages  18 and 70.    CBC Auto Differential [[id number]]  (Abnormal) Collected:  11/13/18 0624    Specimen:  Blood Updated:  11/13/18 0702     WBC 22.67 10*3/mm3      RBC 2.64 10*6/mm3      Hemoglobin 9.3 g/dL      Hematocrit 26.2 %      MCV 99.2 fL      MCH 35.2 pg      MCHC 35.5 g/dL      RDW 24.1 %      RDW-SD 84.4 fl      MPV -- fL      Comment: Instrument unable to calculate result        Platelets 133 10*3/mm3     Blood Culture - Blood, Arm, Right [[id number]] Collected:  11/12/18 1640    Specimen:  Blood from Arm, Right Updated:  11/13/18 0501     Blood Culture No growth at less than 24 hours    Blood Culture - Blood, Arm, Right [[id number]] Collected:  11/12/18 1723    Specimen:  Blood from Arm, Right Updated:  11/13/18 0501     Blood Culture No growth at less than 24 hours    POC Glucose Once [[id number]]  (Normal) Collected:  11/12/18 2336    Specimen:  Blood Updated:  11/12/18 2348     Glucose 101 mg/dL      Comment: : [id number] HUGGINS GINAMeter ID: [id number]       Lactic Acid, Reflex [[id number]]  (Abnormal) Collected:  11/12/18 1412    Specimen:  Blood Updated:  11/12/18 1439     Lactate 3.7 mmol/L     Lactic Acid, Reflex Timer (This will reflex a repeat order 3-3:15 hours after ordered.) [[id number]] Collected:  11/12/18 1004    Specimen:  Blood Updated:  11/12/18 1400     Extra Tube Hold for add-ons.     Comment: Auto resulted.       CBC & Differential [[id number]] Collected:  11/12/18 1004    Specimen:  Blood Updated:  11/12/18 1159    Narrative:       The following orders were created for panel order CBC & Differential.  Procedure                               Abnormality         Status                     ---------                               -----------         ------                     Manual Differential[[id number]]          Abnormal            Final result               Scan Slide[[id number]]                                                                  CBC Auto Differential[[id number]]         Abnormal            Final result                 Please view results for these tests on the individual orders.    Manual Differential [[id number]]  (Abnormal) Collected:  11/12/18 1004    Specimen:  Blood Updated:  11/12/18 1159     Neutrophil % 60.0 %      Lymphocyte % 7.0 %      Monocyte % 18.0 %      Eosinophil % 2.0 %      Bands %  5.0 %      Metamyelocyte % 5.0 %      Myelocyte % 1.0 %      Atypical Lymphocyte % 2.0 %      Neutrophils Absolute 14.72 10*3/mm3      Lymphocytes Absolute 1.58 10*3/mm3      Monocytes Absolute 4.08 10*3/mm3      Eosinophils Absolute 0.45 10*3/mm3      nRBC 1.0 /100 WBC      Anisocytosis Slight/1+     Comment: Few Teardrop cells        Polychromasia Slight/1+     WBC Morphology Normal     Platelet Estimate Adequate    Extra Tubes [[id number]] Collected:  11/12/18 1004    Specimen:  Blood, Venous Line Updated:  11/12/18 1116    Narrative:       The following orders were created for panel order Extra Tubes.  Procedure                               Abnormality         Status                     ---------                               -----------         ------                     Light Blue Top[[id number]]                                   Final result                 Please view results for these tests on the individual orders.    Light Blue Top [[id number]] Collected:  11/12/18 1004    Specimen:  Blood Updated:  11/12/18 1116     Extra Tube hold for add-on     Comment: Auto resulted       Urinalysis With Microscopic If Indicated (No Culture) - Urine, Clean Catch [[id number]]  (Normal) Collected:  11/12/18 1103    Specimen:  Urine, Clean Catch Updated:  11/12/18 1110     Color, UA Yellow     Appearance, UA Clear     pH, UA <=5.0     Specific Gravity, UA 1.022     Glucose, UA Negative     Ketones, UA Negative     Bilirubin, UA Negative     Blood, UA Negative     Protein, UA Negative     Leuk Esterase, UA Negative     Nitrite, UA Negative     Urobilinogen, UA 0.2 E.U./dL    Narrative:        Urine microscopic not indicated.    Lactic Acid, Plasma [[id number]]  (Abnormal) Collected:  11/12/18 1004    Specimen:  Blood Updated:  11/12/18 1050     Lactate 5.7 mmol/L     Comprehensive Metabolic Panel [[id number]]  (Abnormal) Collected:  11/12/18 1004    Specimen:  Blood Updated:  11/12/18 1043     Glucose 92 mg/dL      BUN 14 mg/dL      Creatinine 0.75 mg/dL      Sodium 136 mmol/L      Potassium 3.9 mmol/L      Chloride 104 mmol/L      CO2 18.0 mmol/L      Calcium 9.4 mg/dL      Total Protein 6.8 g/dL      Albumin 4.10 g/dL      ALT (SGPT) 17 U/L      AST (SGOT) 20 U/L      Alkaline Phosphatase 54 U/L      Total Bilirubin 0.9 mg/dL      eGFR Non African Amer 101 mL/min/1.73      Globulin 2.7 gm/dL      A/G Ratio 1.5 g/dL      BUN/Creatinine Ratio 18.7     Anion Gap 14.0 mmol/L     Narrative:       The MDRD GFR formula is only valid for adults with stable renal function between ages 18 and 70.    Lipase [[id number]]  (Normal) Collected:  11/12/18 1004    Specimen:  Blood Updated:  11/12/18 1043     Lipase 48 U/L     CBC Auto Differential [[id number]]  (Abnormal) Collected:  11/12/18 1004    Specimen:  Blood Updated:  11/12/18 1032     WBC 22.64 10*3/mm3      RBC 2.94 10*6/mm3      Hemoglobin 10.2 g/dL      Hematocrit 29.5 %      .3 fL      MCH 34.7 pg      MCHC 34.6 g/dL      RDW 24.0 %      RDW-SD 85.4 fl      Platelets 178 10*3/mm3      Neutrophil % -- %      Lymphocyte % -- %      Monocyte % 3.5 %      Eosinophil % 0.8 %      Basophil % 0.5 %      Neutrophils, Absolute -- 10*3/mm3      Lymphocytes, Absolute -- 10*3/mm3      Monocytes, Absolute 0.79 10*3/mm3      Eosinophils, Absolute 0.17 10*3/mm3      Basophils, Absolute 0.12 10*3/mm3           Imaging Results (last 72 hours)     Procedure Component Value Units Date/Time    XR Chest PA & Lateral [[id number]] Collected:  11/12/18 1000     Updated:  11/12/18 1044    Narrative:       Patient Name:  AICHA BRUCE  Patient ID:  [id number]   Ordering:   YAW GALVEZ  Attending:  GOKUL WASSERMAN  Referring:  YAW GALVEZ  ------------------------------------------------  Chest PA and lateral views    INDICATION: Chest pain    COMPARISON: 2018    FINDINGS: Films -  2    Right chest wall Port-A-Cath noted with the tip of the  Port-A-Cath directed cranially though not visualized on these  images.    Left chest wall pacemaker device also noted, unchanged.    The bilateral lungs are grossly clear. No focal airspace opacity  seen. No pleural effusion or pneumothorax seen. The cardiac  silhouette is unremarkable.    No acute bony abnormality is seen.      Impression:       CONCLUSION:    1.   No acute cardiopulmonary pathology is identified.  2. Right chest wall Port-A-Cath noted. The tip is not visualized  and is directed cranially. Recommended correlation and  repositioning.    These findings were conveyed telephonically to Haley Cardoza PA-C, at 11:40 AM Eastern standard time.    Electronically signed by:  Cirilo Kelsey  2018 10:43 AM  [employer] Workstation: [id number]          ECG/EMG Results (last 72 hours)     ** No results found for the last 72 hours. **           Physician Progress Notes (last 72 hours) (Notes from 11/10/2018  2:31 PM through 2018  2:31 PM)      Kin Hummel MD at 2018  1:55 PM          MEDICINE DAILY PROGRESS NOTE  NAME: Jan Humphrey  : 1940  MRN: [id number]     LOS: 0 days     PROVIDER OF SERVICE: Kin Hummel MD    Chief Complaint: Sepsis (CMS/HCC)    Subjective:   HPI: Patient presented to the [facility] today for chemotherapy and after his port was flushed he developed some chills and chest discomfort.  These symptoms resolved quickly and he has been chest pain free since admission.  His port-a-cath is cranially directed on imaging. Surgery to see.    Interval History:  History taken from: patient family RN  . Patient feeling much better. He desires to go home today, but  understands that he is not likely ready.    Review of Systems:   Review of Systems   Constitutional: Positive for activity change, chills and fatigue. Negative for appetite change and fever.   HENT: Negative for ear pain and sore throat.    Eyes: Negative for pain and visual disturbance.   Respiratory: Negative for cough and shortness of breath.    Cardiovascular: Negative for chest pain and palpitations.   Gastrointestinal: Negative for abdominal pain and nausea.   Endocrine: Negative for cold intolerance and heat intolerance.   Genitourinary: Negative for difficulty urinating and dysuria.   Musculoskeletal: Positive for arthralgias. Negative for gait problem.   Skin: Negative for color change and rash.   Neurological: Negative for dizziness, weakness and headaches.   Hematological: Negative for adenopathy. Does not bruise/bleed easily.   Psychiatric/Behavioral: Negative for agitation, confusion and sleep disturbance.       Objective:     Vital Signs  Vitals:    11/12/18 1558 11/12/18 2140 11/12/18 2329 11/13/18 0404   BP: 105/52 103/57 110/59 106/55   BP Location: Right arm Left arm Left arm Left arm   Patient Position: Lying Lying Lying Lying   Pulse: 62 64 74 67   Resp: 18 18 18 18   Temp: 97.6 °F (36.4 °C) 98.8 °F (37.1 °C) 98.9 °F (37.2 °C) 98.6 °F (37 °C)   TempSrc: Oral Oral Oral Oral   SpO2: 98% 94% 95% 98%   Weight:       Height:           Physical Exam  Physical Exam   Constitutional: He is oriented to person, place, and time. He appears well-developed and well-nourished. No distress.   HENT:   Head: Normocephalic and atraumatic.   Right Ear: External ear normal.   Left Ear: External ear normal.   Nose: Nose normal.   Eyes: Conjunctivae and EOM are normal. Pupils are equal, round, and reactive to light.   Neck: Normal range of motion. Neck supple.   Cardiovascular: Normal rate, regular rhythm, normal heart sounds and intact distal pulses.   Pulmonary/Chest: Effort normal and breath sounds normal. No  respiratory distress. He has no wheezes. He has no rales. He exhibits no tenderness.   Abdominal: Soft. Bowel sounds are normal. He exhibits no distension and no mass. There is no tenderness. There is no rebound and no guarding.   Musculoskeletal: Normal range of motion.   Neurological: He is alert and oriented to person, place, and time.   Skin: Skin is warm and dry. No rash noted. He is not diaphoretic. No erythema. No pallor.   Port site examined. Minimal erythema. Well healed scar.   Psychiatric: He has a normal mood and affect. His behavior is normal.   Nursing note and vitals reviewed.      Medication Review    Current Facility-Administered Medications:   •  acetaminophen (TYLENOL) tablet 650 mg, 650 mg, Oral, Q6H PRN, Sony De Leon PA-C, 650 mg at 11/12/18 1905  •  allopurinol (ZYLOPRIM) tablet 100 mg, 100 mg, Oral, 4x Daily, Uche Obrien MD, 100 mg at 11/13/18 1305  •  aspirin EC tablet 81 mg, 81 mg, Oral, Daily, Uche Obrien MD, 81 mg at 11/13/18 0841  •  cefepime (MAXIPIME) 2 g/100 mL 0.9% NS (mbp), 2 g, Intravenous, Q8H, Uche Obrien MD, Stopped at 11/13/18 0720  •  colchicine tablet 0.6 mg, 0.6 mg, Oral, Daily PRN, Uche Obrien MD  •  folic acid (FOLVITE) tablet 1 mg, 1 mg, Oral, Daily, Uche Obrien MD, 1 mg at 11/13/18 0840  •  heparin (porcine) 5000 UNIT/ML injection 5,000 Units, 5,000 Units, Subcutaneous, Q8H, Uche Obrien MD, 5,000 Units at 11/13/18 0511  •  naproxen (NAPROSYN) tablet 500 mg, 500 mg, Oral, BID With Meals, Uche Obrien MD, 500 mg at 11/13/18 0840  •  ondansetron (ZOFRAN) tablet 4 mg, 4 mg, Oral, 4x Daily PRN, Uche Obrien MD  •  oxybutynin (DITROPAN) tablet 5 mg, 5 mg, Oral, BID, Uche Obrien MD, 5 mg at 11/13/18 0841  •  pantoprazole (PROTONIX) EC tablet 40 mg, 40 mg, Oral, QAM, Uche Obrien MD, 40 mg at 11/13/18 0858  •  [DISCONTINUED] vancomycin 1750 mg/500 mL 0.9% NS IVPB ([facility]), 20 mg/kg, Intravenous, Once **AND**  Pharmacy to dose vancomycin, , Does not apply, Continuous PRN, Uche Obrien MD  •  [COMPLETED] Insert peripheral IV, , , Once **AND** sodium chloride 0.9 % flush 10 mL, 10 mL, Intravenous, PRN, Sony De Leon PA-C  •  sodium chloride 0.9 % flush 3 mL, 3 mL, Intravenous, Q12H, Uche bOrien MD, 3 mL at 11/12/18 2144  •  sodium chloride 0.9 % flush 3-10 mL, 3-10 mL, Intravenous, PRN, Uche Obrien MD  •  sotalol (BETAPACE) half tablet 40 mg, 40 mg, Oral, BID - RT, Uche Obrien MD, 40 mg at 11/13/18 0840  •  tamsulosin (FLOMAX) 24 hr capsule 0.4 mg, 0.4 mg, Oral, Nightly, Uche Obrien MD, 0.4 mg at 11/12/18 2141  •  vancomycin 1250 mg/250 mL 0.9% NS IVPB ([facility]), 15 mg/kg, Intravenous, Q12H, Uche Obrien MD, Last Rate: 0 mL/hr at 11/13/18 0317, 1,250 mg at 11/13/18 1332     Diagnostic Data    Lab Results (last 24 hours)     Procedure Component Value Units Date/Time    Blood Culture - Blood, Blood, Venous Line [[id number]]  (Abnormal) Collected:  11/12/18 1033    Specimen:  Blood, Venous Line Updated:  11/13/18 1329     Blood Culture Abnormal Stain     Gram Stain Aerobic Bottle Gram positive bacilli     Comment: 1 positive for gram positive bacillus of 3 drawn       Narrative:       Blood culture does not meet the specified criteria for PCR identification.  If pregnant, immunocompromised, or clinical concern for meningitis, call lab to run BCID for Listeria monocytogenes.    Blood Culture - Blood, Blood, Venous Line [[id number]]  (Abnormal) Collected:  11/12/18 1026    Specimen:  Blood, Venous Line Updated:  11/13/18 1329     Blood Culture Abnormal Stain     Gram Stain Aerobic Bottle Gram negative bacilli     Comment: 2 bottles of 3 bottles drawn positive for gram neg bacillus       Blood Culture ID, PCR - Blood, Blood, Venous Line [[id number]]  (Normal) Collected:  11/12/18 1026    Specimen:  Blood, Venous Line Updated:  11/13/18 1328     BCID, PCR No organism detected by BCID PCR.     Extra Tubes [[id number]] Collected:  11/13/18 1014    Specimen:  Blood, Venous Line Updated:  11/13/18 1116    Narrative:       The following orders were created for panel order Extra Tubes.  Procedure                               Abnormality         Status                     ---------                               -----------         ------                     Gold Top - SST[[id number]]                                   Final result                 Please view results for these tests on the individual orders.    Gold Top - SST [[id number]] Collected:  11/13/18 1014    Specimen:  Blood Updated:  11/13/18 1116     Extra Tube Hold for add-ons.     Comment: Auto resulted.       Lactic Acid, Plasma [[id number]]  (Normal) Collected:  11/13/18 1016    Specimen:  Blood Updated:  11/13/18 1042     Lactate 1.6 mmol/L     CBC & Differential [[id number]] Collected:  11/13/18 0624    Specimen:  Blood Updated:  11/13/18 0959    Narrative:       The following orders were created for panel order CBC & Differential.  Procedure                               Abnormality         Status                     ---------                               -----------         ------                     Manual Differential[[id number]]          Abnormal            Final result               Scan Slide[[id number]]                                                                  CBC Auto Differential[[id number]]        Abnormal            Final result                 Please view results for these tests on the individual orders.    Manual Differential [[id number]]  (Abnormal) Collected:  11/13/18 0624    Specimen:  Blood Updated:  11/13/18 0959     Neutrophil % 49.0 %      Lymphocyte % 9.0 %      Monocyte % 25.0 %      Eosinophil % 2.0 %      Basophil % 2.0 %      Bands %  3.0 %      Metamyelocyte % 3.0 %      Myelocyte % 7.0 %      Neutrophils Absolute 11.79 10*3/mm3      Lymphocytes Absolute 2.04 10*3/mm3      Monocytes Absolute 5.67 10*3/mm3       Eosinophils Absolute 0.45 10*3/mm3      Basophils Absolute 0.45 10*3/mm3      Anisocytosis Slight/1+     Comment: Few polychromic cells, few teardrop cells        WBC Morphology Normal     Platelet Estimate Decreased    Basic Metabolic Panel [[id number]]  (Abnormal) Collected:  11/13/18 0624    Specimen:  Blood Updated:  11/13/18 0714     Glucose 95 mg/dL      BUN 16 mg/dL      Creatinine 0.87 mg/dL      Sodium 136 mmol/L      Potassium 3.9 mmol/L      Chloride 108 mmol/L      CO2 20.0 mmol/L      Calcium 8.4 mg/dL      eGFR Non African Amer 85 mL/min/1.73      BUN/Creatinine Ratio 18.4     Anion Gap 8.0 mmol/L     Narrative:       The MDRD GFR formula is only valid for adults with stable renal function between ages 18 and 70.    CBC Auto Differential [[id number]]  (Abnormal) Collected:  11/13/18 0624    Specimen:  Blood Updated:  11/13/18 0702     WBC 22.67 10*3/mm3      RBC 2.64 10*6/mm3      Hemoglobin 9.3 g/dL      Hematocrit 26.2 %      MCV 99.2 fL      MCH 35.2 pg      MCHC 35.5 g/dL      RDW 24.1 %      RDW-SD 84.4 fl      MPV -- fL      Comment: Instrument unable to calculate result        Platelets 133 10*3/mm3     Blood Culture - Blood, Arm, Right [[id number]] Collected:  11/12/18 1640    Specimen:  Blood from Arm, Right Updated:  11/13/18 0501     Blood Culture No growth at less than 24 hours    Blood Culture - Blood, Arm, Right [[id number]] Collected:  11/12/18 1723    Specimen:  Blood from Arm, Right Updated:  11/13/18 0501     Blood Culture No growth at less than 24 hours    POC Glucose Once [[id number]]  (Normal) Collected:  11/12/18 2336    Specimen:  Blood Updated:  11/12/18 2348     Glucose 101 mg/dL      Comment: : [phone] JOSELUIS GINAMeter ID: [id number]       Lactic Acid, Reflex [[id number]]  (Abnormal) Collected:  11/12/18 1412    Specimen:  Blood Updated:  11/12/18 1439     Lactate 3.7 mmol/L     Lactic Acid, Reflex Timer (This will reflex a repeat order 3-3:15 hours after ordered.)  [[id number]] Collected:  11/12/18 1004    Specimen:  Blood Updated:  11/12/18 1400     Extra Tube Hold for add-ons.     Comment: Auto resulted.             I reviewed the patient's new clinical results.    Assessment/Plan:     Active Hospital Problems    Diagnosis Date Noted   • **Sepsis (CMS/HCC) [A41.9] 11/13/2018   • Encounter for care related to Port-a-Cath [Z45.2] 11/12/2018       #. Sepsis. Vanc/Cefepime. Cultures pending. Mike results. ECHO.  #. Port-a-cath cranially directed. Surgery consulted. Appreciate input.  #. CML. Active chemo patient. Consult heme/onc as needed.    Will monitor patient's hospital course and adjust treatment as hospital course dictates.    DVT prophylaxis: Heparin  Code status is   Code Status and Medical Interventions:   Ordered at: 11/12/18 1644     Code Status:    CPR     Medical Interventions (Level of Support Prior to Arrest):    Full       Plan for disposition:Where: home and When:  2-3 days      Time:           This document has been electronically signed by Kin Hummel MD on November 13, 2018 1:56 PM     Electronically signed by Kin Hummel MD at 11/13/2018  2:08 PM

## 2018-11-13 NOTE — PROGRESS NOTES
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  Patient was also evaluated by Dr. Man Kimbrough at [facility] in 2012.  Patient has been on observation since then without any active chemotherapy.  In November 2014 in view of worsening leukocytosis and anemia he had a repeat bone marrow biopsy done by Dr. Shore which showed systemic mastocytosis with associated clonal hematologic non-mass cell lineage disease, CMML-1.  Patient has not been on any chemotherapy since then.  In March 2018 patient was seen here at clinic by Goldie WHYTE.  In view of sclerotic lesion on lower extremity as well as skin lesion patient was referred to dermatology.  Biopsy done by Dr. Chen on June 20, 2018 showed increased MAST cells consistent with urticaria pigmentosa.  A she was last seen here at clinic on July 25, 2018.  After that patient was referred to [facility] when he was seen by Dr. Kimbrough on August 20, 2018 and a bone marrow biopsy done on August 23, 2018.  Was started on Vidaza once of December 24, 2018.  Patient is here to get cycle 2 of Vidaza today.  When patient's port was accessed and flushed he started having uncontrollable shakes and chills.  Subsequently patient has been referred to the emergency room.  Denies any fevers at home.  Complains of chest pain and discomfort since port flush.  Denies any bleeding.      PAST MEDICAL HISTORY:    Past Medical History:   Diagnosis Date   • Atrophy of testis    • Benign prostatic hyperplasia    • Borderline glaucoma     • Chronic myelomonocytic leukemia (CMS/HCC)    • Degenerative joint disease involving multiple joints    • GERD (gastroesophageal reflux disease)    • Gout    • History of echocardiogram 05/08/2012    Normal left ventricular systolic function, EF 60%. Early diastolic dysfunction. Mild aortic and pulmonic regurgitation. Trace mitral and mild tricuspid regurgitation. No intracardiac mass pericardial effusion or cardiac thrombus.   • History of Holter monitoring 01/18/2011   • Impotence    • Lightheadedness    • Neck pain, musculoskeletal    • Sleep apnea     NOT WEARING C-PAP   • TIA (transient ischemic attack) 2014       SOCIAL HISTORY:    Social History     Tobacco Use   • Smoking status: Never Smoker   • Smokeless tobacco: Never Used   Substance Use Topics   • Alcohol use: No   • Drug use: No       Surgical History :  Past Surgical History:   Procedure Laterality Date   • CARDIAC CATHETERIZATION  09/30/2009    No epicardial coronary artery disease noted that would explain patient's chest pain of the abnormal stress test noted. Normal left ventricular systolic function with no wall motion abnormalities   • CARDIAC CATHETERIZATION  05/24/1989    Normal catheterization, false-positive exercise treadmill. Noncardiac chest pain   • CARDIAC PACEMAKER PLACEMENT  06/2008    Dual chamber permanent pacemaker implantation   • COLECTOMY PARTIAL / TOTAL  04/14/2000    Cecal diverticulitis. Removal of small segment of terminal ileum, cecum and right colon, sent to pathology   • COLON SURGERY  03/27/2016   • COLONOSCOPY  05/25/2012   • CYSTOSCOPY  11/15/2000    Urinary retention on the basis of medications and benign prostatic hypertrophy   • INGUINAL HERNIA REPAIR  02/15/2007    Recurrent right inguinal hernia. Repair of recurrent inguinal hernia with EPS Prolene mesh system.   • INGUINAL HERNIA REPAIR Right 1957   • LAPAROSCOPIC CHOLECYSTECTOMY  10/26/2006    Gallstones. Laparoscopic cholecystectomy with operative  cholangiogram   • PROSTATECTOMY  11/17/2000    Benign prostatic hypertrophy with urinary retention. Prostatitis. transurethral resection of prostate.   • STEROID INJECTION  10/21/2010    Decadron (Gout)    • STEROID INJECTION  07/23/2013    Kenalog (Gout) (4)       ALLERGIES:    Allergies   Allergen Reactions   • Doxycycline Hyclate Other (See Comments)     Other reaction(s): lip swollen   • Sulfa Antibiotics Swelling     facial   • Other Rash     SILK TAPE   • Penicillins Rash     Reaction: rash   • Soma [Carisoprodol] Swelling and Rash         FAMILY HISTORY:  Family History   Problem Relation Age of Onset   • Cancer Other    • Colon cancer Other         parent   • Coronary artery disease Other    • Heart disease Other    • Hypertension Other    • Cholelithiasis Other    • Other Other         colon problems           REVIEW OF SYSTEMS:      CONSTITUTIONAL:  Complains of fatigue.  Complains of shakes and chills after port flush.  Denies any fever drenching night sweats or weight loss.     EYES: No visual disturbances. No discharge. No new lesions    ENMT:  No epistaxis, mouth sores or difficulty swallowing.    RESPIRATORY:  Complains of shortness of breath with exertion. No new cough or hemoptysis.    CARDIOVASCULAR:  Complains of chest pain after port flush.No palpitations.    GASTROINTESTINAL:  No abdominal pain nausea, vomiting or blood in the stool.    GENITOURINARY: No Dysuria or Hematuria.    MUSCULOSKELETAL:  Complains of worsening back pain.  Complains of worsening pain in neck region.    LYMPHATICS:  Denies any abnormal swollen glands anywhere in the body.    NEUROLOGICAL : No tingling or numbness. No headache or dizziness. No seizures or balance problems.    SKIN: Erythematous lesion present on chest, abdomen, back, upper and lower extremity.            PHYSICAL EXAMINATION:      VITAL SIGNS:  /76   Pulse 73   Temp 97.1 °F (36.2 °C) (Temporal)   Resp 18   Wt 82.6 kg (182 lb 3.2 oz)   BMI 25.41  kg/m²       11/12/18  0846   Weight: 82.6 kg (182 lb 3.2 oz)         ECOG performance status: 2    CONSTITUTIONAL:  Not in any distress.  Having shakes and chills at the time of examination.    EYES: Mild conjunctival Pallor. No Icterus. No Pterygium. Extraocular Movements intact.No ptosis.    ENMT:  Normocephalic, Atraumatic.No Facial Asymmetry noted.    NECK:  No adenopathy.Trachea midline. NO JVD.    RESPIRATORY:  Fair air entry bilateral. No rhonchi or wheezing.Fair respiratory effort.    CARDIOVASCULAR:  S1, S2. Regular rate and rhythm. No murmur or gallop appreciated.Pacemaker present on left chest wall.    ABDOMEN:  Soft, obese, nontender. Bowel sounds present in all four quadrants.  No Hepatosplenomegaly appreciated.    MUSCULOSKELETAL:  No edema.No Calf Tenderness.Decreased range of motion.    NEUROLOGIC:    No  Motor  deficit appreciated. Cranial Nerves 2-12 grossly intact.    SKIN : Skin lesion consistent with urticaria pigmentosa present on chest, abdomen, back, upper and lower extremity.    LYMPHATICS: No new enlarged lymph nodes in neck or supraclavicular area.    PSYCHIATRY: Alert, awake and oriented ×3.Normal affect.  Normal judgment.  Makes good eye contact.        DIAGNOSTIC DATA:    Glucose   Date Value Ref Range Status   11/12/2018 92 60 - 100 mg/dL Final     Sodium   Date Value Ref Range Status   11/12/2018 136 (L) 137 - 145 mmol/L Final     Potassium   Date Value Ref Range Status   11/12/2018 3.9 3.5 - 5.1 mmol/L Final     CO2   Date Value Ref Range Status   11/12/2018 18.0 (L) 22.0 - 31.0 mmol/L Final     Chloride   Date Value Ref Range Status   11/12/2018 104 95 - 110 mmol/L Final     Anion Gap   Date Value Ref Range Status   11/12/2018 14.0 5.0 - 15.0 mmol/L Final     Creatinine   Date Value Ref Range Status   11/12/2018 0.75 0.70 - 1.30 mg/dL Final     BUN   Date Value Ref Range Status   11/12/2018 14 7 - 21 mg/dL Final     BUN/Creatinine Ratio   Date Value Ref Range Status   11/12/2018  18.7 7.0 - 25.0 Final     Calcium   Date Value Ref Range Status   11/12/2018 9.4 8.4 - 10.2 mg/dL Final     eGFR Non  Amer   Date Value Ref Range Status   11/12/2018 101 (H) 42 - 98 mL/min/1.73 Final     Alkaline Phosphatase   Date Value Ref Range Status   11/12/2018 54 38 - 126 U/L Final     Total Protein   Date Value Ref Range Status   11/12/2018 6.8 6.3 - 8.6 g/dL Final     ALT (SGPT)   Date Value Ref Range Status   11/12/2018 17 (L) 21 - 72 U/L Final     AST (SGOT)   Date Value Ref Range Status   11/12/2018 20 17 - 59 U/L Final     Total Bilirubin   Date Value Ref Range Status   11/12/2018 0.9 0.2 - 1.3 mg/dL Final     Albumin   Date Value Ref Range Status   11/12/2018 4.10 3.40 - 4.80 g/dL Final     Globulin   Date Value Ref Range Status   11/12/2018 2.7 2.3 - 3.5 gm/dL Final     Lab Results   Component Value Date    WBC 22.64 (H) 11/12/2018    HGB 10.2 (L) 11/12/2018    HCT 29.5 (L) 11/12/2018    .3 (H) 11/12/2018     11/12/2018     Lab Results   Component Value Date    NEUTROABS 14.72 (H) 11/12/2018    IRON 107 07/25/2018    TIBC 276 07/25/2018    LABIRON 39 07/25/2018    FERRITIN 264.00 07/25/2018    [id number] 803 09/11/2018    FOLATE >20.00 09/11/2018     Lab Results   Component Value Date    HCGQUANT <1 12/16/2014       Tryptase    Ref Range & Units 1mo ago   Tryptase 2.2 - 13.2 ug/L 93.9     Resulting Agency  [facility]   Narrative     Performed at:  [address]  [id number]  : Tej Matias MD, Phone:  [phone]      Specimen Collected: 07/25/18 12:34 Last Resulted: 07/27/18 13:16                  PATHOLOGY:  Pathology report from August 23, 2018 at [place] showed:  Diagnosis:  BONE MARROW - PERIPHERAL BLOOD SMEAR, ASPIRATE SMEAR, PARTICLE PREPARATION, BIOPSY, AND FLOW CYTOMETRY: MARKEDLY HYPERCELLULAR MARROW WITH INVOLVEMENT BY SYSTEMIC MASTOCYTOSIS WITH AN ASSOCIATED HEMATOLOGICAL NEOPLASM, CHRONIC MYELOMONOCYTIC  LEUKEMIA-1; SEE IMPRESSION     IMPRESSION:  The findings are of a markedly hypercellular marrow (90% cellular) with maturing trilineage hematopoiesis and multifocal mast cell aggregates, including spindled forms. Mast cells are positive for CD2 and CD25 by flow cytometric analysis. Recent testing at an outside institution showed an elevated tryptase level. The overall findings are consistent with involvement by systemic mastocytosis. In addition, there is multilineage dysplasia with mildly increased blasts and blast equivalents (6.1% of cellularity in total) and a persistent relative and absolute peripheral monocytosis. The combined findings are consistent with involvement by chronic myelomonocytic leukemia-1 (CMML-1), proliferative type. Final diagnosis requires correlation with pending additional testing, as documented below, which will be included in the comprehensive hematopathology report.    PERIPHERAL BLOOD SMEAR:  CBC : WBC 18.9 k/microL, Hgb 9.7 g/dL, Plt-Ct 187 k/microL,  fL, RDW 26.3%.    A Yi's stained peripheral smear is reviewed. Erythrocytes are decreased and are macrocytic and normochromic with anisopoikilocytosis, including target cells and teardrop cells. Polychromasia is present. Nucleated red blood cells are present. Leukocytes are increased and include mature neutrophils, lymphocytes, and monocytes, with a relative and absolute monocytosis. Neutrophils show dysplasia (nuclear hypolobation, hypogranulation). Left-shifted myeloid elements are present. There are no circulating blasts or plasma cells. Platelets are adequate in number and show variation in size.     ASPIRATE SMEARS AND TOUCH PREPARATIONS:  Yi's stained aspirate smears and touch preparations are reviewed. The material is hypercellular and particulate. Myeloid elements are adequate and show dysplasia (cmuiqts-vb-dybtzmssexz dyssynchrony). Erythroid elements are adequate and show left-shifted maturation with mild  dysplasia (megaloblastoid change, occasional nuclear membrane irregularity). The myeloid:erythroid ratio is 1.1 to 1. Megakaryocytes are increased in number, and demonstrate no significant atypia. Blasts and blast equivalents (monoblasts, promonocytes) comprise 6.1% of cellularity in total (600 cell count). There is no increase in plasma cells or lymphocytes. Focal particles show significantly increased mast cells, including occasional spindle forms. A Prussian blue iron stain is performed on the sample, revealing increased storage iron. Sideroblastic iron is present. Ring sideroblasts are present (approximately 40-50%).     MARROW SMEAR DIFFERENTIAL:  Blasts (0-4): 3.8%  Monoblasts/promonocytes: 2.3%  Promyelocytes (1-8): 2.2%  Myelocytes and metamyelocytes (20-30): 15.8%  Bands and segmented neutrophils (12-25): 23.0%  Eosinophils and eosinophilic precursors (1-5): 0.3%  Basophils and basophilic precursors (0-1): 0.0%  Monocytes and monocytic precursors (0-2): 5.8%  Lymphocytes (10-15): 3.0%  Plasma cells (0-1): 0.2%  Erythroid precursors (15-27): 43.5%  Total cells counted: 600    BONE MARROW BIOPSY AND PARTICLE PREPARATION:  H&E and PAS stained bone marrow biopsy and particle preparation sections are reviewed. The material is adequate and markedly hypercellular (90% cellular). Myeloid elements are present in normal proportion and exhibit maturation. Erythroid elements are present in normal proportion and exhibit left-shifted maturation. Megakaryocytes are increased and include occasional hypolobated forms and forms with abnormal nuclear lobe separation. CD34-positive blasts comprise approximately 3-5% of marrow cellularity. Multifocal dense mast cell aggregates are present, including spindled forms, as confirmed by  and tryptase immunostains. A CD68 stain highlights increased monocytic/histiocytic forms. Bony trabeculae are normal for the patient's age. All stains performed with appropriate controls.      FLOW CYTOMETRY STUDIES:  IP ID:   Viability: 94.1%  DIAGNOSIS: No increase in blasts; abnormal mast cells present    COMMENT: Myeloblasts are not increased (0.9% of total cells), with the following normal immunophenotype: positive for CD33 (moderate), CD34, and CD45 (dim); and negative for CD19. B cells are not increased (0.1% of total cells), with the following normal immunophenotype: positive for CD19 (bright) and CD45 (bright); and negative for CD34. T cells are not increased (3.1% of total cells), with the following normal immunophenotype: positive for CD45 (bright); and negative for CD34. Mast cells are present (0.03% of total cells), with the following abnormal immunophenotype: positive for CD2, CD25, CD45,  (bright), and FcER1.    CYTOGENETICS AND MOLECULAR DIAGNOSTIC RESULTS:  Additional testing (Karyotype, Myeloid NGS) is pending. Results will be included in the comprehensive hematopathology report.         **Electronically signed out by ELVIA TO,TYLER**on 8/27/2018  MS/NAKUL/haritha  Case reviewed by Attending Pathologist        Pathology report from November 13, 2014 showed:  FINAL DIAGNOSIS:   PERIPHERAL SMEAR, REVIEW:        MONOCYTOSIS WITH LEFT MYELOID SHIFT.        ANEMIA, BORDERLINE.   BONE MARROW ASPIRATION AND BIOPSY:        SYSTEMIC MASTOCYTOSIS WITH ASSOCIATED CLONAL             HEMATOLOGIC NON-MAST CELL LINEAGE DISEASE,             CHRONIC MYELOMONOCYTIC LEUKEMIA-1.            RADIOLOGY DATA :  CT of abdomen with and without contrast done on May 14, 2018 showed:  The liver is normal. The gallbladder surgically absent. The  biliary system appears within normal limits status post  cholecystectomy. The pancreas is normal. The spleen is normal.  Bilateral adrenal glands are normal. Right kidney mid zone 4.7 mm  nonobstructive renal calculi. Stable right kidney lower pole oval  hyperdense lesion on unenhanced imaging which has Hounsfield  units of  49. Following enhancement this lesion  has Hounsfield  units of 49 and 51 suggesting no significant enhancement. This  lesion measures 2.36 cm in greatest diameter and is not  appreciably changed. Otherwise right kidney and ureter are  unremarkable. Left kidney and visualized ureter are normal.  Visualized hollow viscera is normal. No lymphadenopathy in the  abdomen. No acute osseous abnormalities.     IMPRESSION:  1. Stable right kidney lower pole oval hyperdense lesion which  does not enhance measuring 2.36 cm in greatest diameter. This  interval stability and appearance is most consistent with benign  hyperdense cyst.  2. Right kidney mid zone 4.7 mm stable nonobstructive renal  calculi..  3. Otherwise unremarkable CT abdomen study with without  contrast..      CT of left lower extremity with contrast done on March 23, 2018 showed:  COMMENTS:              A CT examination was performed of the lower extremities, with  images coned to the left femur.     There is a marrow-based focal sclerotic lesion involving the  proximal/mid femur, measuring approximately 2.6 cm in  craniocaudal extent. There is no evidence of endosteal  scalloping, or features to suggest an aggressive process. There  is no abnormal periosteal reaction.     The remainder the examination is unremarkable for age.     .     IMPRESSION:  CONCLUSION:          1. Focal sclerotic bone lesion which is marrow based in the  diaphysis of the left femur in the proximal/midportion. This is  likely a benign lesion and correlation with plain film  radiographs are suggested.            CT chest with contrast done on January 26, 2018 showed:  IMPRESSION:  CONCLUSION:  Multiple sclerotic lesions in the spine and right RIBS,  concerning for metastatic prostate cancer. Recommend correlation  with PSA levels and nuclear medicine bone scan.           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 Shore.  -Patient rafaela far not require any chemotherapy or any other treatment.  -Recently in view of worsening skin lesion on the chest, abdomen and back he underwent a biopsy by Dr. 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  -Patient is also found to have worsening anemia recently on blood work done from June 2018.  -Patient is also found to have multiple sclerotic lesion on the spine and hips on CT scan done from January 2018 until June 2018.  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  -Patient was evaluated by Dr. Kimbrough at [place] on August 20, 2018 and had a bone marrow biopsy done on August 23, 2018 that again showed systemic mastocytosis with CMML-1.  -In view of elevated tryptase level, his case was discussed at tumor board at [place].  -Case was discussed with Dr. Kimbrough on September 10, 2018, is recommending treatment with Vidaza for now.    -She was started on Vidaza on September 24, 2018.  -Patient was scheduled to start cycle 2 of Vidaza today on November 12, 2018.  -Due to uncontrollable chills and shakes after port access and flushing, patient was referred to the emergency room to rule out any developing sepsis.  -We will resume chemotherapy after evaluation in the emergency room.    2.  Anemia: Most likely secondary to #1  -Remains on folic acid 1 mg by mouth daily.  Hemoglobin is 10.4.  We'll monitored with CBC.    3.  Leukocytosis: Secondary to chronic myelomonocytic leukemia.  We'll monitored with CBC for now.    4.  History of bradycardia status post pacemaker in 2008.    5.  Health maintenance:  Patient does not smoke.  Had a colonoscopy in 2014 by Dr. Guzman.    6. BMI: Patient's Body mass index is 25.41 kg/m². BMI is in reference range.    7. Advance Care Planning: For now patient remains full code and is able to make  His decisions.  Patient has health care surrogate mentioned on chart.    8.  Uncontrollable shakes and chills after port flush:  -Patient was relatively asymptomatic for port flush.  Immediately after port flushes started having chest pain along with chills and shakes.  -Due to possibility of evolving sepsis and bacteremia.  Patient was referred to the emergency room for further evaluation.  Case was discussed with emergency room provider.         Jordin Roblero MD  11/12/2018  7:11 PM        EMR Dragon/Transcription disclaimer:   Much of this encounter note is an electronic transcription/translation of spoken language to printed text. The electronic translation of spoken language may permit erroneous, or at times, nonsensical words or phrases to be inadvertently transcribed; Although I have reviewed the note for such errors, some may still exist.

## 2018-11-14 ENCOUNTER — ANESTHESIA (OUTPATIENT)
Dept: PERIOP | Facility: HOSPITAL | Age: 78
End: 2018-11-14

## 2018-11-14 ENCOUNTER — APPOINTMENT (OUTPATIENT)
Dept: ONCOLOGY | Facility: HOSPITAL | Age: 78
End: 2018-11-14

## 2018-11-14 LAB
ANION GAP SERPL CALCULATED.3IONS-SCNC: 9 MMOL/L (ref 5–15)
BACTERIA SPEC AEROBE CULT: ABNORMAL
BASOPHILS # BLD AUTO: 0.12 10*3/MM3 (ref 0–0.2)
BASOPHILS NFR BLD AUTO: 0.9 % (ref 0–2)
BUN BLD-MCNC: 14 MG/DL (ref 7–21)
BUN/CREAT SERPL: 14.1 (ref 7–25)
CALCIUM SPEC-SCNC: 8.1 MG/DL (ref 8.4–10.2)
CHLORIDE SERPL-SCNC: 108 MMOL/L (ref 95–110)
CO2 SERPL-SCNC: 21 MMOL/L (ref 22–31)
CREAT BLD-MCNC: 0.99 MG/DL (ref 0.7–1.3)
DEPRECATED RDW RBC AUTO: 90.8 FL (ref 35.1–43.9)
EOSINOPHIL # BLD AUTO: 0.54 10*3/MM3 (ref 0–0.7)
EOSINOPHIL NFR BLD AUTO: 3.8 % (ref 0–7)
ERYTHROCYTE [DISTWIDTH] IN BLOOD BY AUTOMATED COUNT: 25 % (ref 11.5–14.5)
GFR SERPL CREATININE-BSD FRML MDRD: 73 ML/MIN/1.73 (ref 42–98)
GLUCOSE BLD-MCNC: 102 MG/DL (ref 60–100)
GRAM STN SPEC: ABNORMAL
HCT VFR BLD AUTO: 24.7 % (ref 39–49)
HGB BLD-MCNC: 8.4 G/DL (ref 13.7–17.3)
IMM GRANULOCYTES # BLD: 2.17 10*3/MM3 (ref 0–0.02)
IMM GRANULOCYTES NFR BLD: 15.5 % (ref 0–0.5)
LAB AP CASE REPORT: NORMAL
LYMPHOCYTES # BLD AUTO: 1.53 10*3/MM3 (ref 0.6–4.2)
LYMPHOCYTES NFR BLD AUTO: 10.9 % (ref 10–50)
MCH RBC QN AUTO: 34 PG (ref 26.5–34)
MCHC RBC AUTO-ENTMCNC: 34 G/DL (ref 31.5–36.3)
MCV RBC AUTO: 100 FL (ref 80–98)
MONOCYTES # BLD AUTO: 4.35 10*3/MM3 (ref 0–0.9)
MONOCYTES NFR BLD AUTO: 31 % (ref 0–12)
NEUTROPHILS # BLD AUTO: 5.32 10*3/MM3 (ref 2–8.6)
NEUTROPHILS NFR BLD AUTO: 37.9 % (ref 37–80)
NRBC BLD MANUAL-RTO: 0.6 /100 WBC (ref 0–0)
PATH REPORT.FINAL DX SPEC: NORMAL
PATH REPORT.GROSS SPEC: NORMAL
PLATELET # BLD AUTO: 109 10*3/MM3 (ref 150–450)
PMV BLD AUTO: ABNORMAL FL (ref 8–12)
POTASSIUM BLD-SCNC: 4.4 MMOL/L (ref 3.5–5.1)
RBC # BLD AUTO: 2.47 10*6/MM3 (ref 4.37–5.74)
SODIUM BLD-SCNC: 138 MMOL/L (ref 137–145)
WBC NRBC COR # BLD: 14.03 10*3/MM3 (ref 3.2–9.8)

## 2018-11-14 PROCEDURE — 88300 SURGICAL PATH GROSS: CPT | Performed by: SURGERY

## 2018-11-14 PROCEDURE — 25010000002 CEFEPIME PER 500 MG: Performed by: HOSPITALIST

## 2018-11-14 PROCEDURE — 25010000002 HEPARIN (PORCINE) PER 1000 UNITS: Performed by: SURGERY

## 2018-11-14 PROCEDURE — 87070 CULTURE OTHR SPECIMN AEROBIC: CPT | Performed by: SURGERY

## 2018-11-14 PROCEDURE — 25010000002 PROPOFOL 10 MG/ML EMULSION: Performed by: NURSE ANESTHETIST, CERTIFIED REGISTERED

## 2018-11-14 PROCEDURE — 25010000002 FENTANYL CITRATE (PF) 100 MCG/2ML SOLUTION: Performed by: NURSE ANESTHETIST, CERTIFIED REGISTERED

## 2018-11-14 PROCEDURE — 0JPTXXZ REMOVAL OF TUNNELED VASCULAR ACCESS DEVICE FROM TRUNK SUBCUTANEOUS TISSUE AND FASCIA, EXTERNAL APPROACH: ICD-10-PCS | Performed by: SURGERY

## 2018-11-14 PROCEDURE — 85025 COMPLETE CBC W/AUTO DIFF WBC: CPT | Performed by: HOSPITALIST

## 2018-11-14 PROCEDURE — 36590 REMOVAL TUNNELED CV CATH: CPT | Performed by: SURGERY

## 2018-11-14 PROCEDURE — 25010000002 VANCOMYCIN: Performed by: HOSPITALIST

## 2018-11-14 PROCEDURE — 80048 BASIC METABOLIC PNL TOTAL CA: CPT | Performed by: HOSPITALIST

## 2018-11-14 PROCEDURE — 88300 SURGICAL PATH GROSS: CPT | Performed by: PATHOLOGY

## 2018-11-14 PROCEDURE — 25010000002 VANCOMYCIN 5 G RECONSTITUTED SOLUTION: Performed by: HOSPITALIST

## 2018-11-14 PROCEDURE — 87205 SMEAR GRAM STAIN: CPT | Performed by: SURGERY

## 2018-11-14 RX ORDER — PROPOFOL 10 MG/ML
VIAL (ML) INTRAVENOUS AS NEEDED
Status: DISCONTINUED | OUTPATIENT
Start: 2018-11-14 | End: 2018-11-14 | Stop reason: SURG

## 2018-11-14 RX ORDER — SODIUM CHLORIDE 9 MG/ML
INJECTION, SOLUTION INTRAVENOUS CONTINUOUS PRN
Status: DISCONTINUED | OUTPATIENT
Start: 2018-11-14 | End: 2018-11-14 | Stop reason: SURG

## 2018-11-14 RX ORDER — FENTANYL CITRATE 50 UG/ML
INJECTION, SOLUTION INTRAMUSCULAR; INTRAVENOUS AS NEEDED
Status: DISCONTINUED | OUTPATIENT
Start: 2018-11-14 | End: 2018-11-14 | Stop reason: SURG

## 2018-11-14 RX ORDER — HEPARIN SODIUM 5000 [USP'U]/ML
5000 INJECTION, SOLUTION INTRAVENOUS; SUBCUTANEOUS EVERY 8 HOURS SCHEDULED
Status: DISCONTINUED | OUTPATIENT
Start: 2018-11-14 | End: 2018-11-16 | Stop reason: HOSPADM

## 2018-11-14 RX ORDER — LIDOCAINE HYDROCHLORIDE 20 MG/ML
INJECTION, SOLUTION INFILTRATION; PERINEURAL AS NEEDED
Status: DISCONTINUED | OUTPATIENT
Start: 2018-11-14 | End: 2018-11-14 | Stop reason: SURG

## 2018-11-14 RX ADMIN — CEFEPIME HYDROCHLORIDE 2 G: 2 INJECTION, POWDER, FOR SOLUTION INTRAVENOUS at 17:07

## 2018-11-14 RX ADMIN — FENTANYL CITRATE 50 MCG: 50 INJECTION, SOLUTION INTRAMUSCULAR; INTRAVENOUS at 07:18

## 2018-11-14 RX ADMIN — Medication 3 ML: at 21:16

## 2018-11-14 RX ADMIN — PROPOFOL 20 MG: 10 INJECTION, EMULSION INTRAVENOUS at 07:26

## 2018-11-14 RX ADMIN — VANCOMYCIN HYDROCHLORIDE 1250 MG: 5 INJECTION, POWDER, LYOPHILIZED, FOR SOLUTION INTRAVENOUS at 23:52

## 2018-11-14 RX ADMIN — PROPOFOL 20 MG: 10 INJECTION, EMULSION INTRAVENOUS at 07:32

## 2018-11-14 RX ADMIN — PROPOFOL 20 MG: 10 INJECTION, EMULSION INTRAVENOUS at 07:28

## 2018-11-14 RX ADMIN — PROPOFOL 20 MG: 10 INJECTION, EMULSION INTRAVENOUS at 07:19

## 2018-11-14 RX ADMIN — HEPARIN SODIUM 5000 UNITS: 5000 INJECTION INTRAVENOUS; SUBCUTANEOUS at 21:15

## 2018-11-14 RX ADMIN — PROPOFOL 20 MG: 10 INJECTION, EMULSION INTRAVENOUS at 07:22

## 2018-11-14 RX ADMIN — CEFEPIME HYDROCHLORIDE 2 G: 2 INJECTION, POWDER, FOR SOLUTION INTRAVENOUS at 21:15

## 2018-11-14 RX ADMIN — PROPOFOL 20 MG: 10 INJECTION, EMULSION INTRAVENOUS at 07:30

## 2018-11-14 RX ADMIN — ALLOPURINOL 100 MG: 100 TABLET ORAL at 17:13

## 2018-11-14 RX ADMIN — PROPOFOL 20 MG: 10 INJECTION, EMULSION INTRAVENOUS at 07:21

## 2018-11-14 RX ADMIN — LIDOCAINE HYDROCHLORIDE 60 MG: 20 INJECTION, SOLUTION INFILTRATION; PERINEURAL at 07:18

## 2018-11-14 RX ADMIN — PANTOPRAZOLE SODIUM 40 MG: 40 TABLET, DELAYED RELEASE ORAL at 05:43

## 2018-11-14 RX ADMIN — PROPOFOL 20 MG: 10 INJECTION, EMULSION INTRAVENOUS at 07:24

## 2018-11-14 RX ADMIN — ALLOPURINOL 100 MG: 100 TABLET ORAL at 13:45

## 2018-11-14 RX ADMIN — VANCOMYCIN HYDROCHLORIDE 1250 MG: 5 INJECTION, POWDER, LYOPHILIZED, FOR SOLUTION INTRAVENOUS at 00:18

## 2018-11-14 RX ADMIN — TAMSULOSIN HYDROCHLORIDE 0.4 MG: 0.4 CAPSULE ORAL at 21:15

## 2018-11-14 RX ADMIN — Medication 40 MG: at 10:15

## 2018-11-14 RX ADMIN — NAPROXEN 500 MG: 500 TABLET ORAL at 17:13

## 2018-11-14 RX ADMIN — SODIUM CHLORIDE: 9 INJECTION, SOLUTION INTRAVENOUS at 07:10

## 2018-11-14 RX ADMIN — Medication 40 MG: at 21:16

## 2018-11-14 RX ADMIN — OXYBUTYNIN CHLORIDE 5 MG: 5 TABLET ORAL at 21:15

## 2018-11-14 RX ADMIN — VANCOMYCIN HYDROCHLORIDE 1250 MG: 5 INJECTION, POWDER, LYOPHILIZED, FOR SOLUTION INTRAVENOUS at 13:42

## 2018-11-14 RX ADMIN — PROPOFOL 20 MG: 10 INJECTION, EMULSION INTRAVENOUS at 07:34

## 2018-11-14 RX ADMIN — PROPOFOL 50 MG: 10 INJECTION, EMULSION INTRAVENOUS at 07:18

## 2018-11-14 RX ADMIN — ALLOPURINOL 100 MG: 100 TABLET ORAL at 21:15

## 2018-11-14 RX ADMIN — CEFEPIME HYDROCHLORIDE 2 G: 2 INJECTION, POWDER, FOR SOLUTION INTRAVENOUS at 05:43

## 2018-11-14 RX ADMIN — PROPOFOL 20 MG: 10 INJECTION, EMULSION INTRAVENOUS at 07:20

## 2018-11-14 NOTE — ANESTHESIA POSTPROCEDURE EVALUATION
Patient: Jan Humphrey    Procedure Summary     Date:  11/14/18 Room / Location:  [facility] OR 03 / [facility] OR    Anesthesia Start:  0710 Anesthesia Stop:  0748    Procedure:  REMOVAL VENOUS ACCESS DEVICE (N/A Chest) Diagnosis:       Port or reservoir infection      (Port or reservoir infection [T80.212A])    Surgeon:  Tono Arevalo MD Provider:  David Perez MD    Anesthesia Type:  MAC ASA Status:  4          Anesthesia Type: MAC  Last vitals  BP   126/60 (11/14/18 0646)   Temp   97.9 °F (36.6 °C) (11/14/18 0646)   Pulse   61 (11/14/18 0646)   Resp   18 (11/14/18 0646)     SpO2   97 % (11/14/18 0646)     Post Anesthesia Care and Evaluation    Patient location during evaluation: PHASE II  Patient participation: complete - patient participated  Level of consciousness: awake and alert  Pain score: 0  Pain management: adequate  Airway patency: patent  Anesthetic complications: No anesthetic complications  PONV Status: none  Cardiovascular status: acceptable  Respiratory status: acceptable  Hydration status: acceptable

## 2018-11-14 NOTE — PROGRESS NOTES
"Pharmacokinetics by Pharmacy - Vancomycin    Jan Humphrey is a 78 y.o. male receiving vancomycin 1250mg IV Q 12 hours day 3 for sepsis possibly secondary to line infection.     Patient is also receiving cefepime    Objective:  [Ht: 180.3 cm (71\"); Wt: 85.8 kg (189 lb 1 oz)]     Lab Results   Component Value Date    WBC 14.03 (H) 11/14/2018    WBC 22.67 (H) 11/13/2018    WBC 22.64 (H) 11/12/2018      Lab Results   Component Value Date    LACTATE 1.6 11/13/2018    LACTATE 3.7 (C) 11/12/2018    LACTATE 5.7 (C) 11/12/2018      Temp Readings from Last 1 Encounters:   11/14/18 97.8 °F (36.6 °C) (Temporal)     Estimated Creatinine Clearance: 75.8 mL/min (by C-G formula based on SCr of 0.99 mg/dL).   Lab Results   Component Value Date    CREATININE 0.99 11/14/2018    CREATININE 0.87 11/13/2018    CREATININE 0.75 11/12/2018       Lab Results   Component Value Date    VANCOPEAK 28.13 (L) 11/13/2018    VANCOTROUGH 16.94 (H) 11/13/2018       Culture Results:  Microbiology Results (last 10 days)       Procedure Component Value - Date/Time    Wound Culture - Wound, Chest, Right [[id number]] Collected:  11/14/18 0741    Lab Status:  Preliminary result Specimen:  Wound from Chest, Right Updated:  11/14/18 0817     Gram Stain Rare (1+) WBCs seen      No organisms seen    Blood Culture - Blood, Arm, Right [[id number]] Collected:  11/12/18 1723    Lab Status:  Preliminary result Specimen:  Blood from Arm, Right Updated:  11/13/18 1715     Blood Culture No growth at 24 hours    Blood Culture - Blood, Arm, Right [[id number]] Collected:  11/12/18 1640    Lab Status:  Preliminary result Specimen:  Blood from Arm, Right Updated:  11/13/18 1715     Blood Culture No growth at 24 hours    Blood Culture - Blood, Blood, Venous Line [[id number]]  (Abnormal) Collected:  11/12/18 1033    Lab Status:  Final result Specimen:  Blood, Venous Line Updated:  11/14/18 0637     Blood Culture Corynebacterium species, not diphtheriae     Comment: " Consistent with contamination at time of collection.  Susceptibility not indicated          Gram Stain Aerobic Bottle Gram positive bacilli     Comment: 1 positive for gram positive bacillus of 3 drawn       Narrative:       Blood culture does not meet the specified criteria for PCR identification.  If pregnant, immunocompromised, or clinical concern for meningitis, call lab to run BCID for Listeria monocytogenes.    Blood Culture - Blood, Blood, Venous Line [[id number]]  (Abnormal) Collected:  11/12/18 1026    Lab Status:  Preliminary result Specimen:  Blood, Venous Line Updated:  11/14/18 0637     Blood Culture Gram Negative Bacilli     Isolated from Aerobic Bottle     Gram Stain Aerobic Bottle Gram negative bacilli     Comment: 2 bottles of 3 bottles drawn positive for gram neg bacillus       Blood Culture ID, PCR - Blood, Blood, Venous Line [[id number]]  (Normal) Collected:  11/12/18 1026    Lab Status:  Final result Specimen:  Blood, Venous Line Updated:  11/13/18 1328     BCID, PCR No organism detected by BCID PCR.                 Assessment:  SCr slightly increased.  Will follow closely.    Afebrile, leukocytosis improving 22.67 -> 14.03.  Culture from line showing GNR.   Vancomycin levels at goal.  Calculate vancomycin peak 32.4, trough 11.34, .84 (AUC goal 400-600).  May want to consider discontinuing vancomycin.  Blood culture from line only showing gram negative rods and patient SCr slightly increasing.    Plan:  1.  Continue vancomycin 1250 mg IV Q 12 hours.  2. Will order levels when kinetically appropriate.   3. Pharmacy will monitor renal function and adjust dose accordingly.      Barrett Chacko III, [facility]   11/14/18 9:29 AM

## 2018-11-14 NOTE — PLAN OF CARE
Problem: Patient Care Overview  Goal: Plan of Care Review  Outcome: Ongoing (interventions implemented as appropriate)   11/14/18 0112   Coping/Psychosocial   Plan of Care Reviewed With patient   Plan of Care Review   Progress no change   OTHER   Outcome Summary No new complaints at this time; Pt NPO at midnight for port removal; will continue to monitor.        Problem: Fall Risk (Adult)  Goal: Identify Related Risk Factors and Signs and Symptoms  Outcome: Outcome(s) achieved Date Met: 11/14/18    Goal: Absence of Fall  Outcome: Ongoing (interventions implemented as appropriate)      Problem: Sepsis/Septic Shock (Adult)  Goal: Signs and Symptoms of Listed Potential Problems Will be Absent, Minimized or Managed (Sepsis/Septic Shock)  Outcome: Ongoing (interventions implemented as appropriate)

## 2018-11-14 NOTE — PROGRESS NOTES
Discharge Planning Assessment  [facility]     Patient Name: Jan Humphrey  MRN: [id number]  Today's Date: 11/14/2018    Admit Date: 11/12/2018    Discharge Needs Assessment     Row Name 11/14/18 1559       Living Environment    Lives With  spouse    Name(s) of Who Lives With Patient  son is helping at home since his wife has broken her ankle    Current Living Arrangements  home/apartment/condo    Primary Care Provided by  self    Provides Primary Care For  spouse    Caregiving Concerns  spouse with h/o stroke and recent ankle fracture    Family Caregiver if Needed  child(olivier), adult    Quality of Family Relationships  helpful;supportive    Able to Return to Prior Arrangements  yes    Living Arrangement Comments  plans to return home  will need to help care for his spouse along with his son       Transition Planning    Patient/Family Anticipates Transition to  home with family    Transportation Anticipated  car, drives self       Discharge Needs Assessment    Readmission Within the Last 30 Days  no previous admission in last 30 days    Concerns to be Addressed  denies needs/concerns at this time    Equipment Currently Used at Home  none    Anticipated Changes Related to Illness  none    Equipment Needed After Discharge  none    Outpatient/Agency/Support Group Needs  clinic(s) [facility]    Discharge Coordination/Progress  pt has rx coverage and [facility],  has transportaion and declines offer of home health transition visi        Discharge Plan    No documentation.       Destination      No service coordination in this encounter.      Durable Medical Equipment      No service coordination in this encounter.      Dialysis/Infusion      No service coordination in this encounter.      Home Medical Care      No service coordination in this encounter.      Community Resources      No service coordination in this encounter.        Expected Discharge Date and Time     Expected Discharge Date  Expected Discharge Time    Nov 15, 2018         Demographic Summary     Row Name 11/14/18 1558       General Information    Admission Type  inpatient    Arrived From  other (see comments) sent from [facility]    Required Notices Provided  Important Message from Medicare    Expected Length of Stay (LOS)  3-5 days    Referral Source  high risk screening    Reason for Consult  discharge planning    Preferred Language  English     Used During This Interaction  no    General Information Comments  confirmed face sheet with pt        Functional Status    No documentation.       Psychosocial    No documentation.       Abuse/Neglect    No documentation.       Legal    No documentation.       Substance Abuse    No documentation.       Patient Forms    No documentation.           Tess Grace

## 2018-11-14 NOTE — PLAN OF CARE
Problem: Patient Care Overview  Goal: Plan of Care Review  Outcome: Ongoing (interventions implemented as appropriate)    Goal: Discharge Needs Assessment  Outcome: Ongoing (interventions implemented as appropriate)    Goal: Interprofessional Rounds/Family Conf  Outcome: Ongoing (interventions implemented as appropriate)      Problem: Sepsis/Septic Shock (Adult)  Goal: Signs and Symptoms of Listed Potential Problems Will be Absent, Minimized or Managed (Sepsis/Septic Shock)  Outcome: Ongoing (interventions implemented as appropriate)

## 2018-11-14 NOTE — ANESTHESIA PREPROCEDURE EVALUATION
Anesthesia Evaluation     Patient summary reviewed and Nursing notes reviewed   no history of anesthetic complications:  NPO Solid Status: > 8 hours  NPO Liquid Status: > 8 hours           Airway   Mallampati: II  TM distance: >3 FB  Neck ROM: full  possible difficult intubation  Dental    (+) poor dentation    Comment: Upper and lower chipped and fractured dentition. Overall poor repair.    Pulmonary - normal exam    breath sounds clear to auscultation  (+) sleep apnea,   (-) not a smoker    ROS comment: cough  Cardiovascular - normal exam  Exercise tolerance: good (4-7 METS)    ECG reviewed  Patient on routine beta blocker and Beta blocker given within 24 hours of surgery  Rhythm: regular  Rate: normal    (+) pacemaker pacemaker interrogated >year ago, hypertension 2 medications or greater, dysrhythmias Paroxysmal Atrial Fib,   (-) past MI, murmur    ROS comment: Normal left ventricular systolic function, EF 60%. Early diastolic dysfunction. Mild aortic and pulmonic regurgitation. Trace mitral and mild tricuspid regurgitation. No intracardiac mass pericardial effusion or cardiac thrombus                                                                                 Atrial-paced rhythm with prolonged AV conduction  Abnormal ECG  When compared with ECG of 21-JAN-2018 12:38,  No significant change was found    Referred By:             Confirmed By:     Specimen Collected: 09/12/18 14:10          Neuro/Psych  (+) TIA,     (-) seizures  GI/Hepatic/Renal/Endo    (+)  GERD well controlled,    (-) hepatitis, liver disease, no renal disease, diabetes, hypothyroidism    Musculoskeletal     (+) arthralgias,   Abdominal    Substance History - negative use     OB/GYN negative ob/gyn ROS         Other   (+) arthritis (Gouty arthriitis.)   history of cancer (Leukemia.) active      Other Comment: HGB 10.6 HCT 30.3                    Anesthesia Plan    ASA 4     MAC   (Patient states has medtronic pacemaker and they are suppose  to slow down the rate prior to surgery.  Per Dr Duran. Chart reviewed. On central line(medi-port) placement. Magnet placed over the pacemaker and then removed at the end of the procedure without problem.)  intravenous induction   Anesthetic plan, all risks, benefits, and alternatives have been provided, discussed and informed consent has been obtained with: patient.

## 2018-11-14 NOTE — PROGRESS NOTES
MEDICINE DAILY PROGRESS NOTE  NAME: Jan Humphrey  : 1940  MRN: [id number]     LOS: 1 day     PROVIDER OF SERVICE: Kin Hummel MD    Chief Complaint: Sepsis (CMS/HCC)    Subjective:   HPI: Patient presented to the [facility] today for chemotherapy and after his port was flushed he developed some chills and chest discomfort.  These symptoms resolved quickly and he has been chest pain free since admission.  His port-a-cath is cranially directed on imaging. Surgery to see.    Interval History:  History taken from: patient family RN  . Patient back from port removal.  . Patient feeling much better. He desires to go home today, but understands that he is not likely ready.    Review of Systems:   Review of Systems   Constitutional: Positive for activity change, chills and fatigue. Negative for appetite change and fever.   HENT: Negative for ear pain and sore throat.    Eyes: Negative for pain and visual disturbance.   Respiratory: Negative for cough and shortness of breath.    Cardiovascular: Negative for chest pain and palpitations.   Gastrointestinal: Negative for abdominal pain and nausea.   Endocrine: Negative for cold intolerance and heat intolerance.   Genitourinary: Negative for difficulty urinating and dysuria.   Musculoskeletal: Positive for arthralgias. Negative for gait problem.   Skin: Negative for color change and rash.   Neurological: Negative for dizziness, weakness and headaches.   Hematological: Negative for adenopathy. Does not bruise/bleed easily.   Psychiatric/Behavioral: Negative for agitation, confusion and sleep disturbance.       Objective:     Vital Signs  Vitals:    18 0430 18 0607 18 0646 18 0750   BP: 127/68  126/60 109/58   BP Location: Left arm   Right arm   Patient Position: Lying  Lying Lying   Pulse: 68  61 85   Resp: 18  18 20   Temp: 97 °F (36.1 °C)  97.9 °F (36.6 °C) 97.8 °F (36.6 °C)   TempSrc: Oral  Temporal Temporal   SpO2: 98%  97%  97%   Weight:  85.8 kg (189 lb 1 oz)     Height:           Physical Exam  Physical Exam   Constitutional: He is oriented to person, place, and time. He appears well-developed and well-nourished. No distress.   HENT:   Head: Normocephalic and atraumatic.   Right Ear: External ear normal.   Left Ear: External ear normal.   Nose: Nose normal.   Eyes: Conjunctivae and EOM are normal. Pupils are equal, round, and reactive to light.   Neck: Normal range of motion. Neck supple.   Cardiovascular: Normal rate, regular rhythm, normal heart sounds and intact distal pulses.   Pulmonary/Chest: Effort normal and breath sounds normal. No respiratory distress. He has no wheezes. He has no rales. He exhibits no tenderness.   Abdominal: Soft. Bowel sounds are normal. He exhibits no distension and no mass. There is no tenderness. There is no rebound and no guarding.   Musculoskeletal: Normal range of motion.   Neurological: He is alert and oriented to person, place, and time.   Skin: Skin is warm and dry. No rash noted. He is not diaphoretic. No erythema. No pallor.   Port site examined. Minimal erythema. No tenderness. Port now removed.   Psychiatric: He has a normal mood and affect. His behavior is normal.   Nursing note and vitals reviewed.      Medication Review    Current Facility-Administered Medications:   •  acetaminophen (TYLENOL) tablet 650 mg, 650 mg, Oral, Q6H PRN, Sony De Leon PA-C, 650 mg at 11/12/18 1905  •  allopurinol (ZYLOPRIM) tablet 100 mg, 100 mg, Oral, 4x Daily, Uche Obrien MD, 100 mg at 11/13/18 2003  •  aspirin EC tablet 81 mg, 81 mg, Oral, Daily, Uche Obrien MD, 81 mg at 11/13/18 0841  •  cefepime (MAXIPIME) 2 g/100 mL 0.9% NS (mbp), 2 g, Intravenous, Q8H, Uche Obrien MD, Last Rate: 25 mL/hr at 11/14/18 0543, 2 g at 11/14/18 0543  •  colchicine tablet 0.6 mg, 0.6 mg, Oral, Daily PRN, Uche Obrien MD  •  folic acid (FOLVITE) tablet 1 mg, 1 mg, Oral, Daily, Uche Obrien,  MD, 1 mg at 11/13/18 0840  •  heparin (porcine) 5000 UNIT/ML injection 5,000 Units, 5,000 Units, Subcutaneous, Q8H, Tono Arevalo MD  •  lidocaine-EPINEPHrine (XYLOCAINE W/EPI) 1 %-1:434440 injection, , , PRN, Tono Arevalo MD, 10 mL at 11/14/18 0733  •  naproxen (NAPROSYN) tablet 500 mg, 500 mg, Oral, BID With Meals, Uche Obrien MD, 500 mg at 11/13/18 1724  •  ondansetron (ZOFRAN) tablet 4 mg, 4 mg, Oral, 4x Daily PRN, Uche Obrien MD  •  oxybutynin (DITROPAN) tablet 5 mg, 5 mg, Oral, BID, Uche Obrien MD, 5 mg at 11/13/18 2003  •  pantoprazole (PROTONIX) EC tablet 40 mg, 40 mg, Oral, QAM, Uche Obrien MD, 40 mg at 11/14/18 0543  •  [DISCONTINUED] vancomycin 1750 mg/500 mL 0.9% NS IVPB ([facility]), 20 mg/kg, Intravenous, Once **AND** Pharmacy to dose vancomycin, , Does not apply, Continuous PRN, Uche Obrien MD  •  [COMPLETED] Insert peripheral IV, , , Once **AND** sodium chloride 0.9 % flush 10 mL, 10 mL, Intravenous, PRN, Sony De Leon PA-C  •  sodium chloride 0.9 % flush 3 mL, 3 mL, Intravenous, Q12H, Uche Obrien MD, 3 mL at 11/13/18 2003  •  sodium chloride 0.9 % flush 3-10 mL, 3-10 mL, Intravenous, PRN, Uche Obrien MD  •  sotalol (BETAPACE) half tablet 40 mg, 40 mg, Oral, BID - RT, Uche Obrien MD, 40 mg at 11/14/18 1015  •  tamsulosin (FLOMAX) 24 hr capsule 0.4 mg, 0.4 mg, Oral, Nightly, Uche Obrien MD, 0.4 mg at 11/13/18 2003  •  vancomycin 1250 mg/250 mL 0.9% NS IVPB ([facility]), 15 mg/kg, Intravenous, Q12H, Uche Obrien MD, Last Rate: 0 mL/hr at 11/13/18 0317, 1,250 mg at 11/14/18 0018     Diagnostic Data    Lab Results (last 24 hours)     Procedure Component Value Units Date/Time    Tissue Pathology Exam [[id number]] Collected:  11/14/18 0733    Specimen:  Hardware / Foreign Body from Chest, Right Updated:  11/14/18 0853    Wound Culture - Wound, Chest, Right [[id number]] Collected:  11/14/18 0741    Specimen:  Wound from Chest, Right  Updated:  11/14/18 0817     Gram Stain Rare (1+) WBCs seen      No organisms seen    Blood Culture - Blood, Blood, Venous Line [[id number]]  (Abnormal) Collected:  11/12/18 1026    Specimen:  Blood, Venous Line Updated:  11/14/18 0637     Blood Culture Gram Negative Bacilli     Isolated from Aerobic Bottle     Gram Stain Aerobic Bottle Gram negative bacilli     Comment: 2 bottles of 3 bottles drawn positive for gram neg bacillus       Blood Culture - Blood, Blood, Venous Line [[id number]]  (Abnormal) Collected:  11/12/18 1033    Specimen:  Blood, Venous Line Updated:  11/14/18 0637     Blood Culture Corynebacterium species, not diphtheriae     Comment: Consistent with contamination at time of collection.  Susceptibility not indicated          Gram Stain Aerobic Bottle Gram positive bacilli     Comment: 1 positive for gram positive bacillus of 3 drawn       Narrative:       Blood culture does not meet the specified criteria for PCR identification.  If pregnant, immunocompromised, or clinical concern for meningitis, call lab to run BCID for Listeria monocytogenes.    CBC & Differential [[id number]] Collected:  11/14/18 0511    Specimen:  Blood Updated:  11/14/18 0619    Narrative:       The following orders were created for panel order CBC & Differential.  Procedure                               Abnormality         Status                     ---------                               -----------         ------                     Scan Slide[[id number]]                                                                  CBC Auto Differential[[id number]]        Abnormal            Final result                 Please view results for these tests on the individual orders.    CBC Auto Differential [[id number]]  (Abnormal) Collected:  11/14/18 0511    Specimen:  Blood Updated:  11/14/18 0619     WBC 14.03 10*3/mm3      RBC 2.47 10*6/mm3      Hemoglobin 8.4 g/dL      Hematocrit 24.7 %      .0 fL      MCH 34.0 pg      MCHC 34.0  g/dL      RDW 25.0 %      RDW-SD 90.8 fl      MPV -- fL      Comment: Results not available        Platelets 109 10*3/mm3      Neutrophil % 37.9 %      Lymphocyte % 10.9 %      Monocyte % 31.0 %      Eosinophil % 3.8 %      Basophil % 0.9 %      Immature Grans % 15.5 %      Neutrophils, Absolute 5.32 10*3/mm3      Lymphocytes, Absolute 1.53 10*3/mm3      Monocytes, Absolute 4.35 10*3/mm3      Eosinophils, Absolute 0.54 10*3/mm3      Basophils, Absolute 0.12 10*3/mm3      Immature Grans, Absolute 2.17 10*3/mm3      nRBC 0.6 /100 WBC     Basic Metabolic Panel [[id number]]  (Abnormal) Collected:  11/14/18 0511    Specimen:  Blood Updated:  11/14/18 0606     Glucose 102 mg/dL      BUN 14 mg/dL      Creatinine 0.99 mg/dL      Sodium 138 mmol/L      Potassium 4.4 mmol/L      Chloride 108 mmol/L      CO2 21.0 mmol/L      Calcium 8.1 mg/dL      eGFR Non African Amer 73 mL/min/1.73      BUN/Creatinine Ratio 14.1     Anion Gap 9.0 mmol/L     Narrative:       The MDRD GFR formula is only valid for adults with stable renal function between ages 18 and 70.    Vancomycin, Trough [[id number]]  (Abnormal) Collected:  11/13/18 2152    Specimen:  Blood Updated:  11/13/18 2237     Vancomycin Trough 16.94 mcg/mL     Vancomycin, Peak [[id number]]  (Abnormal) Collected:  11/13/18 1648    Specimen:  Blood Updated:  11/13/18 1715     Vancomycin Peak 28.13 mcg/mL     Blood Culture - Blood, Arm, Right [[id number]] Collected:  11/12/18 1640    Specimen:  Blood from Arm, Right Updated:  11/13/18 1715     Blood Culture No growth at 24 hours    Blood Culture - Blood, Arm, Right [[id number]] Collected:  11/12/18 1723    Specimen:  Blood from Arm, Right Updated:  11/13/18 1715     Blood Culture No growth at 24 hours    Blood Culture ID, PCR - Blood, Blood, Venous Line [[id number]]  (Normal) Collected:  11/12/18 1026    Specimen:  Blood, Venous Line Updated:  11/13/18 1328     BCID, PCR No organism detected by BCID PCR.          I reviewed the patient's  new clinical results.    Assessment/Plan:     Active Hospital Problems    Diagnosis Date Noted   • **Sepsis (CMS/HCC) [A41.9] 11/13/2018   • Encounter for care related to Port-a-Cath [Z45.2] 11/12/2018   • Port or reservoir infection [T80.212A] 11/12/2018     Added automatically from request for surgery [id number]       #. Sepsis. Vanc/Cefepime. Cultures pending. Mike results. ECHO.  #. Port-a-cath cranially directed.   11/14. Port removed.  11/13. Surgery consulted. Appreciate input.  #. CML. Active chemo patient. Consult heme/onc as needed.    Will monitor patient's hospital course and adjust treatment as hospital course dictates.    DVT prophylaxis: Heparin  Code status is   Code Status and Medical Interventions:   Ordered at: 11/12/18 1644     Code Status:    CPR     Medical Interventions (Level of Support Prior to Arrest):    Full       Plan for disposition:Where: home and When:  2-3 days      Time:           This document has been electronically signed by Kin Hummel MD on November 14, 2018 12:04 PM

## 2018-11-14 NOTE — OP NOTE
REMOVAL VENOUS ACCESS DEVICE  Procedure Note    Jan Humphrey  11/12/2018 - 11/14/2018    Pre-op Diagnosis:   Port or reservoir infection [T80.212A]    Post-op Diagnosis:     Post-Op Diagnosis Codes:     * Port or reservoir infection [T80.212A]    Procedure/CPT® Codes:      Procedure(s):  REMOVAL VENOUS ACCESS DEVICE    Surgeon(s):  Tono Arevalo MD    Anesthesia: Choice    Staff:   Circulator: Yoli Main RN  Scrub Person: Jarett Feng  Assistant: Christa Cornell CSA    Estimated Blood Loss: minimal    Specimens:                ID Type Source Tests Collected by Time   A : old mediport Hardware / Foreign Body Chest, Right TISSUE PATHOLOGY EXAM Tono Arevalo MD 11/14/2018 0733         Drains:   None    Indications:  78-year-old gentleman presented 2 days ago with sepsis and noted to have his right IJ MediPort had flipped up cranially from its original position.  Port appeared to be working and was concerned that this could be involved with his sepsis was recommended that he have this removed.  Presents now to have the port removed.    Findings:  Port removed without incident.  No obvious pus involved.  Culture of the fluid around port was sent for Gram stain and culture    Complications:  None    Procedure:  Patient was brought to the operating room where he was placed under IV sedation without any difficulty. He had SCDs in place. He was receiving vancomycin IV antibiotics preoperatively. His right upper chest was prepped and draped in the normal sterile fashion. Appropriate timeout was taken. Infiltrated local. Made a transverse incision through his previous scar sharply down to the port. We cut the 2 Prolene stitches and then removed the port and catheter as 1 piece. Swab was taken of the fluid around the cath and that will be sent for gram stain and culture. We closed the tunnel with a 4-0 Monocryl figure-of-eight stitch. Good hemostasis noted. We closed the wound then with a  running 4-0 Monocryl subcuticular stitch. Glue was used for final skin closure. Patient was then transferred to recovery room in awake and stable condition.        Disposition:  Transferred to recovery in stable condition.        Tono Arevalo MD     Date: 11/14/2018  Time: 7:41 AM

## 2018-11-15 ENCOUNTER — APPOINTMENT (OUTPATIENT)
Dept: ONCOLOGY | Facility: HOSPITAL | Age: 78
End: 2018-11-15

## 2018-11-15 LAB
ANION GAP SERPL CALCULATED.3IONS-SCNC: 9 MMOL/L (ref 5–15)
BASOPHILS # BLD AUTO: 0.04 10*3/MM3 (ref 0–0.2)
BASOPHILS NFR BLD AUTO: 0.4 % (ref 0–2)
BUN BLD-MCNC: 14 MG/DL (ref 7–21)
BUN/CREAT SERPL: 17.7 (ref 7–25)
CALCIUM SPEC-SCNC: 8.3 MG/DL (ref 8.4–10.2)
CHLORIDE SERPL-SCNC: 108 MMOL/L (ref 95–110)
CO2 SERPL-SCNC: 20 MMOL/L (ref 22–31)
CREAT BLD-MCNC: 0.79 MG/DL (ref 0.7–1.3)
DEPRECATED RDW RBC AUTO: 84 FL (ref 35.1–43.9)
EOSINOPHIL # BLD AUTO: 0.47 10*3/MM3 (ref 0–0.7)
EOSINOPHIL NFR BLD AUTO: 4.1 % (ref 0–7)
ERYTHROCYTE [DISTWIDTH] IN BLOOD BY AUTOMATED COUNT: 23.9 % (ref 11.5–14.5)
GFR SERPL CREATININE-BSD FRML MDRD: 95 ML/MIN/1.73 (ref 42–98)
GLUCOSE BLD-MCNC: 93 MG/DL (ref 60–100)
HCT VFR BLD AUTO: 25.9 % (ref 39–49)
HGB BLD-MCNC: 9.2 G/DL (ref 13.7–17.3)
IMM GRANULOCYTES # BLD: 2.02 10*3/MM3 (ref 0–0.02)
IMM GRANULOCYTES NFR BLD: 17.8 % (ref 0–0.5)
LYMPHOCYTES # BLD AUTO: 2.02 10*3/MM3 (ref 0.6–4.2)
LYMPHOCYTES NFR BLD AUTO: 17.8 % (ref 10–50)
MCH RBC QN AUTO: 34.8 PG (ref 26.5–34)
MCHC RBC AUTO-ENTMCNC: 35.5 G/DL (ref 31.5–36.3)
MCV RBC AUTO: 98.1 FL (ref 80–98)
MONOCYTES # BLD AUTO: 1.9 10*3/MM3 (ref 0–0.9)
MONOCYTES NFR BLD AUTO: 16.7 % (ref 0–12)
NEUTROPHILS # BLD AUTO: 4.9 10*3/MM3 (ref 2–8.6)
NEUTROPHILS NFR BLD AUTO: 43.2 % (ref 37–80)
PLATELET # BLD AUTO: 128 10*3/MM3 (ref 150–450)
PMV BLD AUTO: ABNORMAL FL (ref 8–12)
POTASSIUM BLD-SCNC: 4.3 MMOL/L (ref 3.5–5.1)
RBC # BLD AUTO: 2.64 10*6/MM3 (ref 4.37–5.74)
SODIUM BLD-SCNC: 137 MMOL/L (ref 137–145)
WBC NRBC COR # BLD: 11.35 10*3/MM3 (ref 3.2–9.8)

## 2018-11-15 PROCEDURE — 25010000002 CEFEPIME 2 G/NS 100 ML SOLUTION: Performed by: HOSPITALIST

## 2018-11-15 PROCEDURE — 85025 COMPLETE CBC W/AUTO DIFF WBC: CPT | Performed by: HOSPITALIST

## 2018-11-15 PROCEDURE — 80048 BASIC METABOLIC PNL TOTAL CA: CPT | Performed by: HOSPITALIST

## 2018-11-15 PROCEDURE — 25010000002 CEFEPIME PER 500 MG: Performed by: HOSPITALIST

## 2018-11-15 PROCEDURE — 25010000002 HEPARIN (PORCINE) PER 1000 UNITS: Performed by: SURGERY

## 2018-11-15 RX ADMIN — NAPROXEN 500 MG: 500 TABLET ORAL at 17:49

## 2018-11-15 RX ADMIN — ALLOPURINOL 100 MG: 100 TABLET ORAL at 08:46

## 2018-11-15 RX ADMIN — Medication 3 ML: at 21:17

## 2018-11-15 RX ADMIN — PANTOPRAZOLE SODIUM 40 MG: 40 TABLET, DELAYED RELEASE ORAL at 06:28

## 2018-11-15 RX ADMIN — NAPROXEN 500 MG: 500 TABLET ORAL at 08:47

## 2018-11-15 RX ADMIN — FOLIC ACID 1 MG: 1 TABLET ORAL at 08:46

## 2018-11-15 RX ADMIN — HEPARIN SODIUM 5000 UNITS: 5000 INJECTION INTRAVENOUS; SUBCUTANEOUS at 05:40

## 2018-11-15 RX ADMIN — Medication 40 MG: at 21:16

## 2018-11-15 RX ADMIN — CEFEPIME HYDROCHLORIDE 2 G: 2 INJECTION, POWDER, FOR SOLUTION INTRAVENOUS at 14:00

## 2018-11-15 RX ADMIN — TAMSULOSIN HYDROCHLORIDE 0.4 MG: 0.4 CAPSULE ORAL at 21:17

## 2018-11-15 RX ADMIN — Medication 3 ML: at 08:47

## 2018-11-15 RX ADMIN — ALLOPURINOL 100 MG: 100 TABLET ORAL at 14:00

## 2018-11-15 RX ADMIN — OXYBUTYNIN CHLORIDE 5 MG: 5 TABLET ORAL at 08:47

## 2018-11-15 RX ADMIN — ASPIRIN 81 MG: 81 TABLET, COATED ORAL at 21:17

## 2018-11-15 RX ADMIN — Medication 10 ML: at 17:49

## 2018-11-15 RX ADMIN — CEFEPIME HYDROCHLORIDE 2 G: 2 INJECTION, POWDER, FOR SOLUTION INTRAVENOUS at 05:39

## 2018-11-15 RX ADMIN — OXYBUTYNIN CHLORIDE 5 MG: 5 TABLET ORAL at 21:17

## 2018-11-15 RX ADMIN — Medication 40 MG: at 08:46

## 2018-11-15 RX ADMIN — ALLOPURINOL 100 MG: 100 TABLET ORAL at 21:16

## 2018-11-15 RX ADMIN — HEPARIN SODIUM 5000 UNITS: 5000 INJECTION INTRAVENOUS; SUBCUTANEOUS at 21:16

## 2018-11-15 RX ADMIN — HEPARIN SODIUM 5000 UNITS: 5000 INJECTION INTRAVENOUS; SUBCUTANEOUS at 14:00

## 2018-11-15 RX ADMIN — CEFEPIME HYDROCHLORIDE 2 G: 2 INJECTION, POWDER, FOR SOLUTION INTRAVENOUS at 21:16

## 2018-11-15 RX ADMIN — ALLOPURINOL 100 MG: 100 TABLET ORAL at 17:49

## 2018-11-15 NOTE — PLAN OF CARE
Problem: Patient Care Overview  Goal: Plan of Care Review  Outcome: Ongoing (interventions implemented as appropriate)   11/15/18 0137   Coping/Psychosocial   Plan of Care Reviewed With patient     Goal: Individualization and Mutuality  Outcome: Ongoing (interventions implemented as appropriate)    Goal: Discharge Needs Assessment  Outcome: Ongoing (interventions implemented as appropriate)    Goal: Interprofessional Rounds/Family Conf  Outcome: Ongoing (interventions implemented as appropriate)      Problem: Fall Risk (Adult)  Goal: Absence of Fall  Outcome: Ongoing (interventions implemented as appropriate)      Problem: Sepsis/Septic Shock (Adult)  Goal: Signs and Symptoms of Listed Potential Problems Will be Absent, Minimized or Managed (Sepsis/Septic Shock)  Outcome: Ongoing (interventions implemented as appropriate)

## 2018-11-15 NOTE — PROGRESS NOTES
MEDICINE DAILY PROGRESS NOTE  NAME: Jan Humphrey  : 1940  MRN: [id number]     LOS: 2 days     PROVIDER OF SERVICE: Kin Hummel MD    Chief Complaint: Sepsis (CMS/HCC)    Subjective:   HPI: Patient presented to the [facility] today for chemotherapy and after his port was flushed he developed some chills and chest discomfort.  These symptoms resolved quickly and he has been chest pain free since admission.  His port-a-cath is cranially directed on imaging. Surgery to see.    Interval History:  History taken from: patient family RN  11/15. Patient doing good again today. Feels at baseline. Afebrile. No acute events overnight.  . Patient back from port removal.  . Patient feeling much better. He desires to go home today, but understands that he is not likely ready.    Review of Systems:   Review of Systems   Constitutional: Positive for activity change, chills and fatigue. Negative for appetite change and fever.   HENT: Negative for ear pain and sore throat.    Eyes: Negative for pain and visual disturbance.   Respiratory: Negative for cough and shortness of breath.    Cardiovascular: Negative for chest pain and palpitations.   Gastrointestinal: Negative for abdominal pain and nausea.   Endocrine: Negative for cold intolerance and heat intolerance.   Genitourinary: Negative for difficulty urinating and dysuria.   Musculoskeletal: Positive for arthralgias. Negative for gait problem.   Skin: Negative for color change and rash.   Neurological: Negative for dizziness, weakness and headaches.   Hematological: Negative for adenopathy. Does not bruise/bleed easily.   Psychiatric/Behavioral: Negative for agitation, confusion and sleep disturbance.       Objective:     Vital Signs  Vitals:    18 2114 11/15/18 0010 11/15/18 0322 11/15/18 0839   BP: 130/64 105/56 121/60 124/64   BP Location: Left arm Left arm Left arm Left arm   Patient Position: Lying Lying Lying Sitting   Pulse: 68 60 68 69    Resp: 18 18 18 18   Temp: 97 °F (36.1 °C) 96.8 °F (36 °C) 96.7 °F (35.9 °C) 96.7 °F (35.9 °C)   TempSrc: Oral Oral Oral Oral   SpO2: 96% 94% 94% 99%   Weight:       Height:           Physical Exam  Physical Exam   Constitutional: He is oriented to person, place, and time. He appears well-developed and well-nourished. No distress.   HENT:   Head: Normocephalic and atraumatic.   Right Ear: External ear normal.   Left Ear: External ear normal.   Nose: Nose normal.   Eyes: Conjunctivae and EOM are normal. Pupils are equal, round, and reactive to light.   Neck: Normal range of motion. Neck supple.   Cardiovascular: Normal rate, regular rhythm, normal heart sounds and intact distal pulses.   Pulmonary/Chest: Effort normal and breath sounds normal. No respiratory distress. He has no wheezes. He has no rales. He exhibits no tenderness.   Abdominal: Soft. Bowel sounds are normal. He exhibits no distension and no mass. There is no tenderness. There is no rebound and no guarding.   Musculoskeletal: Normal range of motion.   Neurological: He is alert and oriented to person, place, and time.   Skin: Skin is warm and dry. No rash noted. He is not diaphoretic. No erythema. No pallor.   Port site examined. Minimal erythema. No tenderness. Port now removed.   Psychiatric: He has a normal mood and affect. His behavior is normal.   Nursing note and vitals reviewed.      Medication Review    Current Facility-Administered Medications:   •  acetaminophen (TYLENOL) tablet 650 mg, 650 mg, Oral, Q6H PRN, Sony De Leon PA-C, 650 mg at 11/12/18 1905  •  allopurinol (ZYLOPRIM) tablet 100 mg, 100 mg, Oral, 4x Daily, Uche Obrien MD, 100 mg at 11/15/18 0846  •  aspirin EC tablet 81 mg, 81 mg, Oral, Daily, Uche Obrien MD, 81 mg at 11/13/18 0841  •  cefepime (MAXIPIME) 2 g/100 mL 0.9% NS (mbp), 2 g, Intravenous, Q8H, Uche Obrien MD, Last Rate: 25 mL/hr at 11/14/18 0543, 2 g at 11/15/18 0539  •  colchicine tablet 0.6  mg, 0.6 mg, Oral, Daily PRN, Uche Obrien MD  •  folic acid (FOLVITE) tablet 1 mg, 1 mg, Oral, Daily, Uche Obrien MD, 1 mg at 11/15/18 0846  •  heparin (porcine) 5000 UNIT/ML injection 5,000 Units, 5,000 Units, Subcutaneous, Q8H, Tono Arevalo MD, 5,000 Units at 11/15/18 0540  •  lidocaine-EPINEPHrine (XYLOCAINE W/EPI) 1 %-1:776923 injection, , , PRN, Tono Arevalo MD, 10 mL at 11/14/18 0733  •  naproxen (NAPROSYN) tablet 500 mg, 500 mg, Oral, BID With Meals, Uche Obrien MD, 500 mg at 11/15/18 0847  •  ondansetron (ZOFRAN) tablet 4 mg, 4 mg, Oral, 4x Daily PRN, Uche Obrien MD  •  oxybutynin (DITROPAN) tablet 5 mg, 5 mg, Oral, BID, Uche Obrien MD, 5 mg at 11/15/18 0847  •  pantoprazole (PROTONIX) EC tablet 40 mg, 40 mg, Oral, QAM, Uche Obrien MD, 40 mg at 11/15/18 0628  •  [DISCONTINUED] vancomycin 1750 mg/500 mL 0.9% NS IVPB ([facility]), 20 mg/kg, Intravenous, Once **AND** Pharmacy to dose vancomycin, , Does not apply, Continuous PRN, Uche Obrien MD  •  [COMPLETED] Insert peripheral IV, , , Once **AND** sodium chloride 0.9 % flush 10 mL, 10 mL, Intravenous, PRN, Sony De Leon PA-C  •  sodium chloride 0.9 % flush 3 mL, 3 mL, Intravenous, Q12H, Uche Obrien MD, 3 mL at 11/15/18 0847  •  sodium chloride 0.9 % flush 3-10 mL, 3-10 mL, Intravenous, PRN, Uche Obrien MD  •  sotalol (BETAPACE) half tablet 40 mg, 40 mg, Oral, BID - RT, Uche Obrien MD, 40 mg at 11/15/18 0846  •  tamsulosin (FLOMAX) 24 hr capsule 0.4 mg, 0.4 mg, Oral, Nightly, Uche Obrien MD, 0.4 mg at 11/14/18 2115  •  vancomycin 1250 mg/250 mL 0.9% NS IVPB ([facility]), 15 mg/kg, Intravenous, Q12H, Uche Obrien MD, Last Rate: 0 mL/hr at 11/13/18 0317, 1,250 mg at 11/14/18 2352     Diagnostic Data    Lab Results (last 24 hours)     Procedure Component Value Units Date/Time    Basic Metabolic Panel [[id number]]  (Abnormal) Collected:  11/15/18 0532    Specimen:  Blood Updated:   11/15/18 0642     Glucose 93 mg/dL      BUN 14 mg/dL      Creatinine 0.79 mg/dL      Sodium 137 mmol/L      Potassium 4.3 mmol/L      Chloride 108 mmol/L      CO2 20.0 mmol/L      Calcium 8.3 mg/dL      eGFR Non African Amer 95 mL/min/1.73      BUN/Creatinine Ratio 17.7     Anion Gap 9.0 mmol/L     Narrative:       The MDRD GFR formula is only valid for adults with stable renal function between ages 18 and 70.    Wound Culture - Wound, Chest, Right [[id number]] Collected:  11/14/18 0741    Specimen:  Wound from Chest, Right Updated:  11/15/18 0637     Wound Culture No growth at 24 hours     Gram Stain Rare (1+) WBCs seen      No organisms seen    CBC & Differential [[id number]] Collected:  11/15/18 0532    Specimen:  Blood Updated:  11/15/18 0612    Narrative:       The following orders were created for panel order CBC & Differential.  Procedure                               Abnormality         Status                     ---------                               -----------         ------                     Scan Slide[[id number]]                                                                  CBC Auto Differential[[id number]]        Abnormal            Final result                 Please view results for these tests on the individual orders.    CBC Auto Differential [[id number]]  (Abnormal) Collected:  11/15/18 0532    Specimen:  Blood Updated:  11/15/18 0612     WBC 11.35 10*3/mm3      RBC 2.64 10*6/mm3      Hemoglobin 9.2 g/dL      Hematocrit 25.9 %      MCV 98.1 fL      MCH 34.8 pg      MCHC 35.5 g/dL      RDW 23.9 %      RDW-SD 84.0 fl      MPV -- fL      Comment: Instrument unable to calculate result.         Platelets 128 10*3/mm3      Neutrophil % 43.2 %      Lymphocyte % 17.8 %      Monocyte % 16.7 %      Eosinophil % 4.1 %      Basophil % 0.4 %      Immature Grans % 17.8 %      Neutrophils, Absolute 4.90 10*3/mm3      Lymphocytes, Absolute 2.02 10*3/mm3      Monocytes, Absolute 1.90 10*3/mm3      Eosinophils,  "Absolute 0.47 10*3/mm3      Basophils, Absolute 0.04 10*3/mm3      Immature Grans, Absolute 2.02 10*3/mm3     Blood Culture - Blood, Arm, Right [[id number]] Collected:  11/12/18 1640    Specimen:  Blood from Arm, Right Updated:  11/14/18 1715     Blood Culture No growth at 2 days    Blood Culture - Blood, Arm, Right [[id number]] Collected:  11/12/18 1723    Specimen:  Blood from Arm, Right Updated:  11/14/18 1715     Blood Culture No growth at 2 days    Tissue Pathology Exam [[id number]] Collected:  11/14/18 0733    Specimen:  Hardware / Foreign Body from Chest, Right Updated:  11/14/18 1432     Case Report --     Surgical Pathology Report                         Case: [id number]                                  Authorizing Provider:  Tono Arevalo MD      Collected:           11/14/2018 07:33 AM          Ordering Location:     [facility]             Received:            11/14/2018 08:53 AM                                 [place] [facility]                                                              Pathologist:           Son Wayne MD                                                         Specimen:    Chest, Right, old mediport                                                                  Final Diagnosis --     MEDIPORT.        Gross Description --     The container is labeled \"old Mediport, right side of chest\" and has a purple plastic truncated pyramid as an injection port presently measuring 3.1 x 2.8 x 1.4 cm.  Its base is marked BARD [id number].  It is attached to a white rubber tube measuring 20.0 cm long and 0.3 cm in diameter.  The tube is marked with dots and numbers at intervals.  No further processing is undertaken.             I reviewed the patient's new clinical results.    Assessment/Plan:     Active Hospital Problems    Diagnosis Date Noted   • **Sepsis (CMS/HCC) [A41.9] 11/13/2018   • Encounter for care related to Port-a-Cath [Z45.2] 11/12/2018   • Port or reservoir infection [T80.212A] " 11/12/2018     Added automatically from request for surgery [id number]       #. Sepsis. Vanc/Cefepime. Cultures pending. Mike results. ECHO. Blood culture showing GNB. Not final yet. Will discuss with micro. Per micro GNB growing. Should be final tomorrow. Will stop vanc.  ECHO Results:  Interpretation Summary   · Left ventricular systolic function is normal. Estimated EF = 58%.  · Left ventricular diastolic dysfunction (grade I) consistent with impaired relaxation.  · Left atrial cavity size is borderline dilated.  · Mild mitral valve regurgitation is present  · Mild to moderate tricuspid valve regurgitation is present.  · Electronic lead present in the ventricle.  · There is thickening of the noncoronary cusp of the aortic valve. Calcification/ sclerosis likely. Trace-to-mild aortic valve regurgitation is present.   #. Port-a-cath cranially directed.   11/14. Port removed.  11/13. Surgery consulted. Appreciate input.  #. CML. Active chemo patient. Consult heme/onc as needed.    Will monitor patient's hospital course and adjust treatment as hospital course dictates.    DVT prophylaxis: Heparin  Code status is   Code Status and Medical Interventions:   Ordered at: 11/12/18 1644     Code Status:    CPR     Medical Interventions (Level of Support Prior to Arrest):    Full       Plan for disposition:Where: home and When:  1-2 days      Time:           This document has been electronically signed by Kin Hummel MD on November 15, 2018 12:38 PM

## 2018-11-16 ENCOUNTER — APPOINTMENT (OUTPATIENT)
Dept: ONCOLOGY | Facility: HOSPITAL | Age: 78
End: 2018-11-16

## 2018-11-16 VITALS
DIASTOLIC BLOOD PRESSURE: 59 MMHG | HEART RATE: 67 BPM | HEIGHT: 71 IN | SYSTOLIC BLOOD PRESSURE: 112 MMHG | TEMPERATURE: 96.5 F | RESPIRATION RATE: 18 BRPM | BODY MASS INDEX: 26.44 KG/M2 | OXYGEN SATURATION: 95 % | WEIGHT: 188.9 LBS

## 2018-11-16 LAB
ANION GAP SERPL CALCULATED.3IONS-SCNC: 10 MMOL/L (ref 5–15)
ANISOCYTOSIS BLD QL: ABNORMAL
BACTERIA SPEC AEROBE CULT: ABNORMAL
BASOPHILS # BLD MANUAL: 0.29 10*3/MM3 (ref 0–0.2)
BASOPHILS NFR BLD AUTO: 2 % (ref 0–2)
BUN BLD-MCNC: 15 MG/DL (ref 7–21)
BUN/CREAT SERPL: 17 (ref 7–25)
CALCIUM SPEC-SCNC: 8.9 MG/DL (ref 8.4–10.2)
CHLORIDE SERPL-SCNC: 105 MMOL/L (ref 95–110)
CO2 SERPL-SCNC: 22 MMOL/L (ref 22–31)
CREAT BLD-MCNC: 0.88 MG/DL (ref 0.7–1.3)
DEPRECATED RDW RBC AUTO: 85 FL (ref 35.1–43.9)
EOSINOPHIL # BLD MANUAL: 0.14 10*3/MM3 (ref 0–0.7)
EOSINOPHIL NFR BLD MANUAL: 1 % (ref 0–7)
ERYTHROCYTE [DISTWIDTH] IN BLOOD BY AUTOMATED COUNT: 24.2 % (ref 11.5–14.5)
GFR SERPL CREATININE-BSD FRML MDRD: 84 ML/MIN/1.73 (ref 42–98)
GLUCOSE BLD-MCNC: 96 MG/DL (ref 60–100)
GRAM STN SPEC: ABNORMAL
HCT VFR BLD AUTO: 27.4 % (ref 39–49)
HGB BLD-MCNC: 9.5 G/DL (ref 13.7–17.3)
HYPOCHROMIA BLD QL: ABNORMAL
ISOLATED FROM: ABNORMAL
LYMPHOCYTES # BLD MANUAL: 2.16 10*3/MM3 (ref 0.6–4.2)
LYMPHOCYTES NFR BLD MANUAL: 12 % (ref 0–12)
LYMPHOCYTES NFR BLD MANUAL: 15 % (ref 10–50)
MACROCYTES BLD QL SMEAR: ABNORMAL
MCH RBC QN AUTO: 34.3 PG (ref 26.5–34)
MCHC RBC AUTO-ENTMCNC: 34.7 G/DL (ref 31.5–36.3)
MCV RBC AUTO: 98.9 FL (ref 80–98)
METAMYELOCYTES NFR BLD MANUAL: 3 % (ref 0–0)
MONOCYTES # BLD AUTO: 1.73 10*3/MM3 (ref 0–0.9)
MYELOCYTES NFR BLD MANUAL: 2 % (ref 0–0)
NEUTROPHILS # BLD AUTO: 8.49 10*3/MM3 (ref 2–8.6)
NEUTROPHILS NFR BLD MANUAL: 49 % (ref 37–80)
NEUTS BAND NFR BLD MANUAL: 10 % (ref 0–5)
NRBC SPEC MANUAL: 1 /100 WBC (ref 0–0)
PLATELET # BLD AUTO: 138 10*3/MM3 (ref 150–450)
POLYCHROMASIA BLD QL SMEAR: ABNORMAL
POTASSIUM BLD-SCNC: 4.5 MMOL/L (ref 3.5–5.1)
RBC # BLD AUTO: 2.77 10*6/MM3 (ref 4.37–5.74)
SMALL PLATELETS BLD QL SMEAR: ABNORMAL
SODIUM BLD-SCNC: 137 MMOL/L (ref 137–145)
VARIANT LYMPHS NFR BLD MANUAL: 6 % (ref 0–5)
WBC MORPH BLD: NORMAL
WBC NRBC COR # BLD: 14.39 10*3/MM3 (ref 3.2–9.8)

## 2018-11-16 PROCEDURE — 25010000002 CEFEPIME PER 500 MG: Performed by: HOSPITALIST

## 2018-11-16 PROCEDURE — 85007 BL SMEAR W/DIFF WBC COUNT: CPT | Performed by: HOSPITALIST

## 2018-11-16 PROCEDURE — 80048 BASIC METABOLIC PNL TOTAL CA: CPT | Performed by: HOSPITALIST

## 2018-11-16 PROCEDURE — 87040 BLOOD CULTURE FOR BACTERIA: CPT | Performed by: FAMILY MEDICINE

## 2018-11-16 PROCEDURE — 85025 COMPLETE CBC W/AUTO DIFF WBC: CPT | Performed by: HOSPITALIST

## 2018-11-16 PROCEDURE — 25010000002 HEPARIN (PORCINE) PER 1000 UNITS: Performed by: SURGERY

## 2018-11-16 RX ORDER — LEVOFLOXACIN 750 MG/1
750 TABLET ORAL DAILY
Qty: 10 TABLET | Refills: 0 | Status: SHIPPED | OUTPATIENT
Start: 2018-11-16 | End: 2019-01-01

## 2018-11-16 RX ADMIN — ALLOPURINOL 100 MG: 100 TABLET ORAL at 09:05

## 2018-11-16 RX ADMIN — PANTOPRAZOLE SODIUM 40 MG: 40 TABLET, DELAYED RELEASE ORAL at 06:07

## 2018-11-16 RX ADMIN — ALLOPURINOL 100 MG: 100 TABLET ORAL at 11:47

## 2018-11-16 RX ADMIN — Medication 40 MG: at 09:05

## 2018-11-16 RX ADMIN — CEFEPIME HYDROCHLORIDE 2 G: 2 INJECTION, POWDER, FOR SOLUTION INTRAVENOUS at 06:07

## 2018-11-16 RX ADMIN — OXYBUTYNIN CHLORIDE 5 MG: 5 TABLET ORAL at 09:05

## 2018-11-16 RX ADMIN — HEPARIN SODIUM 5000 UNITS: 5000 INJECTION INTRAVENOUS; SUBCUTANEOUS at 06:07

## 2018-11-16 RX ADMIN — NAPROXEN 500 MG: 500 TABLET ORAL at 09:05

## 2018-11-16 RX ADMIN — FOLIC ACID 1 MG: 1 TABLET ORAL at 09:05

## 2018-11-16 NOTE — PROGRESS NOTES
MEDICINE DAILY PROGRESS NOTE  NAME: Jan Humphrey  : 1940  MRN: [id number]     LOS: 3 days     PROVIDER OF SERVICE: Kin Hummel MD    Chief Complaint: Sepsis (CMS/HCC)    Subjective:   HPI: Patient presented to the [facility] today for chemotherapy and after his port was flushed he developed some chills and chest discomfort.  These symptoms resolved quickly and he has been chest pain free since admission.  His port-a-cath is cranially directed on imaging. Surgery to see.    Interval History:  History taken from: patient family RN  . Patient at baseline. No acute events overnight.  11/15. Patient doing good again today. Feels at baseline. Afebrile. No acute events overnight.  . Patient back from port removal.  . Patient feeling much better. He desires to go home today, but understands that he is not likely ready.    Review of Systems:   Review of Systems   Constitutional: Positive for activity change, chills and fatigue. Negative for appetite change and fever.   HENT: Negative for ear pain and sore throat.    Eyes: Negative for pain and visual disturbance.   Respiratory: Negative for cough and shortness of breath.    Cardiovascular: Negative for chest pain and palpitations.   Gastrointestinal: Negative for abdominal pain and nausea.   Endocrine: Negative for cold intolerance and heat intolerance.   Genitourinary: Negative for difficulty urinating and dysuria.   Musculoskeletal: Positive for arthralgias. Negative for gait problem.   Skin: Negative for color change and rash.   Neurological: Negative for dizziness, weakness and headaches.   Hematological: Negative for adenopathy. Does not bruise/bleed easily.   Psychiatric/Behavioral: Negative for agitation, confusion and sleep disturbance.       Objective:     Vital Signs  Vitals:    18 0341 18 0529 18 0859 18 1319   BP: 115/60  113/59 112/59   BP Location: Right arm  Left arm Left arm   Patient Position: Lying   Lying Sitting   Pulse: 67  69 67   Resp: 18  18 18   Temp: 99.2 °F (37.3 °C)  97.1 °F (36.2 °C) 96.5 °F (35.8 °C)   TempSrc: Tympanic  Oral Oral   SpO2: 98%  93% 95%   Weight:  85.7 kg (188 lb 14.4 oz)     Height:           Physical Exam  Physical Exam   Constitutional: He is oriented to person, place, and time. He appears well-developed and well-nourished. No distress.   HENT:   Head: Normocephalic and atraumatic.   Right Ear: External ear normal.   Left Ear: External ear normal.   Nose: Nose normal.   Eyes: Conjunctivae and EOM are normal. Pupils are equal, round, and reactive to light.   Neck: Normal range of motion. Neck supple.   Cardiovascular: Normal rate, regular rhythm, normal heart sounds and intact distal pulses.   Pulmonary/Chest: Effort normal and breath sounds normal. No respiratory distress. He has no wheezes. He has no rales. He exhibits no tenderness.   Abdominal: Soft. Bowel sounds are normal. He exhibits no distension and no mass. There is no tenderness. There is no rebound and no guarding.   Musculoskeletal: Normal range of motion.   Neurological: He is alert and oriented to person, place, and time.   Skin: Skin is warm and dry. No rash noted. He is not diaphoretic. No erythema. No pallor.   Port site examined. Minimal erythema. No tenderness. Port now removed.   Psychiatric: He has a normal mood and affect. His behavior is normal.   Nursing note and vitals reviewed.      Medication Review  No current facility-administered medications for this encounter.     Current Outpatient Medications:   •  acetaminophen (TYLENOL) 325 MG tablet, Take 1 tablet by mouth As Needed., Disp: , Rfl:   •  allopurinol (ZYLOPRIM) 100 MG tablet, Take 1 tablet by mouth 4 (Four) Times a Day., Disp: 360 tablet, Rfl: 1  •  aspirin 81 MG EC tablet, Take 81 mg by mouth Daily., Disp: , Rfl:   •  colchicine 0.6 MG tablet, Take 0.6 mg by mouth Daily As Needed for Muscle / Joint Pain., Disp: , Rfl:   •  folic acid (FOLVITE) 1 MG  tablet, Take 1 tablet by mouth Daily., Disp: 90 tablet, Rfl: 3  •  omeprazole (PRILOSEC) 20 MG capsule, Take 1 capsule by mouth Daily., Disp: 90 capsule, Rfl: 3  •  ondansetron (ZOFRAN) 4 MG tablet, Take 1 tablet by mouth 4 (Four) Times a Day As Needed for Nausea or Vomiting., Disp: 40 tablet, Rfl: 3  •  oxybutynin (DITROPAN) 5 MG tablet, Take 5 mg by mouth 2 (Two) Times a Day., Disp: , Rfl:   •  sotalol (BETAPACE) 80 MG tablet, Take 0.5 tablets by mouth 2 (Two) Times a Day., Disp: 90 tablet, Rfl: 3  •  tamsulosin (FLOMAX) 0.4 MG capsule 24 hr capsule, Take 1 capsule by mouth every night., Disp: , Rfl:   •  diclofenac (VOLTAREN) 50 MG EC tablet, Take 1 tablet by mouth 2 (Two) Times a Day. (Patient not taking: Reported on 11/12/2018), Disp: 60 tablet, Rfl: 11  •  levoFLOXacin (LEVAQUIN) 750 MG tablet, Take 1 tablet by mouth Daily., Disp: 10 tablet, Rfl: 0     Diagnostic Data    Lab Results (last 24 hours)     Procedure Component Value Units Date/Time    Blood Culture - Blood, Arm, Right [[id number]] Collected:  11/16/18 0935    Specimen:  Blood from Arm, Right Updated:  11/16/18 0944    Blood Culture - Blood, Arm, Left [[id number]] Collected:  11/16/18 0936    Specimen:  Blood from Arm, Left Updated:  11/16/18 0944    CBC & Differential [[id number]] Collected:  11/16/18 0702    Specimen:  Blood Updated:  11/16/18 0820    Narrative:       The following orders were created for panel order CBC & Differential.  Procedure                               Abnormality         Status                     ---------                               -----------         ------                     Manual Differential[[id number]]          Abnormal            Final result               Scan Slide[[id number]]                                                                  CBC Auto Differential[[id number]]        Abnormal            Final result                 Please view results for these tests on the individual orders.    Manual Differential  [[id number]]  (Abnormal) Collected:  11/16/18 0702    Specimen:  Blood Updated:  11/16/18 0820     Neutrophil % 49.0 %      Lymphocyte % 15.0 %      Monocyte % 12.0 %      Eosinophil % 1.0 %      Basophil % 2.0 %      Bands %  10.0 %      Metamyelocyte % 3.0 %      Myelocyte % 2.0 %      Atypical Lymphocyte % 6.0 %      Neutrophils Absolute 8.49 10*3/mm3      Lymphocytes Absolute 2.16 10*3/mm3      Monocytes Absolute 1.73 10*3/mm3      Eosinophils Absolute 0.14 10*3/mm3      Basophils Absolute 0.29 10*3/mm3      nRBC 1.0 /100 WBC      Anisocytosis Mod/2+     Comment: FEW TARGET CELLS SEEN        Hypochromia Slight/1+     Macrocytes Slight/1+     Polychromasia Slight/1+     WBC Morphology Normal     Platelet Estimate Decreased    CBC Auto Differential [[id number]]  (Abnormal) Collected:  11/16/18 0702    Specimen:  Blood Updated:  11/16/18 0756     WBC 14.39 10*3/mm3      RBC 2.77 10*6/mm3      Hemoglobin 9.5 g/dL      Hematocrit 27.4 %      MCV 98.9 fL      MCH 34.3 pg      MCHC 34.7 g/dL      RDW 24.2 %      RDW-SD 85.0 fl      Platelets 138 10*3/mm3     Basic Metabolic Panel [[id number]]  (Normal) Collected:  11/16/18 0702    Specimen:  Blood Updated:  11/16/18 0727     Glucose 96 mg/dL      BUN 15 mg/dL      Creatinine 0.88 mg/dL      Sodium 137 mmol/L      Potassium 4.5 mmol/L      Chloride 105 mmol/L      CO2 22.0 mmol/L      Calcium 8.9 mg/dL      eGFR Non African Amer 84 mL/min/1.73      BUN/Creatinine Ratio 17.0     Anion Gap 10.0 mmol/L     Narrative:       The MDRD GFR formula is only valid for adults with stable renal function between ages 18 and 70.    Wound Culture - Wound, Chest, Right [[id number]] Collected:  11/14/18 0741    Specimen:  Wound from Chest, Right Updated:  11/16/18 0648     Wound Culture No growth at 2 days     Gram Stain Rare (1+) WBCs seen      No organisms seen    Blood Culture - Blood, Blood, Venous Line [[id number]]  (Abnormal)  (Susceptibility) Collected:  11/12/18 1026    Specimen:   Blood, Venous Line Updated:  11/16/18 0625     Blood Culture Acinetobacter lwoffii group     Isolated from Aerobic Bottle     Gram Stain Aerobic Bottle Gram negative bacilli     Comment: 2 bottles of 3 bottles drawn positive for gram neg bacillus       Susceptibility      Acinetobacter lwoffii group     CHIVO     Ampicillin + Sulbactam Susceptible     Cefepime Susceptible     Ceftazidime Susceptible     Ceftriaxone Susceptible     Levofloxacin Susceptible     Piperacillin Susceptible     Trimethoprim + Sulfamethoxazole Susceptible                          I reviewed the patient's new clinical results.    Assessment/Plan:     Active Hospital Problems    Diagnosis Date Noted   • **Sepsis (CMS/HCC) [A41.9] 11/13/2018   • Encounter for care related to Port-a-Cath [Z45.2] 11/12/2018   • Port or reservoir infection [T80.212A] 11/12/2018     Added automatically from request for surgery [id number]       #. Sepsis.   11/16. Vanc stopped 48 hours ago. Culture final. Levaquin  mg qd for 10 days. Talked with ID. Patient has appointment Monday for outpatient ID follow up.   11/14 - 11/15. Vanc/Cefepime. Cultures pending. Mike results. ECHO. Blood culture showing GNB. Not final yet. Will discuss with micro. Per micro GNB growing. Should be final tomorrow. Will stop vanc.  ECHO Results:  Interpretation Summary   · Left ventricular systolic function is normal. Estimated EF = 58%.  · Left ventricular diastolic dysfunction (grade I) consistent with impaired relaxation.  · Left atrial cavity size is borderline dilated.  · Mild mitral valve regurgitation is present  · Mild to moderate tricuspid valve regurgitation is present.  · Electronic lead present in the ventricle.  · There is thickening of the noncoronary cusp of the aortic valve. Calcification/ sclerosis likely. Trace-to-mild aortic valve regurgitation is present.   #. Port-a-cath cranially directed.   11/14. Port removed.  11/13. Surgery consulted. Appreciate input.  #. CML.  Active chemo patient. Consult heme/onc as needed.    Will monitor patient's hospital course and adjust treatment as hospital course dictates.    DVT prophylaxis: Heparin  Code status is   Code Status and Medical Interventions:   Ordered at: 11/12/18 1644     Code Status:    CPR     Medical Interventions (Level of Support Prior to Arrest):    Full       Plan for disposition:Where: home and When:  today      Time:           This document has been electronically signed by Kin Hummel MD on November 16, 2018 5:42 PM

## 2018-11-16 NOTE — DISCHARGE PLACEMENT REQUEST
"Jan Humphrey (78 y.o. Male)     Date of Birth Social Security Number Address Home Phone MRN    1940  [id number] [facility] [address] [phone] [id number]    Sikhism Marital Status          Gnosticist        Admission Date Admission Type Admitting Provider Attending Provider Department, Room/Bed    11/12/18 Emergency Uche Obrien MD Williams, Kevin L, MD [address], 431/1    Discharge Date Discharge Disposition Discharge Destination                       Attending Provider:  Uche Obrien MD    Allergies:  Doxycycline Hyclate, Sulfa Antibiotics, Other, Penicillins, Soma [Carisoprodol]    Isolation:  None   Infection:  None   Code Status:  CPR    Ht:  180.3 cm (71\")   Wt:  85.7 kg (188 lb 14.4 oz)    Admission Cmt:  None   Principal Problem:  Sepsis (CMS/[facility]) [A41.9]                 Active Insurance as of 11/12/2018     Primary Coverage     Payor Plan Insurance Group Employer/Plan Group    ANTHEM MEDICARE REPLACEMENT [employer] MEDICARE ADVANTAGE [id number]     Payor Plan Address Payor Plan Phone Number Payor Plan Fax Number Effective Dates    [address] [phone]  1/1/2018 - None Entered    [facility] [id number]       Subscriber Name Subscriber Birth Date Member ID       JAN HUMPHREY 1940 [id number]                 Emergency Contacts      (Rel.) Home Phone Work Phone Mobile Phone    Ranjith Humphrey (Son) [phone] -- [phone]    Lester Humphrey (Son) [phone] -- [phone]        Pt will need Ertapenem 1 gram q day x 14 days IV  Insurance Information                ANTHEM MEDICARE REPLACEMENT/ANTHEM MEDICARE ADVANTAGE Phone: [phone]    Subscriber: Jan Humphrey Subscriber#: [id number]    Group#: [id number] Precert#:              History & Physical      Tono Arevalo MD at 11/14/2018  6:49 AM          H&P reviewed. The patient was examined and there are no changes to the H&P.  "         Electronically signed by Tono Arevalo MD at 11/14/2018  6:49 AM   Source Note                 Referring Provider: Dr Hummel      Patient Care Team:  Seferino Tan MD as PCP - General (Family Medicine)  Jordin Roblero MD as Consulting Physician (Hematology and Oncology)  Goldie Beverly APRN as Nurse Practitioner (Oncology)      Subjective .     History of present illness:  78-year-old male who had a MediPort placed 3 months ago per Dr. Babb for treatment for CML.  Patient was admitted yesterday with sepsis and also of note his port catheter as stated been position and into the superior vena cava had flipped up into his internal jugular.  Some question whether the port is infected or not but it clearly is malpositioned.  Patient has improved with his treatment for sepsis.    Review of Systems   Constitutional: Negative.    HENT: Negative for hearing loss, nosebleeds and trouble swallowing.    Respiratory: Negative for apnea, chest tightness and shortness of breath.    Cardiovascular: Negative for chest pain and palpitations.   Gastrointestinal: Negative for abdominal distention, abdominal pain and vomiting.   Genitourinary: Negative for difficulty urinating, dysuria and frequency.   Musculoskeletal: Negative for back pain, joint swelling and neck pain.   Skin: Negative for rash.   Neurological: Negative for dizziness and weakness.   Hematological: Negative for adenopathy.   Psychiatric/Behavioral: Negative for agitation. The patient is not nervous/anxious.          History  Past Medical History:   Diagnosis Date   • Atrophy of testis    • Benign prostatic hyperplasia    • Borderline glaucoma    • Chronic myelomonocytic leukemia (CMS/HCC)    • Degenerative joint disease involving multiple joints    • GERD (gastroesophageal reflux disease)    • Gout    • History of echocardiogram 05/08/2012    Normal left ventricular systolic function, EF 60%. Early diastolic dysfunction. Mild aortic and pulmonic  regurgitation. Trace mitral and mild tricuspid regurgitation. No intracardiac mass pericardial effusion or cardiac thrombus.   • History of Holter monitoring 01/18/2011   • Impotence    • Lightheadedness    • Neck pain, musculoskeletal    • Sleep apnea     NOT WEARING C-PAP   • TIA (transient ischemic attack) 2014   , Past Surgical History:   Procedure Laterality Date   • CARDIAC CATHETERIZATION  09/30/2009    No epicardial coronary artery disease noted that would explain patient's chest pain of the abnormal stress test noted. Normal left ventricular systolic function with no wall motion abnormalities   • CARDIAC CATHETERIZATION  05/24/1989    Normal catheterization, false-positive exercise treadmill. Noncardiac chest pain   • CARDIAC PACEMAKER PLACEMENT  06/2008    Dual chamber permanent pacemaker implantation   • COLECTOMY PARTIAL / TOTAL  04/14/2000    Cecal diverticulitis. Removal of small segment of terminal ileum, cecum and right colon, sent to pathology   • COLON SURGERY  03/27/2016   • COLONOSCOPY  05/25/2012   • CYSTOSCOPY  11/15/2000    Urinary retention on the basis of medications and benign prostatic hypertrophy   • INGUINAL HERNIA REPAIR  02/15/2007    Recurrent right inguinal hernia. Repair of recurrent inguinal hernia with EPS Prolene mesh system.   • INGUINAL HERNIA REPAIR Right 1957   • LAPAROSCOPIC CHOLECYSTECTOMY  10/26/2006    Gallstones. Laparoscopic cholecystectomy with operative cholangiogram   • PROSTATECTOMY  11/17/2000    Benign prostatic hypertrophy with urinary retention. Prostatitis. transurethral resection of prostate.   • STEROID INJECTION  10/21/2010    Decadron (Gout)    • STEROID INJECTION  07/23/2013    Kenalog (Gout) (4)   , Family History   Problem Relation Age of Onset   • Cancer Other    • Colon cancer Other         parent   • Coronary artery disease Other    • Heart disease Other    • Hypertension Other    • Cholelithiasis Other    • Other Other         colon problems   ,  Social History     Tobacco Use   • Smoking status: Never Smoker   • Smokeless tobacco: Never Used   Substance Use Topics   • Alcohol use: No   • Drug use: No   , Home Medications:  Prior to Admission medications    Medication Sig Start Date End Date Taking? Authorizing Provider   acetaminophen (TYLENOL) 325 MG tablet Take 1 tablet by mouth As Needed. 4/26/12  Yes Sherin Silveira MD   allopurinol (ZYLOPRIM) 100 MG tablet Take 1 tablet by mouth 4 (Four) Times a Day. 10/22/18  Yes Seferino Tan MD   aspirin 81 MG EC tablet Take 81 mg by mouth Daily.   Yes Sherin Silveira MD   colchicine 0.6 MG tablet Take 0.6 mg by mouth Daily As Needed for Muscle / Joint Pain.   Yes Sherin Silveira MD   folic acid (FOLVITE) 1 MG tablet Take 1 tablet by mouth Daily. 10/22/18  Yes Seferino Tan MD   omeprazole (PRILOSEC) 20 MG capsule Take 1 capsule by mouth Daily. 10/22/18  Yes Seferino Tan MD   ondansetron (ZOFRAN) 4 MG tablet Take 1 tablet by mouth 4 (Four) Times a Day As Needed for Nausea or Vomiting. 9/11/18  Yes Jordin Roblero MD   oxybutynin (DITROPAN) 5 MG tablet Take 5 mg by mouth 2 (Two) Times a Day. 5/31/17  Yes Sherin Silveira MD   sotalol (BETAPACE) 80 MG tablet Take 0.5 tablets by mouth 2 (Two) Times a Day. 9/12/18  Yes Geneva Day MD   tamsulosin (FLOMAX) 0.4 MG capsule 24 hr capsule Take 1 capsule by mouth every night.   Yes Sherin Silveira MD   diclofenac (VOLTAREN) 50 MG EC tablet Take 1 tablet by mouth 2 (Two) Times a Day.  Patient not taking: Reported on 11/12/2018 10/22/18   Seferino Tan MD   , Scheduled Meds:    allopurinol 100 mg Oral 4x Daily   aspirin 81 mg Oral Daily   cefepime 2 g Intravenous Q8H   folic acid 1 mg Oral Daily   heparin (porcine) 5,000 Units Subcutaneous Q8H   naproxen 500 mg Oral BID With Meals   oxybutynin 5 mg Oral BID   pantoprazole 40 mg Oral QAM   sodium chloride 3 mL Intravenous Q12H   sotalol 40 mg Oral BID - RT   tamsulosin 0.4  mg Oral Nightly   vancomycin 15 mg/kg Intravenous Q12H   , Continuous Infusions:    Pharmacy to dose vancomycin    , PRN Meds:  •  acetaminophen  •  colchicine  •  ondansetron  •  [DISCONTINUED] vancomycin **AND** Pharmacy to dose vancomycin  •  [COMPLETED] Insert peripheral IV **AND** sodium chloride  •  sodium chloride and Allergies:  Allergies   Allergen Reactions   • Doxycycline Hyclate Other (See Comments)     Other reaction(s): lip swollen   • Sulfa Antibiotics Swelling     facial   • Other Rash     SILK TAPE   • Penicillins Rash     Reaction: rash   • Soma [Carisoprodol] Swelling and Rash       Objective     Vital Signs   Temp:  [97.1 °F (36.2 °C)-98.9 °F (37.2 °C)] 97.1 °F (36.2 °C)  Heart Rate:  [61-74] 61  Resp:  [18] 18  BP: (103-110)/(55-59) 108/57    Physical Exam:     General Appearance:    Alert, cooperative, in no acute distress   Head:    Normocephalic, without obvious abnormality, atraumatic   Eyes:            Lids and lashes normal, conjunctivae and sclerae normal, no   icterus, no pallor, corneas clear   Ears:    Ears appear intact with no abnormalities noted   Throat:   No oral lesions, no thrush, oral mucosa moist   Neck:   No adenopathy, supple, trachea midline, no thyromegaly,   no JVD   Lungs:     Clear to auscultation,respirations regular, even and                  unlabored    Heart:    Regular rhythm and normal rate, normal S1 and S2, no            murmur, no gallop, no rub, no click   Chest Wall:    No abnormalities observed   Abdomen:     Normal bowel sounds, no masses, no organomegaly, soft        non-tender, non-distended, no guarding, no rebound                tenderness   Extremities:   Moves all extremities well, no edema, no cyanosis, no             redness   Skin:   No bleeding, bruising or rash   Lymph nodes:   No palpable adenopathy   Neurologic:  Grossly intact, sensation intact       Results Review:   I reviewed the patient's new clinical results.      Assessment/Plan        Sepsis (CMS/[facility])    Encounter for care related to Port-a-Cath        I discussed the patients findings and my recommendations with patient, nursing staff and consulting provider     reCommend removal of MediPort and wound infection to clear and port can be replaced at a later time.  I fully discussed the procedure alternatives risk benefits with the patient and he clearly understands and wishes to proceed patient also has a pacemaker which we need to have addressed prior to have any type of surgery        This document has been electronically signed by Tono Arevalo MD on November 13, 2018 4:44 PM     Tono Arevalo MD  11/13/18  4:44 PM               Electronically signed by Tono Arevalo MD at 11/13/2018  4:49 PM             Tono Arevalo MD at 11/13/2018  4:44 PM              Referring Provider: Dr Hummel      Patient Care Team:  Seferino Tan MD as PCP - General (Family Medicine)  Jordin Roblero MD as Consulting Physician (Hematology and Oncology)  Goldie Beverly APRN as Nurse Practitioner (Oncology)      Subjective .     History of present illness:  78-year-old male who had a MediPort placed 3 months ago per Dr. Babb for treatment for CML.  Patient was admitted yesterday with sepsis and also of note his port catheter as stated been position and into the superior vena cava had flipped up into his internal jugular.  Some question whether the port is infected or not but it clearly is malpositioned.  Patient has improved with his treatment for sepsis.    Review of Systems   Constitutional: Negative.    HENT: Negative for hearing loss, nosebleeds and trouble swallowing.    Respiratory: Negative for apnea, chest tightness and shortness of breath.    Cardiovascular: Negative for chest pain and palpitations.   Gastrointestinal: Negative for abdominal distention, abdominal pain and vomiting.   Genitourinary: Negative for difficulty urinating, dysuria and frequency.   Musculoskeletal: Negative for  back pain, joint swelling and neck pain.   Skin: Negative for rash.   Neurological: Negative for dizziness and weakness.   Hematological: Negative for adenopathy.   Psychiatric/Behavioral: Negative for agitation. The patient is not nervous/anxious.          History  Past Medical History:   Diagnosis Date   • Atrophy of testis    • Benign prostatic hyperplasia    • Borderline glaucoma    • Chronic myelomonocytic leukemia (CMS/HCC)    • Degenerative joint disease involving multiple joints    • GERD (gastroesophageal reflux disease)    • Gout    • History of echocardiogram 05/08/2012    Normal left ventricular systolic function, EF 60%. Early diastolic dysfunction. Mild aortic and pulmonic regurgitation. Trace mitral and mild tricuspid regurgitation. No intracardiac mass pericardial effusion or cardiac thrombus.   • History of Holter monitoring 01/18/2011   • Impotence    • Lightheadedness    • Neck pain, musculoskeletal    • Sleep apnea     NOT WEARING C-PAP   • TIA (transient ischemic attack) 2014   , Past Surgical History:   Procedure Laterality Date   • CARDIAC CATHETERIZATION  09/30/2009    No epicardial coronary artery disease noted that would explain patient's chest pain of the abnormal stress test noted. Normal left ventricular systolic function with no wall motion abnormalities   • CARDIAC CATHETERIZATION  05/24/1989    Normal catheterization, false-positive exercise treadmill. Noncardiac chest pain   • CARDIAC PACEMAKER PLACEMENT  06/2008    Dual chamber permanent pacemaker implantation   • COLECTOMY PARTIAL / TOTAL  04/14/2000    Cecal diverticulitis. Removal of small segment of terminal ileum, cecum and right colon, sent to pathology   • COLON SURGERY  03/27/2016   • COLONOSCOPY  05/25/2012   • CYSTOSCOPY  11/15/2000    Urinary retention on the basis of medications and benign prostatic hypertrophy   • INGUINAL HERNIA REPAIR  02/15/2007    Recurrent right inguinal hernia. Repair of recurrent inguinal hernia  with EPS Prolene mesh system.   • INGUINAL HERNIA REPAIR Right 1957   • LAPAROSCOPIC CHOLECYSTECTOMY  10/26/2006    Gallstones. Laparoscopic cholecystectomy with operative cholangiogram   • PROSTATECTOMY  11/17/2000    Benign prostatic hypertrophy with urinary retention. Prostatitis. transurethral resection of prostate.   • STEROID INJECTION  10/21/2010    Decadron (Gout)    • STEROID INJECTION  07/23/2013    Kenalog (Gout) (4)   , Family History   Problem Relation Age of Onset   • Cancer Other    • Colon cancer Other         parent   • Coronary artery disease Other    • Heart disease Other    • Hypertension Other    • Cholelithiasis Other    • Other Other         colon problems   , Social History     Tobacco Use   • Smoking status: Never Smoker   • Smokeless tobacco: Never Used   Substance Use Topics   • Alcohol use: No   • Drug use: No   , Home Medications:  Prior to Admission medications    Medication Sig Start Date End Date Taking? Authorizing Provider   acetaminophen (TYLENOL) 325 MG tablet Take 1 tablet by mouth As Needed. 4/26/12  Yes Sherin Silveira MD   allopurinol (ZYLOPRIM) 100 MG tablet Take 1 tablet by mouth 4 (Four) Times a Day. 10/22/18  Yes Seferino Tan MD   aspirin 81 MG EC tablet Take 81 mg by mouth Daily.   Yes ProviderSherin MD   colchicine 0.6 MG tablet Take 0.6 mg by mouth Daily As Needed for Muscle / Joint Pain.   Yes Sherin Silveira MD   folic acid (FOLVITE) 1 MG tablet Take 1 tablet by mouth Daily. 10/22/18  Yes Seferino Tan MD   omeprazole (PRILOSEC) 20 MG capsule Take 1 capsule by mouth Daily. 10/22/18  Yes Seferino Tan MD   ondansetron (ZOFRAN) 4 MG tablet Take 1 tablet by mouth 4 (Four) Times a Day As Needed for Nausea or Vomiting. 9/11/18  Yes Jordin Roblero MD   oxybutynin (DITROPAN) 5 MG tablet Take 5 mg by mouth 2 (Two) Times a Day. 5/31/17  Yes Sherin Silveira MD   sotalol (BETAPACE) 80 MG tablet Take 0.5 tablets by mouth 2 (Two) Times a Day.  9/12/18  Yes Geneva Day MD   tamsulosin (FLOMAX) 0.4 MG capsule 24 hr capsule Take 1 capsule by mouth every night.   Yes Provider, MD Sherin   diclofenac (VOLTAREN) 50 MG EC tablet Take 1 tablet by mouth 2 (Two) Times a Day.  Patient not taking: Reported on 11/12/2018 10/22/18   Seferino Tan MD   , Scheduled Meds:    allopurinol 100 mg Oral 4x Daily   aspirin 81 mg Oral Daily   cefepime 2 g Intravenous Q8H   folic acid 1 mg Oral Daily   heparin (porcine) 5,000 Units Subcutaneous Q8H   naproxen 500 mg Oral BID With Meals   oxybutynin 5 mg Oral BID   pantoprazole 40 mg Oral QAM   sodium chloride 3 mL Intravenous Q12H   sotalol 40 mg Oral BID - RT   tamsulosin 0.4 mg Oral Nightly   vancomycin 15 mg/kg Intravenous Q12H   , Continuous Infusions:    Pharmacy to dose vancomycin    , PRN Meds:  •  acetaminophen  •  colchicine  •  ondansetron  •  [DISCONTINUED] vancomycin **AND** Pharmacy to dose vancomycin  •  [COMPLETED] Insert peripheral IV **AND** sodium chloride  •  sodium chloride and Allergies:  Allergies   Allergen Reactions   • Doxycycline Hyclate Other (See Comments)     Other reaction(s): lip swollen   • Sulfa Antibiotics Swelling     facial   • Other Rash     SILK TAPE   • Penicillins Rash     Reaction: rash   • Soma [Carisoprodol] Swelling and Rash       Objective     Vital Signs   Temp:  [97.1 °F (36.2 °C)-98.9 °F (37.2 °C)] 97.1 °F (36.2 °C)  Heart Rate:  [61-74] 61  Resp:  [18] 18  BP: (103-110)/(55-59) 108/57    Physical Exam:     General Appearance:    Alert, cooperative, in no acute distress   Head:    Normocephalic, without obvious abnormality, atraumatic   Eyes:            Lids and lashes normal, conjunctivae and sclerae normal, no   icterus, no pallor, corneas clear   Ears:    Ears appear intact with no abnormalities noted   Throat:   No oral lesions, no thrush, oral mucosa moist   Neck:   No adenopathy, supple, trachea midline, no thyromegaly,   no JVD   Lungs:     Clear to  auscultation,respirations regular, even and                  unlabored    Heart:    Regular rhythm and normal rate, normal S1 and S2, no            murmur, no gallop, no rub, no click   Chest Wall:    No abnormalities observed   Abdomen:     Normal bowel sounds, no masses, no organomegaly, soft        non-tender, non-distended, no guarding, no rebound                tenderness   Extremities:   Moves all extremities well, no edema, no cyanosis, no             redness   Skin:   No bleeding, bruising or rash   Lymph nodes:   No palpable adenopathy   Neurologic:  Grossly intact, sensation intact       Results Review:   I reviewed the patient's new clinical results.      Assessment/Plan       Sepsis (CMS/[facility])    Encounter for care related to Port-a-Cath        I discussed the patients findings and my recommendations with patient, nursing staff and consulting provider     reCommend removal of MediPort and wound infection to clear and port can be replaced at a later time.  I fully discussed the procedure alternatives risk benefits with the patient and he clearly understands and wishes to proceed patient also has a pacemaker which we need to have addressed prior to have any type of surgery        This document has been electronically signed by Tono Arevalo MD on November 13, 2018 4:44 PM     Tono Arevalo MD  11/13/18  4:44 PM              Electronically signed by Tono Arevalo MD at 11/13/2018  4:49 PM     Uhce Obrien MD at 11/12/2018  4:51 PM                [facility] Admission      Date of Admission: 11/12/2018      Primary Care Physician: Seferino Tan MD      Chief Complaint:  Chest pain and chills    HPI:  Patient is a 78-year-old  male who presented to the [facility] today for his chemotherapy.  After his port was flushed he was waiting in the waiting room, and developed chest pain and shaking chills.  He had neither of those symptoms  prior to arrival.  He has been feeling well as of late.  He did not have a fever at that time.  He was evaluated by Dr. Roblero and sent to the emergency department for possible line infection.  While in the emergency department it was discovered that his Port-A-Cath is now directed cranially.  At time of my interview patient states that he is feeling okay and has no complaints.  No alleviating or exacerbating factors.     Concurrent Medical History:  has a past medical history of Atrophy of testis, Benign prostatic hyperplasia, Borderline glaucoma, Chronic myelomonocytic leukemia (CMS/HCC), Degenerative joint disease involving multiple joints, GERD (gastroesophageal reflux disease), Gout, History of echocardiogram (05/08/2012), History of Holter monitoring (01/18/2011), Impotence, Lightheadedness, Neck pain, musculoskeletal, Sleep apnea, and TIA (transient ischemic attack) (2014).    Past Surgical History:  has a past surgical history that includes Cardiac pacemaker placement (06/2008); Prostatectomy (11/17/2000); Laparoscopic cholecystectomy (10/26/2006); Colectomy partial / total (04/14/2000); Cystoscopy (11/15/2000); Steroid Injection (10/21/2010); Steroid Injection (07/23/2013); Cardiac catheterization (09/30/2009); Cardiac catheterization (05/24/1989); Colon surgery (03/27/2016); Colonoscopy (05/25/2012); Inguinal hernia repair (02/15/2007); Inguinal hernia repair (Right, 1957); ULTRASOUND ASSISTED RIGHT JUGULAR INSERTION VENOUS ACCESS DEVICE (N/A, 9/13/2018); and PPM generator change - dual (N/A, 5/18/2017).    Family History: family history includes Cancer in his other; Cholelithiasis in his other; Colon cancer in his other; Coronary artery disease in his other; Heart disease in his other; Hypertension in his other; Other in his other.     Social History:  reports that  has never smoked. he has never used smokeless tobacco. He reports that he does not drink alcohol or use drugs.    Allergies:   Allergies    Allergen Reactions   • Doxycycline Hyclate Other (See Comments)     Other reaction(s): lip swollen   • Sulfa Antibiotics Swelling     facial   • Other Rash     SILK TAPE   • Penicillins Rash     Reaction: rash   • Soma [Carisoprodol] Swelling and Rash       Medications:   Medications Prior to Admission   Medication Sig Dispense Refill Last Dose   • acetaminophen (TYLENOL) 325 MG tablet Take 1 tablet by mouth As Needed.   Taking   • allopurinol (ZYLOPRIM) 100 MG tablet Take 1 tablet by mouth 4 (Four) Times a Day. 360 tablet 1 Taking   • aspirin 81 MG EC tablet Take 81 mg by mouth Daily.   Taking   • colchicine 0.6 MG tablet Take 0.6 mg by mouth Daily As Needed for Muscle / Joint Pain.   Taking   • diclofenac (VOLTAREN) 50 MG EC tablet Take 1 tablet by mouth 2 (Two) Times a Day. 60 tablet 11 Taking   • folic acid (FOLVITE) 1 MG tablet Take 1 tablet by mouth Daily. 90 tablet 3 Taking   • omeprazole (PRILOSEC) 20 MG capsule Take 1 capsule by mouth Daily. 90 capsule 3 Taking   • ondansetron (ZOFRAN) 4 MG tablet Take 1 tablet by mouth 4 (Four) Times a Day As Needed for Nausea or Vomiting. 40 tablet 3 Taking   • oxybutynin (DITROPAN) 5 MG tablet Take 5 mg by mouth 2 (Two) Times a Day.   Taking   • sotalol (BETAPACE) 80 MG tablet Take 0.5 tablets by mouth 2 (Two) Times a Day. 90 tablet 3 Taking   • tamsulosin (FLOMAX) 0.4 MG capsule 24 hr capsule Take 1 capsule by mouth every night.   Taking       Review of Systems:  Review of Systems   Constitutional: Positive for chills. Negative for appetite change, fatigue, fever and unexpected weight change.   HENT: Negative for congestion, facial swelling, hearing loss, nosebleeds, rhinorrhea, sneezing, trouble swallowing and voice change.    Eyes: Negative for photophobia and visual disturbance.   Respiratory: Negative for apnea, cough, choking, chest tightness, shortness of breath, wheezing and stridor.    Cardiovascular: Positive for chest pain. Negative for palpitations and leg  swelling.   Gastrointestinal: Negative for abdominal pain, blood in stool, constipation, diarrhea, nausea and vomiting.   Endocrine: Negative for cold intolerance, heat intolerance, polydipsia, polyphagia and polyuria.   Genitourinary: Negative for dysuria, flank pain and hematuria.   Musculoskeletal: Negative for arthralgias, back pain, myalgias and neck pain.   Skin: Negative for rash and wound.   Allergic/Immunologic: Negative for immunocompromised state.   Neurological: Negative for dizziness, seizures, syncope, speech difficulty, weakness, light-headedness, numbness and headaches.   Hematological: Does not bruise/bleed easily.   Psychiatric/Behavioral: Negative for agitation, behavioral problems, confusion, decreased concentration, hallucinations, self-injury and suicidal ideas. The patient is not nervous/anxious.       Otherwise complete ROS is negative except as mentioned above.    Physical Exam:   Temp:  [96.6 °F (35.9 °C)-98.3 °F (36.8 °C)] 97.6 °F (36.4 °C)  Heart Rate:  [62-98] 62  Resp:  [18-25] 18  BP: (105-190)/(49-81) 105/52     Physical Exam   Constitutional: He is oriented to person, place, and time. He appears well-developed and well-nourished.   HENT:   Head: Normocephalic and atraumatic.   Nose: Nose normal.   Mouth/Throat: Oropharynx is clear and moist.   Eyes: Conjunctivae, EOM and lids are normal. Pupils are equal, round, and reactive to light. No scleral icterus.   Neck: Normal range of motion. Neck supple. No JVD present. No tracheal tenderness and no spinous process tenderness present. No neck rigidity. No tracheal deviation, no edema and normal range of motion present.   Cardiovascular: Normal rate, regular rhythm, S1 normal, S2 normal, normal heart sounds and normal pulses. Exam reveals no gallop and no friction rub.   No murmur heard.  Pulmonary/Chest: Effort normal and breath sounds normal. No accessory muscle usage. No respiratory distress. He has no decreased breath sounds. He has no  wheezes. He has no rales. He exhibits no tenderness.   Port in right chest   Abdominal: Soft. Bowel sounds are normal. He exhibits no distension and no mass. There is no tenderness. There is no rebound and no guarding.   Musculoskeletal: He exhibits no edema or tenderness.        Right shoulder: He exhibits normal range of motion, no tenderness and no pain.   Neurological: He is alert and oriented to person, place, and time. He has normal reflexes. He displays no atrophy and normal reflexes. No cranial nerve deficit or sensory deficit. He exhibits normal muscle tone. He displays no seizure activity. Coordination normal.   Skin: Skin is warm. No rash noted. No pallor.   Psychiatric: He has a normal mood and affect. His behavior is normal. Judgment and thought content normal.         Results Reviewed:  I have personally reviewed current lab, radiology, and data and agree with results.  Lab Results (last 24 hours)     Procedure Component Value Units Date/Time    Lactic Acid, Reflex [[id number]]  (Abnormal) Collected:  11/12/18 1412    Specimen:  Blood Updated:  11/12/18 1439     Lactate 3.7 mmol/L     Lactic Acid, Reflex Timer (This will reflex a repeat order 3-3:15 hours after ordered.) [[id number]] Collected:  11/12/18 1004    Specimen:  Blood Updated:  11/12/18 1400     Extra Tube Hold for add-ons.     Comment: Auto resulted.       CBC & Differential [[id number]] Collected:  11/12/18 1004    Specimen:  Blood Updated:  11/12/18 1159    Narrative:       The following orders were created for panel order CBC & Differential.  Procedure                               Abnormality         Status                     ---------                               -----------         ------                     Manual Differential[[id number]]          Abnormal            Final result               Scan Slide[[id number]]                                                                  CBC Auto Differential[[id number]]        Abnormal             Final result                 Please view results for these tests on the individual orders.    Manual Differential [[id number]]  (Abnormal) Collected:  11/12/18 1004    Specimen:  Blood Updated:  11/12/18 1159     Neutrophil % 60.0 %      Lymphocyte % 7.0 %      Monocyte % 18.0 %      Eosinophil % 2.0 %      Bands %  5.0 %      Metamyelocyte % 5.0 %      Myelocyte % 1.0 %      Atypical Lymphocyte % 2.0 %      Neutrophils Absolute 14.72 10*3/mm3      Lymphocytes Absolute 1.58 10*3/mm3      Monocytes Absolute 4.08 10*3/mm3      Eosinophils Absolute 0.45 10*3/mm3      nRBC 1.0 /100 WBC      Anisocytosis Slight/1+     Comment: Few Teardrop cells        Polychromasia Slight/1+     WBC Morphology Normal     Platelet Estimate Adequate    Extra Tubes [[id number]] Collected:  11/12/18 1004    Specimen:  Blood, Venous Line Updated:  11/12/18 1116    Narrative:       The following orders were created for panel order Extra Tubes.  Procedure                               Abnormality         Status                     ---------                               -----------         ------                     Light Blue Top[[id number]]                                   Final result                 Please view results for these tests on the individual orders.    Light Blue Top [[id number]] Collected:  11/12/18 1004    Specimen:  Blood Updated:  11/12/18 1116     Extra Tube hold for add-on     Comment: Auto resulted       Urinalysis With Microscopic If Indicated (No Culture) - Urine, Clean Catch [[id number]]  (Normal) Collected:  11/12/18 1103    Specimen:  Urine, Clean Catch Updated:  11/12/18 1110     Color, UA Yellow     Appearance, UA Clear     pH, UA <=5.0     Specific Gravity, UA 1.022     Glucose, UA Negative     Ketones, UA Negative     Bilirubin, UA Negative     Blood, UA Negative     Protein, UA Negative     Leuk Esterase, UA Negative     Nitrite, UA Negative     Urobilinogen, UA 0.2 E.U./dL    Narrative:       Urine microscopic  not indicated.    Lactic Acid, Plasma [[id number]]  (Abnormal) Collected:  11/12/18 1004    Specimen:  Blood Updated:  11/12/18 1050     Lactate 5.7 mmol/L     Comprehensive Metabolic Panel [[id number]]  (Abnormal) Collected:  11/12/18 1004    Specimen:  Blood Updated:  11/12/18 1043     Glucose 92 mg/dL      BUN 14 mg/dL      Creatinine 0.75 mg/dL      Sodium 136 mmol/L      Potassium 3.9 mmol/L      Chloride 104 mmol/L      CO2 18.0 mmol/L      Calcium 9.4 mg/dL      Total Protein 6.8 g/dL      Albumin 4.10 g/dL      ALT (SGPT) 17 U/L      AST (SGOT) 20 U/L      Alkaline Phosphatase 54 U/L      Total Bilirubin 0.9 mg/dL      eGFR Non African Amer 101 mL/min/1.73      Globulin 2.7 gm/dL      A/G Ratio 1.5 g/dL      BUN/Creatinine Ratio 18.7     Anion Gap 14.0 mmol/L     Narrative:       The MDRD GFR formula is only valid for adults with stable renal function between ages 18 and 70.    Lipase [[id number]]  (Normal) Collected:  11/12/18 1004    Specimen:  Blood Updated:  11/12/18 1043     Lipase 48 U/L     Blood Culture - Blood, Blood, Venous Line [[id number]] Collected:  11/12/18 1033    Specimen:  Blood, Venous Line Updated:  11/12/18 1037    Blood Culture - Blood, Blood, Venous Line [[id number]] Collected:  11/12/18 1026    Specimen:  Blood, Venous Line Updated:  11/12/18 1036    CBC Auto Differential [[id number]]  (Abnormal) Collected:  11/12/18 1004    Specimen:  Blood Updated:  11/12/18 1032     WBC 22.64 10*3/mm3      RBC 2.94 10*6/mm3      Hemoglobin 10.2 g/dL      Hematocrit 29.5 %      .3 fL      MCH 34.7 pg      MCHC 34.6 g/dL      RDW 24.0 %      RDW-SD 85.4 fl      Platelets 178 10*3/mm3      Neutrophil % -- %      Lymphocyte % -- %      Monocyte % 3.5 %      Eosinophil % 0.8 %      Basophil % 0.5 %      Neutrophils, Absolute -- 10*3/mm3      Lymphocytes, Absolute -- 10*3/mm3      Monocytes, Absolute 0.79 10*3/mm3      Eosinophils, Absolute 0.17 10*3/mm3      Basophils, Absolute 0.12 10*3/mm3          Imaging Results (last 24 hours)     Procedure Component Value Units Date/Time    XR Chest PA & Lateral [[id number]] Collected:  11/12/18 1000     Updated:  11/12/18 1044    Narrative:       Patient Name:  AICHA BRUCE  Patient ID:  [id number]   Ordering:  YAW GALVEZ  Attending:  GOKUL WASSERMAN  Referring:  YAW GALVEZ  ------------------------------------------------  Chest PA and lateral views    INDICATION: Chest pain    COMPARISON: September 13, 2018    FINDINGS: Films -  2    Right chest wall Port-A-Cath noted with the tip of the  Port-A-Cath directed cranially though not visualized on these  images.    Left chest wall pacemaker device also noted, unchanged.    The bilateral lungs are grossly clear. No focal airspace opacity  seen. No pleural effusion or pneumothorax seen. The cardiac  silhouette is unremarkable.    No acute bony abnormality is seen.      Impression:       CONCLUSION:    1.   No acute cardiopulmonary pathology is identified.  2. Right chest wall Port-A-Cath noted. The tip is not visualized  and is directed cranially. Recommended correlation and  repositioning.    These findings were conveyed telephonically to Haley Cardoza PA-C, at 11:40 AM Eastern standard time.    Electronically signed by:  Cirilo Kelsey  11/12/2018 10:43 AM  [employer] Workstation: [email or web address]            Assessment:    Active Hospital Problems    Diagnosis   • Encounter for care related to Port-a-Cath             Plan:  1.  Sepsis: Patient with chills and chest pain after flushing the port.  Concern for line infection.  Patient has had blood cultures and empiric ecchymotic started.  We'll continue broad-spectrum antibiotics.  Depending on culture results many echocardiography.  2.  Cranially directed Port-A-Cath tip: Dr. Arevalo was consult from the emergency department  3.  CML:  On active chemotherapy.  Followed by Dr. Roblero.  4.  DVT Prophylaxis:  Heparin    I discussed the patient's findings  and my recommendations with:  Patient        This document has been electronically signed by Uche Obrien MD on November 12, 2018 4:54 PM                    Electronically signed by Uche Obrien MD at 11/12/2018  5:34 PM       Lab Results (last 7 days)     Procedure Component Value Units Date/Time    Blood Culture - Blood, Arm, Right [[id number]] Collected:  11/16/18 0935    Specimen:  Blood from Arm, Right Updated:  11/16/18 0944    Blood Culture - Blood, Arm, Left [[id number]] Collected:  11/16/18 0936    Specimen:  Blood from Arm, Left Updated:  11/16/18 0944    CBC & Differential [[id number]] Collected:  11/16/18 0702    Specimen:  Blood Updated:  11/16/18 0820    Narrative:       The following orders were created for panel order CBC & Differential.  Procedure                               Abnormality         Status                     ---------                               -----------         ------                     Manual Differential[[id number]]          Abnormal            Final result               Scan Slide[[id number]]                                                                  CBC Auto Differential[[id number]]        Abnormal            Final result                 Please view results for these tests on the individual orders.    Manual Differential [[id number]]  (Abnormal) Collected:  11/16/18 0702    Specimen:  Blood Updated:  11/16/18 0820     Neutrophil % 49.0 %      Lymphocyte % 15.0 %      Monocyte % 12.0 %      Eosinophil % 1.0 %      Basophil % 2.0 %      Bands %  10.0 %      Metamyelocyte % 3.0 %      Myelocyte % 2.0 %      Atypical Lymphocyte % 6.0 %      Neutrophils Absolute 8.49 10*3/mm3      Lymphocytes Absolute 2.16 10*3/mm3      Monocytes Absolute 1.73 10*3/mm3      Eosinophils Absolute 0.14 10*3/mm3      Basophils Absolute 0.29 10*3/mm3      nRBC 1.0 /100 WBC      Anisocytosis Mod/2+     Comment: FEW TARGET CELLS SEEN        Hypochromia Slight/1+     Macrocytes Slight/1+      Polychromasia Slight/1+     WBC Morphology Normal     Platelet Estimate Decreased    CBC Auto Differential [[id number]]  (Abnormal) Collected:  11/16/18 0702    Specimen:  Blood Updated:  11/16/18 0756     WBC 14.39 10*3/mm3      RBC 2.77 10*6/mm3      Hemoglobin 9.5 g/dL      Hematocrit 27.4 %      MCV 98.9 fL      MCH 34.3 pg      MCHC 34.7 g/dL      RDW 24.2 %      RDW-SD 85.0 fl      Platelets 138 10*3/mm3     Basic Metabolic Panel [[id number]]  (Normal) Collected:  11/16/18 0702    Specimen:  Blood Updated:  11/16/18 0727     Glucose 96 mg/dL      BUN 15 mg/dL      Creatinine 0.88 mg/dL      Sodium 137 mmol/L      Potassium 4.5 mmol/L      Chloride 105 mmol/L      CO2 22.0 mmol/L      Calcium 8.9 mg/dL      eGFR Non African Amer 84 mL/min/1.73      BUN/Creatinine Ratio 17.0     Anion Gap 10.0 mmol/L     Narrative:       The MDRD GFR formula is only valid for adults with stable renal function between ages 18 and 70.    Wound Culture - Wound, Chest, Right [[id number]] Collected:  11/14/18 0741    Specimen:  Wound from Chest, Right Updated:  11/16/18 0648     Wound Culture No growth at 2 days     Gram Stain Rare (1+) WBCs seen      No organisms seen    Blood Culture - Blood, Blood, Venous Line [[id number]]  (Abnormal)  (Susceptibility) Collected:  11/12/18 1026    Specimen:  Blood, Venous Line Updated:  11/16/18 0625     Blood Culture Acinetobacter lwoffii group     Isolated from Aerobic Bottle     Gram Stain Aerobic Bottle Gram negative bacilli     Comment: 2 bottles of 3 bottles drawn positive for gram neg bacillus       Susceptibility      Acinetobacter lwoffii group     CHIVO     Ampicillin + Sulbactam Susceptible     Cefepime Susceptible     Ceftazidime Susceptible     Ceftriaxone Susceptible     Levofloxacin Susceptible     Piperacillin Susceptible     Trimethoprim + Sulfamethoxazole Susceptible                    Blood Culture - Blood, Arm, Right [[id number]] Collected:  11/12/18 1640    Specimen:  Blood from Arm,  Right Updated:  11/15/18 1715     Blood Culture No growth at 3 days    Blood Culture - Blood, Arm, Right [[id number]] Collected:  11/12/18 1723    Specimen:  Blood from Arm, Right Updated:  11/15/18 1715     Blood Culture No growth at 3 days    Basic Metabolic Panel [[id number]]  (Abnormal) Collected:  11/15/18 0532    Specimen:  Blood Updated:  11/15/18 0642     Glucose 93 mg/dL      BUN 14 mg/dL      Creatinine 0.79 mg/dL      Sodium 137 mmol/L      Potassium 4.3 mmol/L      Chloride 108 mmol/L      CO2 20.0 mmol/L      Calcium 8.3 mg/dL      eGFR Non African Amer 95 mL/min/1.73      BUN/Creatinine Ratio 17.7     Anion Gap 9.0 mmol/L     Narrative:       The MDRD GFR formula is only valid for adults with stable renal function between ages 18 and 70.    CBC & Differential [[id number]] Collected:  11/15/18 0532    Specimen:  Blood Updated:  11/15/18 0612    Narrative:       The following orders were created for panel order CBC & Differential.  Procedure                               Abnormality         Status                     ---------                               -----------         ------                     Scan Slide[[id number]]                                                                  CBC Auto Differential[[id number]]        Abnormal            Final result                 Please view results for these tests on the individual orders.    CBC Auto Differential [[id number]]  (Abnormal) Collected:  11/15/18 0532    Specimen:  Blood Updated:  11/15/18 0612     WBC 11.35 10*3/mm3      RBC 2.64 10*6/mm3      Hemoglobin 9.2 g/dL      Hematocrit 25.9 %      MCV 98.1 fL      MCH 34.8 pg      MCHC 35.5 g/dL      RDW 23.9 %      RDW-SD 84.0 fl      MPV -- fL      Comment: Instrument unable to calculate result.         Platelets 128 10*3/mm3      Neutrophil % 43.2 %      Lymphocyte % 17.8 %      Monocyte % 16.7 %      Eosinophil % 4.1 %      Basophil % 0.4 %      Immature Grans % 17.8 %      Neutrophils, Absolute 4.90  "10*3/mm3      Lymphocytes, Absolute 2.02 10*3/mm3      Monocytes, Absolute 1.90 10*3/mm3      Eosinophils, Absolute 0.47 10*3/mm3      Basophils, Absolute 0.04 10*3/mm3      Immature Grans, Absolute 2.02 10*3/mm3     Tissue Pathology Exam [[id number]] Collected:  11/14/18 0733    Specimen:  Hardware / Foreign Body from Chest, Right Updated:  11/14/18 1432     Case Report --     Surgical Pathology Report                         Case: [id number]                                  Authorizing Provider:  Tono Arevalo MD      Collected:           11/14/2018 07:33 AM          Ordering Location:     [facility]             Received:            11/14/2018 08:53 AM                                 [place]                                                              Pathologist:           Son Wayne MD                                                         Specimen:    Chest, Right, old mediport                                                                  Final Diagnosis --     MEDIPORT.        Gross Description --     The container is labeled \"old Mediport, right side of chest\" and has a purple plastic truncated pyramid as an injection port presently measuring 3.1 x 2.8 x 1.4 cm.  Its base is marked BARD 8985.  It is attached to a white rubber tube measuring 20.0 cm long and 0.3 cm in diameter.  The tube is marked with dots and numbers at intervals.  No further processing is undertaken.       Blood Culture - Blood, Blood, Venous Line [[id number]]  (Abnormal) Collected:  11/12/18 1033    Specimen:  Blood, Venous Line Updated:  11/14/18 0637     Blood Culture Corynebacterium species, not diphtheriae     Comment: Consistent with contamination at time of collection.  Susceptibility not indicated          Gram Stain Aerobic Bottle Gram positive bacilli     Comment: 1 positive for gram positive bacillus of 3 drawn       Narrative:       Blood culture does not meet the specified criteria for PCR " identification.  If pregnant, immunocompromised, or clinical concern for meningitis, call lab to run BCID for Listeria monocytogenes.    CBC & Differential [[id number]] Collected:  11/14/18 0511    Specimen:  Blood Updated:  11/14/18 0619    Narrative:       The following orders were created for panel order CBC & Differential.  Procedure                               Abnormality         Status                     ---------                               -----------         ------                     Scan Slide[[id number]]                                                                  CBC Auto Differential[[id number]]        Abnormal            Final result                 Please view results for these tests on the individual orders.    CBC Auto Differential [[id number]]  (Abnormal) Collected:  11/14/18 0511    Specimen:  Blood Updated:  11/14/18 0619     WBC 14.03 10*3/mm3      RBC 2.47 10*6/mm3      Hemoglobin 8.4 g/dL      Hematocrit 24.7 %      .0 fL      MCH 34.0 pg      MCHC 34.0 g/dL      RDW 25.0 %      RDW-SD 90.8 fl      MPV -- fL      Comment: Results not available        Platelets 109 10*3/mm3      Neutrophil % 37.9 %      Lymphocyte % 10.9 %      Monocyte % 31.0 %      Eosinophil % 3.8 %      Basophil % 0.9 %      Immature Grans % 15.5 %      Neutrophils, Absolute 5.32 10*3/mm3      Lymphocytes, Absolute 1.53 10*3/mm3      Monocytes, Absolute 4.35 10*3/mm3      Eosinophils, Absolute 0.54 10*3/mm3      Basophils, Absolute 0.12 10*3/mm3      Immature Grans, Absolute 2.17 10*3/mm3      nRBC 0.6 /100 WBC     Basic Metabolic Panel [[id number]]  (Abnormal) Collected:  11/14/18 0511    Specimen:  Blood Updated:  11/14/18 0606     Glucose 102 mg/dL      BUN 14 mg/dL      Creatinine 0.99 mg/dL      Sodium 138 mmol/L      Potassium 4.4 mmol/L      Chloride 108 mmol/L      CO2 21.0 mmol/L      Calcium 8.1 mg/dL      eGFR Non African Amer 73 mL/min/1.73      BUN/Creatinine Ratio 14.1     Anion Gap 9.0 mmol/L      Narrative:       The MDRD GFR formula is only valid for adults with stable renal function between ages 18 and 70.    Vancomycin, Trough [[id number]]  (Abnormal) Collected:  11/13/18 2152    Specimen:  Blood Updated:  11/13/18 2237     Vancomycin Trough 16.94 mcg/mL     Vancomycin, Peak [[id number]]  (Abnormal) Collected:  11/13/18 1648    Specimen:  Blood Updated:  11/13/18 1715     Vancomycin Peak 28.13 mcg/mL     Blood Culture ID, PCR - Blood, Blood, Venous Line [[id number]]  (Normal) Collected:  11/12/18 1026    Specimen:  Blood, Venous Line Updated:  11/13/18 1328     BCID, PCR No organism detected by BCID PCR.    Extra Tubes [[id number]] Collected:  11/13/18 1014    Specimen:  Blood, Venous Line Updated:  11/13/18 1116    Narrative:       The following orders were created for panel order Extra Tubes.  Procedure                               Abnormality         Status                     ---------                               -----------         ------                     Gold Top - SST[[id number]]                                   Final result                 Please view results for these tests on the individual orders.    Gold Top - SST [[id number]] Collected:  11/13/18 1014    Specimen:  Blood Updated:  11/13/18 1116     Extra Tube Hold for add-ons.     Comment: Auto resulted.       Lactic Acid, Plasma [[id number]]  (Normal) Collected:  11/13/18 1016    Specimen:  Blood Updated:  11/13/18 1042     Lactate 1.6 mmol/L     CBC & Differential [[id number]] Collected:  11/13/18 0624    Specimen:  Blood Updated:  11/13/18 0959    Narrative:       The following orders were created for panel order CBC & Differential.  Procedure                               Abnormality         Status                     ---------                               -----------         ------                     Manual Differential[[id number]]          Abnormal            Final result               Scan Slide[[id number]]                                                                   CBC Auto Differential[[id number]]        Abnormal            Final result                 Please view results for these tests on the individual orders.    Manual Differential [[id number]]  (Abnormal) Collected:  11/13/18 0624    Specimen:  Blood Updated:  11/13/18 0959     Neutrophil % 49.0 %      Lymphocyte % 9.0 %      Monocyte % 25.0 %      Eosinophil % 2.0 %      Basophil % 2.0 %      Bands %  3.0 %      Metamyelocyte % 3.0 %      Myelocyte % 7.0 %      Neutrophils Absolute 11.79 10*3/mm3      Lymphocytes Absolute 2.04 10*3/mm3      Monocytes Absolute 5.67 10*3/mm3      Eosinophils Absolute 0.45 10*3/mm3      Basophils Absolute 0.45 10*3/mm3      Anisocytosis Slight/1+     Comment: Few polychromic cells, few teardrop cells        WBC Morphology Normal     Platelet Estimate Decreased    Basic Metabolic Panel [[id number]]  (Abnormal) Collected:  11/13/18 0624    Specimen:  Blood Updated:  11/13/18 0714     Glucose 95 mg/dL      BUN 16 mg/dL      Creatinine 0.87 mg/dL      Sodium 136 mmol/L      Potassium 3.9 mmol/L      Chloride 108 mmol/L      CO2 20.0 mmol/L      Calcium 8.4 mg/dL      eGFR Non African Amer 85 mL/min/1.73      BUN/Creatinine Ratio 18.4     Anion Gap 8.0 mmol/L     Narrative:       The MDRD GFR formula is only valid for adults with stable renal function between ages 18 and 70.    CBC Auto Differential [[id number]]  (Abnormal) Collected:  11/13/18 0624    Specimen:  Blood Updated:  11/13/18 0702     WBC 22.67 10*3/mm3      RBC 2.64 10*6/mm3      Hemoglobin 9.3 g/dL      Hematocrit 26.2 %      MCV 99.2 fL      MCH 35.2 pg      MCHC 35.5 g/dL      RDW 24.1 %      RDW-SD 84.4 fl      MPV -- fL      Comment: Instrument unable to calculate result        Platelets 133 10*3/mm3     POC Glucose Once [[id number]]  (Normal) Collected:  11/12/18 2336    Specimen:  Blood Updated:  11/12/18 2348     Glucose 101 mg/dL      Comment: : [id number] HUGGINS GINAMeter ID:  [id number]       Lactic Acid, Reflex [[id number]]  (Abnormal) Collected:  11/12/18 1412    Specimen:  Blood Updated:  11/12/18 1439     Lactate 3.7 mmol/L     Lactic Acid, Reflex Timer (This will reflex a repeat order 3-3:15 hours after ordered.) [[id number]] Collected:  11/12/18 1004    Specimen:  Blood Updated:  11/12/18 1400     Extra Tube Hold for add-ons.     Comment: Auto resulted.       CBC & Differential [[id number]] Collected:  11/12/18 1004    Specimen:  Blood Updated:  11/12/18 1159    Narrative:       The following orders were created for panel order CBC & Differential.  Procedure                               Abnormality         Status                     ---------                               -----------         ------                     Manual Differential[[id number]]          Abnormal            Final result               Scan Slide[[id number]]                                                                  CBC Auto Differential[[id number]]        Abnormal            Final result                 Please view results for these tests on the individual orders.    Manual Differential [[id number]]  (Abnormal) Collected:  11/12/18 1004    Specimen:  Blood Updated:  11/12/18 1159     Neutrophil % 60.0 %      Lymphocyte % 7.0 %      Monocyte % 18.0 %      Eosinophil % 2.0 %      Bands %  5.0 %      Metamyelocyte % 5.0 %      Myelocyte % 1.0 %      Atypical Lymphocyte % 2.0 %      Neutrophils Absolute 14.72 10*3/mm3      Lymphocytes Absolute 1.58 10*3/mm3      Monocytes Absolute 4.08 10*3/mm3      Eosinophils Absolute 0.45 10*3/mm3      nRBC 1.0 /100 WBC      Anisocytosis Slight/1+     Comment: Few Teardrop cells        Polychromasia Slight/1+     WBC Morphology Normal     Platelet Estimate Adequate    Extra Tubes [[id number]] Collected:  11/12/18 1004    Specimen:  Blood, Venous Line Updated:  11/12/18 1116    Narrative:       The following orders were created for panel order Extra Tubes.  Procedure                                Abnormality         Status                     ---------                               -----------         ------                     Light Blue Top[[id number]]                                   Final result                 Please view results for these tests on the individual orders.    Light Blue Top [[id number]] Collected:  11/12/18 1004    Specimen:  Blood Updated:  11/12/18 1116     Extra Tube hold for add-on     Comment: Auto resulted       Urinalysis With Microscopic If Indicated (No Culture) - Urine, Clean Catch [[id number]]  (Normal) Collected:  11/12/18 1103    Specimen:  Urine, Clean Catch Updated:  11/12/18 1110     Color, UA Yellow     Appearance, UA Clear     pH, UA <=5.0     Specific Gravity, UA 1.022     Glucose, UA Negative     Ketones, UA Negative     Bilirubin, UA Negative     Blood, UA Negative     Protein, UA Negative     Leuk Esterase, UA Negative     Nitrite, UA Negative     Urobilinogen, UA 0.2 E.U./dL    Narrative:       Urine microscopic not indicated.    Lactic Acid, Plasma [[id number]]  (Abnormal) Collected:  11/12/18 1004    Specimen:  Blood Updated:  11/12/18 1050     Lactate 5.7 mmol/L     Comprehensive Metabolic Panel [[id number]]  (Abnormal) Collected:  11/12/18 1004    Specimen:  Blood Updated:  11/12/18 1043     Glucose 92 mg/dL      BUN 14 mg/dL      Creatinine 0.75 mg/dL      Sodium 136 mmol/L      Potassium 3.9 mmol/L      Chloride 104 mmol/L      CO2 18.0 mmol/L      Calcium 9.4 mg/dL      Total Protein 6.8 g/dL      Albumin 4.10 g/dL      ALT (SGPT) 17 U/L      AST (SGOT) 20 U/L      Alkaline Phosphatase 54 U/L      Total Bilirubin 0.9 mg/dL      eGFR Non African Amer 101 mL/min/1.73      Globulin 2.7 gm/dL      A/G Ratio 1.5 g/dL      BUN/Creatinine Ratio 18.7     Anion Gap 14.0 mmol/L     Narrative:       The MDRD GFR formula is only valid for adults with stable renal function between ages 18 and 70.    Lipase [[id number]]  (Normal) Collected:  11/12/18 1004     Specimen:  Blood Updated:  18 1043     Lipase 48 U/L     CBC Auto Differential [[id number]]  (Abnormal) Collected:  18 1004    Specimen:  Blood Updated:  18 1032     WBC 22.64 10*3/mm3      RBC 2.94 10*6/mm3      Hemoglobin 10.2 g/dL      Hematocrit 29.5 %      .3 fL      MCH 34.7 pg      MCHC 34.6 g/dL      RDW 24.0 %      RDW-SD 85.4 fl      Platelets 178 10*3/mm3      Neutrophil % -- %      Lymphocyte % -- %      Monocyte % 3.5 %      Eosinophil % 0.8 %      Basophil % 0.5 %      Neutrophils, Absolute -- 10*3/mm3      Lymphocytes, Absolute -- 10*3/mm3      Monocytes, Absolute 0.79 10*3/mm3      Eosinophils, Absolute 0.17 10*3/mm3      Basophils, Absolute 0.12 10*3/mm3              Physician Progress Notes (last 24 hours) (Notes from 11/15/2018 10:52 AM through 2018 10:52 AM)      Kin Hummel MD at 11/15/2018 12:37 PM          MEDICINE DAILY PROGRESS NOTE  NAME: Jan Humphrey  : 1940  MRN: [id number]     LOS: 2 days     PROVIDER OF SERVICE: Kin Hummel MD    Chief Complaint: Sepsis (CMS/HCC)    Subjective:   HPI: Patient presented to the [facility] today for chemotherapy and after his port was flushed he developed some chills and chest discomfort.  These symptoms resolved quickly and he has been chest pain free since admission.  His port-a-cath is cranially directed on imaging. Surgery to see.    Interval History:  History taken from: patient family RN  11/15. Patient doing good again today. Feels at baseline. Afebrile. No acute events overnight.  . Patient back from port removal.  . Patient feeling much better. He desires to go home today, but understands that he is not likely ready.    Review of Systems:   Review of Systems   Constitutional: Positive for activity change, chills and fatigue. Negative for appetite change and fever.   HENT: Negative for ear pain and sore throat.    Eyes: Negative for pain and visual disturbance.   Respiratory: Negative for  cough and shortness of breath.    Cardiovascular: Negative for chest pain and palpitations.   Gastrointestinal: Negative for abdominal pain and nausea.   Endocrine: Negative for cold intolerance and heat intolerance.   Genitourinary: Negative for difficulty urinating and dysuria.   Musculoskeletal: Positive for arthralgias. Negative for gait problem.   Skin: Negative for color change and rash.   Neurological: Negative for dizziness, weakness and headaches.   Hematological: Negative for adenopathy. Does not bruise/bleed easily.   Psychiatric/Behavioral: Negative for agitation, confusion and sleep disturbance.       Objective:     Vital Signs  Vitals:    11/14/18 2114 11/15/18 0010 11/15/18 0322 11/15/18 0839   BP: 130/64 105/56 121/60 124/64   BP Location: Left arm Left arm Left arm Left arm   Patient Position: Lying Lying Lying Sitting   Pulse: 68 60 68 69   Resp: 18 18 18 18   Temp: 97 °F (36.1 °C) 96.8 °F (36 °C) 96.7 °F (35.9 °C) 96.7 °F (35.9 °C)   TempSrc: Oral Oral Oral Oral   SpO2: 96% 94% 94% 99%   Weight:       Height:           Physical Exam  Physical Exam   Constitutional: He is oriented to person, place, and time. He appears well-developed and well-nourished. No distress.   HENT:   Head: Normocephalic and atraumatic.   Right Ear: External ear normal.   Left Ear: External ear normal.   Nose: Nose normal.   Eyes: Conjunctivae and EOM are normal. Pupils are equal, round, and reactive to light.   Neck: Normal range of motion. Neck supple.   Cardiovascular: Normal rate, regular rhythm, normal heart sounds and intact distal pulses.   Pulmonary/Chest: Effort normal and breath sounds normal. No respiratory distress. He has no wheezes. He has no rales. He exhibits no tenderness.   Abdominal: Soft. Bowel sounds are normal. He exhibits no distension and no mass. There is no tenderness. There is no rebound and no guarding.   Musculoskeletal: Normal range of motion.   Neurological: He is alert and oriented to person,  place, and time.   Skin: Skin is warm and dry. No rash noted. He is not diaphoretic. No erythema. No pallor.   Port site examined. Minimal erythema. No tenderness. Port now removed.   Psychiatric: He has a normal mood and affect. His behavior is normal.   Nursing note and vitals reviewed.      Medication Review    Current Facility-Administered Medications:   •  acetaminophen (TYLENOL) tablet 650 mg, 650 mg, Oral, Q6H PRN, Sony De Leon PA-C, 650 mg at 11/12/18 1905  •  allopurinol (ZYLOPRIM) tablet 100 mg, 100 mg, Oral, 4x Daily, Uche Obrien MD, 100 mg at 11/15/18 0846  •  aspirin EC tablet 81 mg, 81 mg, Oral, Daily, Uche Obrien MD, 81 mg at 11/13/18 0841  •  cefepime (MAXIPIME) 2 g/100 mL 0.9% NS (mbp), 2 g, Intravenous, Q8H, Uche Obrien MD, Last Rate: 25 mL/hr at 11/14/18 0543, 2 g at 11/15/18 0539  •  colchicine tablet 0.6 mg, 0.6 mg, Oral, Daily PRN, Uche Obrien MD  •  folic acid (FOLVITE) tablet 1 mg, 1 mg, Oral, Daily, Uche Obrien MD, 1 mg at 11/15/18 0846  •  heparin (porcine) 5000 UNIT/ML injection 5,000 Units, 5,000 Units, Subcutaneous, Q8H, Tono Arevalo MD, 5,000 Units at 11/15/18 0540  •  lidocaine-EPINEPHrine (XYLOCAINE W/EPI) 1 %-1:064401 injection, , , PRN, Tono Arevalo MD, 10 mL at 11/14/18 0733  •  naproxen (NAPROSYN) tablet 500 mg, 500 mg, Oral, BID With Meals, Uche Obrien MD, 500 mg at 11/15/18 0847  •  ondansetron (ZOFRAN) tablet 4 mg, 4 mg, Oral, 4x Daily PRN, Uche Obrien MD  •  oxybutynin (DITROPAN) tablet 5 mg, 5 mg, Oral, BID, Uche Obrien MD, 5 mg at 11/15/18 0847  •  pantoprazole (PROTONIX) EC tablet 40 mg, 40 mg, Oral, QAM, Uche Obrien MD, 40 mg at 11/15/18 0628  •  [DISCONTINUED] vancomycin 1750 mg/500 mL 0.9% NS IVPB ([facility]), 20 mg/kg, Intravenous, Once **AND** Pharmacy to dose vancomycin, , Does not apply, Continuous PRN, Uche Obrien MD  •  [COMPLETED] Insert peripheral IV, , , Once **AND** sodium  chloride 0.9 % flush 10 mL, 10 mL, Intravenous, PRN, Sony De Leon PA-C  •  sodium chloride 0.9 % flush 3 mL, 3 mL, Intravenous, Q12H, Uche Obrien MD, 3 mL at 11/15/18 0847  •  sodium chloride 0.9 % flush 3-10 mL, 3-10 mL, Intravenous, PRN, Uche Obrien MD  •  sotalol (BETAPACE) half tablet 40 mg, 40 mg, Oral, BID - RT, Uche Obrien MD, 40 mg at 11/15/18 0846  •  tamsulosin (FLOMAX) 24 hr capsule 0.4 mg, 0.4 mg, Oral, Nightly, Uche Obrien MD, 0.4 mg at 11/14/18 2115  •  vancomycin 1250 mg/250 mL 0.9% NS IVPB ([facility]), 15 mg/kg, Intravenous, Q12H, Uche Obrien MD, Last Rate: 0 mL/hr at 11/13/18 0317, 1,250 mg at 11/14/18 2352     Diagnostic Data    Lab Results (last 24 hours)     Procedure Component Value Units Date/Time    Basic Metabolic Panel [[id number]]  (Abnormal) Collected:  11/15/18 0532    Specimen:  Blood Updated:  11/15/18 0642     Glucose 93 mg/dL      BUN 14 mg/dL      Creatinine 0.79 mg/dL      Sodium 137 mmol/L      Potassium 4.3 mmol/L      Chloride 108 mmol/L      CO2 20.0 mmol/L      Calcium 8.3 mg/dL      eGFR Non African Amer 95 mL/min/1.73      BUN/Creatinine Ratio 17.7     Anion Gap 9.0 mmol/L     Narrative:       The MDRD GFR formula is only valid for adults with stable renal function between ages 18 and 70.    Wound Culture - Wound, Chest, Right [[id number]] Collected:  11/14/18 0741    Specimen:  Wound from Chest, Right Updated:  11/15/18 0637     Wound Culture No growth at 24 hours     Gram Stain Rare (1+) WBCs seen      No organisms seen    CBC & Differential [[id number]] Collected:  11/15/18 0532    Specimen:  Blood Updated:  11/15/18 0612    Narrative:       The following orders were created for panel order CBC & Differential.  Procedure                               Abnormality         Status                     ---------                               -----------         ------                     Scan Slide[[id number]]                                                                   CBC Auto Differential[[id number]]        Abnormal            Final result                 Please view results for these tests on the individual orders.    CBC Auto Differential [[id number]]  (Abnormal) Collected:  11/15/18 0532    Specimen:  Blood Updated:  11/15/18 0612     WBC 11.35 10*3/mm3      RBC 2.64 10*6/mm3      Hemoglobin 9.2 g/dL      Hematocrit 25.9 %      MCV 98.1 fL      MCH 34.8 pg      MCHC 35.5 g/dL      RDW 23.9 %      RDW-SD 84.0 fl      MPV -- fL      Comment: Instrument unable to calculate result.         Platelets 128 10*3/mm3      Neutrophil % 43.2 %      Lymphocyte % 17.8 %      Monocyte % 16.7 %      Eosinophil % 4.1 %      Basophil % 0.4 %      Immature Grans % 17.8 %      Neutrophils, Absolute 4.90 10*3/mm3      Lymphocytes, Absolute 2.02 10*3/mm3      Monocytes, Absolute 1.90 10*3/mm3      Eosinophils, Absolute 0.47 10*3/mm3      Basophils, Absolute 0.04 10*3/mm3      Immature Grans, Absolute 2.02 10*3/mm3     Blood Culture - Blood, Arm, Right [[id number]] Collected:  11/12/18 1640    Specimen:  Blood from Arm, Right Updated:  11/14/18 1715     Blood Culture No growth at 2 days    Blood Culture - Blood, Arm, Right [[id number]] Collected:  11/12/18 1723    Specimen:  Blood from Arm, Right Updated:  11/14/18 1715     Blood Culture No growth at 2 days    Tissue Pathology Exam [[id number]] Collected:  11/14/18 0733    Specimen:  Hardware / Foreign Body from Chest, Right Updated:  11/14/18 1432     Case Report --     Surgical Pathology Report                         Case: [id number]                                  Authorizing Provider:  Tono Arevalo MD      Collected:           11/14/2018 07:33 AM          Ordering Location:     [facility]             Received:            11/14/2018 08:53 AM                                 [facility]                                                              Pathologist:           Son Wayne MD                    "                                      Specimen:    Chest, Right, old mediport                                                                  Final Diagnosis --     MEDIPORT.        Gross Description --     The container is labeled \"old Mediport, right side of chest\" and has a purple plastic truncated pyramid as an injection port presently measuring 3.1 x 2.8 x 1.4 cm.  Its base is marked BARD [id number].  It is attached to a white rubber tube measuring 20.0 cm long and 0.3 cm in diameter.  The tube is marked with dots and numbers at intervals.  No further processing is undertaken.             I reviewed the patient's new clinical results.    Assessment/Plan:     Active Hospital Problems    Diagnosis Date Noted   • **Sepsis (CMS/[facility]) [A41.9] 11/13/2018   • Encounter for care related to Port-a-Cath [Z45.2] 11/12/2018   • Port or reservoir infection [T80.212A] 11/12/2018     Added automatically from request for surgery [id number]       #. Sepsis. Vanc/Cefepime. Cultures pending. Mike results. ECHO. Blood culture showing GNB. Not final yet. Will discuss with micro. Per micro GNB growing. Should be final tomorrow. Will stop vanc.  ECHO Results:  Interpretation Summary   · Left ventricular systolic function is normal. Estimated EF = 58%.  · Left ventricular diastolic dysfunction (grade I) consistent with impaired relaxation.  · Left atrial cavity size is borderline dilated.  · Mild mitral valve regurgitation is present  · Mild to moderate tricuspid valve regurgitation is present.  · Electronic lead present in the ventricle.  · There is thickening of the noncoronary cusp of the aortic valve. Calcification/ sclerosis likely. Trace-to-mild aortic valve regurgitation is present.   #. Port-a-cath cranially directed.   11/14. Port removed.  11/13. Surgery consulted. Appreciate input.  #. CML. Active chemo patient. Consult heme/onc as needed.    Will monitor patient's hospital course and adjust treatment as hospital course " dictates.    DVT prophylaxis: Heparin  Code status is   Code Status and Medical Interventions:   Ordered at: 11/12/18 1644     Code Status:    CPR     Medical Interventions (Level of Support Prior to Arrest):    Full       Plan for disposition:Where: home and When:  1-2 days      Time:           This document has been electronically signed by Kin Hummel MD on November 15, 2018 12:38 PM    Electronically signed by Kin Hummel MD at 11/15/2018  1:13 PM

## 2018-11-16 NOTE — DISCHARGE SUMMARY
DISCHARGE SUMMARY    NAME: Jan Humphrey   PHYSICIAN: Kin Hummel MD  : 1940  MRN: [id number]    ADMITTED: 2018   DISCHARGED: 18    ADMISSION DIAGNOSES:  Present on Admission:  • Sepsis (CMS/[facility])    DISCHARGE DIAGNOSES:     Sepsis (CMS/[facility])    Encounter for care related to Port-a-Cath    Port or reservoir infection      SERVICE: Medicine. Attending Kin Hummel MD    CONSULTS:   Consult Orders (all) (From admission, onward)    Start     Ordered    18 1142  Inpatient General Surgery Consult  Once     Specialty:  General Surgery  Provider:  Tono Arevalo MD    18 1142    18 1200  Hospitalist (on-call MD unless specified)  Once     Specialty:  Hospitalist  Provider:  Uche Obrien MD    18 1159          PROCEDURES:   Imaging Results (last 7 days)     Procedure Component Value Units Date/Time    XR Chest PA & Lateral [[id number]] Collected:  18 1000     Updated:  18 1044    Narrative:       Patient Name:  JAN HUMPHREY  Patient ID:  [id number]   Ordering:  YAW GALVEZ  Attending:  GOKUL WASSERMAN  Referring:  YAW GALVEZ  ------------------------------------------------  Chest PA and lateral views    INDICATION: Chest pain    COMPARISON: 2018    FINDINGS: Films -  2    Right chest wall Port-A-Cath noted with the tip of the  Port-A-Cath directed cranially though not visualized on these  images.    Left chest wall pacemaker device also noted, unchanged.    The bilateral lungs are grossly clear. No focal airspace opacity  seen. No pleural effusion or pneumothorax seen. The cardiac  silhouette is unremarkable.    No acute bony abnormality is seen.      Impression:       CONCLUSION:    1.   No acute cardiopulmonary pathology is identified.  2. Right chest wall Port-A-Cath noted. The tip is not visualized  and is directed cranially. Recommended correlation and  repositioning.    These findings were conveyed  telephonically to Haley Cardoza PA-C, at 11:40 AM Eastern standard time.    Electronically signed by:  Cirilo Kelsey  11/12/2018 10:43 AM  [employer] Workstation: [id number]          HISTORY OF PRESENT ILLNESS: *Copied from Uche Obrien MD's H&P*  Patient is a 78-year-old  male who presented to the [facility] today for his chemotherapy.  After his port was flushed he was waiting in the waiting room, and developed chest pain and shaking chills.  He had neither of those symptoms prior to arrival.  He has been feeling well as of late.  He did not have a fever at that time.  He was evaluated by Dr. Roblero and sent to the emergency department for possible line infection.  While in the emergency department it was discovered that his Port-A-Cath is now directed cranially.  At time of my interview patient states that he is feeling okay and has no complaints.  No alleviating or exacerbating factors.        DIAGNOSTIC DATA:   Lab Results (last 7 days)     Procedure Component Value Units Date/Time    Blood Culture - Blood, Arm, Right [[id number]] Collected:  11/16/18 0935    Specimen:  Blood from Arm, Right Updated:  11/16/18 0944    Blood Culture - Blood, Arm, Left [[id number]] Collected:  11/16/18 0936    Specimen:  Blood from Arm, Left Updated:  11/16/18 0944    CBC & Differential [[id number]] Collected:  11/16/18 0702    Specimen:  Blood Updated:  11/16/18 0820    Narrative:       The following orders were created for panel order CBC & Differential.  Procedure                               Abnormality         Status                     ---------                               -----------         ------                     Manual Differential[[id number]]          Abnormal            Final result               Scan Slide[[id number]]                                                                  CBC Auto Differential[[id number]]        Abnormal            Final result                 Please view results for these  tests on the individual orders.    Manual Differential [[id number]]  (Abnormal) Collected:  11/16/18 0702    Specimen:  Blood Updated:  11/16/18 0820     Neutrophil % 49.0 %      Lymphocyte % 15.0 %      Monocyte % 12.0 %      Eosinophil % 1.0 %      Basophil % 2.0 %      Bands %  10.0 %      Metamyelocyte % 3.0 %      Myelocyte % 2.0 %      Atypical Lymphocyte % 6.0 %      Neutrophils Absolute 8.49 10*3/mm3      Lymphocytes Absolute 2.16 10*3/mm3      Monocytes Absolute 1.73 10*3/mm3      Eosinophils Absolute 0.14 10*3/mm3      Basophils Absolute 0.29 10*3/mm3      nRBC 1.0 /100 WBC      Anisocytosis Mod/2+     Comment: FEW TARGET CELLS SEEN        Hypochromia Slight/1+     Macrocytes Slight/1+     Polychromasia Slight/1+     WBC Morphology Normal     Platelet Estimate Decreased    CBC Auto Differential [[id number]]  (Abnormal) Collected:  11/16/18 0702    Specimen:  Blood Updated:  11/16/18 0756     WBC 14.39 10*3/mm3      RBC 2.77 10*6/mm3      Hemoglobin 9.5 g/dL      Hematocrit 27.4 %      MCV 98.9 fL      MCH 34.3 pg      MCHC 34.7 g/dL      RDW 24.2 %      RDW-SD 85.0 fl      Platelets 138 10*3/mm3     Basic Metabolic Panel [[id number]]  (Normal) Collected:  11/16/18 0702    Specimen:  Blood Updated:  11/16/18 0727     Glucose 96 mg/dL      BUN 15 mg/dL      Creatinine 0.88 mg/dL      Sodium 137 mmol/L      Potassium 4.5 mmol/L      Chloride 105 mmol/L      CO2 22.0 mmol/L      Calcium 8.9 mg/dL      eGFR Non African Amer 84 mL/min/1.73      BUN/Creatinine Ratio 17.0     Anion Gap 10.0 mmol/L     Narrative:       The MDRD GFR formula is only valid for adults with stable renal function between ages 18 and 70.    Wound Culture - Wound, Chest, Right [[id number]] Collected:  11/14/18 0741    Specimen:  Wound from Chest, Right Updated:  11/16/18 0648     Wound Culture No growth at 2 days     Gram Stain Rare (1+) WBCs seen      No organisms seen    Blood Culture - Blood, Blood, Venous Line [[id number]]  (Abnormal)   (Susceptibility) Collected:  11/12/18 1026    Specimen:  Blood, Venous Line Updated:  11/16/18 0625     Blood Culture Acinetobacter lwoffii group     Isolated from Aerobic Bottle     Gram Stain Aerobic Bottle Gram negative bacilli     Comment: 2 bottles of 3 bottles drawn positive for gram neg bacillus       Susceptibility      Acinetobacter lwoffii group     CHIVO     Ampicillin + Sulbactam Susceptible     Cefepime Susceptible     Ceftazidime Susceptible     Ceftriaxone Susceptible     Levofloxacin Susceptible     Piperacillin Susceptible     Trimethoprim + Sulfamethoxazole Susceptible                    Blood Culture - Blood, Arm, Right [[id number]] Collected:  11/12/18 1640    Specimen:  Blood from Arm, Right Updated:  11/15/18 1715     Blood Culture No growth at 3 days    Blood Culture - Blood, Arm, Right [[id number]] Collected:  11/12/18 1723    Specimen:  Blood from Arm, Right Updated:  11/15/18 1715     Blood Culture No growth at 3 days    Basic Metabolic Panel [[id number]]  (Abnormal) Collected:  11/15/18 0532    Specimen:  Blood Updated:  11/15/18 0642     Glucose 93 mg/dL      BUN 14 mg/dL      Creatinine 0.79 mg/dL      Sodium 137 mmol/L      Potassium 4.3 mmol/L      Chloride 108 mmol/L      CO2 20.0 mmol/L      Calcium 8.3 mg/dL      eGFR Non African Amer 95 mL/min/1.73      BUN/Creatinine Ratio 17.7     Anion Gap 9.0 mmol/L     Narrative:       The MDRD GFR formula is only valid for adults with stable renal function between ages 18 and 70.    CBC & Differential [[id number]] Collected:  11/15/18 0532    Specimen:  Blood Updated:  11/15/18 0612    Narrative:       The following orders were created for panel order CBC & Differential.  Procedure                               Abnormality         Status                     ---------                               -----------         ------                     Scan Slide[[id number]]                                                                  CBC Auto  "Differential[[id number]]        Abnormal            Final result                 Please view results for these tests on the individual orders.    CBC Auto Differential [[id number]]  (Abnormal) Collected:  11/15/18 0532    Specimen:  Blood Updated:  11/15/18 0612     WBC 11.35 10*3/mm3      RBC 2.64 10*6/mm3      Hemoglobin 9.2 g/dL      Hematocrit 25.9 %      MCV 98.1 fL      MCH 34.8 pg      MCHC 35.5 g/dL      RDW 23.9 %      RDW-SD 84.0 fl      MPV -- fL      Comment: Instrument unable to calculate result.         Platelets 128 10*3/mm3      Neutrophil % 43.2 %      Lymphocyte % 17.8 %      Monocyte % 16.7 %      Eosinophil % 4.1 %      Basophil % 0.4 %      Immature Grans % 17.8 %      Neutrophils, Absolute 4.90 10*3/mm3      Lymphocytes, Absolute 2.02 10*3/mm3      Monocytes, Absolute 1.90 10*3/mm3      Eosinophils, Absolute 0.47 10*3/mm3      Basophils, Absolute 0.04 10*3/mm3      Immature Grans, Absolute 2.02 10*3/mm3     Tissue Pathology Exam [[id number]] Collected:  11/14/18 0733    Specimen:  Hardware / Foreign Body from Chest, Right Updated:  11/14/18 1432     Case Report --     Surgical Pathology Report                         Case: [id number]                                  Authorizing Provider:  Tono Arevalo MD      Collected:           11/14/2018 07:33 AM          Ordering Location:     [facility]             Received:            11/14/2018 08:53 AM                                 [place]                                                              Pathologist:           Son Wayne MD                                                         Specimen:    Chest, Right, old mediport                                                                  Final Diagnosis --     MEDIPORT.        Gross Description --     The container is labeled \"old Mediport, right side of chest\" and has a purple plastic truncated pyramid as an injection port presently measuring 3.1 x 2.8 x 1.4 cm.  Its base " is marked [address].  It is attached to a white rubber tube measuring 20.0 cm long and 0.3 cm in diameter.  The tube is marked with dots and numbers at intervals.  No further processing is undertaken.       Blood Culture - Blood, Blood, Venous Line [[id number]]  (Abnormal) Collected:  11/12/18 1033    Specimen:  Blood, Venous Line Updated:  11/14/18 0637     Blood Culture Corynebacterium species, not diphtheriae     Comment: Consistent with contamination at time of collection.  Susceptibility not indicated          Gram Stain Aerobic Bottle Gram positive bacilli     Comment: 1 positive for gram positive bacillus of 3 drawn       Narrative:       Blood culture does not meet the specified criteria for PCR identification.  If pregnant, immunocompromised, or clinical concern for meningitis, call lab to run BCID for Listeria monocytogenes.    CBC & Differential [[id number]] Collected:  11/14/18 0511    Specimen:  Blood Updated:  11/14/18 0619    Narrative:       The following orders were created for panel order CBC & Differential.  Procedure                               Abnormality         Status                     ---------                               -----------         ------                     Scan Slide[[id number]]                                                                  CBC Auto Differential[[id number]]        Abnormal            Final result                 Please view results for these tests on the individual orders.    CBC Auto Differential [[id number]]  (Abnormal) Collected:  11/14/18 0511    Specimen:  Blood Updated:  11/14/18 0619     WBC 14.03 10*3/mm3      RBC 2.47 10*6/mm3      Hemoglobin 8.4 g/dL      Hematocrit 24.7 %      .0 fL      MCH 34.0 pg      MCHC 34.0 g/dL      RDW 25.0 %      RDW-SD 90.8 fl      MPV -- fL      Comment: Results not available        Platelets 109 10*3/mm3      Neutrophil % 37.9 %      Lymphocyte % 10.9 %      Monocyte % 31.0 %      Eosinophil % 3.8 %      Basophil  % 0.9 %      Immature Grans % 15.5 %      Neutrophils, Absolute 5.32 10*3/mm3      Lymphocytes, Absolute 1.53 10*3/mm3      Monocytes, Absolute 4.35 10*3/mm3      Eosinophils, Absolute 0.54 10*3/mm3      Basophils, Absolute 0.12 10*3/mm3      Immature Grans, Absolute 2.17 10*3/mm3      nRBC 0.6 /100 WBC     Basic Metabolic Panel [[id number]]  (Abnormal) Collected:  11/14/18 0511    Specimen:  Blood Updated:  11/14/18 0606     Glucose 102 mg/dL      BUN 14 mg/dL      Creatinine 0.99 mg/dL      Sodium 138 mmol/L      Potassium 4.4 mmol/L      Chloride 108 mmol/L      CO2 21.0 mmol/L      Calcium 8.1 mg/dL      eGFR Non African Amer 73 mL/min/1.73      BUN/Creatinine Ratio 14.1     Anion Gap 9.0 mmol/L     Narrative:       The MDRD GFR formula is only valid for adults with stable renal function between ages 18 and 70.    Vancomycin, Trough [[id number]]  (Abnormal) Collected:  11/13/18 2152    Specimen:  Blood Updated:  11/13/18 2237     Vancomycin Trough 16.94 mcg/mL     Vancomycin, Peak [[id number]]  (Abnormal) Collected:  11/13/18 1648    Specimen:  Blood Updated:  11/13/18 1715     Vancomycin Peak 28.13 mcg/mL     Blood Culture ID, PCR - Blood, Blood, Venous Line [[id number]]  (Normal) Collected:  11/12/18 1026    Specimen:  Blood, Venous Line Updated:  11/13/18 1328     BCID, PCR No organism detected by BCID PCR.    Extra Tubes [[id number]] Collected:  11/13/18 1014    Specimen:  Blood, Venous Line Updated:  11/13/18 1116    Narrative:       The following orders were created for panel order Extra Tubes.  Procedure                               Abnormality         Status                     ---------                               -----------         ------                     Gold Top - SST[[id number]]                                   Final result                 Please view results for these tests on the individual orders.    Gold Top - SST [[id number]] Collected:  11/13/18 1014    Specimen:  Blood Updated:  11/13/18  1116     Extra Tube Hold for add-ons.     Comment: Auto resulted.       Lactic Acid, Plasma [[id number]]  (Normal) Collected:  11/13/18 1016    Specimen:  Blood Updated:  11/13/18 1042     Lactate 1.6 mmol/L     CBC & Differential [[id number]] Collected:  11/13/18 0624    Specimen:  Blood Updated:  11/13/18 0959    Narrative:       The following orders were created for panel order CBC & Differential.  Procedure                               Abnormality         Status                     ---------                               -----------         ------                     Manual Differential[[id number]]          Abnormal            Final result               Scan Slide[[id number]]                                                                  CBC Auto Differential[[id number]]        Abnormal            Final result                 Please view results for these tests on the individual orders.    Manual Differential [[id number]]  (Abnormal) Collected:  11/13/18 0624    Specimen:  Blood Updated:  11/13/18 0959     Neutrophil % 49.0 %      Lymphocyte % 9.0 %      Monocyte % 25.0 %      Eosinophil % 2.0 %      Basophil % 2.0 %      Bands %  3.0 %      Metamyelocyte % 3.0 %      Myelocyte % 7.0 %      Neutrophils Absolute 11.79 10*3/mm3      Lymphocytes Absolute 2.04 10*3/mm3      Monocytes Absolute 5.67 10*3/mm3      Eosinophils Absolute 0.45 10*3/mm3      Basophils Absolute 0.45 10*3/mm3      Anisocytosis Slight/1+     Comment: Few polychromic cells, few teardrop cells        WBC Morphology Normal     Platelet Estimate Decreased    Basic Metabolic Panel [[id number]]  (Abnormal) Collected:  11/13/18 0624    Specimen:  Blood Updated:  11/13/18 0714     Glucose 95 mg/dL      BUN 16 mg/dL      Creatinine 0.87 mg/dL      Sodium 136 mmol/L      Potassium 3.9 mmol/L      Chloride 108 mmol/L      CO2 20.0 mmol/L      Calcium 8.4 mg/dL      eGFR Non African Amer 85 mL/min/1.73      BUN/Creatinine Ratio 18.4     Anion Gap 8.0 mmol/L      Narrative:       The MDRD GFR formula is only valid for adults with stable renal function between ages 18 and 70.    CBC Auto Differential [[id number]]  (Abnormal) Collected:  11/13/18 0624    Specimen:  Blood Updated:  11/13/18 0702     WBC 22.67 10*3/mm3      RBC 2.64 10*6/mm3      Hemoglobin 9.3 g/dL      Hematocrit 26.2 %      MCV 99.2 fL      MCH 35.2 pg      MCHC 35.5 g/dL      RDW 24.1 %      RDW-SD 84.4 fl      MPV -- fL      Comment: Instrument unable to calculate result        Platelets 133 10*3/mm3     POC Glucose Once [[id number]]  (Normal) Collected:  11/12/18 2336    Specimen:  Blood Updated:  11/12/18 2348     Glucose 101 mg/dL      Comment: : [id number] JOSELUIS GINAMeter ID: [id number]       Lactic Acid, Reflex [[id number]]  (Abnormal) Collected:  11/12/18 1412    Specimen:  Blood Updated:  11/12/18 1439     Lactate 3.7 mmol/L     Lactic Acid, Reflex Timer (This will reflex a repeat order 3-3:15 hours after ordered.) [[id number]] Collected:  11/12/18 1004    Specimen:  Blood Updated:  11/12/18 1400     Extra Tube Hold for add-ons.     Comment: Auto resulted.       CBC & Differential [[id number]] Collected:  11/12/18 1004    Specimen:  Blood Updated:  11/12/18 1159    Narrative:       The following orders were created for panel order CBC & Differential.  Procedure                               Abnormality         Status                     ---------                               -----------         ------                     Manual Differential[[id number]]          Abnormal            Final result               Scan Slide[[id number]]                                                                  CBC Auto Differential[[id number]]        Abnormal            Final result                 Please view results for these tests on the individual orders.    Manual Differential [[id number]]  (Abnormal) Collected:  11/12/18 1004    Specimen:  Blood Updated:  11/12/18 1159     Neutrophil % 60.0 %       Lymphocyte % 7.0 %      Monocyte % 18.0 %      Eosinophil % 2.0 %      Bands %  5.0 %      Metamyelocyte % 5.0 %      Myelocyte % 1.0 %      Atypical Lymphocyte % 2.0 %      Neutrophils Absolute 14.72 10*3/mm3      Lymphocytes Absolute 1.58 10*3/mm3      Monocytes Absolute 4.08 10*3/mm3      Eosinophils Absolute 0.45 10*3/mm3      nRBC 1.0 /100 WBC      Anisocytosis Slight/1+     Comment: Few Teardrop cells        Polychromasia Slight/1+     WBC Morphology Normal     Platelet Estimate Adequate    Extra Tubes [[id number]] Collected:  11/12/18 1004    Specimen:  Blood, Venous Line Updated:  11/12/18 1116    Narrative:       The following orders were created for panel order Extra Tubes.  Procedure                               Abnormality         Status                     ---------                               -----------         ------                     Light Blue Top[[id number]]                                   Final result                 Please view results for these tests on the individual orders.    Light Blue Top [[id number]] Collected:  11/12/18 1004    Specimen:  Blood Updated:  11/12/18 1116     Extra Tube hold for add-on     Comment: Auto resulted       Urinalysis With Microscopic If Indicated (No Culture) - Urine, Clean Catch [[id number]]  (Normal) Collected:  11/12/18 1103    Specimen:  Urine, Clean Catch Updated:  11/12/18 1110     Color, UA Yellow     Appearance, UA Clear     pH, UA <=5.0     Specific Gravity, UA 1.022     Glucose, UA Negative     Ketones, UA Negative     Bilirubin, UA Negative     Blood, UA Negative     Protein, UA Negative     Leuk Esterase, UA Negative     Nitrite, UA Negative     Urobilinogen, UA 0.2 E.U./dL    Narrative:       Urine microscopic not indicated.    Lactic Acid, Plasma [[id number]]  (Abnormal) Collected:  11/12/18 1004    Specimen:  Blood Updated:  11/12/18 1050     Lactate 5.7 mmol/L     Comprehensive Metabolic Panel [[id number]]  (Abnormal) Collected:  11/12/18 1004     Specimen:  Blood Updated:  11/12/18 1043     Glucose 92 mg/dL      BUN 14 mg/dL      Creatinine 0.75 mg/dL      Sodium 136 mmol/L      Potassium 3.9 mmol/L      Chloride 104 mmol/L      CO2 18.0 mmol/L      Calcium 9.4 mg/dL      Total Protein 6.8 g/dL      Albumin 4.10 g/dL      ALT (SGPT) 17 U/L      AST (SGOT) 20 U/L      Alkaline Phosphatase 54 U/L      Total Bilirubin 0.9 mg/dL      eGFR Non African Amer 101 mL/min/1.73      Globulin 2.7 gm/dL      A/G Ratio 1.5 g/dL      BUN/Creatinine Ratio 18.7     Anion Gap 14.0 mmol/L     Narrative:       The MDRD GFR formula is only valid for adults with stable renal function between ages 18 and 70.    Lipase [[id number]]  (Normal) Collected:  11/12/18 1004    Specimen:  Blood Updated:  11/12/18 1043     Lipase 48 U/L     CBC Auto Differential [[id number]]  (Abnormal) Collected:  11/12/18 1004    Specimen:  Blood Updated:  11/12/18 1032     WBC 22.64 10*3/mm3      RBC 2.94 10*6/mm3      Hemoglobin 10.2 g/dL      Hematocrit 29.5 %      .3 fL      MCH 34.7 pg      MCHC 34.6 g/dL      RDW 24.0 %      RDW-SD 85.4 fl      Platelets 178 10*3/mm3      Neutrophil % -- %      Lymphocyte % -- %      Monocyte % 3.5 %      Eosinophil % 0.8 %      Basophil % 0.5 %      Neutrophils, Absolute -- 10*3/mm3      Lymphocytes, Absolute -- 10*3/mm3      Monocytes, Absolute 0.79 10*3/mm3      Eosinophils, Absolute 0.17 10*3/mm3      Basophils, Absolute 0.12 10*3/mm3           HOSPITAL COURSE:  Active Hospital Problems    Diagnosis Date Noted   • **Sepsis (CMS/HCC) [A41.9] 11/13/2018   • Encounter for care related to Port-a-Cath [Z45.2] 11/12/2018   • Port or reservoir infection [T80.212A] 11/12/2018     Added automatically from request for surgery [id number]       Patient admitted with sepsis and likely port infection. Cultures and culture from port positive. Otherwise no growth. Patient on Vanc/Cefepime initially with vanc discontinued after only GNB growth seen. Patient to be discharged on  Levaquin to complete 10 more days for 14 days total. Patient has ID follow up on discharge. Patient has some thickening on aortic valve but clinical suspicion for vegetation or endocarditis is low. Patient afebrile over hospital course. Patient to have close oncology, ID, and PCP follow up on discharge.    #. Sepsis. Vanc/Cefepime. Cultures pending. Mike results. ECHO. Blood culture showing GNB. Not final yet. Will discuss with micro. Per micro GNB growing. Should be final tomorrow. Will stop vanc.  ECHO Results:  Interpretation Summary   · Left ventricular systolic function is normal. Estimated EF = 58%.  · Left ventricular diastolic dysfunction (grade I) consistent with impaired relaxation.  · Left atrial cavity size is borderline dilated.  · Mild mitral valve regurgitation is present  · Mild to moderate tricuspid valve regurgitation is present.  · Electronic lead present in the ventricle.  · There is thickening of the noncoronary cusp of the aortic valve. Calcification/ sclerosis likely. Trace-to-mild aortic valve regurgitation is present.   #. Port-a-cath cranially directed.   11/14. Port removed.  11/13. Surgery consulted. Appreciate input.  #. CML. Active chemo patient. Consult heme/onc as needed.    DISCHARGE CONDITION:   Stable    DISPOSITION:  Home or Self Care    DISCHARGE MEDICATIONS     Discharge Medications      New Medications      Instructions Start Date   levoFLOXacin 750 MG tablet  Commonly known as:  LEVAQUIN   750 mg, Oral, Daily         Continue These Medications      Instructions Start Date   allopurinol 100 MG tablet  Commonly known as:  ZYLOPRIM   100 mg, Oral, 4 Times Daily      aspirin 81 MG EC tablet   81 mg, Oral, Daily      colchicine 0.6 MG tablet   0.6 mg, Oral, Daily PRN      diclofenac 50 MG EC tablet  Commonly known as:  VOLTAREN   50 mg, Oral, 2 Times Daily      folic acid 1 MG tablet  Commonly known as:  FOLVITE   1 mg, Oral, Daily      omeprazole 20 MG capsule  Commonly known as:   PRILOSEC   20 mg, Oral, Daily      ondansetron 4 MG tablet  Commonly known as:  ZOFRAN   4 mg, Oral, 4 Times Daily PRN      oxybutynin 5 MG tablet  Commonly known as:  DITROPAN   5 mg, Oral, 2 Times Daily      sotalol 80 MG tablet  Commonly known as:  BETAPACE   40 mg, Oral, 2 Times Daily - RT      tamsulosin 0.4 MG capsule 24 hr capsule  Commonly known as:  FLOMAX   1 capsule, Oral, Nightly      TYLENOL 325 MG tablet  Generic drug:  acetaminophen   1 tablet, Oral, As Needed             INSTRUCTIONS:  Activity:   Activity Instructions     Activity as Tolerated            Diet:   Diet Instructions     Advance Diet As Tolerated            Special instructions: Patient instructed to call MD or return to ED with worsening shortness of breath, chest pain, fever greater than 100.4 degrees F or any other medical concerns..    FOLLOW UP:   Follow-up Information     Seferino Tan MD Follow up.    Specialties:  Family Medicine, Emergency Medicine  Why:  Please call Monday 11/19/18 and make a 1 week hospital follow up appointment.  Contact information:  [address] [place] [id number] [phone]             [facility] Follow up on 11/19/2018.    Why:  Mon/Tuesday of next week. Provider's nurse to call [facility] and schedule appointment. Monday 11/19/18 at 12:30pm.  Contact information:  [address]  [facility]  [phone]  Jordin Narvaez MD Follow up on 11/26/2018.    Specialty:  Hematology and Oncology  Why:  Keep appointment.  Contact information:  [address] [place] [address] [address] [place] [place] [address] [phone]                   PENDING TEST RESULTS AT DISCHARGE   Order Current Status    Blood Culture - Blood, Arm, Left In process    Blood Culture - Blood, Arm, Right In process    Blood Culture - Blood, Arm, Right Preliminary result    Blood Culture - Blood, Arm, Right Preliminary result    Wound Culture - Wound, Chest, Right Preliminary result          Time: Discharge 35  min          This document has been electronically signed by Kin Hummel MD on November 16, 2018 11:23 AM

## 2018-11-16 NOTE — PLAN OF CARE
Problem: Patient Care Overview  Goal: Plan of Care Review  Outcome: Ongoing (interventions implemented as appropriate)   11/16/18 0019   Coping/Psychosocial   Plan of Care Reviewed With patient   Plan of Care Review   Progress no change   OTHER   Outcome Summary Patient resting in bed. VS stable. No c/o pain or s/s of distress at this time. Will continue to monitor this patient.      Goal: Discharge Needs Assessment  Outcome: Ongoing (interventions implemented as appropriate)      Problem: Fall Risk (Adult)  Goal: Absence of Fall  Outcome: Ongoing (interventions implemented as appropriate)      Problem: Sepsis/Septic Shock (Adult)  Goal: Signs and Symptoms of Listed Potential Problems Will be Absent, Minimized or Managed (Sepsis/Septic Shock)  Outcome: Ongoing (interventions implemented as appropriate)

## 2018-11-16 NOTE — DISCHARGE PLACEMENT REQUEST
"Jan Humphrey (78 y.o. Male)     Date of Birth Social Security Number Address Home Phone MRN    [id number]  [id number] [facility] [id number] [phone] [id number]    [employer] Mar[employer] Status          Jain        Admission Date Admission Type Admitting Provider Attending Provider Department, Room/Bed    11/12/18 Emergency Uche Obrien MD Williams, Kevin L, MD [address], 431/1    Discharge Date Discharge Disposition Discharge Destination                       Attending Provider:  Uche Obrien MD    Allergies:  Doxycycline Hyclate, Sulfa Antibiotics, Other, Penicillins, Soma [Carisoprodol]    Isolation:  None   Infection:  None   Code Status:  CPR    Ht:  180.3 cm (71\")   Wt:  85.7 kg (188 lb 14.4 oz)    Admission Cmt:  None   Principal Problem:  Sepsis (CMS/[facility]) [A41.9]                 Active Insurance as of 11/12/2018     Primary Coverage     Payor Plan Insurance Group Employer/Plan Group    [employer] MEDICARE REPLACEMENT ANTHEM MEDICARE ADVANTAGE [id number]     Payor Plan Address Payor Plan Phone Number Payor Plan Fax Number Effective Dates    [address] [phone]  1/1/2018 - None Entered    [facility] [address]       Subscriber Name Subscriber Birth Date Member ID       JAN HUMPHREY 1940 [id number]                 Emergency Contacts      (Rel.) Home Phone Work Phone Mobile Phone    Ranjith Humphrey (Son) [phone] -- [phone]    Lester Humphrey (Son) [phone] -- [phone]        Pt will need 14 days of Ertapenem 1 grams q day .  Would like to dc home today 11/16/18 if poss.  Plan for mid line cath  Tess       Insurance Information                [employer] MEDICARE REPLACEMENT/[employer] MEDICARE ADVANTAGE Phone: [phone]    Subscriber: Jan Humphrey Subscriber#: [id number]    Group#: [id number] Precert#:              History & Physical      Tono Arevalo MD at 11/14/2018  6:49 AM  "         H&P reviewed. The patient was examined and there are no changes to the H&P.          Electronically signed by Tono Arevalo MD at 11/14/2018  6:49 AM   Source Note                 Referring Provider: Dr Hummel      Patient Care Team:  Seferino Tan MD as PCP - General (Family Medicine)  Jordin Roblero MD as Consulting Physician (Hematology and Oncology)  Goldie Beverly APRN as Nurse Practitioner (Oncology)      Subjective .     History of present illness:  78-year-old male who had a MediPort placed 3 months ago per Dr. Babb for treatment for CML.  Patient was admitted yesterday with sepsis and also of note his port catheter as stated been position and into the superior vena cava had flipped up into his internal jugular.  Some question whether the port is infected or not but it clearly is malpositioned.  Patient has improved with his treatment for sepsis.    Review of Systems   Constitutional: Negative.    HENT: Negative for hearing loss, nosebleeds and trouble swallowing.    Respiratory: Negative for apnea, chest tightness and shortness of breath.    Cardiovascular: Negative for chest pain and palpitations.   Gastrointestinal: Negative for abdominal distention, abdominal pain and vomiting.   Genitourinary: Negative for difficulty urinating, dysuria and frequency.   Musculoskeletal: Negative for back pain, joint swelling and neck pain.   Skin: Negative for rash.   Neurological: Negative for dizziness and weakness.   Hematological: Negative for adenopathy.   Psychiatric/Behavioral: Negative for agitation. The patient is not nervous/anxious.          History  Past Medical History:   Diagnosis Date   • Atrophy of testis    • Benign prostatic hyperplasia    • Borderline glaucoma    • Chronic myelomonocytic leukemia (CMS/HCC)    • Degenerative joint disease involving multiple joints    • GERD (gastroesophageal reflux disease)    • Gout    • History of echocardiogram 05/08/2012    Normal left ventricular  systolic function, EF 60%. Early diastolic dysfunction. Mild aortic and pulmonic regurgitation. Trace mitral and mild tricuspid regurgitation. No intracardiac mass pericardial effusion or cardiac thrombus.   • History of Holter monitoring 01/18/2011   • Impotence    • Lightheadedness    • Neck pain, musculoskeletal    • Sleep apnea     NOT WEARING C-PAP   • TIA (transient ischemic attack) 2014   , Past Surgical History:   Procedure Laterality Date   • CARDIAC CATHETERIZATION  09/30/2009    No epicardial coronary artery disease noted that would explain patient's chest pain of the abnormal stress test noted. Normal left ventricular systolic function with no wall motion abnormalities   • CARDIAC CATHETERIZATION  05/24/1989    Normal catheterization, false-positive exercise treadmill. Noncardiac chest pain   • CARDIAC PACEMAKER PLACEMENT  06/2008    Dual chamber permanent pacemaker implantation   • COLECTOMY PARTIAL / TOTAL  04/14/2000    Cecal diverticulitis. Removal of small segment of terminal ileum, cecum and right colon, sent to pathology   • COLON SURGERY  03/27/2016   • COLONOSCOPY  05/25/2012   • CYSTOSCOPY  11/15/2000    Urinary retention on the basis of medications and benign prostatic hypertrophy   • INGUINAL HERNIA REPAIR  02/15/2007    Recurrent right inguinal hernia. Repair of recurrent inguinal hernia with EPS Prolene mesh system.   • INGUINAL HERNIA REPAIR Right 1957   • LAPAROSCOPIC CHOLECYSTECTOMY  10/26/2006    Gallstones. Laparoscopic cholecystectomy with operative cholangiogram   • PROSTATECTOMY  11/17/2000    Benign prostatic hypertrophy with urinary retention. Prostatitis. transurethral resection of prostate.   • STEROID INJECTION  10/21/2010    Decadron (Gout)    • STEROID INJECTION  07/23/2013    Kenalog (Gout) (4)   , Family History   Problem Relation Age of Onset   • Cancer Other    • Colon cancer Other         parent   • Coronary artery disease Other    • Heart disease Other    • Hypertension  Other    • Cholelithiasis Other    • Other Other         colon problems   , Social History     Tobacco Use   • Smoking status: Never Smoker   • Smokeless tobacco: Never Used   Substance Use Topics   • Alcohol use: No   • Drug use: No   , Home Medications:  Prior to Admission medications    Medication Sig Start Date End Date Taking? Authorizing Provider   acetaminophen (TYLENOL) 325 MG tablet Take 1 tablet by mouth As Needed. 4/26/12  Yes Sherin Silveira MD   allopurinol (ZYLOPRIM) 100 MG tablet Take 1 tablet by mouth 4 (Four) Times a Day. 10/22/18  Yes Seferino Tan MD   aspirin 81 MG EC tablet Take 81 mg by mouth Daily.   Yes Sherin Silveira MD   colchicine 0.6 MG tablet Take 0.6 mg by mouth Daily As Needed for Muscle / Joint Pain.   Yes Sherin Silveira MD   folic acid (FOLVITE) 1 MG tablet Take 1 tablet by mouth Daily. 10/22/18  Yes Seferino Tan MD   omeprazole (PRILOSEC) 20 MG capsule Take 1 capsule by mouth Daily. 10/22/18  Yes Seferino Tan MD   ondansetron (ZOFRAN) 4 MG tablet Take 1 tablet by mouth 4 (Four) Times a Day As Needed for Nausea or Vomiting. 9/11/18  Yes Jordin Roblero MD   oxybutynin (DITROPAN) 5 MG tablet Take 5 mg by mouth 2 (Two) Times a Day. 5/31/17  Yes Sherin Silveira MD   sotalol (BETAPACE) 80 MG tablet Take 0.5 tablets by mouth 2 (Two) Times a Day. 9/12/18  Yes Geneva Day MD   tamsulosin (FLOMAX) 0.4 MG capsule 24 hr capsule Take 1 capsule by mouth every night.   Yes Sherin Silveira MD   diclofenac (VOLTAREN) 50 MG EC tablet Take 1 tablet by mouth 2 (Two) Times a Day.  Patient not taking: Reported on 11/12/2018 10/22/18   Seferino Tan MD   , Scheduled Meds:    allopurinol 100 mg Oral 4x Daily   aspirin 81 mg Oral Daily   cefepime 2 g Intravenous Q8H   folic acid 1 mg Oral Daily   heparin (porcine) 5,000 Units Subcutaneous Q8H   naproxen 500 mg Oral BID With Meals   oxybutynin 5 mg Oral BID   pantoprazole 40 mg Oral QAM   sodium  chloride 3 mL Intravenous Q12H   sotalol 40 mg Oral BID - RT   tamsulosin 0.4 mg Oral Nightly   vancomycin 15 mg/kg Intravenous Q12H   , Continuous Infusions:    Pharmacy to dose vancomycin    , PRN Meds:  •  acetaminophen  •  colchicine  •  ondansetron  •  [DISCONTINUED] vancomycin **AND** Pharmacy to dose vancomycin  •  [COMPLETED] Insert peripheral IV **AND** sodium chloride  •  sodium chloride and Allergies:  Allergies   Allergen Reactions   • Doxycycline Hyclate Other (See Comments)     Other reaction(s): lip swollen   • Sulfa Antibiotics Swelling     facial   • Other Rash     SILK TAPE   • Penicillins Rash     Reaction: rash   • Soma [Carisoprodol] Swelling and Rash       Objective     Vital Signs   Temp:  [97.1 °F (36.2 °C)-98.9 °F (37.2 °C)] 97.1 °F (36.2 °C)  Heart Rate:  [61-74] 61  Resp:  [18] 18  BP: (103-110)/(55-59) 108/57    Physical Exam:     General Appearance:    Alert, cooperative, in no acute distress   Head:    Normocephalic, without obvious abnormality, atraumatic   Eyes:            Lids and lashes normal, conjunctivae and sclerae normal, no   icterus, no pallor, corneas clear   Ears:    Ears appear intact with no abnormalities noted   Throat:   No oral lesions, no thrush, oral mucosa moist   Neck:   No adenopathy, supple, trachea midline, no thyromegaly,   no JVD   Lungs:     Clear to auscultation,respirations regular, even and                  unlabored    Heart:    Regular rhythm and normal rate, normal S1 and S2, no            murmur, no gallop, no rub, no click   Chest Wall:    No abnormalities observed   Abdomen:     Normal bowel sounds, no masses, no organomegaly, soft        non-tender, non-distended, no guarding, no rebound                tenderness   Extremities:   Moves all extremities well, no edema, no cyanosis, no             redness   Skin:   No bleeding, bruising or rash   Lymph nodes:   No palpable adenopathy   Neurologic:  Grossly intact, sensation intact       Results  Review:   I reviewed the patient's new clinical results.      Assessment/Plan       Sepsis (CMS/[facility])    Encounter for care related to Port-a-Cath        I discussed the patients findings and my recommendations with patient, nursing staff and consulting provider     reCommend removal of MediPort and wound infection to clear and port can be replaced at a later time.  I fully discussed the procedure alternatives risk benefits with the patient and he clearly understands and wishes to proceed patient also has a pacemaker which we need to have addressed prior to have any type of surgery        This document has been electronically signed by Tono Arevalo MD on November 13, 2018 4:44 PM     Tono Arevalo MD  11/13/18  4:44 PM               Electronically signed by Tono Arevalo MD at 11/13/2018  4:49 PM             Tono Arevalo MD at 11/13/2018  4:44 PM              Referring Provider: Dr Hummel      Patient Care Team:  Seferino Tan MD as PCP - General (Family Medicine)  Jordin Roblero MD as Consulting Physician (Hematology and Oncology)  Goldie Beverly APRN as Nurse Practitioner (Oncology)      Subjective .     History of present illness:  78-year-old male who had a MediPort placed 3 months ago per Dr. Babb for treatment for CML.  Patient was admitted yesterday with sepsis and also of note his port catheter as stated been position and into the superior vena cava had flipped up into his internal jugular.  Some question whether the port is infected or not but it clearly is malpositioned.  Patient has improved with his treatment for sepsis.    Review of Systems   Constitutional: Negative.    HENT: Negative for hearing loss, nosebleeds and trouble swallowing.    Respiratory: Negative for apnea, chest tightness and shortness of breath.    Cardiovascular: Negative for chest pain and palpitations.   Gastrointestinal: Negative for abdominal distention, abdominal pain and vomiting.   Genitourinary:  Negative for difficulty urinating, dysuria and frequency.   Musculoskeletal: Negative for back pain, joint swelling and neck pain.   Skin: Negative for rash.   Neurological: Negative for dizziness and weakness.   Hematological: Negative for adenopathy.   Psychiatric/Behavioral: Negative for agitation. The patient is not nervous/anxious.          History  Past Medical History:   Diagnosis Date   • Atrophy of testis    • Benign prostatic hyperplasia    • Borderline glaucoma    • Chronic myelomonocytic leukemia (CMS/HCC)    • Degenerative joint disease involving multiple joints    • GERD (gastroesophageal reflux disease)    • Gout    • History of echocardiogram 05/08/2012    Normal left ventricular systolic function, EF 60%. Early diastolic dysfunction. Mild aortic and pulmonic regurgitation. Trace mitral and mild tricuspid regurgitation. No intracardiac mass pericardial effusion or cardiac thrombus.   • History of Holter monitoring 01/18/2011   • Impotence    • Lightheadedness    • Neck pain, musculoskeletal    • Sleep apnea     NOT WEARING C-PAP   • TIA (transient ischemic attack) 2014   , Past Surgical History:   Procedure Laterality Date   • CARDIAC CATHETERIZATION  09/30/2009    No epicardial coronary artery disease noted that would explain patient's chest pain of the abnormal stress test noted. Normal left ventricular systolic function with no wall motion abnormalities   • CARDIAC CATHETERIZATION  05/24/1989    Normal catheterization, false-positive exercise treadmill. Noncardiac chest pain   • CARDIAC PACEMAKER PLACEMENT  06/2008    Dual chamber permanent pacemaker implantation   • COLECTOMY PARTIAL / TOTAL  04/14/2000    Cecal diverticulitis. Removal of small segment of terminal ileum, cecum and right colon, sent to pathology   • COLON SURGERY  03/27/2016   • COLONOSCOPY  05/25/2012   • CYSTOSCOPY  11/15/2000    Urinary retention on the basis of medications and benign prostatic hypertrophy   • INGUINAL HERNIA  REPAIR  02/15/2007    Recurrent right inguinal hernia. Repair of recurrent inguinal hernia with EPS Prolene mesh system.   • INGUINAL HERNIA REPAIR Right 1957   • LAPAROSCOPIC CHOLECYSTECTOMY  10/26/2006    Gallstones. Laparoscopic cholecystectomy with operative cholangiogram   • PROSTATECTOMY  11/17/2000    Benign prostatic hypertrophy with urinary retention. Prostatitis. transurethral resection of prostate.   • STEROID INJECTION  10/21/2010    Decadron (Gout)    • STEROID INJECTION  07/23/2013    Kenalog (Gout) (4)   , Family History   Problem Relation Age of Onset   • Cancer Other    • Colon cancer Other         parent   • Coronary artery disease Other    • Heart disease Other    • Hypertension Other    • Cholelithiasis Other    • Other Other         colon problems   , Social History     Tobacco Use   • Smoking status: Never Smoker   • Smokeless tobacco: Never Used   Substance Use Topics   • Alcohol use: No   • Drug use: No   , Home Medications:  Prior to Admission medications    Medication Sig Start Date End Date Taking? Authorizing Provider   acetaminophen (TYLENOL) 325 MG tablet Take 1 tablet by mouth As Needed. 4/26/12  Yes Sherin Silveira MD   allopurinol (ZYLOPRIM) 100 MG tablet Take 1 tablet by mouth 4 (Four) Times a Day. 10/22/18  Yes Seferino Tan MD   aspirin 81 MG EC tablet Take 81 mg by mouth Daily.   Yes Sherin Silveira MD   colchicine 0.6 MG tablet Take 0.6 mg by mouth Daily As Needed for Muscle / Joint Pain.   Yes Sherin Silveira MD   folic acid (FOLVITE) 1 MG tablet Take 1 tablet by mouth Daily. 10/22/18  Yes Seferino Tan MD   omeprazole (PRILOSEC) 20 MG capsule Take 1 capsule by mouth Daily. 10/22/18  Yes Seferino Tan MD   ondansetron (ZOFRAN) 4 MG tablet Take 1 tablet by mouth 4 (Four) Times a Day As Needed for Nausea or Vomiting. 9/11/18  Yes Jordin Roblero MD   oxybutynin (DITROPAN) 5 MG tablet Take 5 mg by mouth 2 (Two) Times a Day. 5/31/17  Yes Raoul  MD Sherin   sotalol (BETAPACE) 80 MG tablet Take 0.5 tablets by mouth 2 (Two) Times a Day. 9/12/18  Yes Geneva Day MD   tamsulosin (FLOMAX) 0.4 MG capsule 24 hr capsule Take 1 capsule by mouth every night.   Yes Provider, MD Sherin   diclofenac (VOLTAREN) 50 MG EC tablet Take 1 tablet by mouth 2 (Two) Times a Day.  Patient not taking: Reported on 11/12/2018 10/22/18   Seferino Tan MD   , Scheduled Meds:    allopurinol 100 mg Oral 4x Daily   aspirin 81 mg Oral Daily   cefepime 2 g Intravenous Q8H   folic acid 1 mg Oral Daily   heparin (porcine) 5,000 Units Subcutaneous Q8H   naproxen 500 mg Oral BID With Meals   oxybutynin 5 mg Oral BID   pantoprazole 40 mg Oral QAM   sodium chloride 3 mL Intravenous Q12H   sotalol 40 mg Oral BID - RT   tamsulosin 0.4 mg Oral Nightly   vancomycin 15 mg/kg Intravenous Q12H   , Continuous Infusions:    Pharmacy to dose vancomycin    , PRN Meds:  •  acetaminophen  •  colchicine  •  ondansetron  •  [DISCONTINUED] vancomycin **AND** Pharmacy to dose vancomycin  •  [COMPLETED] Insert peripheral IV **AND** sodium chloride  •  sodium chloride and Allergies:  Allergies   Allergen Reactions   • Doxycycline Hyclate Other (See Comments)     Other reaction(s): lip swollen   • Sulfa Antibiotics Swelling     facial   • Other Rash     SILK TAPE   • Penicillins Rash     Reaction: rash   • Soma [Carisoprodol] Swelling and Rash       Objective     Vital Signs   Temp:  [97.1 °F (36.2 °C)-98.9 °F (37.2 °C)] 97.1 °F (36.2 °C)  Heart Rate:  [61-74] 61  Resp:  [18] 18  BP: (103-110)/(55-59) 108/57    Physical Exam:     General Appearance:    Alert, cooperative, in no acute distress   Head:    Normocephalic, without obvious abnormality, atraumatic   Eyes:            Lids and lashes normal, conjunctivae and sclerae normal, no   icterus, no pallor, corneas clear   Ears:    Ears appear intact with no abnormalities noted   Throat:   No oral lesions, no thrush, oral mucosa moist    Neck:   No adenopathy, supple, trachea midline, no thyromegaly,   no JVD   Lungs:     Clear to auscultation,respirations regular, even and                  unlabored    Heart:    Regular rhythm and normal rate, normal S1 and S2, no            murmur, no gallop, no rub, no click   Chest Wall:    No abnormalities observed   Abdomen:     Normal bowel sounds, no masses, no organomegaly, soft        non-tender, non-distended, no guarding, no rebound                tenderness   Extremities:   Moves all extremities well, no edema, no cyanosis, no             redness   Skin:   No bleeding, bruising or rash   Lymph nodes:   No palpable adenopathy   Neurologic:  Grossly intact, sensation intact       Results Review:   I reviewed the patient's new clinical results.      Assessment/Plan       Sepsis (CMS/[facility])    Encounter for care related to Port-a-Cath        I discussed the patients findings and my recommendations with patient, nursing staff and consulting provider     reCommend removal of MediPort and wound infection to clear and port can be replaced at a later time.  I fully discussed the procedure alternatives risk benefits with the patient and he clearly understands and wishes to proceed patient also has a pacemaker which we need to have addressed prior to have any type of surgery        This document has been electronically signed by Tono Arevalo MD on November 13, 2018 4:44 PM     Tono Arevalo MD  11/13/18  4:44 PM              Electronically signed by Tono Arevalo MD at 11/13/2018  4:49 PM     Uche Obrien MD at 11/12/2018  4:51 PM                [facility] Medicine Admission      Date of Admission: 11/12/2018      Primary Care Physician: Seferino Tan MD      Chief Complaint:  Chest pain and chills    HPI:  Patient is a 78-year-old  male who presented to the [facility] today for his chemotherapy.  After his port was flushed he was waiting in  the waiting room, and developed chest pain and shaking chills.  He had neither of those symptoms prior to arrival.  He has been feeling well as of late.  He did not have a fever at that time.  He was evaluated by Dr. Roblero and sent to the emergency department for possible line infection.  While in the emergency department it was discovered that his Port-A-Cath is now directed cranially.  At time of my interview patient states that he is feeling okay and has no complaints.  No alleviating or exacerbating factors.     Concurrent Medical History:  has a past medical history of Atrophy of testis, Benign prostatic hyperplasia, Borderline glaucoma, Chronic myelomonocytic leukemia (CMS/HCC), Degenerative joint disease involving multiple joints, GERD (gastroesophageal reflux disease), Gout, History of echocardiogram (05/08/2012), History of Holter monitoring (01/18/2011), Impotence, Lightheadedness, Neck pain, musculoskeletal, Sleep apnea, and TIA (transient ischemic attack) (2014).    Past Surgical History:  has a past surgical history that includes Cardiac pacemaker placement (06/2008); Prostatectomy (11/17/2000); Laparoscopic cholecystectomy (10/26/2006); Colectomy partial / total (04/14/2000); Cystoscopy (11/15/2000); Steroid Injection (10/21/2010); Steroid Injection (07/23/2013); Cardiac catheterization (09/30/2009); Cardiac catheterization (05/24/1989); Colon surgery (03/27/2016); Colonoscopy (05/25/2012); Inguinal hernia repair (02/15/2007); Inguinal hernia repair (Right, 1957); ULTRASOUND ASSISTED RIGHT JUGULAR INSERTION VENOUS ACCESS DEVICE (N/A, 9/13/2018); and PPM generator change - dual (N/A, 5/18/2017).    Family History: family history includes Cancer in his other; Cholelithiasis in his other; Colon cancer in his other; Coronary artery disease in his other; Heart disease in his other; Hypertension in his other; Other in his other.     Social History:  reports that  has never smoked. he has never used smokeless  tobacco. He reports that he does not drink alcohol or use drugs.    Allergies:   Allergies   Allergen Reactions   • Doxycycline Hyclate Other (See Comments)     Other reaction(s): lip swollen   • Sulfa Antibiotics Swelling     facial   • Other Rash     SILK TAPE   • Penicillins Rash     Reaction: rash   • Soma [Carisoprodol] Swelling and Rash       Medications:   Medications Prior to Admission   Medication Sig Dispense Refill Last Dose   • acetaminophen (TYLENOL) 325 MG tablet Take 1 tablet by mouth As Needed.   Taking   • allopurinol (ZYLOPRIM) 100 MG tablet Take 1 tablet by mouth 4 (Four) Times a Day. 360 tablet 1 Taking   • aspirin 81 MG EC tablet Take 81 mg by mouth Daily.   Taking   • colchicine 0.6 MG tablet Take 0.6 mg by mouth Daily As Needed for Muscle / Joint Pain.   Taking   • diclofenac (VOLTAREN) 50 MG EC tablet Take 1 tablet by mouth 2 (Two) Times a Day. 60 tablet 11 Taking   • folic acid (FOLVITE) 1 MG tablet Take 1 tablet by mouth Daily. 90 tablet 3 Taking   • omeprazole (PRILOSEC) 20 MG capsule Take 1 capsule by mouth Daily. 90 capsule 3 Taking   • ondansetron (ZOFRAN) 4 MG tablet Take 1 tablet by mouth 4 (Four) Times a Day As Needed for Nausea or Vomiting. 40 tablet 3 Taking   • oxybutynin (DITROPAN) 5 MG tablet Take 5 mg by mouth 2 (Two) Times a Day.   Taking   • sotalol (BETAPACE) 80 MG tablet Take 0.5 tablets by mouth 2 (Two) Times a Day. 90 tablet 3 Taking   • tamsulosin (FLOMAX) 0.4 MG capsule 24 hr capsule Take 1 capsule by mouth every night.   Taking       Review of Systems:  Review of Systems   Constitutional: Positive for chills. Negative for appetite change, fatigue, fever and unexpected weight change.   HENT: Negative for congestion, facial swelling, hearing loss, nosebleeds, rhinorrhea, sneezing, trouble swallowing and voice change.    Eyes: Negative for photophobia and visual disturbance.   Respiratory: Negative for apnea, cough, choking, chest tightness, shortness of breath, wheezing  and stridor.    Cardiovascular: Positive for chest pain. Negative for palpitations and leg swelling.   Gastrointestinal: Negative for abdominal pain, blood in stool, constipation, diarrhea, nausea and vomiting.   Endocrine: Negative for cold intolerance, heat intolerance, polydipsia, polyphagia and polyuria.   Genitourinary: Negative for dysuria, flank pain and hematuria.   Musculoskeletal: Negative for arthralgias, back pain, myalgias and neck pain.   Skin: Negative for rash and wound.   Allergic/Immunologic: Negative for immunocompromised state.   Neurological: Negative for dizziness, seizures, syncope, speech difficulty, weakness, light-headedness, numbness and headaches.   Hematological: Does not bruise/bleed easily.   Psychiatric/Behavioral: Negative for agitation, behavioral problems, confusion, decreased concentration, hallucinations, self-injury and suicidal ideas. The patient is not nervous/anxious.       Otherwise complete ROS is negative except as mentioned above.    Physical Exam:   Temp:  [96.6 °F (35.9 °C)-98.3 °F (36.8 °C)] 97.6 °F (36.4 °C)  Heart Rate:  [62-98] 62  Resp:  [18-25] 18  BP: (105-190)/(49-81) 105/52     Physical Exam   Constitutional: He is oriented to person, place, and time. He appears well-developed and well-nourished.   HENT:   Head: Normocephalic and atraumatic.   Nose: Nose normal.   Mouth/Throat: Oropharynx is clear and moist.   Eyes: Conjunctivae, EOM and lids are normal. Pupils are equal, round, and reactive to light. No scleral icterus.   Neck: Normal range of motion. Neck supple. No JVD present. No tracheal tenderness and no spinous process tenderness present. No neck rigidity. No tracheal deviation, no edema and normal range of motion present.   Cardiovascular: Normal rate, regular rhythm, S1 normal, S2 normal, normal heart sounds and normal pulses. Exam reveals no gallop and no friction rub.   No murmur heard.  Pulmonary/Chest: Effort normal and breath sounds normal. No  accessory muscle usage. No respiratory distress. He has no decreased breath sounds. He has no wheezes. He has no rales. He exhibits no tenderness.   Port in right chest   Abdominal: Soft. Bowel sounds are normal. He exhibits no distension and no mass. There is no tenderness. There is no rebound and no guarding.   Musculoskeletal: He exhibits no edema or tenderness.        Right shoulder: He exhibits normal range of motion, no tenderness and no pain.   Neurological: He is alert and oriented to person, place, and time. He has normal reflexes. He displays no atrophy and normal reflexes. No cranial nerve deficit or sensory deficit. He exhibits normal muscle tone. He displays no seizure activity. Coordination normal.   Skin: Skin is warm. No rash noted. No pallor.   Psychiatric: He has a normal mood and affect. His behavior is normal. Judgment and thought content normal.         Results Reviewed:  I have personally reviewed current lab, radiology, and data and agree with results.  Lab Results (last 24 hours)     Procedure Component Value Units Date/Time    Lactic Acid, Reflex [[id number]]  (Abnormal) Collected:  11/12/18 1412    Specimen:  Blood Updated:  11/12/18 1439     Lactate 3.7 mmol/L     Lactic Acid, Reflex Timer (This will reflex a repeat order 3-3:15 hours after ordered.) [[id number]] Collected:  11/12/18 1004    Specimen:  Blood Updated:  11/12/18 1400     Extra Tube Hold for add-ons.     Comment: Auto resulted.       CBC & Differential [[id number]] Collected:  11/12/18 1004    Specimen:  Blood Updated:  11/12/18 1159    Narrative:       The following orders were created for panel order CBC & Differential.  Procedure                               Abnormality         Status                     ---------                               -----------         ------                     Manual Differential[[id number]]          Abnormal            Final result               Scan Slide[[id number]]                                                                   CBC Auto Differential[[id number]]        Abnormal            Final result                 Please view results for these tests on the individual orders.    Manual Differential [[id number]]  (Abnormal) Collected:  11/12/18 1004    Specimen:  Blood Updated:  11/12/18 1159     Neutrophil % 60.0 %      Lymphocyte % 7.0 %      Monocyte % 18.0 %      Eosinophil % 2.0 %      Bands %  5.0 %      Metamyelocyte % 5.0 %      Myelocyte % 1.0 %      Atypical Lymphocyte % 2.0 %      Neutrophils Absolute 14.72 10*3/mm3      Lymphocytes Absolute 1.58 10*3/mm3      Monocytes Absolute 4.08 10*3/mm3      Eosinophils Absolute 0.45 10*3/mm3      nRBC 1.0 /100 WBC      Anisocytosis Slight/1+     Comment: Few Teardrop cells        Polychromasia Slight/1+     WBC Morphology Normal     Platelet Estimate Adequate    Extra Tubes [[id number]] Collected:  11/12/18 1004    Specimen:  Blood, Venous Line Updated:  11/12/18 1116    Narrative:       The following orders were created for panel order Extra Tubes.  Procedure                               Abnormality         Status                     ---------                               -----------         ------                     Light Blue Top[[id number]]                                   Final result                 Please view results for these tests on the individual orders.    Light Blue Top [[id number]] Collected:  11/12/18 1004    Specimen:  Blood Updated:  11/12/18 1116     Extra Tube hold for add-on     Comment: Auto resulted       Urinalysis With Microscopic If Indicated (No Culture) - Urine, Clean Catch [[id number]]  (Normal) Collected:  11/12/18 1103    Specimen:  Urine, Clean Catch Updated:  11/12/18 1110     Color, UA Yellow     Appearance, UA Clear     pH, UA <=5.0     Specific Gravity, UA 1.022     Glucose, UA Negative     Ketones, UA Negative     Bilirubin, UA Negative     Blood, UA Negative     Protein, UA Negative     Leuk Esterase, UA Negative      Nitrite, UA Negative     Urobilinogen, UA 0.2 E.U./dL    Narrative:       Urine microscopic not indicated.    Lactic Acid, Plasma [[id number]]  (Abnormal) Collected:  11/12/18 1004    Specimen:  Blood Updated:  11/12/18 1050     Lactate 5.7 mmol/L     Comprehensive Metabolic Panel [[id number]]  (Abnormal) Collected:  11/12/18 1004    Specimen:  Blood Updated:  11/12/18 1043     Glucose 92 mg/dL      BUN 14 mg/dL      Creatinine 0.75 mg/dL      Sodium 136 mmol/L      Potassium 3.9 mmol/L      Chloride 104 mmol/L      CO2 18.0 mmol/L      Calcium 9.4 mg/dL      Total Protein 6.8 g/dL      Albumin 4.10 g/dL      ALT (SGPT) 17 U/L      AST (SGOT) 20 U/L      Alkaline Phosphatase 54 U/L      Total Bilirubin 0.9 mg/dL      eGFR Non African Amer 101 mL/min/1.73      Globulin 2.7 gm/dL      A/G Ratio 1.5 g/dL      BUN/Creatinine Ratio 18.7     Anion Gap 14.0 mmol/L     Narrative:       The MDRD GFR formula is only valid for adults with stable renal function between ages 18 and 70.    Lipase [[id number]]  (Normal) Collected:  11/12/18 1004    Specimen:  Blood Updated:  11/12/18 1043     Lipase 48 U/L     Blood Culture - Blood, Blood, Venous Line [[id number]] Collected:  11/12/18 1033    Specimen:  Blood, Venous Line Updated:  11/12/18 1037    Blood Culture - Blood, Blood, Venous Line [[id number]] Collected:  11/12/18 1026    Specimen:  Blood, Venous Line Updated:  11/12/18 1036    CBC Auto Differential [[id number]]  (Abnormal) Collected:  11/12/18 1004    Specimen:  Blood Updated:  11/12/18 1032     WBC 22.64 10*3/mm3      RBC 2.94 10*6/mm3      Hemoglobin 10.2 g/dL      Hematocrit 29.5 %      .3 fL      MCH 34.7 pg      MCHC 34.6 g/dL      RDW 24.0 %      RDW-SD 85.4 fl      Platelets 178 10*3/mm3      Neutrophil % -- %      Lymphocyte % -- %      Monocyte % 3.5 %      Eosinophil % 0.8 %      Basophil % 0.5 %      Neutrophils, Absolute -- 10*3/mm3      Lymphocytes, Absolute -- 10*3/mm3      Monocytes, Absolute 0.79  10*3/mm3      Eosinophils, Absolute 0.17 10*3/mm3      Basophils, Absolute 0.12 10*3/mm3         Imaging Results (last 24 hours)     Procedure Component Value Units Date/Time    XR Chest PA & Lateral [[id number]] Collected:  11/12/18 1000     Updated:  11/12/18 1044    Narrative:       Patient Name:  AICHA BRUCE  Patient ID:  [id number]   Ordering:  YAW GALVEZ  Attending:  GOKUL WASSERMAN  Referring:  YAW GALVEZ  ------------------------------------------------  Chest PA and lateral views    INDICATION: Chest pain    COMPARISON: September 13, 2018    FINDINGS: Films -  2    Right chest wall Port-A-Cath noted with the tip of the  Port-A-Cath directed cranially though not visualized on these  images.    Left chest wall pacemaker device also noted, unchanged.    The bilateral lungs are grossly clear. No focal airspace opacity  seen. No pleural effusion or pneumothorax seen. The cardiac  silhouette is unremarkable.    No acute bony abnormality is seen.      Impression:       CONCLUSION:    1.   No acute cardiopulmonary pathology is identified.  2. Right chest wall Port-A-Cath noted. The tip is not visualized  and is directed cranially. Recommended correlation and  repositioning.    These findings were conveyed telephonically to Haley Cardoza PA-C, at 11:40 AM Eastern standard time.    Electronically signed by:  Cirilo Kelsey  11/12/2018 10:43 AM  CST Workstation: [id number]            Assessment:    Active Hospital Problems    Diagnosis   • Encounter for care related to Port-a-Cath             Plan:  1.  Sepsis: Patient with chills and chest pain after flushing the port.  Concern for line infection.  Patient has had blood cultures and empiric ecchymotic started.  We'll continue broad-spectrum antibiotics.  Depending on culture results many echocardiography.  2.  Cranially directed Port-A-Cath tip: Dr. Arevalo was consult from the emergency department  3.  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  Followed by Dr. Roblero.  4.  DVT Prophylaxis:  Heparin    I discussed the patient's findings and my recommendations with:  Patient        This document has been electronically signed by Uche Obrien MD on November 12, 2018 4:54 PM                    Electronically signed by Uche Obrien MD at 11/12/2018  5:34 PM       Lab Results (last 7 days)     Procedure Component Value Units Date/Time    Blood Culture - Blood, Arm, Right [[id number]] Collected:  11/16/18 0935    Specimen:  Blood from Arm, Right Updated:  11/16/18 0944    Blood Culture - Blood, Arm, Left [[id number]] Collected:  11/16/18 0936    Specimen:  Blood from Arm, Left Updated:  11/16/18 0944    CBC & Differential [[id number]] Collected:  11/16/18 0702    Specimen:  Blood Updated:  11/16/18 0820    Narrative:       The following orders were created for panel order CBC & Differential.  Procedure                               Abnormality         Status                     ---------                               -----------         ------                     Manual Differential[[id number]]          Abnormal            Final result               Scan Slide[[id number]]                                                                  CBC Auto Differential[[id number]]        Abnormal            Final result                 Please view results for these tests on the individual orders.    Manual Differential [[id number]]  (Abnormal) Collected:  11/16/18 0702    Specimen:  Blood Updated:  11/16/18 0820     Neutrophil % 49.0 %      Lymphocyte % 15.0 %      Monocyte % 12.0 %      Eosinophil % 1.0 %      Basophil % 2.0 %      Bands %  10.0 %      Metamyelocyte % 3.0 %      Myelocyte % 2.0 %      Atypical Lymphocyte % 6.0 %      Neutrophils Absolute 8.49 10*3/mm3      Lymphocytes Absolute 2.16 10*3/mm3      Monocytes Absolute 1.73 10*3/mm3      Eosinophils Absolute 0.14 10*3/mm3      Basophils Absolute 0.29 10*3/mm3      nRBC 1.0 /100 WBC       Anisocytosis Mod/2+     Comment: FEW TARGET CELLS SEEN        Hypochromia Slight/1+     Macrocytes Slight/1+     Polychromasia Slight/1+     WBC Morphology Normal     Platelet Estimate Decreased    CBC Auto Differential [[id number]]  (Abnormal) Collected:  11/16/18 0702    Specimen:  Blood Updated:  11/16/18 0756     WBC 14.39 10*3/mm3      RBC 2.77 10*6/mm3      Hemoglobin 9.5 g/dL      Hematocrit 27.4 %      MCV 98.9 fL      MCH 34.3 pg      MCHC 34.7 g/dL      RDW 24.2 %      RDW-SD 85.0 fl      Platelets 138 10*3/mm3     Basic Metabolic Panel [[id number]]  (Normal) Collected:  11/16/18 0702    Specimen:  Blood Updated:  11/16/18 0727     Glucose 96 mg/dL      BUN 15 mg/dL      Creatinine 0.88 mg/dL      Sodium 137 mmol/L      Potassium 4.5 mmol/L      Chloride 105 mmol/L      CO2 22.0 mmol/L      Calcium 8.9 mg/dL      eGFR Non African Amer 84 mL/min/1.73      BUN/Creatinine Ratio 17.0     Anion Gap 10.0 mmol/L     Narrative:       The MDRD GFR formula is only valid for adults with stable renal function between ages 18 and 70.    Wound Culture - Wound, Chest, Right [[id number]] Collected:  11/14/18 0741    Specimen:  Wound from Chest, Right Updated:  11/16/18 0648     Wound Culture No growth at 2 days     Gram Stain Rare (1+) WBCs seen      No organisms seen    Blood Culture - Blood, Blood, Venous Line [[id number]]  (Abnormal)  (Susceptibility) Collected:  11/12/18 1026    Specimen:  Blood, Venous Line Updated:  11/16/18 0625     Blood Culture Acinetobacter lwoffii group     Isolated from Aerobic Bottle     Gram Stain Aerobic Bottle Gram negative bacilli     Comment: 2 bottles of 3 bottles drawn positive for gram neg bacillus       Susceptibility      Acinetobacter lwoffii group     CHIVO     Ampicillin + Sulbactam Susceptible     Cefepime Susceptible     Ceftazidime Susceptible     Ceftriaxone Susceptible     Levofloxacin Susceptible     Piperacillin Susceptible     Trimethoprim + Sulfamethoxazole Susceptible                     Blood Culture - Blood, Arm, Right [[id number]] Collected:  11/12/18 1640    Specimen:  Blood from Arm, Right Updated:  11/15/18 1715     Blood Culture No growth at 3 days    Blood Culture - Blood, Arm, Right [[id number]] Collected:  11/12/18 1723    Specimen:  Blood from Arm, Right Updated:  11/15/18 1715     Blood Culture No growth at 3 days    Basic Metabolic Panel [[id number]]  (Abnormal) Collected:  11/15/18 0532    Specimen:  Blood Updated:  11/15/18 0642     Glucose 93 mg/dL      BUN 14 mg/dL      Creatinine 0.79 mg/dL      Sodium 137 mmol/L      Potassium 4.3 mmol/L      Chloride 108 mmol/L      CO2 20.0 mmol/L      Calcium 8.3 mg/dL      eGFR Non African Amer 95 mL/min/1.73      BUN/Creatinine Ratio 17.7     Anion Gap 9.0 mmol/L     Narrative:       The MDRD GFR formula is only valid for adults with stable renal function between ages 18 and 70.    CBC & Differential [[id number]] Collected:  11/15/18 0532    Specimen:  Blood Updated:  11/15/18 0612    Narrative:       The following orders were created for panel order CBC & Differential.  Procedure                               Abnormality         Status                     ---------                               -----------         ------                     Scan Slide[[id number]]                                                                  CBC Auto Differential[[id number]]        Abnormal            Final result                 Please view results for these tests on the individual orders.    CBC Auto Differential [[id number]]  (Abnormal) Collected:  11/15/18 0532    Specimen:  Blood Updated:  11/15/18 0612     WBC 11.35 10*3/mm3      RBC 2.64 10*6/mm3      Hemoglobin 9.2 g/dL      Hematocrit 25.9 %      MCV 98.1 fL      MCH 34.8 pg      MCHC 35.5 g/dL      RDW 23.9 %      RDW-SD 84.0 fl      MPV -- fL      Comment: Instrument unable to calculate result.         Platelets 128 10*3/mm3      Neutrophil % 43.2 %      Lymphocyte % 17.8 %      Monocyte %  "16.7 %      Eosinophil % 4.1 %      Basophil % 0.4 %      Immature Grans % 17.8 %      Neutrophils, Absolute 4.90 10*3/mm3      Lymphocytes, Absolute 2.02 10*3/mm3      Monocytes, Absolute 1.90 10*3/mm3      Eosinophils, Absolute 0.47 10*3/mm3      Basophils, Absolute 0.04 10*3/mm3      Immature Grans, Absolute 2.02 10*3/mm3     Tissue Pathology Exam [[id number]] Collected:  11/14/18 0733    Specimen:  Hardware / Foreign Body from Chest, Right Updated:  11/14/18 1432     Case Report --     Surgical Pathology Report                         Case: [id number]                                  Authorizing Provider:  Tono Arevalo MD      Collected:           11/14/2018 07:33 AM          Ordering Location:     [facility]             Received:            11/14/2018 08:53 AM                                 [place]                                                              Pathologist:           Son Wayne MD                                                         Specimen:    Chest, Right, old mediport                                                                  Final Diagnosis --     MEDIPORT.        Gross Description --     The container is labeled \"old Mediport, right side of chest\" and has a purple plastic truncated pyramid as an injection port presently measuring 3.1 x 2.8 x 1.4 cm.  Its base is marked BARD 8985.  It is attached to a white rubber tube measuring 20.0 cm long and 0.3 cm in diameter.  The tube is marked with dots and numbers at intervals.  No further processing is undertaken.       Blood Culture - Blood, Blood, Venous Line [[id number]]  (Abnormal) Collected:  11/12/18 1033    Specimen:  Blood, Venous Line Updated:  11/14/18 0637     Blood Culture Corynebacterium species, not diphtheriae     Comment: Consistent with contamination at time of collection.  Susceptibility not indicated          Gram Stain Aerobic Bottle Gram positive bacilli     Comment: 1 positive for gram positive " bacillus of 3 drawn       Narrative:       Blood culture does not meet the specified criteria for PCR identification.  If pregnant, immunocompromised, or clinical concern for meningitis, call lab to run BCID for Listeria monocytogenes.    CBC & Differential [[id number]] Collected:  11/14/18 0511    Specimen:  Blood Updated:  11/14/18 0619    Narrative:       The following orders were created for panel order CBC & Differential.  Procedure                               Abnormality         Status                     ---------                               -----------         ------                     Scan Slide[[id number]]                                                                  CBC Auto Differential[[id number]]        Abnormal            Final result                 Please view results for these tests on the individual orders.    CBC Auto Differential [[id number]]  (Abnormal) Collected:  11/14/18 0511    Specimen:  Blood Updated:  11/14/18 0619     WBC 14.03 10*3/mm3      RBC 2.47 10*6/mm3      Hemoglobin 8.4 g/dL      Hematocrit 24.7 %      .0 fL      MCH 34.0 pg      MCHC 34.0 g/dL      RDW 25.0 %      RDW-SD 90.8 fl      MPV -- fL      Comment: Results not available        Platelets 109 10*3/mm3      Neutrophil % 37.9 %      Lymphocyte % 10.9 %      Monocyte % 31.0 %      Eosinophil % 3.8 %      Basophil % 0.9 %      Immature Grans % 15.5 %      Neutrophils, Absolute 5.32 10*3/mm3      Lymphocytes, Absolute 1.53 10*3/mm3      Monocytes, Absolute 4.35 10*3/mm3      Eosinophils, Absolute 0.54 10*3/mm3      Basophils, Absolute 0.12 10*3/mm3      Immature Grans, Absolute 2.17 10*3/mm3      nRBC 0.6 /100 WBC     Basic Metabolic Panel [[id number]]  (Abnormal) Collected:  11/14/18 0511    Specimen:  Blood Updated:  11/14/18 0606     Glucose 102 mg/dL      BUN 14 mg/dL      Creatinine 0.99 mg/dL      Sodium 138 mmol/L      Potassium 4.4 mmol/L      Chloride 108 mmol/L      CO2 21.0 mmol/L      Calcium 8.1 mg/dL       eGFR Non African Amer 73 mL/min/1.73      BUN/Creatinine Ratio 14.1     Anion Gap 9.0 mmol/L     Narrative:       The MDRD GFR formula is only valid for adults with stable renal function between ages 18 and 70.    Vancomycin, Trough [[id number]]  (Abnormal) Collected:  11/13/18 2152    Specimen:  Blood Updated:  11/13/18 2237     Vancomycin Trough 16.94 mcg/mL     Vancomycin, Peak [[id number]]  (Abnormal) Collected:  11/13/18 1648    Specimen:  Blood Updated:  11/13/18 1715     Vancomycin Peak 28.13 mcg/mL     Blood Culture ID, PCR - Blood, Blood, Venous Line [[id number]]  (Normal) Collected:  11/12/18 1026    Specimen:  Blood, Venous Line Updated:  11/13/18 1328     BCID, PCR No organism detected by BCID PCR.    Extra Tubes [[id number]] Collected:  11/13/18 1014    Specimen:  Blood, Venous Line Updated:  11/13/18 1116    Narrative:       The following orders were created for panel order Extra Tubes.  Procedure                               Abnormality         Status                     ---------                               -----------         ------                     Gold Top - SST[[id number]]                                   Final result                 Please view results for these tests on the individual orders.    Gold Top - SST [[id number]] Collected:  11/13/18 1014    Specimen:  Blood Updated:  11/13/18 1116     Extra Tube Hold for add-ons.     Comment: Auto resulted.       Lactic Acid, Plasma [[id number]]  (Normal) Collected:  11/13/18 1016    Specimen:  Blood Updated:  11/13/18 1042     Lactate 1.6 mmol/L     CBC & Differential [[id number]] Collected:  11/13/18 0624    Specimen:  Blood Updated:  11/13/18 0959    Narrative:       The following orders were created for panel order CBC & Differential.  Procedure                               Abnormality         Status                     ---------                               -----------         ------                     Manual Differential[[id number]]           Abnormal            Final result               Scan Slide[[id number]]                                                                  CBC Auto Differential[[id number]]        Abnormal            Final result                 Please view results for these tests on the individual orders.    Manual Differential [[id number]]  (Abnormal) Collected:  11/13/18 0624    Specimen:  Blood Updated:  11/13/18 0959     Neutrophil % 49.0 %      Lymphocyte % 9.0 %      Monocyte % 25.0 %      Eosinophil % 2.0 %      Basophil % 2.0 %      Bands %  3.0 %      Metamyelocyte % 3.0 %      Myelocyte % 7.0 %      Neutrophils Absolute 11.79 10*3/mm3      Lymphocytes Absolute 2.04 10*3/mm3      Monocytes Absolute 5.67 10*3/mm3      Eosinophils Absolute 0.45 10*3/mm3      Basophils Absolute 0.45 10*3/mm3      Anisocytosis Slight/1+     Comment: Few polychromic cells, few teardrop cells        WBC Morphology Normal     Platelet Estimate Decreased    Basic Metabolic Panel [[id number]]  (Abnormal) Collected:  11/13/18 0624    Specimen:  Blood Updated:  11/13/18 0714     Glucose 95 mg/dL      BUN 16 mg/dL      Creatinine 0.87 mg/dL      Sodium 136 mmol/L      Potassium 3.9 mmol/L      Chloride 108 mmol/L      CO2 20.0 mmol/L      Calcium 8.4 mg/dL      eGFR Non African Amer 85 mL/min/1.73      BUN/Creatinine Ratio 18.4     Anion Gap 8.0 mmol/L     Narrative:       The MDRD GFR formula is only valid for adults with stable renal function between ages 18 and 70.    CBC Auto Differential [[id number]]  (Abnormal) Collected:  11/13/18 0624    Specimen:  Blood Updated:  11/13/18 0702     WBC 22.67 10*3/mm3      RBC 2.64 10*6/mm3      Hemoglobin 9.3 g/dL      Hematocrit 26.2 %      MCV 99.2 fL      MCH 35.2 pg      MCHC 35.5 g/dL      RDW 24.1 %      RDW-SD 84.4 fl      MPV -- fL      Comment: Instrument unable to calculate result        Platelets 133 10*3/mm3     POC Glucose Once [[id number]]  (Normal) Collected:  11/12/18 2336    Specimen:  Blood Updated:   11/12/18 2348     Glucose 101 mg/dL      Comment: : [id number] JOSELUIS GINAMeter ID: [id number]       Lactic Acid, Reflex [[id number]]  (Abnormal) Collected:  11/12/18 1412    Specimen:  Blood Updated:  11/12/18 1439     Lactate 3.7 mmol/L     Lactic Acid, Reflex Timer (This will reflex a repeat order 3-3:15 hours after ordered.) [[id number]] Collected:  11/12/18 1004    Specimen:  Blood Updated:  11/12/18 1400     Extra Tube Hold for add-ons.     Comment: Auto resulted.       CBC & Differential [[id number]] Collected:  11/12/18 1004    Specimen:  Blood Updated:  11/12/18 1159    Narrative:       The following orders were created for panel order CBC & Differential.  Procedure                               Abnormality         Status                     ---------                               -----------         ------                     Manual Differential[[id number]]          Abnormal            Final result               Scan Slide[[id number]]                                                                  CBC Auto Differential[[id number]]        Abnormal            Final result                 Please view results for these tests on the individual orders.    Manual Differential [[id number]]  (Abnormal) Collected:  11/12/18 1004    Specimen:  Blood Updated:  11/12/18 1159     Neutrophil % 60.0 %      Lymphocyte % 7.0 %      Monocyte % 18.0 %      Eosinophil % 2.0 %      Bands %  5.0 %      Metamyelocyte % 5.0 %      Myelocyte % 1.0 %      Atypical Lymphocyte % 2.0 %      Neutrophils Absolute 14.72 10*3/mm3      Lymphocytes Absolute 1.58 10*3/mm3      Monocytes Absolute 4.08 10*3/mm3      Eosinophils Absolute 0.45 10*3/mm3      nRBC 1.0 /100 WBC      Anisocytosis Slight/1+     Comment: Few Teardrop cells        Polychromasia Slight/1+     WBC Morphology Normal     Platelet Estimate Adequate    Extra Tubes [[id number]] Collected:  11/12/18 1004    Specimen:  Blood, Venous Line Updated:  11/12/18 1116    Narrative:        The following orders were created for panel order Extra Tubes.  Procedure                               Abnormality         Status                     ---------                               -----------         ------                     Light Blue Top[[id number]]                                   Final result                 Please view results for these tests on the individual orders.    Light Blue Top [[id number]] Collected:  11/12/18 1004    Specimen:  Blood Updated:  11/12/18 1116     Extra Tube hold for add-on     Comment: Auto resulted       Urinalysis With Microscopic If Indicated (No Culture) - Urine, Clean Catch [[id number]]  (Normal) Collected:  11/12/18 1103    Specimen:  Urine, Clean Catch Updated:  11/12/18 1110     Color, UA Yellow     Appearance, UA Clear     pH, UA <=5.0     Specific Gravity, UA 1.022     Glucose, UA Negative     Ketones, UA Negative     Bilirubin, UA Negative     Blood, UA Negative     Protein, UA Negative     Leuk Esterase, UA Negative     Nitrite, UA Negative     Urobilinogen, UA 0.2 E.U./dL    Narrative:       Urine microscopic not indicated.    Lactic Acid, Plasma [[id number]]  (Abnormal) Collected:  11/12/18 1004    Specimen:  Blood Updated:  11/12/18 1050     Lactate 5.7 mmol/L     Comprehensive Metabolic Panel [[id number]]  (Abnormal) Collected:  11/12/18 1004    Specimen:  Blood Updated:  11/12/18 1043     Glucose 92 mg/dL      BUN 14 mg/dL      Creatinine 0.75 mg/dL      Sodium 136 mmol/L      Potassium 3.9 mmol/L      Chloride 104 mmol/L      CO2 18.0 mmol/L      Calcium 9.4 mg/dL      Total Protein 6.8 g/dL      Albumin 4.10 g/dL      ALT (SGPT) 17 U/L      AST (SGOT) 20 U/L      Alkaline Phosphatase 54 U/L      Total Bilirubin 0.9 mg/dL      eGFR Non African Amer 101 mL/min/1.73      Globulin 2.7 gm/dL      A/G Ratio 1.5 g/dL      BUN/Creatinine Ratio 18.7     Anion Gap 14.0 mmol/L     Narrative:       The MDRD GFR formula is only valid for adults with stable renal  function between ages 18 and 70.    Lipase [[id number]]  (Normal) Collected:  18 1004    Specimen:  Blood Updated:  18 1043     Lipase 48 U/L     CBC Auto Differential [[id number]]  (Abnormal) Collected:  18 1004    Specimen:  Blood Updated:  18 1032     WBC 22.64 10*3/mm3      RBC 2.94 10*6/mm3      Hemoglobin 10.2 g/dL      Hematocrit 29.5 %      .3 fL      MCH 34.7 pg      MCHC 34.6 g/dL      RDW 24.0 %      RDW-SD 85.4 fl      Platelets 178 10*3/mm3      Neutrophil % -- %      Lymphocyte % -- %      Monocyte % 3.5 %      Eosinophil % 0.8 %      Basophil % 0.5 %      Neutrophils, Absolute -- 10*3/mm3      Lymphocytes, Absolute -- 10*3/mm3      Monocytes, Absolute 0.79 10*3/mm3      Eosinophils, Absolute 0.17 10*3/mm3      Basophils, Absolute 0.12 10*3/mm3              Physician Progress Notes (last 24 hours) (Notes from 11/15/2018 11:00 AM through 2018 11:00 AM)      Kin Hummel MD at 11/15/2018 12:37 PM          MEDICINE DAILY PROGRESS NOTE  NAME: Jan Humphrey  : 1940  MRN: [id number]     LOS: 2 days     PROVIDER OF SERVICE: Kin Hummel MD    Chief Complaint: Sepsis (CMS/HCC)    Subjective:   HPI: Patient presented to the [facility] today for chemotherapy and after his port was flushed he developed some chills and chest discomfort.  These symptoms resolved quickly and he has been chest pain free since admission.  His port-a-cath is cranially directed on imaging. Surgery to see.    Interval History:  History taken from: patient family RN  11/15. Patient doing good again today. Feels at baseline. Afebrile. No acute events overnight.  . Patient back from port removal.  . Patient feeling much better. He desires to go home today, but understands that he is not likely ready.    Review of Systems:   Review of Systems   Constitutional: Positive for activity change, chills and fatigue. Negative for appetite change and fever.   HENT: Negative for ear pain  and sore throat.    Eyes: Negative for pain and visual disturbance.   Respiratory: Negative for cough and shortness of breath.    Cardiovascular: Negative for chest pain and palpitations.   Gastrointestinal: Negative for abdominal pain and nausea.   Endocrine: Negative for cold intolerance and heat intolerance.   Genitourinary: Negative for difficulty urinating and dysuria.   Musculoskeletal: Positive for arthralgias. Negative for gait problem.   Skin: Negative for color change and rash.   Neurological: Negative for dizziness, weakness and headaches.   Hematological: Negative for adenopathy. Does not bruise/bleed easily.   Psychiatric/Behavioral: Negative for agitation, confusion and sleep disturbance.       Objective:     Vital Signs  Vitals:    11/14/18 2114 11/15/18 0010 11/15/18 0322 11/15/18 0839   BP: 130/64 105/56 121/60 124/64   BP Location: Left arm Left arm Left arm Left arm   Patient Position: Lying Lying Lying Sitting   Pulse: 68 60 68 69   Resp: 18 18 18 18   Temp: 97 °F (36.1 °C) 96.8 °F (36 °C) 96.7 °F (35.9 °C) 96.7 °F (35.9 °C)   TempSrc: Oral Oral Oral Oral   SpO2: 96% 94% 94% 99%   Weight:       Height:           Physical Exam  Physical Exam   Constitutional: He is oriented to person, place, and time. He appears well-developed and well-nourished. No distress.   HENT:   Head: Normocephalic and atraumatic.   Right Ear: External ear normal.   Left Ear: External ear normal.   Nose: Nose normal.   Eyes: Conjunctivae and EOM are normal. Pupils are equal, round, and reactive to light.   Neck: Normal range of motion. Neck supple.   Cardiovascular: Normal rate, regular rhythm, normal heart sounds and intact distal pulses.   Pulmonary/Chest: Effort normal and breath sounds normal. No respiratory distress. He has no wheezes. He has no rales. He exhibits no tenderness.   Abdominal: Soft. Bowel sounds are normal. He exhibits no distension and no mass. There is no tenderness. There is no rebound and no  guarding.   Musculoskeletal: Normal range of motion.   Neurological: He is alert and oriented to person, place, and time.   Skin: Skin is warm and dry. No rash noted. He is not diaphoretic. No erythema. No pallor.   Port site examined. Minimal erythema. No tenderness. Port now removed.   Psychiatric: He has a normal mood and affect. His behavior is normal.   Nursing note and vitals reviewed.      Medication Review    Current Facility-Administered Medications:   •  acetaminophen (TYLENOL) tablet 650 mg, 650 mg, Oral, Q6H PRN, Sony De Leon PA-C, 650 mg at 11/12/18 1905  •  allopurinol (ZYLOPRIM) tablet 100 mg, 100 mg, Oral, 4x Daily, Uche Obrien MD, 100 mg at 11/15/18 0846  •  aspirin EC tablet 81 mg, 81 mg, Oral, Daily, Uche Obrien MD, 81 mg at 11/13/18 0841  •  cefepime (MAXIPIME) 2 g/100 mL 0.9% NS (mbp), 2 g, Intravenous, Q8H, Uche Obrien MD, Last Rate: 25 mL/hr at 11/14/18 0543, 2 g at 11/15/18 0539  •  colchicine tablet 0.6 mg, 0.6 mg, Oral, Daily PRN, Uche Obrien MD  •  folic acid (FOLVITE) tablet 1 mg, 1 mg, Oral, Daily, Uche Obrien MD, 1 mg at 11/15/18 0846  •  heparin (porcine) 5000 UNIT/ML injection 5,000 Units, 5,000 Units, Subcutaneous, Q8H, Tono Arevalo MD, 5,000 Units at 11/15/18 0540  •  lidocaine-EPINEPHrine (XYLOCAINE W/EPI) 1 %-1:536985 injection, , , PRN, Tono Arevalo MD, 10 mL at 11/14/18 0733  •  naproxen (NAPROSYN) tablet 500 mg, 500 mg, Oral, BID With Meals, Uche Obrien MD, 500 mg at 11/15/18 0847  •  ondansetron (ZOFRAN) tablet 4 mg, 4 mg, Oral, 4x Daily PRN, Uche Obrien MD  •  oxybutynin (DITROPAN) tablet 5 mg, 5 mg, Oral, BID, Uche Obrien MD, 5 mg at 11/15/18 0847  •  pantoprazole (PROTONIX) EC tablet 40 mg, 40 mg, Oral, QAM, Uche Obrien MD, 40 mg at 11/15/18 0628  •  [DISCONTINUED] vancomycin 1750 mg/500 mL 0.9% NS IVPB ([facility]), 20 mg/kg, Intravenous, Once **AND** Pharmacy to dose vancomycin, , Does not apply,  Continuous PRN, Uche Obrien MD  •  [COMPLETED] Insert peripheral IV, , , Once **AND** sodium chloride 0.9 % flush 10 mL, 10 mL, Intravenous, PRN, Sony De Leon PA-C  •  sodium chloride 0.9 % flush 3 mL, 3 mL, Intravenous, Q12H, Uche Obrien MD, 3 mL at 11/15/18 0847  •  sodium chloride 0.9 % flush 3-10 mL, 3-10 mL, Intravenous, PRN, Uche Obrien MD  •  sotalol (BETAPACE) half tablet 40 mg, 40 mg, Oral, BID - RT, Uche Obrien MD, 40 mg at 11/15/18 0846  •  tamsulosin (FLOMAX) 24 hr capsule 0.4 mg, 0.4 mg, Oral, Nightly, Uche Obrien MD, 0.4 mg at 11/14/18 2115  •  vancomycin 1250 mg/250 mL 0.9% NS IVPB ([facility]), 15 mg/kg, Intravenous, Q12H, Uche Obrien MD, Last Rate: 0 mL/hr at 11/13/18 0317, 1,250 mg at 11/14/18 2352     Diagnostic Data    Lab Results (last 24 hours)     Procedure Component Value Units Date/Time    Basic Metabolic Panel [[id number]]  (Abnormal) Collected:  11/15/18 0532    Specimen:  Blood Updated:  11/15/18 0642     Glucose 93 mg/dL      BUN 14 mg/dL      Creatinine 0.79 mg/dL      Sodium 137 mmol/L      Potassium 4.3 mmol/L      Chloride 108 mmol/L      CO2 20.0 mmol/L      Calcium 8.3 mg/dL      eGFR Non African Amer 95 mL/min/1.73      BUN/Creatinine Ratio 17.7     Anion Gap 9.0 mmol/L     Narrative:       The MDRD GFR formula is only valid for adults with stable renal function between ages 18 and 70.    Wound Culture - Wound, Chest, Right [[id number]] Collected:  11/14/18 0741    Specimen:  Wound from Chest, Right Updated:  11/15/18 0637     Wound Culture No growth at 24 hours     Gram Stain Rare (1+) WBCs seen      No organisms seen    CBC & Differential [[id number]] Collected:  11/15/18 0532    Specimen:  Blood Updated:  11/15/18 0612    Narrative:       The following orders were created for panel order CBC & Differential.  Procedure                               Abnormality         Status                     ---------                                -----------         ------                     Scan Slide[[id number]]                                                                  CBC Auto Differential[[id number]]        Abnormal            Final result                 Please view results for these tests on the individual orders.    CBC Auto Differential [[id number]]  (Abnormal) Collected:  11/15/18 0532    Specimen:  Blood Updated:  11/15/18 0612     WBC 11.35 10*3/mm3      RBC 2.64 10*6/mm3      Hemoglobin 9.2 g/dL      Hematocrit 25.9 %      MCV 98.1 fL      MCH 34.8 pg      MCHC 35.5 g/dL      RDW 23.9 %      RDW-SD 84.0 fl      MPV -- fL      Comment: Instrument unable to calculate result.         Platelets 128 10*3/mm3      Neutrophil % 43.2 %      Lymphocyte % 17.8 %      Monocyte % 16.7 %      Eosinophil % 4.1 %      Basophil % 0.4 %      Immature Grans % 17.8 %      Neutrophils, Absolute 4.90 10*3/mm3      Lymphocytes, Absolute 2.02 10*3/mm3      Monocytes, Absolute 1.90 10*3/mm3      Eosinophils, Absolute 0.47 10*3/mm3      Basophils, Absolute 0.04 10*3/mm3      Immature Grans, Absolute 2.02 10*3/mm3     Blood Culture - Blood, Arm, Right [[id number]] Collected:  11/12/18 1640    Specimen:  Blood from Arm, Right Updated:  11/14/18 1715     Blood Culture No growth at 2 days    Blood Culture - Blood, Arm, Right [[id number]] Collected:  11/12/18 1723    Specimen:  Blood from Arm, Right Updated:  11/14/18 1715     Blood Culture No growth at 2 days    Tissue Pathology Exam [[id number]] Collected:  11/14/18 0733    Specimen:  Hardware / Foreign Body from Chest, Right Updated:  11/14/18 1432     Case Report --     Surgical Pathology Report                         Case: [id number]                                  Authorizing Provider:  Tono Arevalo MD      Collected:           11/14/2018 07:33 AM          Ordering Location:     [facility]             Received:            11/14/2018 08:53 AM                                 [facility]                    "                                           Pathologist:           Son Wayne MD                                                         Specimen:    Chest, Right, old mediport                                                                  Final Diagnosis --     MEDIPORT.        Gross Description --     The container is labeled \"old Mediport, right side of chest\" and has a purple plastic truncated pyramid as an injection port presently measuring 3.1 x 2.8 x 1.4 cm.  Its base is marked BARD [id number].  It is attached to a white rubber tube measuring 20.0 cm long and 0.3 cm in diameter.  The tube is marked with dots and numbers at intervals.  No further processing is undertaken.             I reviewed the patient's new clinical results.    Assessment/Plan:     Active Hospital Problems    Diagnosis Date Noted   • **Sepsis (CMS/HCC) [A41.9] 11/13/2018   • Encounter for care related to Port-a-Cath [Z45.2] 11/12/2018   • Port or reservoir infection [T80.212A] 11/12/2018     Added automatically from request for surgery [id number]       #. Sepsis. Vanc/Cefepime. Cultures pending. Mike results. ECHO. Blood culture showing GNB. Not final yet. Will discuss with micro. Per micro GNB growing. Should be final tomorrow. Will stop vanc.  ECHO Results:  Interpretation Summary   · Left ventricular systolic function is normal. Estimated EF = 58%.  · Left ventricular diastolic dysfunction (grade I) consistent with impaired relaxation.  · Left atrial cavity size is borderline dilated.  · Mild mitral valve regurgitation is present  · Mild to moderate tricuspid valve regurgitation is present.  · Electronic lead present in the ventricle.  · There is thickening of the noncoronary cusp of the aortic valve. Calcification/ sclerosis likely. Trace-to-mild aortic valve regurgitation is present.   #. Port-a-cath cranially directed.   11/14. Port removed.  11/13. Surgery consulted. Appreciate input.  #. CML. Active chemo patient. Consult heme/onc " as needed.    Will monitor patient's hospital course and adjust treatment as hospital course dictates.    DVT prophylaxis: Heparin  Code status is   Code Status and Medical Interventions:   Ordered at: 11/12/18 1644     Code Status:    CPR     Medical Interventions (Level of Support Prior to Arrest):    Full       Plan for disposition:Where: home and When:  1-2 days      Time:           This document has been electronically signed by Kin Hummel MD on November 15, 2018 12:38 PM    Electronically signed by Kin Hummel MD at 11/15/2018  1:13 PM

## 2018-11-16 NOTE — PLAN OF CARE
Problem: Patient Care Overview  Goal: Plan of Care Review  Outcome: Ongoing (interventions implemented as appropriate)  Pt's vital signs stable. Antibiotic regiment continued.    Problem: Fall Risk (Adult)  Goal: Absence of Fall  Outcome: Ongoing (interventions implemented as appropriate)      Problem: Sepsis/Septic Shock (Adult)  Goal: Signs and Symptoms of Listed Potential Problems Will be Absent, Minimized or Managed (Sepsis/Septic Shock)  Outcome: Ongoing (interventions implemented as appropriate)

## 2018-11-17 ENCOUNTER — READMISSION MANAGEMENT (OUTPATIENT)
Dept: CALL CENTER | Facility: HOSPITAL | Age: 78
End: 2018-11-17

## 2018-11-17 LAB
BACTERIA SPEC AEROBE CULT: NORMAL
BACTERIA SPEC AEROBE CULT: NORMAL

## 2018-11-17 NOTE — OUTREACH NOTE
Prep Survey      Responses   Facility patient discharged from?  [place]   Is patient eligible?  Yes   Discharge diagnosis  Sepsis, Port or reservoir infection related to Port-a-cath   Does the patient have one of the following disease processes/diagnoses(primary or secondary)?  Sepsis   Does the patient have Home health ordered?  No   Is there a DME ordered?  No   Comments regarding appointments  See AVS   Prep survey completed?  Yes          Abbey Smith RN

## 2018-11-19 ENCOUNTER — READMISSION MANAGEMENT (OUTPATIENT)
Dept: CALL CENTER | Facility: HOSPITAL | Age: 78
End: 2018-11-19

## 2018-11-19 LAB
BACTERIA SPEC AEROBE CULT: NORMAL
GRAM STN SPEC: NORMAL
GRAM STN SPEC: NORMAL

## 2018-11-19 NOTE — PAYOR COMM NOTE
"Jan Humphrey (78 y.o. Male)     Date of Birth Social Security Number Address Home Phone MRN    1940  [address] [facility] [address] [phone] [id number]    [facility] [employer] [facility]          [employer]        Admission Date Admission Type Admitting Provider Attending Provider Department, Room/Bed    18 Emergency Uche Obrien MD  [address], 431/1    Discharge Date Discharge Disposition Discharge Destination        2018 Home or Self Care              Attending Provider:  (none)   Allergies:  Doxycycline Hyclate, Sulfa Antibiotics, Other, Penicillins, Soma [Carisoprodol]    Isolation:  None   Infection:  None   Code Status:  Prior    Ht:  180.3 cm (71\")   Wt:  85.7 kg (188 lb 14.4 oz)    Admission Cmt:  None   Principal Problem:  Sepsis (CMS/HCC) [A41.9]                 Active Insurance as of 2018     Primary Coverage     Payor Plan Insurance Group Employer/Plan Group    ANTH MEDICARE REPLACEMENT ANTH MEDICARE ADVANTAGE [id number]     Payor Plan Address Payor Plan Phone Number Payor Plan Fax Number Effective Dates    [address] [phone]  2018 - None Entered    [facility] [address]       Subscriber Name Subscriber Birth Date Member ID       JAN HUMPHREY 1940 [id number]                 Emergency Contacts      (Rel.) Home Phone Work Phone Mobile Phone    Ranjith Humphrey (Son) [phone] -- [phone]    Lester Humphrey (Son) [phone] -- [phone]               Discharge Summary      Kin Hummel MD at 2018 11:23 AM          DISCHARGE SUMMARY    NAME: Jan Humphrey   PHYSICIAN: Kin Hummel MD  : 1940  MRN: [id number]    ADMITTED: 2018   DISCHARGED: 18    ADMISSION DIAGNOSES:  Present on Admission:  • Sepsis (CMS/HCC)    DISCHARGE DIAGNOSES:     Sepsis (CMS/HCC)    Encounter for care related to Port-a-Cath    Port or reservoir infection      SERVICE: " Medicine. Attending Kin Hummel MD    CONSULTS:   Consult Orders (all) (From admission, onward)    Start     Ordered    11/13/18 1142  Inpatient General Surgery Consult  Once     Specialty:  General Surgery  Provider:  Tono Arevalo MD    11/13/18 1142    11/12/18 1200  Hospitalist (on-call MD unless specified)  Once     Specialty:  Hospitalist  Provider:  Uche Obrien MD    11/12/18 1159          PROCEDURES:   Imaging Results (last 7 days)     Procedure Component Value Units Date/Time    XR Chest PA & Lateral [[id number]] Collected:  11/12/18 1000     Updated:  11/12/18 1044    Narrative:       Patient Name:  AICHA BRUCE  Patient ID:  [id number]   Ordering:  YAW GALVEZ  Attending:  GOKUL WASSERMAN  Referring:  YAW GALVEZ  ------------------------------------------------  Chest PA and lateral views    INDICATION: Chest pain    COMPARISON: September 13, 2018    FINDINGS: Films -  2    Right chest wall Port-A-Cath noted with the tip of the  Port-A-Cath directed cranially though not visualized on these  images.    Left chest wall pacemaker device also noted, unchanged.    The bilateral lungs are grossly clear. No focal airspace opacity  seen. No pleural effusion or pneumothorax seen. The cardiac  silhouette is unremarkable.    No acute bony abnormality is seen.      Impression:       CONCLUSION:    1.   No acute cardiopulmonary pathology is identified.  2. Right chest wall Port-A-Cath noted. The tip is not visualized  and is directed cranially. Recommended correlation and  repositioning.    These findings were conveyed telephonically to Haley Cardoza PA-C, at 11:40 AM Eastern standard time.    Electronically signed by:  Cirilo Kelsey  11/12/2018 10:43 AM  [employer] Workstation: [employer]          HISTORY OF PRESENT ILLNESS: *Copied from Uche Obrien MD's H&P*  Patient is a 78-year-old  male who presented to the [facility] today for his chemotherapy.  After his  port was flushed he was waiting in the waiting room, and developed chest pain and shaking chills.  He had neither of those symptoms prior to arrival.  He has been feeling well as of late.  He did not have a fever at that time.  He was evaluated by Dr. Roblero and sent to the emergency department for possible line infection.  While in the emergency department it was discovered that his Port-A-Cath is now directed cranially.  At time of my interview patient states that he is feeling okay and has no complaints.  No alleviating or exacerbating factors.        DIAGNOSTIC DATA:   Lab Results (last 7 days)     Procedure Component Value Units Date/Time    Blood Culture - Blood, Arm, Right [[id number]] Collected:  11/16/18 0935    Specimen:  Blood from Arm, Right Updated:  11/16/18 0944    Blood Culture - Blood, Arm, Left [[id number]] Collected:  11/16/18 0936    Specimen:  Blood from Arm, Left Updated:  11/16/18 0944    CBC & Differential [[id number]] Collected:  11/16/18 0702    Specimen:  Blood Updated:  11/16/18 0820    Narrative:       The following orders were created for panel order CBC & Differential.  Procedure                               Abnormality         Status                     ---------                               -----------         ------                     Manual Differential[[id number]]          Abnormal            Final result               Scan Slide[[id number]]                                                                  CBC Auto Differential[[id number]]        Abnormal            Final result                 Please view results for these tests on the individual orders.    Manual Differential [[id number]]  (Abnormal) Collected:  11/16/18 0702    Specimen:  Blood Updated:  11/16/18 0820     Neutrophil % 49.0 %      Lymphocyte % 15.0 %      Monocyte % 12.0 %      Eosinophil % 1.0 %      Basophil % 2.0 %      Bands %  10.0 %      Metamyelocyte % 3.0 %      Myelocyte % 2.0 %      Atypical Lymphocyte % 6.0  %      Neutrophils Absolute 8.49 10*3/mm3      Lymphocytes Absolute 2.16 10*3/mm3      Monocytes Absolute 1.73 10*3/mm3      Eosinophils Absolute 0.14 10*3/mm3      Basophils Absolute 0.29 10*3/mm3      nRBC 1.0 /100 WBC      Anisocytosis Mod/2+     Comment: FEW TARGET CELLS SEEN        Hypochromia Slight/1+     Macrocytes Slight/1+     Polychromasia Slight/1+     WBC Morphology Normal     Platelet Estimate Decreased    CBC Auto Differential [[id number]]  (Abnormal) Collected:  11/16/18 0702    Specimen:  Blood Updated:  11/16/18 0756     WBC 14.39 10*3/mm3      RBC 2.77 10*6/mm3      Hemoglobin 9.5 g/dL      Hematocrit 27.4 %      MCV 98.9 fL      MCH 34.3 pg      MCHC 34.7 g/dL      RDW 24.2 %      RDW-SD 85.0 fl      Platelets 138 10*3/mm3     Basic Metabolic Panel [[id number]]  (Normal) Collected:  11/16/18 0702    Specimen:  Blood Updated:  11/16/18 0727     Glucose 96 mg/dL      BUN 15 mg/dL      Creatinine 0.88 mg/dL      Sodium 137 mmol/L      Potassium 4.5 mmol/L      Chloride 105 mmol/L      CO2 22.0 mmol/L      Calcium 8.9 mg/dL      eGFR Non African Amer 84 mL/min/1.73      BUN/Creatinine Ratio 17.0     Anion Gap 10.0 mmol/L     Narrative:       The MDRD GFR formula is only valid for adults with stable renal function between ages 18 and 70.    Wound Culture - Wound, Chest, Right [[id number]] Collected:  11/14/18 0741    Specimen:  Wound from Chest, Right Updated:  11/16/18 0648     Wound Culture No growth at 2 days     Gram Stain Rare (1+) WBCs seen      No organisms seen    Blood Culture - Blood, Blood, Venous Line [[id number]]  (Abnormal)  (Susceptibility) Collected:  11/12/18 1026    Specimen:  Blood, Venous Line Updated:  11/16/18 0625     Blood Culture Acinetobacter lwoffii group     Isolated from Aerobic Bottle     Gram Stain Aerobic Bottle Gram negative bacilli     Comment: 2 bottles of 3 bottles drawn positive for gram neg bacillus       Susceptibility      Acinetobacter lwoffii group     CHIVO      Ampicillin + Sulbactam Susceptible     Cefepime Susceptible     Ceftazidime Susceptible     Ceftriaxone Susceptible     Levofloxacin Susceptible     Piperacillin Susceptible     Trimethoprim + Sulfamethoxazole Susceptible                    Blood Culture - Blood, Arm, Right [[id number]] Collected:  11/12/18 1640    Specimen:  Blood from Arm, Right Updated:  11/15/18 1715     Blood Culture No growth at 3 days    Blood Culture - Blood, Arm, Right [[id number]] Collected:  11/12/18 1723    Specimen:  Blood from Arm, Right Updated:  11/15/18 1715     Blood Culture No growth at 3 days    Basic Metabolic Panel [[id number]]  (Abnormal) Collected:  11/15/18 0532    Specimen:  Blood Updated:  11/15/18 0642     Glucose 93 mg/dL      BUN 14 mg/dL      Creatinine 0.79 mg/dL      Sodium 137 mmol/L      Potassium 4.3 mmol/L      Chloride 108 mmol/L      CO2 20.0 mmol/L      Calcium 8.3 mg/dL      eGFR Non African Amer 95 mL/min/1.73      BUN/Creatinine Ratio 17.7     Anion Gap 9.0 mmol/L     Narrative:       The MDRD GFR formula is only valid for adults with stable renal function between ages 18 and 70.    CBC & Differential [[id number]] Collected:  11/15/18 0532    Specimen:  Blood Updated:  11/15/18 0612    Narrative:       The following orders were created for panel order CBC & Differential.  Procedure                               Abnormality         Status                     ---------                               -----------         ------                     Scan Slide[[id number]]                                                                  CBC Auto Differential[[id number]]        Abnormal            Final result                 Please view results for these tests on the individual orders.    CBC Auto Differential [[id number]]  (Abnormal) Collected:  11/15/18 0532    Specimen:  Blood Updated:  11/15/18 0612     WBC 11.35 10*3/mm3      RBC 2.64 10*6/mm3      Hemoglobin 9.2 g/dL      Hematocrit 25.9 %      MCV 98.1 fL      MCH  "34.8 pg      MCHC 35.5 g/dL      RDW 23.9 %      RDW-SD 84.0 fl      MPV -- fL      Comment: Instrument unable to calculate result.         Platelets 128 10*3/mm3      Neutrophil % 43.2 %      Lymphocyte % 17.8 %      Monocyte % 16.7 %      Eosinophil % 4.1 %      Basophil % 0.4 %      Immature Grans % 17.8 %      Neutrophils, Absolute 4.90 10*3/mm3      Lymphocytes, Absolute 2.02 10*3/mm3      Monocytes, Absolute 1.90 10*3/mm3      Eosinophils, Absolute 0.47 10*3/mm3      Basophils, Absolute 0.04 10*3/mm3      Immature Grans, Absolute 2.02 10*3/mm3     Tissue Pathology Exam [[id number]] Collected:  11/14/18 0733    Specimen:  Hardware / Foreign Body from Chest, Right Updated:  11/14/18 1432     Case Report --     Surgical Pathology Report                         Case: [id number]                                  Authorizing Provider:  Tono Arevalo MD      Collected:           11/14/2018 07:33 AM          Ordering Location:     [facility]             Received:            11/14/2018 08:53 AM                                 [place]                                                              Pathologist:           Son Wayne MD                                                         Specimen:    Chest, Right, old mediport                                                                  Final Diagnosis --     MEDIPORT.        Gross Description --     The container is labeled \"old Mediport, right side of chest\" and has a purple plastic truncated pyramid as an injection port presently measuring 3.1 x 2.8 x 1.4 cm.  Its base is marked BARD 8985.  It is attached to a white rubber tube measuring 20.0 cm long and 0.3 cm in diameter.  The tube is marked with dots and numbers at intervals.  No further processing is undertaken.       Blood Culture - Blood, Blood, Venous Line [[id number]]  (Abnormal) Collected:  11/12/18 1033    Specimen:  Blood, Venous Line Updated:  11/14/18 0637     Blood Culture " Corynebacterium species, not diphtheriae     Comment: Consistent with contamination at time of collection.  Susceptibility not indicated          Gram Stain Aerobic Bottle Gram positive bacilli     Comment: 1 positive for gram positive bacillus of 3 drawn       Narrative:       Blood culture does not meet the specified criteria for PCR identification.  If pregnant, immunocompromised, or clinical concern for meningitis, call lab to run BCID for Listeria monocytogenes.    CBC & Differential [[id number]] Collected:  11/14/18 0511    Specimen:  Blood Updated:  11/14/18 0619    Narrative:       The following orders were created for panel order CBC & Differential.  Procedure                               Abnormality         Status                     ---------                               -----------         ------                     Scan Slide[[id number]]                                                                  CBC Auto Differential[[id number]]        Abnormal            Final result                 Please view results for these tests on the individual orders.    CBC Auto Differential [[id number]]  (Abnormal) Collected:  11/14/18 0511    Specimen:  Blood Updated:  11/14/18 0619     WBC 14.03 10*3/mm3      RBC 2.47 10*6/mm3      Hemoglobin 8.4 g/dL      Hematocrit 24.7 %      .0 fL      MCH 34.0 pg      MCHC 34.0 g/dL      RDW 25.0 %      RDW-SD 90.8 fl      MPV -- fL      Comment: Results not available        Platelets 109 10*3/mm3      Neutrophil % 37.9 %      Lymphocyte % 10.9 %      Monocyte % 31.0 %      Eosinophil % 3.8 %      Basophil % 0.9 %      Immature Grans % 15.5 %      Neutrophils, Absolute 5.32 10*3/mm3      Lymphocytes, Absolute 1.53 10*3/mm3      Monocytes, Absolute 4.35 10*3/mm3      Eosinophils, Absolute 0.54 10*3/mm3      Basophils, Absolute 0.12 10*3/mm3      Immature Grans, Absolute 2.17 10*3/mm3      nRBC 0.6 /100 WBC     Basic Metabolic Panel [[id number]]  (Abnormal) Collected:   11/14/18 0511    Specimen:  Blood Updated:  11/14/18 0606     Glucose 102 mg/dL      BUN 14 mg/dL      Creatinine 0.99 mg/dL      Sodium 138 mmol/L      Potassium 4.4 mmol/L      Chloride 108 mmol/L      CO2 21.0 mmol/L      Calcium 8.1 mg/dL      eGFR Non African Amer 73 mL/min/1.73      BUN/Creatinine Ratio 14.1     Anion Gap 9.0 mmol/L     Narrative:       The MDRD GFR formula is only valid for adults with stable renal function between ages 18 and 70.    Vancomycin, Trough [[id number]]  (Abnormal) Collected:  11/13/18 2152    Specimen:  Blood Updated:  11/13/18 2237     Vancomycin Trough 16.94 mcg/mL     Vancomycin, Peak [[id number]]  (Abnormal) Collected:  11/13/18 1648    Specimen:  Blood Updated:  11/13/18 1715     Vancomycin Peak 28.13 mcg/mL     Blood Culture ID, PCR - Blood, Blood, Venous Line [[id number]]  (Normal) Collected:  11/12/18 1026    Specimen:  Blood, Venous Line Updated:  11/13/18 1328     BCID, PCR No organism detected by BCID PCR.    Extra Tubes [[id number]] Collected:  11/13/18 1014    Specimen:  Blood, Venous Line Updated:  11/13/18 1116    Narrative:       The following orders were created for panel order Extra Tubes.  Procedure                               Abnormality         Status                     ---------                               -----------         ------                     Gold Top - SST[[id number]]                                   Final result                 Please view results for these tests on the individual orders.    Gold Top - SST [[id number]] Collected:  11/13/18 1014    Specimen:  Blood Updated:  11/13/18 1116     Extra Tube Hold for add-ons.     Comment: Auto resulted.       Lactic Acid, Plasma [[id number]]  (Normal) Collected:  11/13/18 1016    Specimen:  Blood Updated:  11/13/18 1042     Lactate 1.6 mmol/L     CBC & Differential [[id number]] Collected:  11/13/18 0624    Specimen:  Blood Updated:  11/13/18 0959    Narrative:       The following orders were created for  panel order CBC & Differential.  Procedure                               Abnormality         Status                     ---------                               -----------         ------                     Manual Differential[[id number]]          Abnormal            Final result               Scan Slide[[id number]]                                                                  CBC Auto Differential[[id number]]        Abnormal            Final result                 Please view results for these tests on the individual orders.    Manual Differential [[id number]]  (Abnormal) Collected:  11/13/18 0624    Specimen:  Blood Updated:  11/13/18 0959     Neutrophil % 49.0 %      Lymphocyte % 9.0 %      Monocyte % 25.0 %      Eosinophil % 2.0 %      Basophil % 2.0 %      Bands %  3.0 %      Metamyelocyte % 3.0 %      Myelocyte % 7.0 %      Neutrophils Absolute 11.79 10*3/mm3      Lymphocytes Absolute 2.04 10*3/mm3      Monocytes Absolute 5.67 10*3/mm3      Eosinophils Absolute 0.45 10*3/mm3      Basophils Absolute 0.45 10*3/mm3      Anisocytosis Slight/1+     Comment: Few polychromic cells, few teardrop cells        WBC Morphology Normal     Platelet Estimate Decreased    Basic Metabolic Panel [[id number]]  (Abnormal) Collected:  11/13/18 0624    Specimen:  Blood Updated:  11/13/18 0714     Glucose 95 mg/dL      BUN 16 mg/dL      Creatinine 0.87 mg/dL      Sodium 136 mmol/L      Potassium 3.9 mmol/L      Chloride 108 mmol/L      CO2 20.0 mmol/L      Calcium 8.4 mg/dL      eGFR Non African Amer 85 mL/min/1.73      BUN/Creatinine Ratio 18.4     Anion Gap 8.0 mmol/L     Narrative:       The MDRD GFR formula is only valid for adults with stable renal function between ages 18 and 70.    CBC Auto Differential [[id number]]  (Abnormal) Collected:  11/13/18 0624    Specimen:  Blood Updated:  11/13/18 0702     WBC 22.67 10*3/mm3      RBC 2.64 10*6/mm3      Hemoglobin 9.3 g/dL      Hematocrit 26.2 %      MCV 99.2 fL      MCH 35.2 pg       MCHC 35.5 g/dL      RDW 24.1 %      RDW-SD 84.4 fl      MPV -- fL      Comment: Instrument unable to calculate result        Platelets 133 10*3/mm3     POC Glucose Once [[id number]]  (Normal) Collected:  11/12/18 2336    Specimen:  Blood Updated:  11/12/18 2348     Glucose 101 mg/dL      Comment: : [id number] JOSELUIS GINAMeter ID: [id number]       Lactic Acid, Reflex [[id number]]  (Abnormal) Collected:  11/12/18 1412    Specimen:  Blood Updated:  11/12/18 1439     Lactate 3.7 mmol/L     Lactic Acid, Reflex Timer (This will reflex a repeat order 3-3:15 hours after ordered.) [[id number]] Collected:  11/12/18 1004    Specimen:  Blood Updated:  11/12/18 1400     Extra Tube Hold for add-ons.     Comment: Auto resulted.       CBC & Differential [[id number]] Collected:  11/12/18 1004    Specimen:  Blood Updated:  11/12/18 1159    Narrative:       The following orders were created for panel order CBC & Differential.  Procedure                               Abnormality         Status                     ---------                               -----------         ------                     Manual Differential[[id number]]          Abnormal            Final result               Scan Slide[[id number]]                                                                  CBC Auto Differential[[id number]]        Abnormal            Final result                 Please view results for these tests on the individual orders.    Manual Differential [[id number]]  (Abnormal) Collected:  11/12/18 1004    Specimen:  Blood Updated:  11/12/18 1159     Neutrophil % 60.0 %      Lymphocyte % 7.0 %      Monocyte % 18.0 %      Eosinophil % 2.0 %      Bands %  5.0 %      Metamyelocyte % 5.0 %      Myelocyte % 1.0 %      Atypical Lymphocyte % 2.0 %      Neutrophils Absolute 14.72 10*3/mm3      Lymphocytes Absolute 1.58 10*3/mm3      Monocytes Absolute 4.08 10*3/mm3      Eosinophils Absolute 0.45 10*3/mm3      nRBC 1.0 /100 WBC      Anisocytosis  Slight/1+     Comment: Few Teardrop cells        Polychromasia Slight/1+     WBC Morphology Normal     Platelet Estimate Adequate    Extra Tubes [[id number]] Collected:  11/12/18 1004    Specimen:  Blood, Venous Line Updated:  11/12/18 1116    Narrative:       The following orders were created for panel order Extra Tubes.  Procedure                               Abnormality         Status                     ---------                               -----------         ------                     Light Blue Top[[id number]]                                   Final result                 Please view results for these tests on the individual orders.    Light Blue Top [[id number]] Collected:  11/12/18 1004    Specimen:  Blood Updated:  11/12/18 1116     Extra Tube hold for add-on     Comment: Auto resulted       Urinalysis With Microscopic If Indicated (No Culture) - Urine, Clean Catch [[id number]]  (Normal) Collected:  11/12/18 1103    Specimen:  Urine, Clean Catch Updated:  11/12/18 1110     Color, UA Yellow     Appearance, UA Clear     pH, UA <=5.0     Specific Gravity, UA 1.022     Glucose, UA Negative     Ketones, UA Negative     Bilirubin, UA Negative     Blood, UA Negative     Protein, UA Negative     Leuk Esterase, UA Negative     Nitrite, UA Negative     Urobilinogen, UA 0.2 E.U./dL    Narrative:       Urine microscopic not indicated.    Lactic Acid, Plasma [[id number]]  (Abnormal) Collected:  11/12/18 1004    Specimen:  Blood Updated:  11/12/18 1050     Lactate 5.7 mmol/L     Comprehensive Metabolic Panel [[id number]]  (Abnormal) Collected:  11/12/18 1004    Specimen:  Blood Updated:  11/12/18 1043     Glucose 92 mg/dL      BUN 14 mg/dL      Creatinine 0.75 mg/dL      Sodium 136 mmol/L      Potassium 3.9 mmol/L      Chloride 104 mmol/L      CO2 18.0 mmol/L      Calcium 9.4 mg/dL      Total Protein 6.8 g/dL      Albumin 4.10 g/dL      ALT (SGPT) 17 U/L      AST (SGOT) 20 U/L      Alkaline Phosphatase 54 U/L      Total  Bilirubin 0.9 mg/dL      eGFR Non African Amer 101 mL/min/1.73      Globulin 2.7 gm/dL      A/G Ratio 1.5 g/dL      BUN/Creatinine Ratio 18.7     Anion Gap 14.0 mmol/L     Narrative:       The MDRD GFR formula is only valid for adults with stable renal function between ages 18 and 70.    Lipase [[id number]]  (Normal) Collected:  11/12/18 1004    Specimen:  Blood Updated:  11/12/18 1043     Lipase 48 U/L     CBC Auto Differential [[id number]]  (Abnormal) Collected:  11/12/18 1004    Specimen:  Blood Updated:  11/12/18 1032     WBC 22.64 10*3/mm3      RBC 2.94 10*6/mm3      Hemoglobin 10.2 g/dL      Hematocrit 29.5 %      .3 fL      MCH 34.7 pg      MCHC 34.6 g/dL      RDW 24.0 %      RDW-SD 85.4 fl      Platelets 178 10*3/mm3      Neutrophil % -- %      Lymphocyte % -- %      Monocyte % 3.5 %      Eosinophil % 0.8 %      Basophil % 0.5 %      Neutrophils, Absolute -- 10*3/mm3      Lymphocytes, Absolute -- 10*3/mm3      Monocytes, Absolute 0.79 10*3/mm3      Eosinophils, Absolute 0.17 10*3/mm3      Basophils, Absolute 0.12 10*3/mm3           HOSPITAL COURSE:  Active Hospital Problems    Diagnosis Date Noted   • **Sepsis (CMS/HCC) [A41.9] 11/13/2018   • Encounter for care related to Port-a-Cath [Z45.2] 11/12/2018   • Port or reservoir infection [T80.212A] 11/12/2018     Added automatically from request for surgery [id number]       Patient admitted with sepsis and likely port infection. Cultures and culture from port positive. Otherwise no growth. Patient on Vanc/Cefepime initially with vanc discontinued after only GNB growth seen. Patient to be discharged on Levaquin to complete 10 more days for 14 days total. Patient has ID follow up on discharge. Patient has some thickening on aortic valve but clinical suspicion for vegetation or endocarditis is low. Patient afebrile over hospital course. Patient to have close oncology, ID, and PCP follow up on discharge.    #. Sepsis. Vanc/Cefepime. Cultures pending. Mike  results. ECHO. Blood culture showing GNB. Not final yet. Will discuss with micro. Per micro GNB growing. Should be final tomorrow. Will stop vanc.  ECHO Results:  Interpretation Summary   · Left ventricular systolic function is normal. Estimated EF = 58%.  · Left ventricular diastolic dysfunction (grade I) consistent with impaired relaxation.  · Left atrial cavity size is borderline dilated.  · Mild mitral valve regurgitation is present  · Mild to moderate tricuspid valve regurgitation is present.  · Electronic lead present in the ventricle.  · There is thickening of the noncoronary cusp of the aortic valve. Calcification/ sclerosis likely. Trace-to-mild aortic valve regurgitation is present.   #. Port-a-cath cranially directed.   11/14. Port removed.  11/13. Surgery consulted. Appreciate input.  #. CML. Active chemo patient. Consult heme/onc as needed.    DISCHARGE CONDITION:   Stable    DISPOSITION:  Home or Self Care    DISCHARGE MEDICATIONS     Discharge Medications      New Medications      Instructions Start Date   levoFLOXacin 750 MG tablet  Commonly known as:  LEVAQUIN   750 mg, Oral, Daily         Continue These Medications      Instructions Start Date   allopurinol 100 MG tablet  Commonly known as:  ZYLOPRIM   100 mg, Oral, 4 Times Daily      aspirin 81 MG EC tablet   81 mg, Oral, Daily      colchicine 0.6 MG tablet   0.6 mg, Oral, Daily PRN      diclofenac 50 MG EC tablet  Commonly known as:  VOLTAREN   50 mg, Oral, 2 Times Daily      folic acid 1 MG tablet  Commonly known as:  FOLVITE   1 mg, Oral, Daily      omeprazole 20 MG capsule  Commonly known as:  PRILOSEC   20 mg, Oral, Daily      ondansetron 4 MG tablet  Commonly known as:  ZOFRAN   4 mg, Oral, 4 Times Daily PRN      oxybutynin 5 MG tablet  Commonly known as:  DITROPAN   5 mg, Oral, 2 Times Daily      sotalol 80 MG tablet  Commonly known as:  BETAPACE   40 mg, Oral, 2 Times Daily - RT      tamsulosin 0.4 MG capsule 24 hr capsule  Commonly known  as:  FLOMAX   1 capsule, Oral, Nightly      TYLENOL 325 MG tablet  Generic drug:  acetaminophen   1 tablet, Oral, As Needed             INSTRUCTIONS:  Activity:   Activity Instructions     Activity as Tolerated            Diet:   Diet Instructions     Advance Diet As Tolerated            Special instructions: Patient instructed to call MD or return to ED with worsening shortness of breath, chest pain, fever greater than 100.4 degrees F or any other medical concerns..    FOLLOW UP:   Follow-up Information     Seferino Tan MD Follow up.    Specialties:  Family Medicine, Emergency Medicine  Why:  Please call Monday 11/19/18 and make a 1 week hospital follow up appointment.  Contact information:  [address] [facility] [place] [address] [phone]             Infectious Disease-[place] Follow up on 11/19/2018.    Why:  Mon/Tuesday of next week. Provider's nurse to call [facility] and schedule appointment. Monday 11/19/18 at 12:30pm.  Contact information:  [address]  [facility]    Jordin Narvaez MD Follow up on 11/26/2018.    Specialty:  Hematology and Oncology  Why:  Keep appointment.  Contact information:  [address] [place] [address] [address] [place] [address] [phone]                   PENDING TEST RESULTS AT DISCHARGE   Order Current Status    Blood Culture - Blood, Arm, Left In process    Blood Culture - Blood, Arm, Right In process    Blood Culture - Blood, Arm, Right Preliminary result    Blood Culture - Blood, Arm, Right Preliminary result    Wound Culture - Wound, Chest, Right Preliminary result          Time: Discharge 35 min          This document has been electronically signed by Kin Hummel MD on November 16, 2018 11:23 AM                  Electronically signed by Kin Hummel MD at 11/16/2018  5:49 PM

## 2018-11-19 NOTE — OUTREACH NOTE
Sepsis Week 1 Survey      Responses   Facility patient discharged from?  [facility]   Does the patient have one of the following disease processes/diagnoses(primary or secondary)?  Sepsis   Is there a successful TCM telephone encounter documented?  No   Week 1 attempt successful?  Yes   Call start time  0740   Call end time  0752   Discharge diagnosis  Sepsis, Port or reservoir infection related to Port-a-cath   List who call center can speak with  Please speak with patient only   Meds reviewed with patient/caregiver?  Yes   Is the patient having any side effects they believe may be caused by any medication additions or changes?  No   Does the patient have all medications related to this admission filled (includes all antibiotics, inhalers, nebulizers,steroids,etc.)  Yes   Is the patient taking all medications as directed (includes completed medication regime)?  Yes   Comments regarding appointments  See AVS   Does the patient have a primary care provider?   Yes   Comments regarding PCP  Will call Dr. Tan today for appt   Does the patient have an appointment with their PCP within 7 days of discharge?  Yes   Has the patient kept scheduled appointments due by today?  N/A   Did the patient receive a copy of their discharge instructions?  Yes   Nursing interventions  Reviewed instructions with patient   What is the patient's perception of their health status since discharge?  Improving   Is the patient/caregiver able to teach back Sepsis?  S - Shivering,fever or very cold, E - Extreme pain or generalized discomfort (worst ever,especially abdomen), P - Pale or discolored skin, S - Sleepy, difficult to arouse,confused, S - Short of breath, I -   I feel like I might die-a feeling of hopelessness   Nursing interventions  Nurse provided reassurance to patient, Nurse provided patient education, Advised patient to call provider, Advised patient to seek immediate care   Is patient/caregiver able to teach back steps to  recovery at home?  Set small, achievable goals for return to baseline health, Rest and regain strength, Talk about feelings with family/friends, Record milestones and struggles in a journal, Make a list of questions for PCP appoinment, Exercise as tolerated, Eat a balanced diet   Is the patient/caregiver able to teach back signs and symptoms of worsening condition:  Fever, Rapid heart rate (>90), Hyperthermia, Shortness of breath/rapid respiratory rate, Altered mental status(confusion/coma), Edema, High blood glucose without diabetes   Is the patient/caregiver able to teach back the hierarchy of who to call/visit for symptoms/problems? PCP, Specialist, Home health nurse, Urgent Care, ED, 911  Yes   Week 1 call completed?  Yes   Wrap up additional comments  Feels he had a fever his first night home but none since then. He is concerned about having his port removed, having to utilize a PICC line until this infection clears and a new port can be inserted. Seeing infectious disease today.           Goldie Estrada, RN

## 2018-11-20 ENCOUNTER — OFFICE VISIT (OUTPATIENT)
Dept: FAMILY MEDICINE CLINIC | Facility: CLINIC | Age: 78
End: 2018-11-20

## 2018-11-20 VITALS
HEIGHT: 71 IN | DIASTOLIC BLOOD PRESSURE: 60 MMHG | SYSTOLIC BLOOD PRESSURE: 150 MMHG | BODY MASS INDEX: 26.49 KG/M2 | HEART RATE: 80 BPM | OXYGEN SATURATION: 95 % | TEMPERATURE: 98.2 F | WEIGHT: 189.2 LBS

## 2018-11-20 DIAGNOSIS — A41.9 SEPSIS, DUE TO UNSPECIFIED ORGANISM: Primary | ICD-10-CM

## 2018-11-20 PROCEDURE — 99213 OFFICE O/P EST LOW 20 MIN: CPT | Performed by: FAMILY MEDICINE

## 2018-11-20 NOTE — PROGRESS NOTES
" Angelo Julio Workman is a 78 y.o. male.     History of Present Illness     Hospital follow up, sepsis thought due to infected port  Only his 5th round of chemo, plan is 2-3 months.   Has appointment soon dr ortega  Having fatigue    Review of Systems   Constitutional: Positive for fatigue and fever. Negative for chills.   HENT: Positive for hearing loss and sneezing. Negative for congestion, ear discharge, ear pain, facial swelling, postnasal drip, rhinorrhea, sinus pressure, sore throat, trouble swallowing and voice change.    Eyes: Negative for discharge, redness and visual disturbance.   Respiratory: Positive for cough and shortness of breath. Negative for chest tightness and wheezing.    Cardiovascular: Negative for chest pain and palpitations.   Gastrointestinal: Positive for nausea. Negative for abdominal pain, blood in stool, constipation, diarrhea and vomiting.   Endocrine: Negative for polydipsia and polyuria.   Genitourinary: Negative for dysuria, flank pain, hematuria and urgency.   Musculoskeletal: Negative for arthralgias, back pain, joint swelling and myalgias.   Skin: Negative for rash.   Neurological: Negative for dizziness, weakness, numbness and headaches.   Hematological: Negative for adenopathy.   Psychiatric/Behavioral: Negative for confusion and sleep disturbance. The patient is not nervous/anxious.            /60 (BP Location: Left arm, Patient Position: Sitting, Cuff Size: Adult)   Pulse 80   Temp 98.2 °F (36.8 °C) (Temporal)   Ht 180.3 cm (70.98\")   Wt 85.8 kg (189 lb 3.2 oz)   SpO2 95%   BMI 26.40 kg/m²       Objective     Physical Exam   Constitutional: He is oriented to person, place, and time. He appears well-developed and well-nourished.   HENT:   Head: Normocephalic and atraumatic.   Right Ear: External ear normal.   Left Ear: External ear normal.   Nose: Nose normal.   Eyes: Conjunctivae and EOM are normal. Pupils are equal, round, and reactive to light.   Neck: " Normal range of motion.   Pulmonary/Chest: Effort normal.   Musculoskeletal: Normal range of motion.   Neurological: He is alert and oriented to person, place, and time.   Psychiatric: He has a normal mood and affect. His behavior is normal. Judgment and thought content normal.   Nursing note and vitals reviewed.          PAST MEDICAL HISTORY     Past Medical History:   Diagnosis Date   • Atrophy of testis    • Benign prostatic hyperplasia    • Borderline glaucoma    • Chronic myelomonocytic leukemia (CMS/HCC)    • Degenerative joint disease involving multiple joints    • GERD (gastroesophageal reflux disease)    • Gout    • History of echocardiogram 05/08/2012    Normal left ventricular systolic function, EF 60%. Early diastolic dysfunction. Mild aortic and pulmonic regurgitation. Trace mitral and mild tricuspid regurgitation. No intracardiac mass pericardial effusion or cardiac thrombus.   • History of Holter monitoring 01/18/2011   • Impotence    • Lightheadedness    • Neck pain, musculoskeletal    • Sleep apnea     NOT WEARING C-PAP   • TIA (transient ischemic attack) 2014      PAST SURGICAL HISTORY     Past Surgical History:   Procedure Laterality Date   • CARDIAC CATHETERIZATION  09/30/2009    No epicardial coronary artery disease noted that would explain patient's chest pain of the abnormal stress test noted. Normal left ventricular systolic function with no wall motion abnormalities   • CARDIAC CATHETERIZATION  05/24/1989    Normal catheterization, false-positive exercise treadmill. Noncardiac chest pain   • CARDIAC PACEMAKER PLACEMENT  06/2008    Dual chamber permanent pacemaker implantation   • COLECTOMY PARTIAL / TOTAL  04/14/2000    Cecal diverticulitis. Removal of small segment of terminal ileum, cecum and right colon, sent to pathology   • COLON SURGERY  03/27/2016   • COLONOSCOPY  05/25/2012   • CYSTOSCOPY  11/15/2000    Urinary retention on the basis of medications and benign prostatic hypertrophy    • INGUINAL HERNIA REPAIR  02/15/2007    Recurrent right inguinal hernia. Repair of recurrent inguinal hernia with EPS Prolene mesh system.   • INGUINAL HERNIA REPAIR Right 1957   • LAPAROSCOPIC CHOLECYSTECTOMY  10/26/2006    Gallstones. Laparoscopic cholecystectomy with operative cholangiogram   • PROSTATECTOMY  11/17/2000    Benign prostatic hypertrophy with urinary retention. Prostatitis. transurethral resection of prostate.   • STEROID INJECTION  10/21/2010    Decadron (Gout)    • STEROID INJECTION  07/23/2013    Kenalog (Gout) (4)      SOCIAL HISTORY     Social History     Socioeconomic History   • Marital status:      Spouse name: Not on file   • Number of children: Not on file   • Years of education: Not on file   • Highest education level: Not on file   Tobacco Use   • Smoking status: Never Smoker   • Smokeless tobacco: Never Used   Substance and Sexual Activity   • Alcohol use: No   • Drug use: No   • Sexual activity: Defer      ALLERGIES   Doxycycline hyclate; Sulfa antibiotics; Other; Penicillins; and Soma [carisoprodol]   MEDICATIONS     Current Outpatient Medications   Medication Sig Dispense Refill   • acetaminophen (TYLENOL) 325 MG tablet Take 1 tablet by mouth As Needed.     • allopurinol (ZYLOPRIM) 100 MG tablet Take 1 tablet by mouth 4 (Four) Times a Day. 360 tablet 1   • aspirin 81 MG EC tablet Take 81 mg by mouth Daily.     • colchicine 0.6 MG tablet Take 0.6 mg by mouth Daily As Needed for Muscle / Joint Pain.     • diclofenac (VOLTAREN) 50 MG EC tablet Take 1 tablet by mouth 2 (Two) Times a Day. 60 tablet 11   • folic acid (FOLVITE) 1 MG tablet Take 1 tablet by mouth Daily. 90 tablet 3   • levoFLOXacin (LEVAQUIN) 750 MG tablet Take 1 tablet by mouth Daily. 10 tablet 0   • omeprazole (PRILOSEC) 20 MG capsule Take 1 capsule by mouth Daily. 90 capsule 3   • ondansetron (ZOFRAN) 4 MG tablet Take 1 tablet by mouth 4 (Four) Times a Day As Needed for Nausea or Vomiting. 40 tablet 3   •  oxybutynin (DITROPAN) 5 MG tablet Take 5 mg by mouth 2 (Two) Times a Day.     • sotalol (BETAPACE) 80 MG tablet Take 0.5 tablets by mouth 2 (Two) Times a Day. 90 tablet 3   • tamsulosin (FLOMAX) 0.4 MG capsule 24 hr capsule Take 1 capsule by mouth every night.       No current facility-administered medications for this visit.         The following portions of the patient's history were reviewed and updated as appropriate: allergies, current medications, past family history, past medical history, past social history, past surgical history and problem list.        Assessment/Plan   Jan was seen today for follow-up.    Diagnoses and all orders for this visit:    Sepsis, due to unspecified organism (CMS/HCC)    his strength will improve, more time.  He stays active.                    No Follow-up on file.                  This document has been electronically signed by Seferino Tan MD on November 20, 2018 10:34 AM

## 2018-11-21 LAB
BACTERIA SPEC AEROBE CULT: NORMAL
BACTERIA SPEC AEROBE CULT: NORMAL

## 2018-11-26 ENCOUNTER — OFFICE VISIT (OUTPATIENT)
Dept: ONCOLOGY | Facility: CLINIC | Age: 78
End: 2018-11-26

## 2018-11-26 ENCOUNTER — INFUSION (OUTPATIENT)
Dept: ONCOLOGY | Facility: HOSPITAL | Age: 78
End: 2018-11-26

## 2018-11-26 VITALS
HEIGHT: 71 IN | DIASTOLIC BLOOD PRESSURE: 76 MMHG | SYSTOLIC BLOOD PRESSURE: 137 MMHG | TEMPERATURE: 98.2 F | RESPIRATION RATE: 16 BRPM | HEART RATE: 77 BPM | BODY MASS INDEX: 26.01 KG/M2 | WEIGHT: 185.8 LBS | OXYGEN SATURATION: 96 %

## 2018-11-26 DIAGNOSIS — C93.10 CHRONIC MYELOMONOCYTIC LEUKEMIA NOT HAVING ACHIEVED REMISSION (HCC): ICD-10-CM

## 2018-11-26 DIAGNOSIS — D63.0 ANEMIA IN NEOPLASTIC DISEASE: ICD-10-CM

## 2018-11-26 DIAGNOSIS — T80.212A PORT OR RESERVOIR INFECTION, INITIAL ENCOUNTER: ICD-10-CM

## 2018-11-26 DIAGNOSIS — Z45.2 ENCOUNTER FOR VENOUS ACCESS DEVICE CARE: Primary | ICD-10-CM

## 2018-11-26 DIAGNOSIS — D47.02 SYSTEMIC MASTOCYTOSIS WITH ASSOCIATED CLONAL HEMATOLOGICAL NON-MAST CELL LINEAGE DISEASE: Primary | ICD-10-CM

## 2018-11-26 DIAGNOSIS — A41.9 SEPSIS, DUE TO UNSPECIFIED ORGANISM: ICD-10-CM

## 2018-11-26 LAB
ALBUMIN SERPL-MCNC: 4.4 G/DL (ref 3.4–4.8)
ALBUMIN/GLOB SERPL: 1.5 G/DL (ref 1.1–1.8)
ALP SERPL-CCNC: 49 U/L (ref 38–126)
ALT SERPL W P-5'-P-CCNC: 17 U/L (ref 21–72)
ANION GAP SERPL CALCULATED.3IONS-SCNC: 10 MMOL/L (ref 5–15)
ANISOCYTOSIS BLD QL: ABNORMAL
AST SERPL-CCNC: 19 U/L (ref 17–59)
BASOPHILS # BLD MANUAL: 0.13 10*3/MM3 (ref 0–0.2)
BASOPHILS NFR BLD AUTO: 1 % (ref 0–2)
BILIRUB SERPL-MCNC: 0.8 MG/DL (ref 0.2–1.3)
BUN BLD-MCNC: 13 MG/DL (ref 7–21)
BUN/CREAT SERPL: 11.2 (ref 7–25)
CALCIUM SPEC-SCNC: 8.8 MG/DL (ref 8.4–10.2)
CHLORIDE SERPL-SCNC: 104 MMOL/L (ref 95–110)
CO2 SERPL-SCNC: 25 MMOL/L (ref 22–31)
CREAT BLD-MCNC: 1.16 MG/DL (ref 0.7–1.3)
DEPRECATED RDW RBC AUTO: 88 FL (ref 35.1–43.9)
EOSINOPHIL # BLD MANUAL: 0.25 10*3/MM3 (ref 0–0.7)
EOSINOPHIL NFR BLD MANUAL: 2 % (ref 0–7)
ERYTHROCYTE [DISTWIDTH] IN BLOOD BY AUTOMATED COUNT: 25.2 % (ref 11.5–14.5)
GFR SERPL CREATININE-BSD FRML MDRD: 61 ML/MIN/1.73 (ref 42–98)
GLOBULIN UR ELPH-MCNC: 3 GM/DL (ref 2.3–3.5)
GLUCOSE BLD-MCNC: 101 MG/DL (ref 60–100)
HCT VFR BLD AUTO: 28.3 % (ref 39–49)
HGB BLD-MCNC: 9.8 G/DL (ref 13.7–17.3)
LYMPHOCYTES # BLD MANUAL: 3.67 10*3/MM3 (ref 0.6–4.2)
LYMPHOCYTES NFR BLD MANUAL: 11 % (ref 0–12)
LYMPHOCYTES NFR BLD MANUAL: 29 % (ref 10–50)
MACROCYTES BLD QL SMEAR: ABNORMAL
MCH RBC QN AUTO: 34.9 PG (ref 26.5–34)
MCHC RBC AUTO-ENTMCNC: 34.6 G/DL (ref 31.5–36.3)
MCV RBC AUTO: 100.7 FL (ref 80–98)
METAMYELOCYTES NFR BLD MANUAL: 4 % (ref 0–0)
MONOCYTES # BLD AUTO: 1.39 10*3/MM3 (ref 0–0.9)
MYELOCYTES NFR BLD MANUAL: 4 % (ref 0–0)
NEUTROPHILS # BLD AUTO: 6.19 10*3/MM3 (ref 2–8.6)
NEUTROPHILS NFR BLD MANUAL: 32 % (ref 37–80)
NEUTS BAND NFR BLD MANUAL: 17 % (ref 0–5)
NRBC SPEC MANUAL: 1 /100 WBC (ref 0–0)
PLATELET # BLD AUTO: 184 10*3/MM3 (ref 150–450)
POTASSIUM BLD-SCNC: 4 MMOL/L (ref 3.5–5.1)
PROT SERPL-MCNC: 7.4 G/DL (ref 6.3–8.6)
RBC # BLD AUTO: 2.81 10*6/MM3 (ref 4.37–5.74)
SMALL PLATELETS BLD QL SMEAR: ADEQUATE
SODIUM BLD-SCNC: 139 MMOL/L (ref 137–145)
WBC MORPH BLD: NORMAL
WBC NRBC COR # BLD: 12.64 10*3/MM3 (ref 3.2–9.8)

## 2018-11-26 PROCEDURE — 87040 BLOOD CULTURE FOR BACTERIA: CPT | Performed by: NURSE PRACTITIONER

## 2018-11-26 PROCEDURE — 99214 OFFICE O/P EST MOD 30 MIN: CPT | Performed by: INTERNAL MEDICINE

## 2018-11-26 PROCEDURE — G0463 HOSPITAL OUTPT CLINIC VISIT: HCPCS | Performed by: INTERNAL MEDICINE

## 2018-11-26 PROCEDURE — 85007 BL SMEAR W/DIFF WBC COUNT: CPT | Performed by: INTERNAL MEDICINE

## 2018-11-26 PROCEDURE — 85025 COMPLETE CBC W/AUTO DIFF WBC: CPT

## 2018-11-26 PROCEDURE — 87040 BLOOD CULTURE FOR BACTERIA: CPT

## 2018-11-26 PROCEDURE — 1157F ADVNC CARE PLAN IN RCRD: CPT | Performed by: INTERNAL MEDICINE

## 2018-11-26 PROCEDURE — 80053 COMPREHEN METABOLIC PANEL: CPT | Performed by: INTERNAL MEDICINE

## 2018-11-26 PROCEDURE — G8420 CALC BMI NORM PARAMETERS: HCPCS | Performed by: INTERNAL MEDICINE

## 2018-11-26 RX ORDER — SODIUM CHLORIDE 0.9 % (FLUSH) 0.9 %
10 SYRINGE (ML) INJECTION AS NEEDED
Status: DISCONTINUED | OUTPATIENT
Start: 2018-11-26 | End: 2018-11-26

## 2018-11-26 RX ORDER — SODIUM CHLORIDE 0.9 % (FLUSH) 0.9 %
10 SYRINGE (ML) INJECTION AS NEEDED
Status: CANCELLED | OUTPATIENT
Start: 2018-11-26

## 2018-11-26 NOTE — PROGRESS NOTES
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  Patient was also evaluated by Dr. Man Kimbrough at [place] in 2012.  Patient has been on observation since then without any active chemotherapy.  In November 2014 in view of worsening leukocytosis and anemia he had a repeat bone marrow biopsy done by Dr. Shore which showed systemic mastocytosis with associated clonal hematologic non-mass cell lineage disease, CMML-1.  Patient has not been on any chemotherapy since then.  In March 2018 patient was seen here at clinic by Goldie WHYTE.  In view of sclerotic lesion on lower extremity as well as skin lesion patient was referred to dermatology.  Biopsy done by Dr. Chen on June 20, 2018 showed increased MAST cells consistent with urticaria pigmentosa.  A she was last seen here at clinic on July 25, 2018.  After that patient was referred to [facility] clinic when he was seen by Dr. Kimbrough on August 20, 2018 and a bone marrow biopsy done on August 23, 2018.  Was started on Vidaza on September 24, 2018.  Patient is here to get cycle 2 of Vidaza today.  Patient was last seen here at [facility] on November 12, 2018, due to uncontrollable shakes and chills after port access and flushing, patient was admitted to the hospital and was found to have gram-negative bacteremia with Acinetobacter.  Patient had a port removed during hospital stay.  Denies any more chills or fevers. Denies any bleeding.  States his skin lesions are somewhat improved.      PAST MEDICAL  HISTORY:    Past Medical History:   Diagnosis Date   • Atrophy of testis    • Benign prostatic hyperplasia    • Borderline glaucoma    • Chronic myelomonocytic leukemia (CMS/HCC)    • Degenerative joint disease involving multiple joints    • GERD (gastroesophageal reflux disease)    • Gout    • History of echocardiogram 05/08/2012    Normal left ventricular systolic function, EF 60%. Early diastolic dysfunction. Mild aortic and pulmonic regurgitation. Trace mitral and mild tricuspid regurgitation. No intracardiac mass pericardial effusion or cardiac thrombus.   • History of Holter monitoring 01/18/2011   • Impotence    • Lightheadedness    • Neck pain, musculoskeletal    • Sleep apnea     NOT WEARING C-PAP   • TIA (transient ischemic attack) 2014       SOCIAL HISTORY:    Social History     Tobacco Use   • Smoking status: Never Smoker   • Smokeless tobacco: Never Used   Substance Use Topics   • Alcohol use: No   • Drug use: No       Surgical History :  Past Surgical History:   Procedure Laterality Date   • CARDIAC CATHETERIZATION  09/30/2009    No epicardial coronary artery disease noted that would explain patient's chest pain of the abnormal stress test noted. Normal left ventricular systolic function with no wall motion abnormalities   • CARDIAC CATHETERIZATION  05/24/1989    Normal catheterization, false-positive exercise treadmill. Noncardiac chest pain   • CARDIAC PACEMAKER PLACEMENT  06/2008    Dual chamber permanent pacemaker implantation   • COLECTOMY PARTIAL / TOTAL  04/14/2000    Cecal diverticulitis. Removal of small segment of terminal ileum, cecum and right colon, sent to pathology   • COLON SURGERY  03/27/2016   • COLONOSCOPY  05/25/2012   • CYSTOSCOPY  11/15/2000    Urinary retention on the basis of medications and benign prostatic hypertrophy   • INGUINAL HERNIA REPAIR  02/15/2007    Recurrent right inguinal hernia. Repair of recurrent inguinal hernia with EPS Prolene mesh system.   • INGUINAL HERNIA  REPAIR Right 1957   • LAPAROSCOPIC CHOLECYSTECTOMY  10/26/2006    Gallstones. Laparoscopic cholecystectomy with operative cholangiogram   • PROSTATECTOMY  11/17/2000    Benign prostatic hypertrophy with urinary retention. Prostatitis. transurethral resection of prostate.   • STEROID INJECTION  10/21/2010    Decadron (Gout)    • STEROID INJECTION  07/23/2013    Kenalog (Gout) (4)       ALLERGIES:    Allergies   Allergen Reactions   • Doxycycline Hyclate Other (See Comments)     Other reaction(s): lip swollen   • Sulfa Antibiotics Swelling     facial   • Other Rash     SILK TAPE   • Penicillins Rash     Reaction: rash   • Soma [Carisoprodol] Swelling and Rash         FAMILY HISTORY:  Family History   Problem Relation Age of Onset   • Cancer Other    • Colon cancer Other         parent   • Coronary artery disease Other    • Heart disease Other    • Hypertension Other    • Cholelithiasis Other    • Other Other         colon problems           REVIEW OF SYSTEMS:      CONSTITUTIONAL:  Complains of fatigue.  Denies any fever drenching night sweats or weight loss.     EYES: No visual disturbances. No discharge. No new lesions    ENMT:  No epistaxis, mouth sores or difficulty swallowing.    RESPIRATORY:  Complains of shortness of breath with exertion. No new cough or hemoptysis.    CARDIOVASCULAR:  Complains of intermittent chest pain.No palpitations.    GASTROINTESTINAL:  No abdominal pain nausea, vomiting or blood in the stool.    GENITOURINARY: No Dysuria or Hematuria.    MUSCULOSKELETAL:  Complains of worsening back pain.  Complains of worsening pain in neck region.    LYMPHATICS:  Denies any abnormal swollen glands anywhere in the body.    NEUROLOGICAL : No tingling or numbness. No headache or dizziness. No seizures or balance problems.    SKIN: Erythematous lesion present on chest, abdomen, back, upper and lower extremity.            PHYSICAL EXAMINATION:      VITAL SIGNS:  /76   Pulse 77   Temp 98.2 °F (36.8  "°C) (Temporal)   Resp 16   Ht 180.3 cm (70.98\")   Wt 84.3 kg (185 lb 12.8 oz)   SpO2 96%   BMI 25.93 kg/m²       11/26/18  1013   Weight: 84.3 kg (185 lb 12.8 oz)         ECOG performance status: 2    CONSTITUTIONAL:  Not in any distress.  Having shakes and chills at the time of examination.    EYES: Mild conjunctival Pallor. No Icterus. No Pterygium. Extraocular Movements intact.No ptosis.    ENMT:  Normocephalic, Atraumatic.No Facial Asymmetry noted.    NECK:  No adenopathy.Trachea midline. NO JVD.    RESPIRATORY:  Fair air entry bilateral. No rhonchi or wheezing.Fair respiratory effort.    CARDIOVASCULAR:  S1, S2. Regular rate and rhythm. No murmur or gallop appreciated.Pacemaker present on left chest wall.    ABDOMEN:  Soft, obese, nontender. Bowel sounds present in all four quadrants.  No Hepatosplenomegaly appreciated.    MUSCULOSKELETAL:  No edema.No Calf Tenderness.Decreased range of motion.    NEUROLOGIC:    No  Motor  deficit appreciated. Cranial Nerves 2-12 grossly intact.    SKIN : Skin lesion consistent with urticaria pigmentosa present on chest, abdomen, back, upper and lower extremity.    LYMPHATICS: No new enlarged lymph nodes in neck or supraclavicular area.    PSYCHIATRY: Alert, awake and oriented ×3.Normal affect.  Normal judgment.  Makes good eye contact.        DIAGNOSTIC DATA:    Glucose   Date Value Ref Range Status   11/26/2018 101 (H) 60 - 100 mg/dL Final     Sodium   Date Value Ref Range Status   11/26/2018 139 137 - 145 mmol/L Final     Potassium   Date Value Ref Range Status   11/26/2018 4.0 3.5 - 5.1 mmol/L Final     CO2   Date Value Ref Range Status   11/26/2018 25.0 22.0 - 31.0 mmol/L Final     Chloride   Date Value Ref Range Status   11/26/2018 104 95 - 110 mmol/L Final     Anion Gap   Date Value Ref Range Status   11/26/2018 10.0 5.0 - 15.0 mmol/L Final     Creatinine   Date Value Ref Range Status   11/26/2018 1.16 0.70 - 1.30 mg/dL Final     BUN   Date Value Ref Range Status "   11/26/2018 13 7 - 21 mg/dL Final     BUN/Creatinine Ratio   Date Value Ref Range Status   11/26/2018 11.2 7.0 - 25.0 Final     Calcium   Date Value Ref Range Status   11/26/2018 8.8 8.4 - 10.2 mg/dL Final     eGFR Non  Amer   Date Value Ref Range Status   11/26/2018 61 42 - 98 mL/min/1.73 Final     Alkaline Phosphatase   Date Value Ref Range Status   11/26/2018 49 38 - 126 U/L Final     Total Protein   Date Value Ref Range Status   11/26/2018 7.4 6.3 - 8.6 g/dL Final     ALT (SGPT)   Date Value Ref Range Status   11/26/2018 17 (L) 21 - 72 U/L Final     AST (SGOT)   Date Value Ref Range Status   11/26/2018 19 17 - 59 U/L Final     Total Bilirubin   Date Value Ref Range Status   11/26/2018 0.8 0.2 - 1.3 mg/dL Final     Albumin   Date Value Ref Range Status   11/26/2018 4.40 3.40 - 4.80 g/dL Final     Globulin   Date Value Ref Range Status   11/26/2018 3.0 2.3 - 3.5 gm/dL Final     Lab Results   Component Value Date    WBC 12.64 (H) 11/26/2018    HGB 9.8 (L) 11/26/2018    HCT 28.3 (L) 11/26/2018    .7 (H) 11/26/2018     11/26/2018     Lab Results   Component Value Date    NEUTROABS 6.19 11/26/2018    IRON 107 07/25/2018    TIBC 276 07/25/2018    LABIRON 39 07/25/2018    FERRITIN 264.00 07/25/2018    KEUQSTDV93 803 09/11/2018    FOLATE >20.00 09/11/2018     Lab Results   Component Value Date    HCGQUANT <1 12/16/2014       Tryptase    Ref Range & Units 1mo ago   Tryptase 2.2 - 13.2 ug/L 93.9     Resulting Agency  [facility]   Narrative     Performed at:  [address] [facility]  [id number]  : Tej Matias MD, Phone:  [phone]      Specimen Collected: 07/25/18 12:34 Last Resulted: 07/27/18 13:16                     Blood Culture - Blood, Blood, Venous Line   Specimen Information: Blood, Venous Line        Blood Culture Acinetobacter lwoffii group Abnormal        ISOLATED FROM Aerobic Bottle              Gram Stain  Aerobic Bottle Gram negative bacilli    2 bottles of 3 bottles drawn positive for gram neg bacillus         Resulting Agency: [facility]   Susceptibility      Acinetobacter lwoffii group     CHIVO     Ampicillin + Sulbactam <=8/4 ug/ml Susceptible     Cefepime <=8 ug/ml Susceptible     Ceftazidime 4 ug/ml Susceptible     Ceftriaxone <=8 ug/ml Susceptible     Levofloxacin <=2 ug/ml Susceptible     Piperacillin <=16 ug/ml Susceptible     Trimethoprim + Sulfamethoxazole <=2/38 ug/ml Susceptible                 Specimen Collected: 11/12/18 10:26 Last Resulted: 11/16/18 06:25                  PATHOLOGY:  Pathology report from August 23, 2018 at [facility] showed:  Diagnosis:  BONE MARROW - PERIPHERAL BLOOD SMEAR, ASPIRATE SMEAR, PARTICLE PREPARATION, BIOPSY, AND FLOW CYTOMETRY: MARKEDLY HYPERCELLULAR MARROW WITH INVOLVEMENT BY SYSTEMIC MASTOCYTOSIS WITH AN ASSOCIATED HEMATOLOGICAL NEOPLASM, CHRONIC MYELOMONOCYTIC LEUKEMIA-1; SEE IMPRESSION     IMPRESSION:  The findings are of a markedly hypercellular marrow (90% cellular) with maturing trilineage hematopoiesis and multifocal mast cell aggregates, including spindled forms. Mast cells are positive for CD2 and CD25 by flow cytometric analysis. Recent testing at an outside institution showed an elevated tryptase level. The overall findings are consistent with involvement by systemic mastocytosis. In addition, there is multilineage dysplasia with mildly increased blasts and blast equivalents (6.1% of cellularity in total) and a persistent relative and absolute peripheral monocytosis. The combined findings are consistent with involvement by chronic myelomonocytic leukemia-1 (CMML-1), proliferative type. Final diagnosis requires correlation with pending additional testing, as documented below, which will be included in the comprehensive hematopathology report.    PERIPHERAL BLOOD SMEAR:  CBC : WBC 18.9 k/microL, Hgb 9.7 g/dL, Plt-Ct 187 k/microL,  fL, RDW 26.3%.    A Yi's stained peripheral smear is  reviewed. Erythrocytes are decreased and are macrocytic and normochromic with anisopoikilocytosis, including target cells and teardrop cells. Polychromasia is present. Nucleated red blood cells are present. Leukocytes are increased and include mature neutrophils, lymphocytes, and monocytes, with a relative and absolute monocytosis. Neutrophils show dysplasia (nuclear hypolobation, hypogranulation). Left-shifted myeloid elements are present. There are no circulating blasts or plasma cells. Platelets are adequate in number and show variation in size.     ASPIRATE SMEARS AND TOUCH PREPARATIONS:  Yi's stained aspirate smears and touch preparations are reviewed. The material is hypercellular and particulate. Myeloid elements are adequate and show dysplasia (aiguifk-qd-fcxxuncbabe dyssynchrony). Erythroid elements are adequate and show left-shifted maturation with mild dysplasia (megaloblastoid change, occasional nuclear membrane irregularity). The myeloid:erythroid ratio is 1.1 to 1. Megakaryocytes are increased in number, and demonstrate no significant atypia. Blasts and blast equivalents (monoblasts, promonocytes) comprise 6.1% of cellularity in total (600 cell count). There is no increase in plasma cells or lymphocytes. Focal particles show significantly increased mast cells, including occasional spindle forms. A Prussian blue iron stain is performed on the sample, revealing increased storage iron. Sideroblastic iron is present. Ring sideroblasts are present (approximately 40-50%).     MARROW SMEAR DIFFERENTIAL:  Blasts (0-4): 3.8%  Monoblasts/promonocytes: 2.3%  Promyelocytes (1-8): 2.2%  Myelocytes and metamyelocytes (20-30): 15.8%  Bands and segmented neutrophils (12-25): 23.0%  Eosinophils and eosinophilic precursors (1-5): 0.3%  Basophils and basophilic precursors (0-1): 0.0%  Monocytes and monocytic precursors (0-2): 5.8%  Lymphocytes (10-15): 3.0%  Plasma cells (0-1): 0.2%  Erythroid precursors (15-27):  43.5%  Total cells counted: 600    BONE MARROW BIOPSY AND PARTICLE PREPARATION:  H&E and PAS stained bone marrow biopsy and particle preparation sections are reviewed. The material is adequate and markedly hypercellular (90% cellular). Myeloid elements are present in normal proportion and exhibit maturation. Erythroid elements are present in normal proportion and exhibit left-shifted maturation. Megakaryocytes are increased and include occasional hypolobated forms and forms with abnormal nuclear lobe separation. CD34-positive blasts comprise approximately 3-5% of marrow cellularity. Multifocal dense mast cell aggregates are present, including spindled forms, as confirmed by  and tryptase immunostains. A CD68 stain highlights increased monocytic/histiocytic forms. Bony trabeculae are normal for the patient's age. All stains performed with appropriate controls.     FLOW CYTOMETRY STUDIES:  IP ID:   Viability: 94.1%  DIAGNOSIS: No increase in blasts; abnormal mast cells present    COMMENT: Myeloblasts are not increased (0.9% of total cells), with the following normal immunophenotype: positive for CD33 (moderate), CD34, and CD45 (dim); and negative for CD19. B cells are not increased (0.1% of total cells), with the following normal immunophenotype: positive for CD19 (bright) and CD45 (bright); and negative for CD34. T cells are not increased (3.1% of total cells), with the following normal immunophenotype: positive for CD45 (bright); and negative for CD34. Mast cells are present (0.03% of total cells), with the following abnormal immunophenotype: positive for CD2, CD25, CD45,  (bright), and FcER1.    CYTOGENETICS AND MOLECULAR DIAGNOSTIC RESULTS:  Additional testing (Karyotype, Myeloid NGS) is pending. Results will be included in the comprehensive hematopathology report.         **Electronically signed out by GAN MD, EMILY**on 8/27/2018  MS/NAKUL/haritha  Case reviewed by Attending  Pathologist        Pathology report from November 13, 2014 showed:  FINAL DIAGNOSIS:   PERIPHERAL SMEAR, REVIEW:        MONOCYTOSIS WITH LEFT MYELOID SHIFT.        ANEMIA, BORDERLINE.   BONE MARROW ASPIRATION AND BIOPSY:        SYSTEMIC MASTOCYTOSIS WITH ASSOCIATED CLONAL             HEMATOLOGIC NON-MAST CELL LINEAGE DISEASE,             CHRONIC MYELOMONOCYTIC LEUKEMIA-1.            RADIOLOGY DATA :  CT of abdomen with and without contrast done on May 14, 2018 showed:  The liver is normal. The gallbladder surgically absent. The  biliary system appears within normal limits status post  cholecystectomy. The pancreas is normal. The spleen is normal.  Bilateral adrenal glands are normal. Right kidney mid zone 4.7 mm  nonobstructive renal calculi. Stable right kidney lower pole oval  hyperdense lesion on unenhanced imaging which has Hounsfield  units of  49. Following enhancement this lesion has Hounsfield  units of 49 and 51 suggesting no significant enhancement. This  lesion measures 2.36 cm in greatest diameter and is not  appreciably changed. Otherwise right kidney and ureter are  unremarkable. Left kidney and visualized ureter are normal.  Visualized hollow viscera is normal. No lymphadenopathy in the  abdomen. No acute osseous abnormalities.     IMPRESSION:  1. Stable right kidney lower pole oval hyperdense lesion which  does not enhance measuring 2.36 cm in greatest diameter. This  interval stability and appearance is most consistent with benign  hyperdense cyst.  2. Right kidney mid zone 4.7 mm stable nonobstructive renal  calculi..  3. Otherwise unremarkable CT abdomen study with without  contrast..      CT of left lower extremity with contrast done on March 23, 2018 showed:  COMMENTS:              A CT examination was performed of the lower extremities, with  images coned to the left femur.     There is a marrow-based focal sclerotic lesion involving the  proximal/mid femur, measuring approximately 2.6 cm  in  craniocaudal extent. There is no evidence of endosteal  scalloping, or features to suggest an aggressive process. There  is no abnormal periosteal reaction.     The remainder the examination is unremarkable for age.     .     IMPRESSION:  CONCLUSION:          1. Focal sclerotic bone lesion which is marrow based in the  diaphysis of the left femur in the proximal/midportion. This is  likely a benign lesion and correlation with plain film  radiographs are suggested.            CT chest with contrast done on January 26, 2018 showed:  IMPRESSION:  CONCLUSION:  Multiple sclerotic lesions in the spine and right RIBS,  concerning for metastatic prostate cancer. Recommend correlation  with PSA levels and nuclear medicine bone scan.           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 Shore.  -Patient hasso far not require any chemotherapy or any other treatment.  -Recently in view of worsening skin lesion on the chest, abdomen and back he underwent a biopsy by Dr. 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  -Patient is also found to have worsening anemia recently on blood work done from June 2018.  -Patient is also found to have multiple sclerotic lesion on the spine and hips on CT scan done from January 2018 until June 2018.  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  -Patient was evaluated by Dr. Kimbrough at [place] on August 20, 2018 and had a bone marrow biopsy done on August 23, 2018 that again showed systemic mastocytosis  with CMML-1.  -In view of elevated tryptase level, his case was discussed at tumor board at [place].  -Case was discussed with Dr. Kimbrough on September 10, 2018, is recommending treatment with Vidaza for now.    -He was started on Vidaza on September 24, 2018.  -Patient was last seen here at clinic on November 12, 2018 and due to bacteremia and chemotherapy was delayed.  -Patient is requesting chemotherapy to be held for now secondary to wife's being sick at home.  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  We'll ask him to return to clinic in 6 weeks with a repeat CBC, CMP and LDH on that day.    2.  Anemia: Most likely secondary to #1  -Remains on folic acid 1 mg by mouth daily.  Hemoglobin is 9.6.  We'll monitored with CBC.    3.  Leukocytosis: Secondary to chronic myelomonocytic leukemia.  We'll monitored with CBC for now.    4.  History of bradycardia status post pacemaker in 2008.    5.  Health maintenance: Patient does not smoke.  Had a colonoscopy in 2014 by Dr. Guzman.    6. BMI: Patient's Body mass index is 25.93 kg/m². BMI is in reference range.    7. Advance Care Planning: For now patient remains full code and is able to make  His decisions.  Patient has health care surrogate mentioned on chart.           Jordin Roblero MD  11/26/2018  4:35 PM        EMR Dragon/Transcription disclaimer:   Much of this encounter note is an electronic transcription/translation of spoken language to printed text. The electronic translation of spoken language may permit erroneous, or at times, nonsensical words or phrases to be inadvertently transcribed; Although I have reviewed the note for such errors, some may still exist.

## 2018-11-27 ENCOUNTER — APPOINTMENT (OUTPATIENT)
Dept: ONCOLOGY | Facility: HOSPITAL | Age: 78
End: 2018-11-27

## 2018-11-28 ENCOUNTER — READMISSION MANAGEMENT (OUTPATIENT)
Dept: CALL CENTER | Facility: HOSPITAL | Age: 78
End: 2018-11-28

## 2018-11-28 ENCOUNTER — APPOINTMENT (OUTPATIENT)
Dept: ONCOLOGY | Facility: HOSPITAL | Age: 78
End: 2018-11-28

## 2018-11-28 NOTE — OUTREACH NOTE
Sepsis Week 2 Survey      Responses   Facility patient discharged from?  [facility]   Does the patient have one of the following disease processes/diagnoses(primary or secondary)?  Sepsis   Week 2 attempt successful?  Yes   Call start time  [phone]   Call end time  [phone]   Discharge diagnosis  Sepsis, Port or reservoir infection related to Port-a-cath   List who call center can speak with  Please speak with patient only   Meds reviewed with patient/caregiver?  Yes   Is the patient having any side effects they believe may be caused by any medication additions or changes?  No   Does the patient have all medications related to this admission filled (includes all antibiotics, inhalers, nebulizers,steroids,etc.)  Yes   Is the patient taking all medications as directed (includes completed medication regime)?  Yes   Does the patient have a primary care provider?   Yes   Has the patient kept scheduled appointments due by today?  Yes   Psychosocial issues?  No   Did the patient receive a copy of their discharge instructions?  Yes   Nursing interventions  Reviewed instructions with patient, Educated on MyChart   What is the patient's perception of their health status since discharge?  Improving   Is the patient/caregiver able to teach back Sepsis?  S - Shivering,fever or very cold, E - Extreme pain or generalized discomfort (worst ever,especially abdomen), S - Short of breath   Nursing interventions  Nurse provided reassurance to patient   Is patient/caregiver able to teach back steps to recovery at home?  Make a list of questions for PCP appoinment, Exercise as tolerated, Eat a balanced diet, Learn about sepsis(sepsis.org), Record milestones and struggles in a journal, Talk about feelings with family/friends, Rest and regain strength, Set small, achievable goals for return to baseline health   Is the patient/caregiver able to teach back signs and symptoms of worsening condition:  Fever, Rapid heart rate (>90), Altered mental  status(confusion/coma), Shortness of breath/rapid respiratory rate   Is the patient/caregiver able to teach back the hierarchy of who to call/visit for symptoms/problems? PCP, Specialist, Home health nurse, Urgent Care, ED, 911  Yes   Week 2 call completed?  Yes   Wrap up additional comments  Pt states he is feeling better and also states that he has a 6 week break from chemo.          Caitlin Arevalo RN

## 2018-11-29 ENCOUNTER — APPOINTMENT (OUTPATIENT)
Dept: ONCOLOGY | Facility: HOSPITAL | Age: 78
End: 2018-11-29

## 2018-11-30 ENCOUNTER — APPOINTMENT (OUTPATIENT)
Dept: ONCOLOGY | Facility: HOSPITAL | Age: 78
End: 2018-11-30

## 2018-11-30 ENCOUNTER — TELEPHONE (OUTPATIENT)
Dept: NUTRITION | Facility: HOSPITAL | Age: 78
End: 2018-11-30

## 2018-11-30 NOTE — PROGRESS NOTES
Adult Outpatient Nutrition  Assessment    Patient Name:  Jan Humphrey  YOB: 1940  MRN: [id number]    Assessment Date:  11/30/2018    Comments:  Chart review revealed 78WM coming to [facility] due to myelomonocytic leukemia/anemia. Ht 71 in. Wt 186 lb--down 13 lb/6 mon. 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  Attempted phone contact to assess nutrition. Left voice mail offering nutrition services. Mailed information to home 1) supplement suggestions--commercial and homemade  2) ways to increase calories/protein 3) RDN's name and contact information.                         Electronically signed by:  Adwoa Woo RD  11/30/18 10:38 AM

## 2018-12-01 LAB
BACTERIA SPEC AEROBE CULT: NORMAL
BACTERIA SPEC AEROBE CULT: NORMAL

## 2018-12-06 ENCOUNTER — READMISSION MANAGEMENT (OUTPATIENT)
Dept: CALL CENTER | Facility: HOSPITAL | Age: 78
End: 2018-12-06

## 2018-12-06 NOTE — OUTREACH NOTE
Sepsis Week 3 Survey      Responses   Facility patient discharged from?  [place] [facility]   Does the patient have one of the following disease processes/diagnoses(primary or secondary)?  Sepsis   Week 3 attempt successful?  No   Unsuccessful attempts  Attempt 1          Shaina Phillips RN

## 2018-12-07 PROCEDURE — 93294 REM INTERROG EVL PM/LDLS PM: CPT | Performed by: NURSE PRACTITIONER

## 2018-12-10 ENCOUNTER — READMISSION MANAGEMENT (OUTPATIENT)
Dept: CALL CENTER | Facility: HOSPITAL | Age: 78
End: 2018-12-10

## 2018-12-10 NOTE — OUTREACH NOTE
Sepsis Week 3 Survey      Responses   Facility patient discharged from?  [place]   Does the patient have one of the following disease processes/diagnoses(primary or secondary)?  Sepsis   Week 3 attempt successful?  No   Unsuccessful attempts  Attempt 2   Rescheduled  Revoked   Revoke  Decline to participate          Goldie Estrada RN

## 2018-12-12 ENCOUNTER — DOCUMENTATION (OUTPATIENT)
Dept: CARDIOLOGY | Facility: CLINIC | Age: 78
End: 2018-12-12

## 2018-12-12 ENCOUNTER — CLINICAL SUPPORT (OUTPATIENT)
Dept: CARDIOLOGY | Facility: CLINIC | Age: 78
End: 2018-12-12

## 2018-12-12 ENCOUNTER — OFFICE VISIT (OUTPATIENT)
Dept: CARDIOLOGY | Facility: CLINIC | Age: 78
End: 2018-12-12

## 2018-12-12 VITALS
HEIGHT: 71 IN | HEART RATE: 84 BPM | WEIGHT: 187 LBS | BODY MASS INDEX: 26.18 KG/M2 | OXYGEN SATURATION: 99 % | DIASTOLIC BLOOD PRESSURE: 70 MMHG | SYSTOLIC BLOOD PRESSURE: 138 MMHG

## 2018-12-12 DIAGNOSIS — Z95.0 PACEMAKER: ICD-10-CM

## 2018-12-12 DIAGNOSIS — I48.0 PAROXYSMAL ATRIAL FIBRILLATION (HCC): ICD-10-CM

## 2018-12-12 DIAGNOSIS — I49.5 SICK SINUS SYNDROME (HCC): Primary | ICD-10-CM

## 2018-12-12 DIAGNOSIS — I10 ESSENTIAL HYPERTENSION: ICD-10-CM

## 2018-12-12 DIAGNOSIS — E55.9 VITAMIN D DEFICIENCY: Primary | ICD-10-CM

## 2018-12-12 PROCEDURE — 99214 OFFICE O/P EST MOD 30 MIN: CPT | Performed by: INTERNAL MEDICINE

## 2018-12-12 PROCEDURE — 93296 REM INTERROG EVL PM/IDS: CPT | Performed by: NURSE PRACTITIONER

## 2018-12-12 RX ORDER — ERGOCALCIFEROL 1.25 MG/1
50000 CAPSULE ORAL
Qty: 4 CAPSULE | Refills: 5 | Status: SHIPPED | OUTPATIENT
Start: 2018-12-12 | End: 2019-01-01

## 2018-12-12 NOTE — PROGRESS NOTES
Jan Humphrey  78 y.o. male    12/12/2018  1. Sick sinus syndrome (CMS/HCC)    2. Paroxysmal atrial fibrillation (CMS/HCC)    3. Essential hypertension    4. Pacemaker        History of Present Illness  Mr. Humphrey is here for follow-up of his above stated problems.  He was recently admitted to [facility] for management of sepsis after his Port-A-Cath was flushed and he developed fevers and chills.  Treated with antibiotics and his symptoms have improved.  Echocardiogram showed the following findings:  · Left ventricular systolic function is normal. Estimated EF = 58%.  · Left ventricular diastolic dysfunction (grade I) consistent with impaired relaxation.  · Left atrial cavity size is borderline dilated.  · Mild mitral valve regurgitation is present  · Mild to moderate tricuspid valve regurgitation is present.  · Electronic lead present in the ventricle.  · There is thickening of the noncoronary cusp of the aortic valve. Calcification/ sclerosis likely. Trace-to-mild aortic valve regurgitation is present.  Following discharge from the hospital, his had generalized fatigue but no chest pain or shortness of breath.  His pacemaker is known to be functioning well.  We tried to check his pacemaker today but due to technical problems we were unable to do so.  [employer] has been informed.  They will come by to check.    SUBJECTIVE    Allergies   Allergen Reactions   • Doxycycline Hyclate Other (See Comments)     Other reaction(s): lip swollen   • Sulfa Antibiotics Swelling     facial   • Other Rash     SILK TAPE   • Penicillins Rash     Reaction: rash   • Soma [Carisoprodol] Swelling and Rash         Past Medical History:   Diagnosis Date   • Atrophy of testis    • Benign prostatic hyperplasia    • Borderline glaucoma    • Chronic myelomonocytic leukemia (CMS/HCC)    • Degenerative joint disease involving multiple joints    • GERD (gastroesophageal reflux disease)    • Gout    • History of echocardiogram  05/08/2012    Normal left ventricular systolic function, EF 60%. Early diastolic dysfunction. Mild aortic and pulmonic regurgitation. Trace mitral and mild tricuspid regurgitation. No intracardiac mass pericardial effusion or cardiac thrombus.   • History of Holter monitoring 01/18/2011   • Impotence    • Lightheadedness    • Neck pain, musculoskeletal    • Sleep apnea     NOT WEARING C-PAP   • TIA (transient ischemic attack) 2014         Past Surgical History:   Procedure Laterality Date   • CARDIAC CATHETERIZATION  09/30/2009    No epicardial coronary artery disease noted that would explain patient's chest pain of the abnormal stress test noted. Normal left ventricular systolic function with no wall motion abnormalities   • CARDIAC CATHETERIZATION  05/24/1989    Normal catheterization, false-positive exercise treadmill. Noncardiac chest pain   • CARDIAC PACEMAKER PLACEMENT  06/2008    Dual chamber permanent pacemaker implantation   • COLECTOMY PARTIAL / TOTAL  04/14/2000    Cecal diverticulitis. Removal of small segment of terminal ileum, cecum and right colon, sent to pathology   • COLON SURGERY  03/27/2016   • COLONOSCOPY  05/25/2012   • CYSTOSCOPY  11/15/2000    Urinary retention on the basis of medications and benign prostatic hypertrophy   • INGUINAL HERNIA REPAIR  02/15/2007    Recurrent right inguinal hernia. Repair of recurrent inguinal hernia with EPS Prolene mesh system.   • INGUINAL HERNIA REPAIR Right 1957   • LAPAROSCOPIC CHOLECYSTECTOMY  10/26/2006    Gallstones. Laparoscopic cholecystectomy with operative cholangiogram   • PROSTATECTOMY  11/17/2000    Benign prostatic hypertrophy with urinary retention. Prostatitis. transurethral resection of prostate.   • STEROID INJECTION  10/21/2010    Decadron (Gout)    • STEROID INJECTION  07/23/2013    Kenalog (Gout) (4)         Family History   Problem Relation Age of Onset   • Cancer Other    • Colon cancer Other         parent   • Coronary artery disease  Other    • Heart disease Other    • Hypertension Other    • Cholelithiasis Other    • Other Other         colon problems         Social History     Socioeconomic History   • Marital status:      Spouse name: Not on file   • Number of children: Not on file   • Years of education: Not on file   • Highest education level: Not on file   Social Needs   • Financial resource strain: Not on file   • Food insecurity - worry: Not on file   • Food insecurity - inability: Not on file   • Transportation needs - medical: Not on file   • Transportation needs - non-medical: Not on file   Occupational History   • Not on file   Tobacco Use   • Smoking status: Never Smoker   • Smokeless tobacco: Never Used   Substance and Sexual Activity   • Alcohol use: No   • Drug use: No   • Sexual activity: Defer   Other Topics Concern   • Not on file   Social History Narrative   • Not on file         Current Outpatient Medications   Medication Sig Dispense Refill   • acetaminophen (TYLENOL) 325 MG tablet Take 1 tablet by mouth As Needed.     • allopurinol (ZYLOPRIM) 100 MG tablet Take 1 tablet by mouth 4 (Four) Times a Day. 360 tablet 1   • aspirin 81 MG EC tablet Take 81 mg by mouth Daily.     • colchicine 0.6 MG tablet Take 0.6 mg by mouth Daily As Needed for Muscle / Joint Pain.     • diclofenac (VOLTAREN) 50 MG EC tablet Take 1 tablet by mouth 2 (Two) Times a Day. 60 tablet 11   • folic acid (FOLVITE) 1 MG tablet Take 1 tablet by mouth Daily. 90 tablet 3   • levoFLOXacin (LEVAQUIN) 750 MG tablet Take 1 tablet by mouth Daily. 10 tablet 0   • omeprazole (PRILOSEC) 20 MG capsule Take 1 capsule by mouth Daily. 90 capsule 3   • ondansetron (ZOFRAN) 4 MG tablet Take 1 tablet by mouth 4 (Four) Times a Day As Needed for Nausea or Vomiting. 40 tablet 3   • oxybutynin (DITROPAN) 5 MG tablet Take 5 mg by mouth 2 (Two) Times a Day.     • sotalol (BETAPACE) 80 MG tablet Take 0.5 tablets by mouth 2 (Two) Times a Day. 90 tablet 3   • tamsulosin  "(FLOMAX) 0.4 MG capsule 24 hr capsule Take 1 capsule by mouth every night.     • vitamin D (ERGOCALCIFEROL) 17994 units capsule capsule Take 1 capsule by mouth Every 7 (Seven) Days. 4 capsule 5     No current facility-administered medications for this visit.          OBJECTIVE    /70   Pulse 84   Ht 180.3 cm (70.98\")   Wt 84.8 kg (187 lb)   SpO2 99%   BMI 26.09 kg/m²         Review of Systems     Constitutional:  Denies recent weight loss, weight gain, fever or chills     HENT:  Denies any hearing loss, epistaxis, hoarseness, or difficulty speaking.     Eyes: Wears eyeglasses or contact lenses     Respiratory:  Denies dyspnea with exertion,no cough, wheezing, or hemoptysis.     Cardiovascular: Negative for palpations, chest pain, orthopnea, PND    Gastrointestinal:  Denies change in bowel habits, dyspepsia, ulcer disease, hematochezia, or melena.     Endocrine: Negative for cold intolerance, heat intolerance, polydipsia, polyphagia and polyuria.     Genitourinary: Negative.      Musculoskeletal: Denies any history of arthritic symptoms or back problems     Skin:  Denies any change in hair or nails, rashes, or skin lesions.     Neurological:  Denies any history of recurrent headaches, strokes, TIA, or seizure disorder.     Hematological: Chronic myelomonocytic leukemia    Psychiatric/Behavioral: Denies any history of depression, substance abuse, or change in cognitive function.         Physical Exam     Constitutional: Cooperative, alert and oriented    HENT:   Head: Normocephalic, normal hair patterns, no masses or tenderness.  Ears, Nose, and Throat: No gross abnormalities. No pallor or cyanosis.   Eyes: EOMS intact, PERRL, conjunctivae and lids unremarkable. Fundoscopic exam and visual fields not performed.   Neck: No palpable masses or adenopathy, no thyromegaly, no JVD, carotid pulses are full and equal bilaterally and without  Bruits.     Cardiovascular: Regular rhythm, S1 and S2 normal, no S3 or S4. "  No murmurs, gallops, or rubs detected.     Pulmonary/Chest: Chest: normal symmetry,  normal respiratory excursion, no intercostal retraction, no use of accessory muscles.            Pulmonary: Normal breath sounds. No rales or ronchi.    Abdominal: Abdomen soft, bowel sounds normoactive, no masses, no hepatosplenomegaly, non-tender, no bruits.     Musculoskeletal: No deformities, clubbing, cyanosis, erythema, or edema observed.     Neurological: No gross motor or sensory deficits noted, affect appropriate, oriented to time, person, place.     Skin: Warm and dry to the touch, no apparent skin lesions or masses noted.     Psychiatric: He has a normal mood and affect. His behavior is normal. Judgment and thought content normal.         Procedures      Lab Results   Component Value Date    WBC 12.64 (H) 11/26/2018    HGB 9.8 (L) 11/26/2018    HCT 28.3 (L) 11/26/2018    .7 (H) 11/26/2018     11/26/2018     Lab Results   Component Value Date    GLUCOSE 101 (H) 11/26/2018    BUN 13 11/26/2018    CREATININE 1.16 11/26/2018    EGFRIFNONA 61 11/26/2018    BCR 11.2 11/26/2018    CO2 25.0 11/26/2018    CALCIUM 8.8 11/26/2018    ALBUMIN 4.40 11/26/2018    AST 19 11/26/2018    ALT 17 (L) 11/26/2018     Lab Results   Component Value Date    CHOL 78 05/21/2018    CHOL 96 03/21/2017     Lab Results   Component Value Date    TRIG 85 05/21/2018    TRIG 170 03/21/2017    TRIG 113 09/22/2015     Lab Results   Component Value Date    HDL 23 (L) 05/21/2018    HDL 26 (L) 03/21/2017     No components found for: LDLCALC  Lab Results   Component Value Date    LDL 44 05/21/2018    LDL 45 03/21/2017    LDL 62 09/22/2015     No results found for: HDLLDLRATIO  No components found for: CHOLHDL  Lab Results   Component Value Date    HGBA1C 6.2 (H) 05/21/2018     Lab Results   Component Value Date    TSH 1.79 12/16/2014           ASSESSMENT AND PLAN  Mr. Humphrey is stable with regards to his heart with no evidence of angina or  congestive heart failure.  No arrhythmias noted.  To reassess his aortic valve and to make sure that there are no abnormalities a limited echocardiogram is being arranged.  I've continued antiplatelet therapy with aspirin, antiarrhythmic therapy with sotalol.    Addendum: The patient had remote interrogation of his pacemaker and this showed occasional atrial fibrillation which were nonsustained.  I believe that the patient would benefit from anticoagulation.  I discussed this case with Dr. Roblero and the plan would be to start him on Eliquis 2.5 mg BID. For stroke prevention since his CHADSVASC score is >2.    Jan was seen today for follow-up.    Diagnoses and all orders for this visit:    Sick sinus syndrome (CMS/HCC)  -     Adult Transthoracic Echo Limited W/ Cont if Necessary Per Protocol; Future    Paroxysmal atrial fibrillation (CMS/HCC)  -     Adult Transthoracic Echo Limited W/ Cont if Necessary Per Protocol; Future    Essential hypertension  -     Adult Transthoracic Echo Limited W/ Cont if Necessary Per Protocol; Future    Pacemaker        Patient's Body mass index is 26.09 kg/m². BMI is within normal parameters. No follow-up required.      Geneva Day MD  12/12/2018  12:51 PM

## 2018-12-12 NOTE — PROGRESS NOTES
[facility] for patient to give our office a call back. Dr. Duran looked at pacemaker report , stated he went into afib 17 times . Dr. Duran started patient on Eliquis , and advised that patient stay away from antiinflammatories med.

## 2018-12-12 NOTE — PROGRESS NOTES
Pacemaker Evaluation Report    December 12, 2018    Primary Cardiologist: Shay Schwartz  Implanting MD: Dr. Day  :[employer] Model: [id number] Serial Number: [id number]  Implant date: 5/18/2017    Reason for evaluation:requested per physician  Remote  PPM (attempted office interog. This is remote from 12/7/18.   Cardiac device indication(s): A Fib, Sinus node dysfunction    Battery  ERIKA: 6 years   Last charge: 3.00v    Interrogation Results  Atrial sensing: P wave: 0.5 mV  Atrial capture: high   Atrial lead impedance: 361 ohms  Ventricular sensing: R wave: 6.3 mV  Ventricular capture: 2.00 V @ 0.40 ms  Ventricular lead impedance: right  741 ohms    Parameters  Mode: AAIR<=>DDDR  Base Rate: 60/130    Diagnostic Data  Atrial paced: 96.2 %   Ventricular paced: 0.3 %  Mode switch: 0%  AT/AF [place]: 0.2%  AHR: 17   Longest 118 minutes  VHR: 0    Changes made: No changes made. Device report transmitted 12/7/18. Physician requested to check device in clinic. [employer]  could not recognize the device.  [employer] device check appointment made for  1/2/2019 at 0815.     Conclusions: normal device function and Follow up in 6 months    Assessment:  1. Sick sinus syndrome (CMS/HCC)    2. Pacemaker    3. Paroxysmal atrial fibrillation (CMS/HCC)    - no AC ordered. Contraindicated due to current chemo, low platelets, and anemia          This document has been electronically signed by ISABELL Travis on December 12, 2018 2:49 PM

## 2019-01-01 ENCOUNTER — CLINICAL SUPPORT (OUTPATIENT)
Dept: CARDIOLOGY | Facility: CLINIC | Age: 79
End: 2019-01-01

## 2019-01-01 ENCOUNTER — APPOINTMENT (OUTPATIENT)
Dept: ONCOLOGY | Facility: HOSPITAL | Age: 79
End: 2019-01-01

## 2019-01-01 ENCOUNTER — LAB (OUTPATIENT)
Dept: ONCOLOGY | Facility: HOSPITAL | Age: 79
End: 2019-01-01

## 2019-01-01 ENCOUNTER — ANESTHESIA EVENT (OUTPATIENT)
Dept: GASTROENTEROLOGY | Facility: HOSPITAL | Age: 79
End: 2019-01-01

## 2019-01-01 ENCOUNTER — TELEPHONE (OUTPATIENT)
Dept: ONCOLOGY | Facility: HOSPITAL | Age: 79
End: 2019-01-01

## 2019-01-01 ENCOUNTER — HOSPITAL ENCOUNTER (OUTPATIENT)
Dept: CT IMAGING | Facility: HOSPITAL | Age: 79
Discharge: HOME | End: 2019-12-18
Admitting: INTERNAL MEDICINE

## 2019-01-01 ENCOUNTER — INFUSION (OUTPATIENT)
Dept: ONCOLOGY | Facility: HOSPITAL | Age: 79
End: 2019-01-01

## 2019-01-01 ENCOUNTER — TELEPHONE (OUTPATIENT)
Dept: ONCOLOGY | Facility: CLINIC | Age: 79
End: 2019-01-01

## 2019-01-01 ENCOUNTER — TELEPHONE (OUTPATIENT)
Dept: FAMILY MEDICINE CLINIC | Facility: CLINIC | Age: 79
End: 2019-01-01

## 2019-01-01 ENCOUNTER — OFFICE VISIT (OUTPATIENT)
Dept: ONCOLOGY | Facility: CLINIC | Age: 79
End: 2019-01-01

## 2019-01-01 ENCOUNTER — DOCUMENTATION (OUTPATIENT)
Dept: ONCOLOGY | Facility: CLINIC | Age: 79
End: 2019-01-01

## 2019-01-01 ENCOUNTER — OFFICE VISIT (OUTPATIENT)
Dept: CARDIOLOGY | Facility: CLINIC | Age: 79
End: 2019-01-01

## 2019-01-01 ENCOUNTER — OFFICE VISIT (OUTPATIENT)
Dept: FAMILY MEDICINE CLINIC | Facility: CLINIC | Age: 79
End: 2019-01-01

## 2019-01-01 ENCOUNTER — HOSPITAL ENCOUNTER (OUTPATIENT)
Dept: CT IMAGING | Facility: HOSPITAL | Age: 79
Discharge: HOME | End: 2019-10-25
Admitting: FAMILY MEDICINE

## 2019-01-01 ENCOUNTER — HOSPITAL ENCOUNTER (OUTPATIENT)
Facility: HOSPITAL | Age: 79
Setting detail: HOSPITAL OUTPATIENT SURGERY
Discharge: HOME | End: 2019-07-30
Attending: INTERNAL MEDICINE | Admitting: INTERNAL MEDICINE

## 2019-01-01 ENCOUNTER — ANESTHESIA (OUTPATIENT)
Dept: GASTROENTEROLOGY | Facility: HOSPITAL | Age: 79
End: 2019-01-01

## 2019-01-01 ENCOUNTER — DOCUMENTATION (OUTPATIENT)
Dept: CARDIOLOGY | Facility: CLINIC | Age: 79
End: 2019-01-01

## 2019-01-01 ENCOUNTER — APPOINTMENT (OUTPATIENT)
Dept: ONCOLOGY | Facility: CLINIC | Age: 79
End: 2019-01-01

## 2019-01-01 VITALS
TEMPERATURE: 97.6 F | SYSTOLIC BLOOD PRESSURE: 123 MMHG | OXYGEN SATURATION: 100 % | BODY MASS INDEX: 24.56 KG/M2 | DIASTOLIC BLOOD PRESSURE: 61 MMHG | WEIGHT: 176 LBS | HEART RATE: 84 BPM

## 2019-01-01 VITALS
OXYGEN SATURATION: 97 % | SYSTOLIC BLOOD PRESSURE: 146 MMHG | HEIGHT: 71 IN | RESPIRATION RATE: 18 BRPM | DIASTOLIC BLOOD PRESSURE: 71 MMHG | BODY MASS INDEX: 26.99 KG/M2 | HEART RATE: 83 BPM | TEMPERATURE: 98.2 F | WEIGHT: 192.8 LBS

## 2019-01-01 VITALS
SYSTOLIC BLOOD PRESSURE: 113 MMHG | OXYGEN SATURATION: 95 % | BODY MASS INDEX: 26.68 KG/M2 | WEIGHT: 190.6 LBS | HEART RATE: 77 BPM | HEIGHT: 71 IN | RESPIRATION RATE: 16 BRPM | DIASTOLIC BLOOD PRESSURE: 68 MMHG | TEMPERATURE: 97.8 F

## 2019-01-01 VITALS
SYSTOLIC BLOOD PRESSURE: 153 MMHG | HEART RATE: 75 BPM | BODY MASS INDEX: 26.68 KG/M2 | HEIGHT: 71 IN | OXYGEN SATURATION: 96 % | TEMPERATURE: 97.1 F | DIASTOLIC BLOOD PRESSURE: 79 MMHG | WEIGHT: 190.6 LBS | RESPIRATION RATE: 18 BRPM

## 2019-01-01 VITALS
BODY MASS INDEX: 26.82 KG/M2 | HEART RATE: 78 BPM | DIASTOLIC BLOOD PRESSURE: 73 MMHG | RESPIRATION RATE: 16 BRPM | HEIGHT: 71 IN | SYSTOLIC BLOOD PRESSURE: 133 MMHG | OXYGEN SATURATION: 97 % | WEIGHT: 191.6 LBS | TEMPERATURE: 97.8 F

## 2019-01-01 VITALS
SYSTOLIC BLOOD PRESSURE: 145 MMHG | SYSTOLIC BLOOD PRESSURE: 152 MMHG | DIASTOLIC BLOOD PRESSURE: 72 MMHG | HEART RATE: 88 BPM | DIASTOLIC BLOOD PRESSURE: 71 MMHG | TEMPERATURE: 98.2 F | DIASTOLIC BLOOD PRESSURE: 85 MMHG | TEMPERATURE: 97.6 F | RESPIRATION RATE: 18 BRPM | SYSTOLIC BLOOD PRESSURE: 138 MMHG | TEMPERATURE: 97.3 F | RESPIRATION RATE: 18 BRPM | HEART RATE: 90 BPM | RESPIRATION RATE: 20 BRPM | HEART RATE: 87 BPM

## 2019-01-01 VITALS
HEART RATE: 80 BPM | SYSTOLIC BLOOD PRESSURE: 148 MMHG | SYSTOLIC BLOOD PRESSURE: 147 MMHG | DIASTOLIC BLOOD PRESSURE: 67 MMHG | HEART RATE: 81 BPM | HEART RATE: 78 BPM | RESPIRATION RATE: 20 BRPM | RESPIRATION RATE: 18 BRPM | TEMPERATURE: 97.9 F | DIASTOLIC BLOOD PRESSURE: 67 MMHG | TEMPERATURE: 97.2 F | DIASTOLIC BLOOD PRESSURE: 69 MMHG | SYSTOLIC BLOOD PRESSURE: 155 MMHG | RESPIRATION RATE: 18 BRPM | TEMPERATURE: 98 F

## 2019-01-01 VITALS
HEIGHT: 71 IN | HEART RATE: 97 BPM | SYSTOLIC BLOOD PRESSURE: 122 MMHG | DIASTOLIC BLOOD PRESSURE: 74 MMHG | WEIGHT: 191.5 LBS | OXYGEN SATURATION: 99 % | BODY MASS INDEX: 26.81 KG/M2

## 2019-01-01 VITALS
RESPIRATION RATE: 18 BRPM | HEIGHT: 71 IN | SYSTOLIC BLOOD PRESSURE: 145 MMHG | TEMPERATURE: 97.9 F | WEIGHT: 194.2 LBS | BODY MASS INDEX: 27.19 KG/M2 | DIASTOLIC BLOOD PRESSURE: 70 MMHG | HEART RATE: 80 BPM

## 2019-01-01 VITALS
DIASTOLIC BLOOD PRESSURE: 74 MMHG | HEART RATE: 71 BPM | SYSTOLIC BLOOD PRESSURE: 151 MMHG | TEMPERATURE: 97.6 F | RESPIRATION RATE: 20 BRPM

## 2019-01-01 VITALS
WEIGHT: 192.2 LBS | OXYGEN SATURATION: 96 % | RESPIRATION RATE: 18 BRPM | SYSTOLIC BLOOD PRESSURE: 140 MMHG | HEART RATE: 82 BPM | TEMPERATURE: 98.3 F | DIASTOLIC BLOOD PRESSURE: 73 MMHG | HEIGHT: 71 IN | BODY MASS INDEX: 26.91 KG/M2

## 2019-01-01 VITALS
HEART RATE: 86 BPM | TEMPERATURE: 98 F | DIASTOLIC BLOOD PRESSURE: 74 MMHG | RESPIRATION RATE: 16 BRPM | SYSTOLIC BLOOD PRESSURE: 135 MMHG

## 2019-01-01 VITALS
TEMPERATURE: 97.8 F | HEART RATE: 88 BPM | SYSTOLIC BLOOD PRESSURE: 149 MMHG | DIASTOLIC BLOOD PRESSURE: 74 MMHG | RESPIRATION RATE: 16 BRPM

## 2019-01-01 VITALS
SYSTOLIC BLOOD PRESSURE: 105 MMHG | RESPIRATION RATE: 20 BRPM | HEART RATE: 62 BPM | TEMPERATURE: 97.9 F | DIASTOLIC BLOOD PRESSURE: 62 MMHG

## 2019-01-01 VITALS
SYSTOLIC BLOOD PRESSURE: 142 MMHG | HEART RATE: 84 BPM | TEMPERATURE: 97.5 F | TEMPERATURE: 97.9 F | RESPIRATION RATE: 20 BRPM | DIASTOLIC BLOOD PRESSURE: 58 MMHG | HEART RATE: 81 BPM | WEIGHT: 188.2 LBS | DIASTOLIC BLOOD PRESSURE: 79 MMHG | SYSTOLIC BLOOD PRESSURE: 124 MMHG | OXYGEN SATURATION: 99 % | BODY MASS INDEX: 26.35 KG/M2 | HEIGHT: 71 IN

## 2019-01-01 VITALS
SYSTOLIC BLOOD PRESSURE: 123 MMHG | RESPIRATION RATE: 20 BRPM | HEART RATE: 64 BPM | DIASTOLIC BLOOD PRESSURE: 58 MMHG | TEMPERATURE: 98.2 F

## 2019-01-01 VITALS
SYSTOLIC BLOOD PRESSURE: 136 MMHG | BODY MASS INDEX: 26.94 KG/M2 | DIASTOLIC BLOOD PRESSURE: 66 MMHG | WEIGHT: 192.4 LBS | OXYGEN SATURATION: 95 % | HEIGHT: 71 IN | HEART RATE: 63 BPM | RESPIRATION RATE: 16 BRPM | TEMPERATURE: 98.5 F

## 2019-01-01 VITALS
HEIGHT: 71 IN | DIASTOLIC BLOOD PRESSURE: 60 MMHG | HEART RATE: 61 BPM | OXYGEN SATURATION: 97 % | TEMPERATURE: 97.4 F | BODY MASS INDEX: 26.36 KG/M2 | SYSTOLIC BLOOD PRESSURE: 104 MMHG | WEIGHT: 188.31 LBS | RESPIRATION RATE: 18 BRPM

## 2019-01-01 VITALS
SYSTOLIC BLOOD PRESSURE: 152 MMHG | DIASTOLIC BLOOD PRESSURE: 77 MMHG | HEART RATE: 81 BPM | RESPIRATION RATE: 18 BRPM | TEMPERATURE: 97.9 F

## 2019-01-01 VITALS
DIASTOLIC BLOOD PRESSURE: 70 MMHG | TEMPERATURE: 97.7 F | RESPIRATION RATE: 18 BRPM | HEART RATE: 88 BPM | SYSTOLIC BLOOD PRESSURE: 148 MMHG

## 2019-01-01 VITALS
WEIGHT: 182.6 LBS | SYSTOLIC BLOOD PRESSURE: 143 MMHG | BODY MASS INDEX: 25.56 KG/M2 | TEMPERATURE: 97.7 F | DIASTOLIC BLOOD PRESSURE: 70 MMHG | HEIGHT: 71 IN | RESPIRATION RATE: 18 BRPM | HEART RATE: 78 BPM

## 2019-01-01 VITALS — DIASTOLIC BLOOD PRESSURE: 65 MMHG | OXYGEN SATURATION: 94 % | SYSTOLIC BLOOD PRESSURE: 141 MMHG | HEART RATE: 66 BPM

## 2019-01-01 VITALS
SYSTOLIC BLOOD PRESSURE: 118 MMHG | DIASTOLIC BLOOD PRESSURE: 58 MMHG | TEMPERATURE: 97.9 F | HEART RATE: 60 BPM | RESPIRATION RATE: 20 BRPM

## 2019-01-01 VITALS
SYSTOLIC BLOOD PRESSURE: 132 MMHG | BODY MASS INDEX: 25.65 KG/M2 | RESPIRATION RATE: 18 BRPM | HEIGHT: 71 IN | HEART RATE: 79 BPM | DIASTOLIC BLOOD PRESSURE: 60 MMHG | WEIGHT: 183.2 LBS | TEMPERATURE: 97.2 F

## 2019-01-01 VITALS
DIASTOLIC BLOOD PRESSURE: 65 MMHG | RESPIRATION RATE: 18 BRPM | SYSTOLIC BLOOD PRESSURE: 142 MMHG | SYSTOLIC BLOOD PRESSURE: 148 MMHG | OXYGEN SATURATION: 96 % | DIASTOLIC BLOOD PRESSURE: 69 MMHG | TEMPERATURE: 97.3 F | RESPIRATION RATE: 18 BRPM | WEIGHT: 193.8 LBS | HEART RATE: 79 BPM | HEART RATE: 84 BPM | TEMPERATURE: 98.1 F | BODY MASS INDEX: 27.13 KG/M2 | HEIGHT: 71 IN

## 2019-01-01 VITALS
TEMPERATURE: 97.3 F | DIASTOLIC BLOOD PRESSURE: 76 MMHG | HEART RATE: 85 BPM | RESPIRATION RATE: 18 BRPM | SYSTOLIC BLOOD PRESSURE: 150 MMHG

## 2019-01-01 VITALS
SYSTOLIC BLOOD PRESSURE: 156 MMHG | RESPIRATION RATE: 20 BRPM | HEART RATE: 89 BPM | DIASTOLIC BLOOD PRESSURE: 81 MMHG | TEMPERATURE: 98.5 F

## 2019-01-01 VITALS
SYSTOLIC BLOOD PRESSURE: 128 MMHG | DIASTOLIC BLOOD PRESSURE: 69 MMHG | TEMPERATURE: 97.8 F | HEART RATE: 84 BPM | RESPIRATION RATE: 20 BRPM

## 2019-01-01 VITALS
HEIGHT: 71 IN | BODY MASS INDEX: 26.49 KG/M2 | SYSTOLIC BLOOD PRESSURE: 150 MMHG | RESPIRATION RATE: 18 BRPM | TEMPERATURE: 98.1 F | DIASTOLIC BLOOD PRESSURE: 74 MMHG | HEART RATE: 78 BPM | WEIGHT: 189.2 LBS

## 2019-01-01 VITALS
DIASTOLIC BLOOD PRESSURE: 52 MMHG | RESPIRATION RATE: 16 BRPM | HEART RATE: 60 BPM | SYSTOLIC BLOOD PRESSURE: 101 MMHG | TEMPERATURE: 98.3 F

## 2019-01-01 VITALS
SYSTOLIC BLOOD PRESSURE: 145 MMHG | RESPIRATION RATE: 18 BRPM | TEMPERATURE: 98 F | HEART RATE: 88 BPM | DIASTOLIC BLOOD PRESSURE: 76 MMHG

## 2019-01-01 DIAGNOSIS — D47.02 SYSTEMIC MASTOCYTOSIS WITH ASSOCIATED CLONAL HEMATOLOGICAL NON-MAST CELL LINEAGE DISEASE: ICD-10-CM

## 2019-01-01 DIAGNOSIS — C93.10 CHRONIC MYELOMONOCYTIC LEUKEMIA NOT HAVING ACHIEVED REMISSION (HCC): ICD-10-CM

## 2019-01-01 DIAGNOSIS — D63.0 ANEMIA IN NEOPLASTIC DISEASE: ICD-10-CM

## 2019-01-01 DIAGNOSIS — D47.02 SYSTEMIC MASTOCYTOSIS WITH ASSOCIATED CLONAL HEMATOLOGICAL NON-MAST CELL LINEAGE DISEASE: Primary | ICD-10-CM

## 2019-01-01 DIAGNOSIS — D69.6 THROMBOCYTOPENIA (HCC): ICD-10-CM

## 2019-01-01 DIAGNOSIS — D47.01 URTICARIA PIGMENTOSA: ICD-10-CM

## 2019-01-01 DIAGNOSIS — R10.84 ABDOMINAL PAIN, GENERALIZED: ICD-10-CM

## 2019-01-01 DIAGNOSIS — R19.7 DIARRHEA, UNSPECIFIED TYPE: ICD-10-CM

## 2019-01-01 DIAGNOSIS — Z23 FLU VACCINE NEED: Primary | ICD-10-CM

## 2019-01-01 DIAGNOSIS — C93.10 CHRONIC MYELOMONOCYTIC LEUKEMIA NOT HAVING ACHIEVED REMISSION (HCC): Primary | ICD-10-CM

## 2019-01-01 DIAGNOSIS — I48.0 PAROXYSMAL ATRIAL FIBRILLATION (HCC): ICD-10-CM

## 2019-01-01 DIAGNOSIS — R10.12: ICD-10-CM

## 2019-01-01 DIAGNOSIS — R16.1 SPLENOMEGALY: ICD-10-CM

## 2019-01-01 DIAGNOSIS — R11.15 INTRACTABLE CYCLICAL VOMITING WITH NAUSEA: Primary | ICD-10-CM

## 2019-01-01 DIAGNOSIS — Z95.0 PACEMAKER: ICD-10-CM

## 2019-01-01 DIAGNOSIS — I49.5 SICK SINUS SYNDROME (HCC): Primary | ICD-10-CM

## 2019-01-01 DIAGNOSIS — E87.6 HYPOKALEMIA: ICD-10-CM

## 2019-01-01 DIAGNOSIS — E86.0 DEHYDRATION: ICD-10-CM

## 2019-01-01 DIAGNOSIS — K92.2 CHRONIC GASTROINTESTINAL HEMORRHAGE: ICD-10-CM

## 2019-01-01 DIAGNOSIS — R10.812 LEFT UPPER QUADRANT ABDOMINAL TENDERNESS, REBOUND TENDERNESS PRESENCE NOT SPECIFIED: Primary | ICD-10-CM

## 2019-01-01 DIAGNOSIS — F32.9 REACTIVE DEPRESSION: ICD-10-CM

## 2019-01-01 DIAGNOSIS — K62.5 RECTAL BLEEDING: ICD-10-CM

## 2019-01-01 DIAGNOSIS — C92.10 CML (CHRONIC MYELOCYTIC LEUKEMIA) (HCC): ICD-10-CM

## 2019-01-01 DIAGNOSIS — C92.10 CML (CHRONIC MYELOCYTIC LEUKEMIA) (HCC): Primary | ICD-10-CM

## 2019-01-01 DIAGNOSIS — O26.899: ICD-10-CM

## 2019-01-01 DIAGNOSIS — Z45.2 ENCOUNTER FOR VENOUS ACCESS DEVICE CARE: ICD-10-CM

## 2019-01-01 DIAGNOSIS — I10 ESSENTIAL HYPERTENSION: ICD-10-CM

## 2019-01-01 DIAGNOSIS — R42 LIGHTHEADEDNESS: ICD-10-CM

## 2019-01-01 LAB
ABO + RH BLD: NORMAL
ABO GROUP BLD: NORMAL
ACANTHOCYTES BLD QL SMEAR: NORMAL
ALBUMIN SERPL-MCNC: 3.9 G/DL (ref 3.5–5.2)
ALBUMIN SERPL-MCNC: 3.9 G/DL (ref 3.5–5.2)
ALBUMIN SERPL-MCNC: 4 G/DL (ref 3.5–5.2)
ALBUMIN SERPL-MCNC: 4.3 G/DL (ref 3.5–5.2)
ALBUMIN SERPL-MCNC: 4.4 G/DL (ref 3.5–5.2)
ALBUMIN SERPL-MCNC: 4.5 G/DL (ref 3.5–5.2)
ALBUMIN SERPL-MCNC: 4.6 G/DL (ref 3.5–5.2)
ALBUMIN SERPL-MCNC: 4.6 G/DL (ref 3.5–5.2)
ALBUMIN SERPL-MCNC: 4.7 G/DL (ref 3.4–4.8)
ALBUMIN SERPL-MCNC: 4.7 G/DL (ref 3.5–5.2)
ALBUMIN SERPL-MCNC: 5.1 G/DL (ref 3.5–5.2)
ALBUMIN/GLOB SERPL: 1.5 G/DL
ALBUMIN/GLOB SERPL: 1.5 G/DL (ref 1.1–1.8)
ALBUMIN/GLOB SERPL: 1.6 G/DL
ALBUMIN/GLOB SERPL: 1.7 G/DL
ALBUMIN/GLOB SERPL: 1.8 G/DL
ALBUMIN/GLOB SERPL: 1.8 G/DL
ALP SERPL-CCNC: 120 U/L (ref 39–117)
ALP SERPL-CCNC: 136 U/L (ref 39–117)
ALP SERPL-CCNC: 50 U/L (ref 39–117)
ALP SERPL-CCNC: 55 U/L (ref 39–117)
ALP SERPL-CCNC: 55 U/L (ref 39–117)
ALP SERPL-CCNC: 56 U/L (ref 39–117)
ALP SERPL-CCNC: 56 U/L (ref 39–117)
ALP SERPL-CCNC: 57 U/L (ref 39–117)
ALP SERPL-CCNC: 57 U/L (ref 39–117)
ALP SERPL-CCNC: 59 U/L (ref 38–126)
ALP SERPL-CCNC: 59 U/L (ref 39–117)
ALP SERPL-CCNC: 61 U/L (ref 39–117)
ALP SERPL-CCNC: 63 U/L (ref 39–117)
ALP SERPL-CCNC: 68 U/L (ref 39–117)
ALP SERPL-CCNC: 73 U/L (ref 39–117)
ALP SERPL-CCNC: 73 U/L (ref 39–117)
ALT SERPL W P-5'-P-CCNC: 12 U/L (ref 1–41)
ALT SERPL W P-5'-P-CCNC: 13 U/L (ref 1–41)
ALT SERPL W P-5'-P-CCNC: 14 U/L (ref 1–41)
ALT SERPL W P-5'-P-CCNC: 15 U/L (ref 1–41)
ALT SERPL W P-5'-P-CCNC: 15 U/L (ref 21–72)
ALT SERPL W P-5'-P-CCNC: 16 U/L (ref 1–41)
ALT SERPL W P-5'-P-CCNC: 18 U/L (ref 1–41)
ALT SERPL W P-5'-P-CCNC: 7 U/L (ref 1–41)
ALT SERPL W P-5'-P-CCNC: 8 U/L (ref 1–41)
ALT SERPL W P-5'-P-CCNC: 9 U/L (ref 1–41)
ANION GAP SERPL CALCULATED.3IONS-SCNC: 10 MMOL/L
ANION GAP SERPL CALCULATED.3IONS-SCNC: 10 MMOL/L (ref 5–15)
ANION GAP SERPL CALCULATED.3IONS-SCNC: 10 MMOL/L (ref 5–15)
ANION GAP SERPL CALCULATED.3IONS-SCNC: 11 MMOL/L
ANION GAP SERPL CALCULATED.3IONS-SCNC: 11 MMOL/L
ANION GAP SERPL CALCULATED.3IONS-SCNC: 11 MMOL/L (ref 5–15)
ANION GAP SERPL CALCULATED.3IONS-SCNC: 11 MMOL/L (ref 5–15)
ANION GAP SERPL CALCULATED.3IONS-SCNC: 12 MMOL/L (ref 5–15)
ANION GAP SERPL CALCULATED.3IONS-SCNC: 8 MMOL/L (ref 5–15)
ANION GAP SERPL CALCULATED.3IONS-SCNC: 8 MMOL/L (ref 5–15)
ANION GAP SERPL CALCULATED.3IONS-SCNC: 9 MMOL/L (ref 5–15)
ANION GAP SERPL CALCULATED.3IONS-SCNC: 9 MMOL/L (ref 5–15)
ANISOCYTOSIS BLD QL: ABNORMAL
ANISOCYTOSIS BLD QL: NORMAL
ANISOCYTOSIS BLD QL: NORMAL
AST SERPL-CCNC: 11 U/L (ref 1–40)
AST SERPL-CCNC: 12 U/L (ref 1–40)
AST SERPL-CCNC: 13 U/L (ref 1–40)
AST SERPL-CCNC: 14 U/L (ref 1–40)
AST SERPL-CCNC: 15 U/L (ref 1–40)
AST SERPL-CCNC: 15 U/L (ref 1–40)
AST SERPL-CCNC: 16 U/L (ref 1–40)
AST SERPL-CCNC: 16 U/L (ref 1–40)
AST SERPL-CCNC: 17 U/L (ref 1–40)
AST SERPL-CCNC: 17 U/L (ref 1–40)
AST SERPL-CCNC: 18 U/L (ref 1–40)
AST SERPL-CCNC: 20 U/L (ref 17–59)
AST SERPL-CCNC: 20 U/L (ref 1–40)
AST SERPL-CCNC: 20 U/L (ref 1–40)
AST SERPL-CCNC: 21 U/L (ref 1–40)
AST SERPL-CCNC: 23 U/L (ref 1–40)
BASOPHILS # BLD AUTO: 0.01 10*3/MM3 (ref 0–0.2)
BASOPHILS # BLD AUTO: 0.02 10*3/MM3 (ref 0–0.2)
BASOPHILS # BLD AUTO: 0.03 10*3/MM3 (ref 0–0.2)
BASOPHILS # BLD AUTO: 0.03 10*3/MM3 (ref 0–0.2)
BASOPHILS # BLD AUTO: 0.04 10*3/MM3 (ref 0–0.2)
BASOPHILS # BLD AUTO: 0.05 10*3/MM3 (ref 0–0.2)
BASOPHILS # BLD AUTO: 0.05 10*3/MM3 (ref 0–0.2)
BASOPHILS # BLD AUTO: 0.16 10*3/MM3 (ref 0–0.2)
BASOPHILS # BLD AUTO: 0.16 10*3/MM3 (ref 0–0.2)
BASOPHILS # BLD MANUAL: 0 10*3/MM3 (ref 0–0.2)
BASOPHILS # BLD MANUAL: 0.04 10*3/MM3 (ref 0–0.2)
BASOPHILS # BLD MANUAL: 0.08 10*3/MM3 (ref 0–0.2)
BASOPHILS # BLD MANUAL: 0.1 10*3/MM3 (ref 0–0.2)
BASOPHILS # BLD MANUAL: 0.2 10*3/MM3 (ref 0–0.2)
BASOPHILS NFR BLD AUTO: 0 % (ref 0–1.5)
BASOPHILS NFR BLD AUTO: 0.3 % (ref 0–1.5)
BASOPHILS NFR BLD AUTO: 0.4 % (ref 0–1.5)
BASOPHILS NFR BLD AUTO: 0.5 % (ref 0–1.5)
BASOPHILS NFR BLD AUTO: 0.6 % (ref 0–1.5)
BASOPHILS NFR BLD AUTO: 1 % (ref 0–1.5)
BASOPHILS NFR BLD AUTO: 1.9 % (ref 0–1.5)
BASOPHILS NFR BLD AUTO: 1.9 % (ref 0–1.5)
BASOPHILS NFR BLD AUTO: 2 % (ref 0–1.5)
BH BB BLOOD EXPIRATION DATE: NORMAL
BH BB BLOOD TYPE BARCODE: 6200
BH BB DISPENSE STATUS: NORMAL
BH BB PRODUCT CODE: NORMAL
BH BB UNIT NUMBER: NORMAL
BILIRUB SERPL-MCNC: 0.4 MG/DL (ref 0.2–1.2)
BILIRUB SERPL-MCNC: 0.5 MG/DL (ref 0.2–1.2)
BILIRUB SERPL-MCNC: 0.5 MG/DL (ref 0.2–1.3)
BILIRUB SERPL-MCNC: 0.6 MG/DL (ref 0.2–1.2)
BILIRUB SERPL-MCNC: 0.7 MG/DL (ref 0.2–1.2)
BILIRUB SERPL-MCNC: 0.8 MG/DL (ref 0.2–1.2)
BILIRUB SERPL-MCNC: 0.8 MG/DL (ref 0.2–1.2)
BILIRUB SERPL-MCNC: 0.9 MG/DL (ref 0.2–1.2)
BILIRUB SERPL-MCNC: 0.9 MG/DL (ref 0.2–1.2)
BILIRUB SERPL-MCNC: 1 MG/DL (ref 0.2–1.2)
BILIRUB SERPL-MCNC: 1 MG/DL (ref 0.2–1.2)
BLASTS NFR BLD MANUAL: 1 % (ref 0–0)
BLD GP AB SCN SERPL QL: NEGATIVE
BUN BLD-MCNC: 10 MG/DL (ref 8–23)
BUN BLD-MCNC: 10 MG/DL (ref 8–23)
BUN BLD-MCNC: 11 MG/DL (ref 7–21)
BUN BLD-MCNC: 11 MG/DL (ref 8–23)
BUN BLD-MCNC: 12 MG/DL (ref 8–23)
BUN BLD-MCNC: 13 MG/DL (ref 8–23)
BUN BLD-MCNC: 14 MG/DL (ref 8–23)
BUN BLD-MCNC: 15 MG/DL (ref 8–23)
BUN BLD-MCNC: 21 MG/DL (ref 8–23)
BUN/CREAT SERPL: 11 (ref 7–25)
BUN/CREAT SERPL: 12.5 (ref 7–25)
BUN/CREAT SERPL: 12.7 (ref 7–25)
BUN/CREAT SERPL: 12.9 (ref 7–25)
BUN/CREAT SERPL: 14.6 (ref 7–25)
BUN/CREAT SERPL: 14.9 (ref 7–25)
BUN/CREAT SERPL: 15.1 (ref 7–25)
BUN/CREAT SERPL: 15.2 (ref 7–25)
BUN/CREAT SERPL: 15.6 (ref 7–25)
BUN/CREAT SERPL: 15.6 (ref 7–25)
BUN/CREAT SERPL: 15.7 (ref 7–25)
BUN/CREAT SERPL: 16.7 (ref 7–25)
BUN/CREAT SERPL: 16.9 (ref 7–25)
BUN/CREAT SERPL: 17.1 (ref 7–25)
BUN/CREAT SERPL: 17.3 (ref 7–25)
BUN/CREAT SERPL: 23.1 (ref 7–25)
C DIFF TOX GENS STL QL NAA+PROBE: NEGATIVE
C3 FRG RBC-MCNC: ABNORMAL
CALCIUM SPEC-SCNC: 8.3 MG/DL (ref 8.6–10.5)
CALCIUM SPEC-SCNC: 8.3 MG/DL (ref 8.6–10.5)
CALCIUM SPEC-SCNC: 8.5 MG/DL (ref 8.6–10.5)
CALCIUM SPEC-SCNC: 8.6 MG/DL (ref 8.6–10.5)
CALCIUM SPEC-SCNC: 8.9 MG/DL (ref 8.6–10.5)
CALCIUM SPEC-SCNC: 9.1 MG/DL (ref 8.6–10.5)
CALCIUM SPEC-SCNC: 9.1 MG/DL (ref 8.6–10.5)
CALCIUM SPEC-SCNC: 9.2 MG/DL (ref 8.6–10.5)
CALCIUM SPEC-SCNC: 9.3 MG/DL (ref 8.4–10.2)
CALCIUM SPEC-SCNC: 9.5 MG/DL (ref 8.6–10.5)
CALCIUM SPEC-SCNC: 9.5 MG/DL (ref 8.6–10.5)
CHLORIDE SERPL-SCNC: 100 MMOL/L (ref 98–107)
CHLORIDE SERPL-SCNC: 100 MMOL/L (ref 98–107)
CHLORIDE SERPL-SCNC: 101 MMOL/L (ref 98–107)
CHLORIDE SERPL-SCNC: 102 MMOL/L (ref 98–107)
CHLORIDE SERPL-SCNC: 103 MMOL/L (ref 98–107)
CHLORIDE SERPL-SCNC: 103 MMOL/L (ref 98–107)
CHLORIDE SERPL-SCNC: 104 MMOL/L (ref 98–107)
CHLORIDE SERPL-SCNC: 104 MMOL/L (ref 98–107)
CHLORIDE SERPL-SCNC: 105 MMOL/L (ref 98–107)
CHLORIDE SERPL-SCNC: 105 MMOL/L (ref 98–107)
CHLORIDE SERPL-SCNC: 106 MMOL/L (ref 98–107)
CHLORIDE SERPL-SCNC: 99 MMOL/L (ref 95–110)
CO2 SERPL-SCNC: 20 MMOL/L (ref 22–29)
CO2 SERPL-SCNC: 22 MMOL/L (ref 22–29)
CO2 SERPL-SCNC: 23 MMOL/L (ref 22–29)
CO2 SERPL-SCNC: 24 MMOL/L (ref 22–29)
CO2 SERPL-SCNC: 25 MMOL/L (ref 22–29)
CO2 SERPL-SCNC: 25 MMOL/L (ref 22–29)
CO2 SERPL-SCNC: 25 MMOL/L (ref 22–31)
CO2 SERPL-SCNC: 26 MMOL/L (ref 22–29)
CO2 SERPL-SCNC: 27 MMOL/L (ref 22–29)
CREAT BLD-MCNC: 0.75 MG/DL (ref 0.76–1.27)
CREAT BLD-MCNC: 0.78 MG/DL (ref 0.76–1.27)
CREAT BLD-MCNC: 0.79 MG/DL (ref 0.76–1.27)
CREAT BLD-MCNC: 0.79 MG/DL (ref 0.76–1.27)
CREAT BLD-MCNC: 0.82 MG/DL (ref 0.76–1.27)
CREAT BLD-MCNC: 0.83 MG/DL (ref 0.76–1.27)
CREAT BLD-MCNC: 0.85 MG/DL (ref 0.7–1.3)
CREAT BLD-MCNC: 0.88 MG/DL (ref 0.76–1.27)
CREAT BLD-MCNC: 0.89 MG/DL (ref 0.76–1.27)
CREAT BLD-MCNC: 0.89 MG/DL (ref 0.76–1.27)
CREAT BLD-MCNC: 0.9 MG/DL (ref 0.76–1.27)
CREAT BLD-MCNC: 0.9 MG/DL (ref 0.76–1.27)
CREAT BLD-MCNC: 0.91 MG/DL (ref 0.76–1.27)
CREAT BLD-MCNC: 0.91 MG/DL (ref 0.76–1.27)
CREAT BLD-MCNC: 0.93 MG/DL (ref 0.76–1.27)
CREAT BLD-MCNC: 0.94 MG/DL (ref 0.76–1.27)
DACRYOCYTES BLD QL SMEAR: ABNORMAL
DEPRECATED RDW RBC AUTO: 100 FL (ref 37–54)
DEPRECATED RDW RBC AUTO: 100.1 FL (ref 37–54)
DEPRECATED RDW RBC AUTO: 102 FL (ref 37–54)
DEPRECATED RDW RBC AUTO: 74.6 FL (ref 37–54)
DEPRECATED RDW RBC AUTO: 74.7 FL (ref 37–54)
DEPRECATED RDW RBC AUTO: 78.2 FL (ref 37–54)
DEPRECATED RDW RBC AUTO: 78.3 FL (ref 37–54)
DEPRECATED RDW RBC AUTO: 80.5 FL (ref 37–54)
DEPRECATED RDW RBC AUTO: 80.6 FL (ref 37–54)
DEPRECATED RDW RBC AUTO: 82.3 FL (ref 37–54)
DEPRECATED RDW RBC AUTO: 83.3 FL (ref 37–54)
DEPRECATED RDW RBC AUTO: 83.6 FL (ref 37–54)
DEPRECATED RDW RBC AUTO: 87 FL (ref 37–54)
DEPRECATED RDW RBC AUTO: 87.4 FL (ref 37–54)
DEPRECATED RDW RBC AUTO: 87.7 FL (ref 37–54)
DEPRECATED RDW RBC AUTO: 87.9 FL (ref 37–54)
DEPRECATED RDW RBC AUTO: 89.8 FL (ref 37–54)
DEPRECATED RDW RBC AUTO: 90.2 FL (ref 37–54)
DEPRECATED RDW RBC AUTO: 90.2 FL (ref 37–54)
DEPRECATED RDW RBC AUTO: 90.4 FL (ref 37–54)
DEPRECATED RDW RBC AUTO: 90.7 FL (ref 37–54)
DEPRECATED RDW RBC AUTO: 92.4 FL (ref 37–54)
DEPRECATED RDW RBC AUTO: 95.6 FL (ref 37–54)
DEPRECATED RDW RBC AUTO: 96.3 FL (ref 37–54)
ELLIPTOCYTES BLD QL SMEAR: ABNORMAL
ELLIPTOCYTES BLD QL SMEAR: ABNORMAL
ELLIPTOCYTES BLD QL SMEAR: NORMAL
EOSINOPHIL # BLD AUTO: 0.01 10*3/MM3 (ref 0–0.4)
EOSINOPHIL # BLD AUTO: 0.02 10*3/MM3 (ref 0–0.4)
EOSINOPHIL # BLD AUTO: 0.02 10*3/MM3 (ref 0–0.4)
EOSINOPHIL # BLD AUTO: 0.05 10*3/MM3 (ref 0–0.4)
EOSINOPHIL # BLD AUTO: 0.1 10*3/MM3 (ref 0–0.4)
EOSINOPHIL # BLD AUTO: 0.12 10*3/MM3 (ref 0–0.4)
EOSINOPHIL # BLD MANUAL: 0.06 10*3/MM3 (ref 0–0.4)
EOSINOPHIL # BLD MANUAL: 0.07 10*3/MM3 (ref 0–0.4)
EOSINOPHIL # BLD MANUAL: 0.08 10*3/MM3 (ref 0–0.4)
EOSINOPHIL # BLD MANUAL: 0.09 10*3/MM3 (ref 0–0.4)
EOSINOPHIL # BLD MANUAL: 0.1 10*3/MM3 (ref 0–0.4)
EOSINOPHIL # BLD MANUAL: 0.23 10*3/MM3 (ref 0–0.4)
EOSINOPHIL # BLD MANUAL: 0.29 10*3/MM3 (ref 0–0.4)
EOSINOPHIL NFR BLD AUTO: 0.1 % (ref 0.3–6.2)
EOSINOPHIL NFR BLD AUTO: 0.2 % (ref 0.3–6.2)
EOSINOPHIL NFR BLD AUTO: 0.2 % (ref 0.3–6.2)
EOSINOPHIL NFR BLD AUTO: 0.3 % (ref 0.3–6.2)
EOSINOPHIL NFR BLD AUTO: 0.5 % (ref 0.3–6.2)
EOSINOPHIL NFR BLD AUTO: 0.8 % (ref 0.3–6.2)
EOSINOPHIL NFR BLD AUTO: 1.2 % (ref 0.3–6.2)
EOSINOPHIL NFR BLD AUTO: 1.4 % (ref 0.3–6.2)
EOSINOPHIL NFR BLD MANUAL: 1 % (ref 0.3–6.2)
EOSINOPHIL NFR BLD MANUAL: 3 % (ref 0.3–6.2)
EOSINOPHIL NFR BLD MANUAL: 3 % (ref 0.3–6.2)
ERYTHROCYTE [DISTWIDTH] IN BLOOD BY AUTOMATED COUNT: 20.5 % (ref 12.3–15.4)
ERYTHROCYTE [DISTWIDTH] IN BLOOD BY AUTOMATED COUNT: 20.9 % (ref 12.3–15.4)
ERYTHROCYTE [DISTWIDTH] IN BLOOD BY AUTOMATED COUNT: 21.2 % (ref 12.3–15.4)
ERYTHROCYTE [DISTWIDTH] IN BLOOD BY AUTOMATED COUNT: 21.6 % (ref 12.3–15.4)
ERYTHROCYTE [DISTWIDTH] IN BLOOD BY AUTOMATED COUNT: 21.6 % (ref 12.3–15.4)
ERYTHROCYTE [DISTWIDTH] IN BLOOD BY AUTOMATED COUNT: 21.9 % (ref 12.3–15.4)
ERYTHROCYTE [DISTWIDTH] IN BLOOD BY AUTOMATED COUNT: 22.1 % (ref 12.3–15.4)
ERYTHROCYTE [DISTWIDTH] IN BLOOD BY AUTOMATED COUNT: 22.5 % (ref 12.3–15.4)
ERYTHROCYTE [DISTWIDTH] IN BLOOD BY AUTOMATED COUNT: 22.9 % (ref 12.3–15.4)
ERYTHROCYTE [DISTWIDTH] IN BLOOD BY AUTOMATED COUNT: 23.6 % (ref 12.3–15.4)
ERYTHROCYTE [DISTWIDTH] IN BLOOD BY AUTOMATED COUNT: 23.7 % (ref 12.3–15.4)
ERYTHROCYTE [DISTWIDTH] IN BLOOD BY AUTOMATED COUNT: 23.9 % (ref 12.3–15.4)
ERYTHROCYTE [DISTWIDTH] IN BLOOD BY AUTOMATED COUNT: 23.9 % (ref 12.3–15.4)
ERYTHROCYTE [DISTWIDTH] IN BLOOD BY AUTOMATED COUNT: 24.5 % (ref 12.3–15.4)
ERYTHROCYTE [DISTWIDTH] IN BLOOD BY AUTOMATED COUNT: 24.7 % (ref 12.3–15.4)
ERYTHROCYTE [DISTWIDTH] IN BLOOD BY AUTOMATED COUNT: 24.7 % (ref 12.3–15.4)
ERYTHROCYTE [DISTWIDTH] IN BLOOD BY AUTOMATED COUNT: 24.8 % (ref 12.3–15.4)
ERYTHROCYTE [DISTWIDTH] IN BLOOD BY AUTOMATED COUNT: 25.1 % (ref 12.3–15.4)
ERYTHROCYTE [DISTWIDTH] IN BLOOD BY AUTOMATED COUNT: 25.1 % (ref 12.3–15.4)
ERYTHROCYTE [DISTWIDTH] IN BLOOD BY AUTOMATED COUNT: 25.4 % (ref 12.3–15.4)
ERYTHROCYTE [DISTWIDTH] IN BLOOD BY AUTOMATED COUNT: 26 % (ref 12.3–15.4)
ERYTHROCYTE [DISTWIDTH] IN BLOOD BY AUTOMATED COUNT: 26.5 % (ref 12.3–15.4)
ERYTHROCYTE [DISTWIDTH] IN BLOOD BY AUTOMATED COUNT: 26.5 % (ref 12.3–15.4)
ERYTHROCYTE [DISTWIDTH] IN BLOOD BY AUTOMATED COUNT: 27.1 % (ref 12.3–15.4)
FERRITIN SERPL-MCNC: 150.9 NG/ML (ref 30–400)
FERRITIN SERPL-MCNC: 248.1 NG/ML (ref 30–400)
FOLATE SERPL-MCNC: >20 NG/ML (ref 4.78–24.2)
FOLATE SERPL-MCNC: >20 NG/ML (ref 4.78–24.2)
GFR SERPL CREATININE-BSD FRML MDRD: 101 ML/MIN/1.73
GFR SERPL CREATININE-BSD FRML MDRD: 78 ML/MIN/1.73
GFR SERPL CREATININE-BSD FRML MDRD: 79 ML/MIN/1.73
GFR SERPL CREATININE-BSD FRML MDRD: 81 ML/MIN/1.73
GFR SERPL CREATININE-BSD FRML MDRD: 81 ML/MIN/1.73
GFR SERPL CREATININE-BSD FRML MDRD: 82 ML/MIN/1.73
GFR SERPL CREATININE-BSD FRML MDRD: 82 ML/MIN/1.73
GFR SERPL CREATININE-BSD FRML MDRD: 83 ML/MIN/1.73
GFR SERPL CREATININE-BSD FRML MDRD: 83 ML/MIN/1.73
GFR SERPL CREATININE-BSD FRML MDRD: 84 ML/MIN/1.73
GFR SERPL CREATININE-BSD FRML MDRD: 87 ML/MIN/1.73 (ref 42–98)
GFR SERPL CREATININE-BSD FRML MDRD: 90 ML/MIN/1.73
GFR SERPL CREATININE-BSD FRML MDRD: 91 ML/MIN/1.73
GFR SERPL CREATININE-BSD FRML MDRD: 95 ML/MIN/1.73
GFR SERPL CREATININE-BSD FRML MDRD: 95 ML/MIN/1.73
GFR SERPL CREATININE-BSD FRML MDRD: 97 ML/MIN/1.73
GIANT PLATELETS: ABNORMAL
GLOBULIN UR ELPH-MCNC: 2.4 GM/DL
GLOBULIN UR ELPH-MCNC: 2.4 GM/DL
GLOBULIN UR ELPH-MCNC: 2.5 GM/DL
GLOBULIN UR ELPH-MCNC: 2.5 GM/DL
GLOBULIN UR ELPH-MCNC: 2.6 GM/DL
GLOBULIN UR ELPH-MCNC: 2.6 GM/DL
GLOBULIN UR ELPH-MCNC: 2.7 GM/DL
GLOBULIN UR ELPH-MCNC: 2.8 GM/DL
GLOBULIN UR ELPH-MCNC: 2.8 GM/DL
GLOBULIN UR ELPH-MCNC: 2.9 GM/DL
GLOBULIN UR ELPH-MCNC: 3 GM/DL
GLOBULIN UR ELPH-MCNC: 3.1 GM/DL (ref 2.3–3.5)
GLUCOSE BLD-MCNC: 102 MG/DL (ref 65–99)
GLUCOSE BLD-MCNC: 105 MG/DL (ref 65–99)
GLUCOSE BLD-MCNC: 106 MG/DL (ref 65–99)
GLUCOSE BLD-MCNC: 111 MG/DL (ref 60–100)
GLUCOSE BLD-MCNC: 112 MG/DL (ref 65–99)
GLUCOSE BLD-MCNC: 120 MG/DL (ref 65–99)
GLUCOSE BLD-MCNC: 121 MG/DL (ref 65–99)
GLUCOSE BLD-MCNC: 124 MG/DL (ref 65–99)
GLUCOSE BLD-MCNC: 127 MG/DL (ref 65–99)
GLUCOSE BLD-MCNC: 133 MG/DL (ref 65–99)
GLUCOSE BLD-MCNC: 135 MG/DL (ref 65–99)
GLUCOSE BLD-MCNC: 151 MG/DL (ref 65–99)
GLUCOSE BLD-MCNC: 79 MG/DL (ref 65–99)
GLUCOSE BLD-MCNC: 87 MG/DL (ref 65–99)
GLUCOSE BLD-MCNC: 95 MG/DL (ref 65–99)
GLUCOSE BLD-MCNC: 96 MG/DL (ref 65–99)
HCT VFR BLD AUTO: 20.1 % (ref 37.5–51)
HCT VFR BLD AUTO: 20.6 % (ref 37.5–51)
HCT VFR BLD AUTO: 21.3 % (ref 37.5–51)
HCT VFR BLD AUTO: 22.5 % (ref 37.5–51)
HCT VFR BLD AUTO: 22.5 % (ref 37.5–51)
HCT VFR BLD AUTO: 22.9 % (ref 37.5–51)
HCT VFR BLD AUTO: 23.5 % (ref 37.5–51)
HCT VFR BLD AUTO: 23.7 % (ref 37.5–51)
HCT VFR BLD AUTO: 24.8 % (ref 37.5–51)
HCT VFR BLD AUTO: 26.2 % (ref 37.5–51)
HCT VFR BLD AUTO: 27.5 % (ref 37.5–51)
HCT VFR BLD AUTO: 29 % (ref 37.5–51)
HCT VFR BLD AUTO: 29 % (ref 37.5–51)
HCT VFR BLD AUTO: 29.3 % (ref 37.5–51)
HCT VFR BLD AUTO: 29.8 % (ref 37.5–51)
HCT VFR BLD AUTO: 30 % (ref 37.5–51)
HCT VFR BLD AUTO: 30.7 % (ref 37.5–51)
HCT VFR BLD AUTO: 31.1 % (ref 37.5–51)
HCT VFR BLD AUTO: 31.3 % (ref 37.5–51)
HCT VFR BLD AUTO: 31.3 % (ref 37.5–51)
HCT VFR BLD AUTO: 31.7 % (ref 37.5–51)
HCT VFR BLD AUTO: 32.8 % (ref 37.5–51)
HCT VFR BLD AUTO: 33.2 % (ref 37.5–51)
HCT VFR BLD AUTO: 33.3 % (ref 37.5–51)
HELMET CELLS: ABNORMAL
HGB BLD-MCNC: 10 G/DL (ref 13–17.7)
HGB BLD-MCNC: 10 G/DL (ref 13–17.7)
HGB BLD-MCNC: 10.2 G/DL (ref 13–17.7)
HGB BLD-MCNC: 10.5 G/DL (ref 13–17.7)
HGB BLD-MCNC: 10.6 G/DL (ref 13–17.7)
HGB BLD-MCNC: 10.7 G/DL (ref 13–17.7)
HGB BLD-MCNC: 10.7 G/DL (ref 13–17.7)
HGB BLD-MCNC: 10.8 G/DL (ref 13–17.7)
HGB BLD-MCNC: 11 G/DL (ref 13–17.7)
HGB BLD-MCNC: 11 G/DL (ref 13–17.7)
HGB BLD-MCNC: 11.2 G/DL (ref 13–17.7)
HGB BLD-MCNC: 11.3 G/DL (ref 13–17.7)
HGB BLD-MCNC: 11.3 G/DL (ref 13–17.7)
HGB BLD-MCNC: 6.7 G/DL (ref 13–17.7)
HGB BLD-MCNC: 6.9 G/DL (ref 13–17.7)
HGB BLD-MCNC: 7.3 G/DL (ref 13–17.7)
HGB BLD-MCNC: 7.4 G/DL (ref 13–17.7)
HGB BLD-MCNC: 7.8 G/DL (ref 13–17.7)
HGB BLD-MCNC: 7.9 G/DL (ref 13–17.7)
HGB BLD-MCNC: 7.9 G/DL (ref 13–17.7)
HGB BLD-MCNC: 8 G/DL (ref 13–17.7)
HGB BLD-MCNC: 8.5 G/DL (ref 13–17.7)
HGB BLD-MCNC: 8.7 G/DL (ref 13–17.7)
HGB BLD-MCNC: 9.8 G/DL (ref 13–17.7)
HOLD SPECIMEN: NORMAL
HOLD SPECIMEN: NORMAL
HYPOCHROMIA BLD QL: ABNORMAL
HYPOCHROMIA BLD QL: NORMAL
IMM GRANULOCYTES # BLD AUTO: 0.37 10*3/MM3 (ref 0–0.05)
IMM GRANULOCYTES # BLD AUTO: 0.38 10*3/MM3 (ref 0–0.05)
IMM GRANULOCYTES # BLD AUTO: 0.48 10*3/MM3 (ref 0–0.05)
IMM GRANULOCYTES # BLD AUTO: 0.5 10*3/MM3 (ref 0–0.05)
IMM GRANULOCYTES # BLD AUTO: 0.51 10*3/MM3 (ref 0–0.05)
IMM GRANULOCYTES # BLD AUTO: 0.62 10*3/MM3 (ref 0–0.05)
IMM GRANULOCYTES # BLD AUTO: 0.62 10*3/MM3 (ref 0–0.05)
IMM GRANULOCYTES # BLD AUTO: 0.95 10*3/MM3 (ref 0–0.05)
IMM GRANULOCYTES # BLD AUTO: 1 10*3/MM3 (ref 0–0.05)
IMM GRANULOCYTES # BLD AUTO: 1.03 10*3/MM3 (ref 0–0.05)
IMM GRANULOCYTES # BLD AUTO: 2.47 10*3/MM3 (ref 0–0.05)
IMM GRANULOCYTES NFR BLD AUTO: 11.9 % (ref 0–0.5)
IMM GRANULOCYTES NFR BLD AUTO: 12.5 % (ref 0–0.5)
IMM GRANULOCYTES NFR BLD AUTO: 12.6 % (ref 0–0.5)
IMM GRANULOCYTES NFR BLD AUTO: 12.7 % (ref 0–0.5)
IMM GRANULOCYTES NFR BLD AUTO: 13.1 % (ref 0–0.5)
IMM GRANULOCYTES NFR BLD AUTO: 21.6 % (ref 0–0.5)
IMM GRANULOCYTES NFR BLD AUTO: 4.4 % (ref 0–0.5)
IMM GRANULOCYTES NFR BLD AUTO: 5.7 % (ref 0–0.5)
IMM GRANULOCYTES NFR BLD AUTO: 8 % (ref 0–0.5)
IMM GRANULOCYTES NFR BLD AUTO: 8.6 % (ref 0–0.5)
IMM GRANULOCYTES NFR BLD AUTO: 8.9 % (ref 0–0.5)
IRON 24H UR-MRATE: 72 MCG/DL (ref 59–158)
IRON 24H UR-MRATE: 93 MCG/DL (ref 59–158)
IRON SATN MFR SERPL: 24 % (ref 20–50)
IRON SATN MFR SERPL: 39 % (ref 20–50)
LAB AP CASE REPORT: NORMAL
LARGE PLATELETS: ABNORMAL
LARGE PLATELETS: ABNORMAL
LDH SERPL-CCNC: 189 U/L (ref 135–225)
LDH SERPL-CCNC: 223 U/L (ref 135–225)
LYMPHOCYTES # BLD AUTO: 0.59 10*3/MM3 (ref 0.7–3.1)
LYMPHOCYTES # BLD AUTO: 0.9 10*3/MM3 (ref 0.7–3.1)
LYMPHOCYTES # BLD AUTO: 0.97 10*3/MM3 (ref 0.7–3.1)
LYMPHOCYTES # BLD AUTO: 1.21 10*3/MM3 (ref 0.7–3.1)
LYMPHOCYTES # BLD AUTO: 1.26 10*3/MM3 (ref 0.7–3.1)
LYMPHOCYTES # BLD AUTO: 1.3 10*3/MM3 (ref 0.7–3.1)
LYMPHOCYTES # BLD AUTO: 1.42 10*3/MM3 (ref 0.7–3.1)
LYMPHOCYTES # BLD AUTO: 1.55 10*3/MM3 (ref 0.7–3.1)
LYMPHOCYTES # BLD AUTO: 1.63 10*3/MM3 (ref 0.7–3.1)
LYMPHOCYTES # BLD AUTO: 1.73 10*3/MM3 (ref 0.7–3.1)
LYMPHOCYTES # BLD AUTO: 1.84 10*3/MM3 (ref 0.7–3.1)
LYMPHOCYTES # BLD MANUAL: 0.81 10*3/MM3 (ref 0.7–3.1)
LYMPHOCYTES # BLD MANUAL: 0.83 10*3/MM3 (ref 0.7–3.1)
LYMPHOCYTES # BLD MANUAL: 0.87 10*3/MM3 (ref 0.7–3.1)
LYMPHOCYTES # BLD MANUAL: 1.15 10*3/MM3 (ref 0.7–3.1)
LYMPHOCYTES # BLD MANUAL: 1.17 10*3/MM3 (ref 0.7–3.1)
LYMPHOCYTES # BLD MANUAL: 1.2 10*3/MM3 (ref 0.7–3.1)
LYMPHOCYTES # BLD MANUAL: 1.21 10*3/MM3 (ref 0.7–3.1)
LYMPHOCYTES # BLD MANUAL: 1.22 10*3/MM3 (ref 0.7–3.1)
LYMPHOCYTES # BLD MANUAL: 1.34 10*3/MM3 (ref 0.7–3.1)
LYMPHOCYTES # BLD MANUAL: 1.57 10*3/MM3 (ref 0.7–3.1)
LYMPHOCYTES # BLD MANUAL: 1.6 10*3/MM3 (ref 0.7–3.1)
LYMPHOCYTES # BLD MANUAL: 1.6 10*3/MM3 (ref 0.7–3.1)
LYMPHOCYTES # BLD MANUAL: 1.73 10*3/MM3 (ref 0.7–3.1)
LYMPHOCYTES # BLD MANUAL: 1.74 10*3/MM3 (ref 0.7–3.1)
LYMPHOCYTES # BLD MANUAL: 1.81 10*3/MM3 (ref 0.7–3.1)
LYMPHOCYTES # BLD MANUAL: 1.92 10*3/MM3 (ref 0.7–3.1)
LYMPHOCYTES # BLD MANUAL: 2.07 10*3/MM3 (ref 0.7–3.1)
LYMPHOCYTES # BLD MANUAL: 2.19 10*3/MM3 (ref 0.7–3.1)
LYMPHOCYTES # BLD MANUAL: 2.32 10*3/MM3 (ref 0.7–3.1)
LYMPHOCYTES # BLD MANUAL: 2.96 10*3/MM3 (ref 0.7–3.1)
LYMPHOCYTES # BLD MANUAL: 3.14 10*3/MM3 (ref 0.7–3.1)
LYMPHOCYTES NFR BLD AUTO: 11.3 % (ref 19.6–45.3)
LYMPHOCYTES NFR BLD AUTO: 12.4 % (ref 19.6–45.3)
LYMPHOCYTES NFR BLD AUTO: 15 % (ref 19.6–45.3)
LYMPHOCYTES NFR BLD AUTO: 16 % (ref 19.6–45.3)
LYMPHOCYTES NFR BLD AUTO: 20.3 % (ref 19.6–45.3)
LYMPHOCYTES NFR BLD AUTO: 20.5 % (ref 19.6–45.3)
LYMPHOCYTES NFR BLD AUTO: 20.7 % (ref 19.6–45.3)
LYMPHOCYTES NFR BLD AUTO: 21.4 % (ref 19.6–45.3)
LYMPHOCYTES NFR BLD AUTO: 21.9 % (ref 19.6–45.3)
LYMPHOCYTES NFR BLD AUTO: 22.7 % (ref 19.6–45.3)
LYMPHOCYTES NFR BLD AUTO: 24.1 % (ref 19.6–45.3)
LYMPHOCYTES NFR BLD MANUAL: 11 % (ref 5–12)
LYMPHOCYTES NFR BLD MANUAL: 12 % (ref 19.6–45.3)
LYMPHOCYTES NFR BLD MANUAL: 13 % (ref 5–12)
LYMPHOCYTES NFR BLD MANUAL: 14 % (ref 19.6–45.3)
LYMPHOCYTES NFR BLD MANUAL: 14 % (ref 5–12)
LYMPHOCYTES NFR BLD MANUAL: 15 % (ref 19.6–45.3)
LYMPHOCYTES NFR BLD MANUAL: 16 % (ref 19.6–45.3)
LYMPHOCYTES NFR BLD MANUAL: 16 % (ref 5–12)
LYMPHOCYTES NFR BLD MANUAL: 16 % (ref 5–12)
LYMPHOCYTES NFR BLD MANUAL: 18 % (ref 19.6–45.3)
LYMPHOCYTES NFR BLD MANUAL: 18 % (ref 5–12)
LYMPHOCYTES NFR BLD MANUAL: 19 % (ref 19.6–45.3)
LYMPHOCYTES NFR BLD MANUAL: 19 % (ref 5–12)
LYMPHOCYTES NFR BLD MANUAL: 19 % (ref 5–12)
LYMPHOCYTES NFR BLD MANUAL: 20 % (ref 19.6–45.3)
LYMPHOCYTES NFR BLD MANUAL: 20 % (ref 5–12)
LYMPHOCYTES NFR BLD MANUAL: 20 % (ref 5–12)
LYMPHOCYTES NFR BLD MANUAL: 21 % (ref 19.6–45.3)
LYMPHOCYTES NFR BLD MANUAL: 21 % (ref 5–12)
LYMPHOCYTES NFR BLD MANUAL: 22 % (ref 5–12)
LYMPHOCYTES NFR BLD MANUAL: 23 % (ref 5–12)
LYMPHOCYTES NFR BLD MANUAL: 24 % (ref 19.6–45.3)
LYMPHOCYTES NFR BLD MANUAL: 24 % (ref 19.6–45.3)
LYMPHOCYTES NFR BLD MANUAL: 24 % (ref 5–12)
LYMPHOCYTES NFR BLD MANUAL: 24 % (ref 5–12)
LYMPHOCYTES NFR BLD MANUAL: 25 % (ref 5–12)
LYMPHOCYTES NFR BLD MANUAL: 26 % (ref 19.6–45.3)
LYMPHOCYTES NFR BLD MANUAL: 27 % (ref 19.6–45.3)
LYMPHOCYTES NFR BLD MANUAL: 27 % (ref 5–12)
LYMPHOCYTES NFR BLD MANUAL: 29 % (ref 5–12)
LYMPHOCYTES NFR BLD MANUAL: 30 % (ref 19.6–45.3)
LYMPHOCYTES NFR BLD MANUAL: 32 % (ref 19.6–45.3)
LYMPHOCYTES NFR BLD MANUAL: 34 % (ref 19.6–45.3)
LYMPHOCYTES NFR BLD MANUAL: 6 % (ref 5–12)
LYMPHOCYTES NFR BLD MANUAL: 7 % (ref 5–12)
LYMPHOCYTES NFR BLD MANUAL: 7 % (ref 5–12)
Lab: NORMAL
MACROCYTES BLD QL SMEAR: ABNORMAL
MACROCYTES BLD QL SMEAR: NORMAL
MACROCYTES BLD QL SMEAR: NORMAL
MCH RBC QN AUTO: 33.7 PG (ref 26.6–33)
MCH RBC QN AUTO: 33.9 PG (ref 26.6–33)
MCH RBC QN AUTO: 34.1 PG (ref 26.6–33)
MCH RBC QN AUTO: 34.5 PG (ref 26.6–33)
MCH RBC QN AUTO: 34.6 PG (ref 26.6–33)
MCH RBC QN AUTO: 34.7 PG (ref 26.6–33)
MCH RBC QN AUTO: 34.9 PG (ref 26.6–33)
MCH RBC QN AUTO: 34.9 PG (ref 26.6–33)
MCH RBC QN AUTO: 35 PG (ref 26.6–33)
MCH RBC QN AUTO: 35.1 PG (ref 26.6–33)
MCH RBC QN AUTO: 35.2 PG (ref 26.6–33)
MCH RBC QN AUTO: 35.3 PG (ref 26.6–33)
MCH RBC QN AUTO: 35.4 PG (ref 26.6–33)
MCH RBC QN AUTO: 35.5 PG (ref 26.6–33)
MCH RBC QN AUTO: 35.6 PG (ref 26.6–33)
MCHC RBC AUTO-ENTMCNC: 32.9 G/DL (ref 31.5–35.7)
MCHC RBC AUTO-ENTMCNC: 33.2 G/DL (ref 31.5–35.7)
MCHC RBC AUTO-ENTMCNC: 33.3 G/DL (ref 31.5–35.7)
MCHC RBC AUTO-ENTMCNC: 33.3 G/DL (ref 31.5–35.7)
MCHC RBC AUTO-ENTMCNC: 33.5 G/DL (ref 31.5–35.7)
MCHC RBC AUTO-ENTMCNC: 33.9 G/DL (ref 31.5–35.7)
MCHC RBC AUTO-ENTMCNC: 34 G/DL (ref 31.5–35.7)
MCHC RBC AUTO-ENTMCNC: 34 G/DL (ref 31.5–35.7)
MCHC RBC AUTO-ENTMCNC: 34.1 G/DL (ref 31.5–35.7)
MCHC RBC AUTO-ENTMCNC: 34.1 G/DL (ref 31.5–35.7)
MCHC RBC AUTO-ENTMCNC: 34.2 G/DL (ref 31.5–35.7)
MCHC RBC AUTO-ENTMCNC: 34.3 G/DL (ref 31.5–35.7)
MCHC RBC AUTO-ENTMCNC: 34.3 G/DL (ref 31.5–35.7)
MCHC RBC AUTO-ENTMCNC: 34.4 G/DL (ref 31.5–35.7)
MCHC RBC AUTO-ENTMCNC: 34.5 G/DL (ref 31.5–35.7)
MCHC RBC AUTO-ENTMCNC: 34.7 G/DL (ref 31.5–35.7)
MCHC RBC AUTO-ENTMCNC: 34.7 G/DL (ref 31.5–35.7)
MCHC RBC AUTO-ENTMCNC: 35.1 G/DL (ref 31.5–35.7)
MCHC RBC AUTO-ENTMCNC: 35.2 G/DL (ref 31.5–35.7)
MCHC RBC AUTO-ENTMCNC: 35.2 G/DL (ref 31.5–35.7)
MCHC RBC AUTO-ENTMCNC: 35.6 G/DL (ref 31.5–35.7)
MCHC RBC AUTO-ENTMCNC: 36 G/DL (ref 31.5–35.7)
MCV RBC AUTO: 100 FL (ref 79–97)
MCV RBC AUTO: 100 FL (ref 79–97)
MCV RBC AUTO: 100.3 FL (ref 79–97)
MCV RBC AUTO: 100.4 FL (ref 79–97)
MCV RBC AUTO: 100.9 FL (ref 79–97)
MCV RBC AUTO: 101 FL (ref 79–97)
MCV RBC AUTO: 101.7 FL (ref 79–97)
MCV RBC AUTO: 101.7 FL (ref 79–97)
MCV RBC AUTO: 102.3 FL (ref 79–97)
MCV RBC AUTO: 102.7 FL (ref 79–97)
MCV RBC AUTO: 102.9 FL (ref 79–97)
MCV RBC AUTO: 103.4 FL (ref 79–97)
MCV RBC AUTO: 103.5 FL (ref 79–97)
MCV RBC AUTO: 103.5 FL (ref 79–97)
MCV RBC AUTO: 105.1 FL (ref 79–97)
MCV RBC AUTO: 105.2 FL (ref 79–97)
MCV RBC AUTO: 105.2 FL (ref 79–97)
MCV RBC AUTO: 98.4 FL (ref 79–97)
MCV RBC AUTO: 98.4 FL (ref 79–97)
MCV RBC AUTO: 98.7 FL (ref 79–97)
MCV RBC AUTO: 99 FL (ref 79–97)
MCV RBC AUTO: 99.6 FL (ref 79–97)
MCV RBC AUTO: 99.7 FL (ref 79–97)
MCV RBC AUTO: 99.7 FL (ref 79–97)
METAMYELOCYTES NFR BLD MANUAL: 1 % (ref 0–0)
METAMYELOCYTES NFR BLD MANUAL: 10 % (ref 0–0)
METAMYELOCYTES NFR BLD MANUAL: 10 % (ref 0–0)
METAMYELOCYTES NFR BLD MANUAL: 2 % (ref 0–0)
METAMYELOCYTES NFR BLD MANUAL: 3 % (ref 0–0)
METAMYELOCYTES NFR BLD MANUAL: 4 % (ref 0–0)
METAMYELOCYTES NFR BLD MANUAL: 5 % (ref 0–0)
METAMYELOCYTES NFR BLD MANUAL: 9 % (ref 0–0)
METAMYELOCYTES NFR BLD MANUAL: 9 % (ref 0–0)
MICROCYTES BLD QL: NORMAL
MONOCYTES # BLD AUTO: 0.59 10*3/MM3 (ref 0.1–0.9)
MONOCYTES # BLD AUTO: 0.61 10*3/MM3 (ref 0.1–0.9)
MONOCYTES # BLD AUTO: 0.69 10*3/MM3 (ref 0.1–0.9)
MONOCYTES # BLD AUTO: 0.82 10*3/MM3 (ref 0.1–0.9)
MONOCYTES # BLD AUTO: 0.94 10*3/MM3 (ref 0.1–0.9)
MONOCYTES # BLD AUTO: 0.99 10*3/MM3 (ref 0.1–0.9)
MONOCYTES # BLD AUTO: 1.07 10*3/MM3 (ref 0.1–0.9)
MONOCYTES # BLD AUTO: 1.07 10*3/MM3 (ref 0.1–0.9)
MONOCYTES # BLD AUTO: 1.12 10*3/MM3 (ref 0.1–0.9)
MONOCYTES # BLD AUTO: 1.13 10*3/MM3 (ref 0.1–0.9)
MONOCYTES # BLD AUTO: 1.16 10*3/MM3 (ref 0.1–0.9)
MONOCYTES # BLD AUTO: 1.26 10*3/MM3 (ref 0.1–0.9)
MONOCYTES # BLD AUTO: 1.3 10*3/MM3 (ref 0.1–0.9)
MONOCYTES # BLD AUTO: 1.3 10*3/MM3 (ref 0.1–0.9)
MONOCYTES # BLD AUTO: 1.35 10*3/MM3 (ref 0.1–0.9)
MONOCYTES # BLD AUTO: 1.46 10*3/MM3 (ref 0.1–0.9)
MONOCYTES # BLD AUTO: 1.48 10*3/MM3 (ref 0.1–0.9)
MONOCYTES # BLD AUTO: 1.48 10*3/MM3 (ref 0.1–0.9)
MONOCYTES # BLD AUTO: 1.49 10*3/MM3 (ref 0.1–0.9)
MONOCYTES # BLD AUTO: 1.62 10*3/MM3 (ref 0.1–0.9)
MONOCYTES # BLD AUTO: 1.64 10*3/MM3 (ref 0.1–0.9)
MONOCYTES # BLD AUTO: 1.73 10*3/MM3 (ref 0.1–0.9)
MONOCYTES # BLD AUTO: 1.74 10*3/MM3 (ref 0.1–0.9)
MONOCYTES # BLD AUTO: 1.9 10*3/MM3 (ref 0.1–0.9)
MONOCYTES # BLD AUTO: 1.91 10*3/MM3 (ref 0.1–0.9)
MONOCYTES # BLD AUTO: 1.97 10*3/MM3 (ref 0.1–0.9)
MONOCYTES # BLD AUTO: 1.98 10*3/MM3 (ref 0.1–0.9)
MONOCYTES # BLD AUTO: 1.99 10*3/MM3 (ref 0.1–0.9)
MONOCYTES # BLD AUTO: 2 10*3/MM3 (ref 0.1–0.9)
MONOCYTES # BLD AUTO: 2.19 10*3/MM3 (ref 0.1–0.9)
MONOCYTES # BLD AUTO: 2.33 10*3/MM3 (ref 0.1–0.9)
MONOCYTES # BLD AUTO: 2.45 10*3/MM3 (ref 0.1–0.9)
MONOCYTES NFR BLD AUTO: 13.8 % (ref 5–12)
MONOCYTES NFR BLD AUTO: 17.6 % (ref 5–12)
MONOCYTES NFR BLD AUTO: 20.3 % (ref 5–12)
MONOCYTES NFR BLD AUTO: 20.9 % (ref 5–12)
MONOCYTES NFR BLD AUTO: 21.4 % (ref 5–12)
MONOCYTES NFR BLD AUTO: 23.1 % (ref 5–12)
MONOCYTES NFR BLD AUTO: 25.1 % (ref 5–12)
MONOCYTES NFR BLD AUTO: 26.5 % (ref 5–12)
MONOCYTES NFR BLD AUTO: 27.2 % (ref 5–12)
MONOCYTES NFR BLD AUTO: 27.4 % (ref 5–12)
MONOCYTES NFR BLD AUTO: 30.7 % (ref 5–12)
MYELOCYTES NFR BLD MANUAL: 1 % (ref 0–0)
MYELOCYTES NFR BLD MANUAL: 2 % (ref 0–0)
MYELOCYTES NFR BLD MANUAL: 3 % (ref 0–0)
MYELOCYTES NFR BLD MANUAL: 4 % (ref 0–0)
MYELOCYTES NFR BLD MANUAL: 4 % (ref 0–0)
MYELOCYTES NFR BLD MANUAL: 5 % (ref 0–0)
MYELOCYTES NFR BLD MANUAL: 5 % (ref 0–0)
NEUTROPHILS # BLD AUTO: 1.77 10*3/MM3 (ref 1.7–7)
NEUTROPHILS # BLD AUTO: 1.91 10*3/MM3 (ref 1.7–7)
NEUTROPHILS # BLD AUTO: 1.99 10*3/MM3 (ref 1.7–7)
NEUTROPHILS # BLD AUTO: 2.01 10*3/MM3 (ref 1.7–7)
NEUTROPHILS # BLD AUTO: 2.17 10*3/MM3 (ref 1.7–7)
NEUTROPHILS # BLD AUTO: 2.68 10*3/MM3 (ref 1.7–7)
NEUTROPHILS # BLD AUTO: 2.75 10*3/MM3 (ref 1.7–7)
NEUTROPHILS # BLD AUTO: 3.04 10*3/MM3 (ref 1.7–7)
NEUTROPHILS # BLD AUTO: 3.2 10*3/MM3 (ref 1.7–7)
NEUTROPHILS # BLD AUTO: 3.36 10*3/MM3 (ref 1.7–7)
NEUTROPHILS # BLD AUTO: 3.4 10*3/MM3 (ref 1.7–7)
NEUTROPHILS # BLD AUTO: 3.58 10*3/MM3 (ref 1.7–7)
NEUTROPHILS # BLD AUTO: 3.63 10*3/MM3 (ref 1.7–7)
NEUTROPHILS # BLD AUTO: 3.71 10*3/MM3 (ref 1.7–7)
NEUTROPHILS # BLD AUTO: 3.75 10*3/MM3 (ref 1.7–7)
NEUTROPHILS # BLD AUTO: 3.75 10*3/MM3 (ref 1.7–7)
NEUTROPHILS # BLD AUTO: 3.76 10*3/MM3 (ref 1.7–7)
NEUTROPHILS # BLD AUTO: 4.02 10*3/MM3 (ref 1.7–7)
NEUTROPHILS # BLD AUTO: 4.2 10*3/MM3 (ref 1.7–7)
NEUTROPHILS # BLD AUTO: 4.4 10*3/MM3 (ref 1.7–7)
NEUTROPHILS # BLD AUTO: 4.47 10*3/MM3 (ref 1.4–7)
NEUTROPHILS # BLD AUTO: 4.51 10*3/MM3 (ref 1.7–7)
NEUTROPHILS # BLD AUTO: 4.54 10*3/MM3 (ref 1.7–7)
NEUTROPHILS # BLD AUTO: 4.55 10*3/MM3 (ref 1.7–7)
NEUTROPHILS # BLD AUTO: 4.72 10*3/MM3 (ref 1.7–7)
NEUTROPHILS # BLD AUTO: 4.78 10*3/MM3 (ref 1.4–7)
NEUTROPHILS # BLD AUTO: 4.8 10*3/MM3 (ref 1.7–7)
NEUTROPHILS # BLD AUTO: 4.97 10*3/MM3 (ref 1.4–7)
NEUTROPHILS # BLD AUTO: 5.17 10*3/MM3 (ref 1.7–7)
NEUTROPHILS # BLD AUTO: 5.19 10*3/MM3 (ref 1.7–7)
NEUTROPHILS # BLD AUTO: 6.66 10*3/MM3 (ref 1.7–7)
NEUTROPHILS # BLD AUTO: 6.87 10*3/MM3 (ref 1.7–7)
NEUTROPHILS NFR BLD AUTO: 40.7 % (ref 42.7–76)
NEUTROPHILS NFR BLD AUTO: 41.4 % (ref 42.7–76)
NEUTROPHILS NFR BLD AUTO: 42 % (ref 42.7–76)
NEUTROPHILS NFR BLD AUTO: 43.1 % (ref 42.7–76)
NEUTROPHILS NFR BLD AUTO: 44.4 % (ref 42.7–76)
NEUTROPHILS NFR BLD AUTO: 44.6 % (ref 42.7–76)
NEUTROPHILS NFR BLD AUTO: 45 % (ref 42.7–76)
NEUTROPHILS NFR BLD AUTO: 45.2 % (ref 42.7–76)
NEUTROPHILS NFR BLD AUTO: 50.6 % (ref 42.7–76)
NEUTROPHILS NFR BLD AUTO: 53 % (ref 42.7–76)
NEUTROPHILS NFR BLD AUTO: 56.7 % (ref 42.7–76)
NEUTROPHILS NFR BLD MANUAL: 38 % (ref 42.7–76)
NEUTROPHILS NFR BLD MANUAL: 40 % (ref 42.7–76)
NEUTROPHILS NFR BLD MANUAL: 41 % (ref 42.7–76)
NEUTROPHILS NFR BLD MANUAL: 42 % (ref 42.7–76)
NEUTROPHILS NFR BLD MANUAL: 44 % (ref 42.7–76)
NEUTROPHILS NFR BLD MANUAL: 44 % (ref 42.7–76)
NEUTROPHILS NFR BLD MANUAL: 45 % (ref 42.7–76)
NEUTROPHILS NFR BLD MANUAL: 46 % (ref 42.7–76)
NEUTROPHILS NFR BLD MANUAL: 47 % (ref 42.7–76)
NEUTROPHILS NFR BLD MANUAL: 48 % (ref 42.7–76)
NEUTROPHILS NFR BLD MANUAL: 51 % (ref 42.7–76)
NEUTROPHILS NFR BLD MANUAL: 51 % (ref 42.7–76)
NEUTROPHILS NFR BLD MANUAL: 52 % (ref 42.7–76)
NEUTROPHILS NFR BLD MANUAL: 53 % (ref 42.7–76)
NEUTROPHILS NFR BLD MANUAL: 56 % (ref 42.7–76)
NEUTROPHILS NFR BLD MANUAL: 58 % (ref 42.7–76)
NEUTROPHILS NFR BLD MANUAL: 59 % (ref 42.7–76)
NEUTS BAND NFR BLD MANUAL: 1 % (ref 0–5)
NEUTS BAND NFR BLD MANUAL: 1 % (ref 0–5)
NEUTS BAND NFR BLD MANUAL: 10 % (ref 0–5)
NEUTS BAND NFR BLD MANUAL: 11 % (ref 0–5)
NEUTS BAND NFR BLD MANUAL: 13 % (ref 0–5)
NEUTS BAND NFR BLD MANUAL: 2 % (ref 0–5)
NEUTS BAND NFR BLD MANUAL: 2 % (ref 0–5)
NEUTS BAND NFR BLD MANUAL: 3 % (ref 0–5)
NEUTS BAND NFR BLD MANUAL: 4 % (ref 0–5)
NEUTS BAND NFR BLD MANUAL: 4 % (ref 0–5)
NEUTS BAND NFR BLD MANUAL: 5 % (ref 0–5)
NEUTS BAND NFR BLD MANUAL: 7 % (ref 0–5)
NEUTS BAND NFR BLD MANUAL: 7 % (ref 0–5)
NEUTS BAND NFR BLD MANUAL: 8 % (ref 0–5)
NEUTS BAND NFR BLD MANUAL: 8 % (ref 0–5)
NRBC BLD AUTO-RTO: 0.4 /100 WBC (ref 0–0)
NRBC BLD AUTO-RTO: 0.5 /100 WBC (ref 0–0.2)
NRBC BLD AUTO-RTO: 0.6 /100 WBC (ref 0–0)
NRBC BLD AUTO-RTO: 0.8 /100 WBC (ref 0–0.2)
NRBC BLD AUTO-RTO: 0.9 /100 WBC (ref 0–0.2)
NRBC BLD AUTO-RTO: 1.1 /100 WBC (ref 0–0.2)
NRBC BLD AUTO-RTO: 1.6 /100 WBC (ref 0–0.2)
NRBC BLD AUTO-RTO: 1.8 /100 WBC (ref 0–0.2)
NRBC BLD AUTO-RTO: 1.8 /100 WBC (ref 0–0.2)
NRBC BLD AUTO-RTO: 2 /100 WBC (ref 0–0.2)
NRBC BLD AUTO-RTO: 2.1 /100 WBC (ref 0–0.2)
NRBC SPEC MANUAL: 1 /100 WBC (ref 0–0.2)
NRBC SPEC MANUAL: 2 /100 WBC (ref 0–0.2)
NRBC SPEC MANUAL: 2 /100 WBC (ref 0–0.2)
NRBC SPEC MANUAL: 4 /100 WBC (ref 0–0.2)
NRBC SPEC MANUAL: 4 /100 WBC (ref 0–0.2)
OVALOCYTES BLD QL SMEAR: ABNORMAL
OVALOCYTES BLD QL SMEAR: ABNORMAL
OVALOCYTES BLD QL SMEAR: NORMAL
PATH REPORT.FINAL DX SPEC: NORMAL
PATH REPORT.GROSS SPEC: NORMAL
PLAT MORPH BLD: ABNORMAL
PLATELET # BLD AUTO: 104 10*3/MM3 (ref 140–450)
PLATELET # BLD AUTO: 108 10*3/MM3 (ref 140–450)
PLATELET # BLD AUTO: 110 10*3/MM3 (ref 140–450)
PLATELET # BLD AUTO: 111 10*3/MM3 (ref 140–450)
PLATELET # BLD AUTO: 111 10*3/MM3 (ref 140–450)
PLATELET # BLD AUTO: 123 10*3/MM3 (ref 140–450)
PLATELET # BLD AUTO: 125 10*3/MM3 (ref 140–450)
PLATELET # BLD AUTO: 129 10*3/MM3 (ref 140–450)
PLATELET # BLD AUTO: 150 10*3/MM3 (ref 140–450)
PLATELET # BLD AUTO: 160 10*3/MM3 (ref 140–450)
PLATELET # BLD AUTO: 167 10*3/MM3 (ref 140–450)
PLATELET # BLD AUTO: 177 10*3/MM3 (ref 140–450)
PLATELET # BLD AUTO: 191 10*3/MM3 (ref 140–450)
PLATELET # BLD AUTO: 217 10*3/MM3 (ref 140–450)
PLATELET # BLD AUTO: 221 10*3/MM3 (ref 140–450)
PLATELET # BLD AUTO: 262 10*3/MM3 (ref 140–450)
PLATELET # BLD AUTO: 262 10*3/MM3 (ref 140–450)
PLATELET # BLD AUTO: 43 10*3/MM3 (ref 140–450)
PLATELET # BLD AUTO: 47 10*3/MM3 (ref 140–450)
PLATELET # BLD AUTO: 60 10*3/MM3 (ref 140–450)
PLATELET # BLD AUTO: 61 10*3/MM3 (ref 140–450)
PLATELET # BLD AUTO: 68 10*3/MM3 (ref 140–450)
PLATELET # BLD AUTO: 75 10*3/MM3 (ref 140–450)
PLATELET # BLD AUTO: 87 10*3/MM3 (ref 140–450)
PMV BLD AUTO: 12.8 FL (ref 6–12)
PMV BLD AUTO: 13.4 FL (ref 6–12)
PMV BLD AUTO: 13.5 FL (ref 6–12)
PMV BLD AUTO: 13.5 FL (ref 6–12)
PMV BLD AUTO: 14.1 FL (ref 6–12)
PMV BLD AUTO: 14.7 FL (ref 6–12)
PMV BLD AUTO: ABNORMAL FL
PMV BLD AUTO: ABNORMAL FL (ref 6–12)
POIKILOCYTOSIS BLD QL SMEAR: ABNORMAL
POIKILOCYTOSIS BLD QL SMEAR: ABNORMAL
POLYCHROMASIA BLD QL SMEAR: ABNORMAL
POLYCHROMASIA BLD QL SMEAR: NORMAL
POLYCHROMASIA BLD QL SMEAR: NORMAL
POTASSIUM BLD-SCNC: 3.4 MMOL/L (ref 3.5–5.2)
POTASSIUM BLD-SCNC: 3.9 MMOL/L (ref 3.5–5.1)
POTASSIUM BLD-SCNC: 3.9 MMOL/L (ref 3.5–5.2)
POTASSIUM BLD-SCNC: 3.9 MMOL/L (ref 3.5–5.2)
POTASSIUM BLD-SCNC: 4 MMOL/L (ref 3.5–5.2)
POTASSIUM BLD-SCNC: 4.1 MMOL/L (ref 3.5–5.2)
POTASSIUM BLD-SCNC: 4.1 MMOL/L (ref 3.5–5.2)
POTASSIUM BLD-SCNC: 4.2 MMOL/L (ref 3.5–5.2)
POTASSIUM BLD-SCNC: 4.3 MMOL/L (ref 3.5–5.2)
POTASSIUM BLD-SCNC: 4.3 MMOL/L (ref 3.5–5.2)
POTASSIUM BLD-SCNC: 4.4 MMOL/L (ref 3.5–5.2)
POTASSIUM BLD-SCNC: 4.5 MMOL/L (ref 3.5–5.2)
POTASSIUM BLD-SCNC: 4.6 MMOL/L (ref 3.5–5.2)
PROT SERPL-MCNC: 6.3 G/DL (ref 6–8.5)
PROT SERPL-MCNC: 6.4 G/DL (ref 6–8.5)
PROT SERPL-MCNC: 6.6 G/DL (ref 6–8.5)
PROT SERPL-MCNC: 6.7 G/DL (ref 6–8.5)
PROT SERPL-MCNC: 7 G/DL (ref 6–8.5)
PROT SERPL-MCNC: 7.1 G/DL (ref 6–8.5)
PROT SERPL-MCNC: 7.2 G/DL (ref 6–8.5)
PROT SERPL-MCNC: 7.3 G/DL (ref 6–8.5)
PROT SERPL-MCNC: 7.4 G/DL (ref 6–8.5)
PROT SERPL-MCNC: 7.6 G/DL (ref 6–8.5)
PROT SERPL-MCNC: 7.8 G/DL (ref 6.3–8.6)
PROT SERPL-MCNC: 8.1 G/DL (ref 6–8.5)
RBC # BLD AUTO: 1.91 10*6/MM3 (ref 4.14–5.8)
RBC # BLD AUTO: 1.99 10*6/MM3 (ref 4.14–5.8)
RBC # BLD AUTO: 2.07 10*6/MM3 (ref 4.14–5.8)
RBC # BLD AUTO: 2.14 10*6/MM3 (ref 4.14–5.8)
RBC # BLD AUTO: 2.19 10*6/MM3 (ref 4.14–5.8)
RBC # BLD AUTO: 2.28 10*6/MM3 (ref 4.14–5.8)
RBC # BLD AUTO: 2.29 10*6/MM3 (ref 4.14–5.8)
RBC # BLD AUTO: 2.31 10*6/MM3 (ref 4.14–5.8)
RBC # BLD AUTO: 2.49 10*6/MM3 (ref 4.14–5.8)
RBC # BLD AUTO: 2.52 10*6/MM3 (ref 4.14–5.8)
RBC # BLD AUTO: 2.76 10*6/MM3 (ref 4.14–5.8)
RBC # BLD AUTO: 2.88 10*6/MM3 (ref 4.14–5.8)
RBC # BLD AUTO: 2.91 10*6/MM3 (ref 4.14–5.8)
RBC # BLD AUTO: 2.93 10*6/MM3 (ref 4.14–5.8)
RBC # BLD AUTO: 2.98 10*6/MM3 (ref 4.14–5.8)
RBC # BLD AUTO: 3.04 10*6/MM3 (ref 4.14–5.8)
RBC # BLD AUTO: 3.05 10*6/MM3 (ref 4.14–5.8)
RBC # BLD AUTO: 3.06 10*6/MM3 (ref 4.14–5.8)
RBC # BLD AUTO: 3.1 10*6/MM3 (ref 4.14–5.8)
RBC # BLD AUTO: 3.17 10*6/MM3 (ref 4.14–5.8)
RBC # BLD AUTO: 3.17 10*6/MM3 (ref 4.14–5.8)
RBC # BLD AUTO: 3.22 10*6/MM3 (ref 4.14–5.8)
RBC # BLD AUTO: 3.25 10*6/MM3 (ref 4.14–5.8)
RBC # BLD AUTO: 3.33 10*6/MM3 (ref 4.14–5.8)
RBC MORPH BLD: NORMAL
RH BLD: POSITIVE
SCHISTOCYTES BLD QL SMEAR: ABNORMAL
SMALL PLATELETS BLD QL SMEAR: ABNORMAL
SMALL PLATELETS BLD QL SMEAR: ADEQUATE
SMALL PLATELETS BLD QL SMEAR: NORMAL
SMALL PLATELETS BLD QL SMEAR: NORMAL
SMUDGE CELLS IN BLOOD BY LIGHT MICROSCOPY: 1 /100 WBC
SMUDGE CELLS IN BLOOD BY LIGHT MICROSCOPY: 2 /100 WBC
SMUDGE CELLS IN BLOOD BY LIGHT MICROSCOPY: 3 /100 WBC
SODIUM BLD-SCNC: 134 MMOL/L (ref 136–145)
SODIUM BLD-SCNC: 134 MMOL/L (ref 137–145)
SODIUM BLD-SCNC: 136 MMOL/L (ref 136–145)
SODIUM BLD-SCNC: 137 MMOL/L (ref 136–145)
SODIUM BLD-SCNC: 138 MMOL/L (ref 136–145)
SODIUM BLD-SCNC: 139 MMOL/L (ref 136–145)
SODIUM BLD-SCNC: 139 MMOL/L (ref 136–145)
T&S EXPIRATION DATE: NORMAL
TARGETS BLD QL SMEAR: ABNORMAL
TARGETS BLD QL SMEAR: NORMAL
TIBC SERPL-MCNC: 238 MCG/DL (ref 298–536)
TIBC SERPL-MCNC: 302 MCG/DL (ref 298–536)
TRANSFERRIN SERPL-MCNC: 160 MG/DL (ref 200–360)
TRANSFERRIN SERPL-MCNC: 203 MG/DL (ref 200–360)
UNIT  ABO: NORMAL
UNIT  RH: NORMAL
VARIANT LYMPHS NFR BLD MANUAL: 1 % (ref 0–5)
VARIANT LYMPHS NFR BLD MANUAL: 14 % (ref 0–5)
VARIANT LYMPHS NFR BLD MANUAL: 2 % (ref 0–5)
VARIANT LYMPHS NFR BLD MANUAL: 3 % (ref 0–5)
VARIANT LYMPHS NFR BLD MANUAL: 4 % (ref 0–5)
VARIANT LYMPHS NFR BLD MANUAL: 5 % (ref 0–5)
VARIANT LYMPHS NFR BLD MANUAL: 7 % (ref 0–5)
VIT B12 BLD-MCNC: 365 PG/ML (ref 211–946)
VIT B12 BLD-MCNC: 808 PG/ML (ref 211–946)
WBC MORPH BLD: NORMAL
WBC NRBC COR # BLD: 10.09 10*3/MM3 (ref 3.4–10.8)
WBC NRBC COR # BLD: 11.45 10*3/MM3 (ref 3.4–10.8)
WBC NRBC COR # BLD: 3.93 10*3/MM3 (ref 3.4–10.8)
WBC NRBC COR # BLD: 4.28 10*3/MM3 (ref 3.4–10.8)
WBC NRBC COR # BLD: 5.23 10*3/MM3 (ref 3.4–10.8)
WBC NRBC COR # BLD: 6.39 10*3/MM3 (ref 3.4–10.8)
WBC NRBC COR # BLD: 7.23 10*3/MM3 (ref 3.4–10.8)
WBC NRBC COR # BLD: 7.28 10*3/MM3 (ref 3.4–10.8)
WBC NRBC COR # BLD: 7.44 10*3/MM3 (ref 3.4–10.8)
WBC NRBC COR # BLD: 7.56 10*3/MM3 (ref 3.4–10.8)
WBC NRBC COR # BLD: 7.64 10*3/MM3 (ref 3.4–10.8)
WBC NRBC COR # BLD: 7.73 10*3/MM3 (ref 3.4–10.8)
WBC NRBC COR # BLD: 7.78 10*3/MM3 (ref 3.4–10.8)
WBC NRBC COR # BLD: 7.86 10*3/MM3 (ref 3.4–10.8)
WBC NRBC COR # BLD: 7.97 10*3/MM3 (ref 3.4–10.8)
WBC NRBC COR # BLD: 8.12 10*3/MM3 (ref 3.4–10.8)
WBC NRBC COR # BLD: 8.26 10*3/MM3 (ref 3.4–10.8)
WBC NRBC COR # BLD: 8.42 10*3/MM3 (ref 3.4–10.8)
WBC NRBC COR # BLD: 8.42 10*3/MM3 (ref 3.4–10.8)
WBC NRBC COR # BLD: 8.43 10*3/MM3 (ref 3.4–10.8)
WBC NRBC COR # BLD: 8.62 10*3/MM3 (ref 3.4–10.8)
WBC NRBC COR # BLD: 8.71 10*3/MM3 (ref 3.4–10.8)
WBC NRBC COR # BLD: 9.05 10*3/MM3 (ref 3.4–10.8)
WBC NRBC COR # BLD: 9.8 10*3/MM3 (ref 3.4–10.8)
WHOLE BLOOD HOLD SPECIMEN: NORMAL

## 2019-01-01 PROCEDURE — G9903 PT SCRN TBCO ID AS NON USER: HCPCS | Performed by: INTERNAL MEDICINE

## 2019-01-01 PROCEDURE — P9016 RBC LEUKOCYTES REDUCED: HCPCS

## 2019-01-01 PROCEDURE — 85007 BL SMEAR W/DIFF WBC COUNT: CPT

## 2019-01-01 PROCEDURE — 85025 COMPLETE CBC W/AUTO DIFF WBC: CPT

## 2019-01-01 PROCEDURE — 36415 COLL VENOUS BLD VENIPUNCTURE: CPT | Performed by: INTERNAL MEDICINE

## 2019-01-01 PROCEDURE — 25010000002 PROPOFOL 10 MG/ML EMULSION: Performed by: NURSE ANESTHETIST, CERTIFIED REGISTERED

## 2019-01-01 PROCEDURE — 96413 CHEMO IV INFUSION 1 HR: CPT | Performed by: INTERNAL MEDICINE

## 2019-01-01 PROCEDURE — 82746 ASSAY OF FOLIC ACID SERUM: CPT | Performed by: INTERNAL MEDICINE

## 2019-01-01 PROCEDURE — 25010000002 AZACITIDINE 100 MG RECONSTITUTED SUSPENSION 1 EACH VIAL: Performed by: INTERNAL MEDICINE

## 2019-01-01 PROCEDURE — 99214 OFFICE O/P EST MOD 30 MIN: CPT | Performed by: INTERNAL MEDICINE

## 2019-01-01 PROCEDURE — 86923 COMPATIBILITY TEST ELECTRIC: CPT

## 2019-01-01 PROCEDURE — 86900 BLOOD TYPING SEROLOGIC ABO: CPT

## 2019-01-01 PROCEDURE — G0463 HOSPITAL OUTPT CLINIC VISIT: HCPCS | Performed by: INTERNAL MEDICINE

## 2019-01-01 PROCEDURE — G8417 CALC BMI ABV UP PARAM F/U: HCPCS | Performed by: INTERNAL MEDICINE

## 2019-01-01 PROCEDURE — 25010000002 DEXAMETHASONE SODIUM PHOSPHATE 100 MG/10ML SOLUTION 10 ML VIAL: Performed by: INTERNAL MEDICINE

## 2019-01-01 PROCEDURE — 99213 OFFICE O/P EST LOW 20 MIN: CPT | Performed by: NURSE PRACTITIONER

## 2019-01-01 PROCEDURE — 1157F ADVNC CARE PLAN IN RCRD: CPT | Performed by: INTERNAL MEDICINE

## 2019-01-01 PROCEDURE — 80053 COMPREHEN METABOLIC PANEL: CPT

## 2019-01-01 PROCEDURE — 74176 CT ABD & PELVIS W/O CONTRAST: CPT

## 2019-01-01 PROCEDURE — 86850 RBC ANTIBODY SCREEN: CPT

## 2019-01-01 PROCEDURE — 36415 COLL VENOUS BLD VENIPUNCTURE: CPT | Performed by: NURSE PRACTITIONER

## 2019-01-01 PROCEDURE — 96375 TX/PRO/DX INJ NEW DRUG ADDON: CPT | Performed by: INTERNAL MEDICINE

## 2019-01-01 PROCEDURE — 25010000002 ONDANSETRON PER 1 MG: Performed by: INTERNAL MEDICINE

## 2019-01-01 PROCEDURE — 80053 COMPREHEN METABOLIC PANEL: CPT | Performed by: INTERNAL MEDICINE

## 2019-01-01 PROCEDURE — 83615 LACTATE (LD) (LDH) ENZYME: CPT

## 2019-01-01 PROCEDURE — 85025 COMPLETE CBC W/AUTO DIFF WBC: CPT | Performed by: INTERNAL MEDICINE

## 2019-01-01 PROCEDURE — G8731 PAIN NEG NO PLAN: HCPCS | Performed by: INTERNAL MEDICINE

## 2019-01-01 PROCEDURE — G0463 HOSPITAL OUTPT CLINIC VISIT: HCPCS | Performed by: NURSE PRACTITIONER

## 2019-01-01 PROCEDURE — 87493 C DIFF AMPLIFIED PROBE: CPT | Performed by: INTERNAL MEDICINE

## 2019-01-01 PROCEDURE — 85007 BL SMEAR W/DIFF WBC COUNT: CPT | Performed by: INTERNAL MEDICINE

## 2019-01-01 PROCEDURE — 83540 ASSAY OF IRON: CPT | Performed by: INTERNAL MEDICINE

## 2019-01-01 PROCEDURE — 86901 BLOOD TYPING SEROLOGIC RH(D): CPT

## 2019-01-01 PROCEDURE — G0008 ADMIN INFLUENZA VIRUS VAC: HCPCS | Performed by: INTERNAL MEDICINE

## 2019-01-01 PROCEDURE — 80053 COMPREHEN METABOLIC PANEL: CPT | Performed by: NURSE PRACTITIONER

## 2019-01-01 PROCEDURE — 25010000002 PALONOSETRON PER 25 MCG: Performed by: INTERNAL MEDICINE

## 2019-01-01 PROCEDURE — 90674 CCIIV4 VAC NO PRSV 0.5 ML IM: CPT | Performed by: INTERNAL MEDICINE

## 2019-01-01 PROCEDURE — 86901 BLOOD TYPING SEROLOGIC RH(D): CPT | Performed by: INTERNAL MEDICINE

## 2019-01-01 PROCEDURE — 84466 ASSAY OF TRANSFERRIN: CPT | Performed by: INTERNAL MEDICINE

## 2019-01-01 PROCEDURE — 85007 BL SMEAR W/DIFF WBC COUNT: CPT | Performed by: NURSE PRACTITIONER

## 2019-01-01 PROCEDURE — 36430 TRANSFUSION BLD/BLD COMPNT: CPT

## 2019-01-01 PROCEDURE — 96409 CHEMO IV PUSH SNGL DRUG: CPT | Performed by: INTERNAL MEDICINE

## 2019-01-01 PROCEDURE — 1123F ACP DISCUSS/DSCN MKR DOCD: CPT | Performed by: INTERNAL MEDICINE

## 2019-01-01 PROCEDURE — 96361 HYDRATE IV INFUSION ADD-ON: CPT | Performed by: INTERNAL MEDICINE

## 2019-01-01 PROCEDURE — 25010000002 INFLUENZA VAC SUBUNIT QUAD 0.5 ML SUSPENSION PREFILLED SYRINGE: Performed by: INTERNAL MEDICINE

## 2019-01-01 PROCEDURE — 99213 OFFICE O/P EST LOW 20 MIN: CPT | Performed by: INTERNAL MEDICINE

## 2019-01-01 PROCEDURE — 93296 REM INTERROG EVL PM/IDS: CPT | Performed by: NURSE PRACTITIONER

## 2019-01-01 PROCEDURE — 88305 TISSUE EXAM BY PATHOLOGIST: CPT | Performed by: INTERNAL MEDICINE

## 2019-01-01 PROCEDURE — 82728 ASSAY OF FERRITIN: CPT | Performed by: INTERNAL MEDICINE

## 2019-01-01 PROCEDURE — 93288 INTERROG EVL PM/LDLS PM IP: CPT | Performed by: NURSE PRACTITIONER

## 2019-01-01 PROCEDURE — 96374 THER/PROPH/DIAG INJ IV PUSH: CPT | Performed by: INTERNAL MEDICINE

## 2019-01-01 PROCEDURE — 82607 VITAMIN B-12: CPT | Performed by: INTERNAL MEDICINE

## 2019-01-01 PROCEDURE — 99214 OFFICE O/P EST MOD 30 MIN: CPT | Performed by: FAMILY MEDICINE

## 2019-01-01 PROCEDURE — 93294 REM INTERROG EVL PM/LDLS PM: CPT | Performed by: NURSE PRACTITIONER

## 2019-01-01 PROCEDURE — 86850 RBC ANTIBODY SCREEN: CPT | Performed by: INTERNAL MEDICINE

## 2019-01-01 PROCEDURE — 88305 TISSUE EXAM BY PATHOLOGIST: CPT | Performed by: PATHOLOGY

## 2019-01-01 PROCEDURE — 85025 COMPLETE CBC W/AUTO DIFF WBC: CPT | Performed by: NURSE PRACTITIONER

## 2019-01-01 PROCEDURE — 86900 BLOOD TYPING SEROLOGIC ABO: CPT | Performed by: INTERNAL MEDICINE

## 2019-01-01 RX ORDER — PALONOSETRON 0.05 MG/ML
0.25 INJECTION, SOLUTION INTRAVENOUS ONCE
Status: COMPLETED | OUTPATIENT
Start: 2019-01-01 | End: 2019-01-01

## 2019-01-01 RX ORDER — SODIUM CHLORIDE 9 MG/ML
250 INJECTION, SOLUTION INTRAVENOUS ONCE
Status: COMPLETED | OUTPATIENT
Start: 2019-01-01 | End: 2019-01-01

## 2019-01-01 RX ORDER — MIRTAZAPINE 15 MG/1
15 TABLET, FILM COATED ORAL NIGHTLY
Qty: 30 TABLET | Refills: 0 | Status: SHIPPED | OUTPATIENT
Start: 2019-01-01

## 2019-01-01 RX ORDER — SODIUM CHLORIDE 9 MG/ML
250 INJECTION, SOLUTION INTRAVENOUS ONCE
Status: CANCELLED | OUTPATIENT
Start: 2019-01-01

## 2019-01-01 RX ORDER — PALONOSETRON 0.05 MG/ML
0.25 INJECTION, SOLUTION INTRAVENOUS ONCE
Status: CANCELLED | OUTPATIENT
Start: 2019-01-01

## 2019-01-01 RX ORDER — SOTALOL HYDROCHLORIDE 80 MG/1
TABLET ORAL
Qty: 90 TABLET | Refills: 3 | Status: SHIPPED | OUTPATIENT
Start: 2019-01-01 | End: 2020-01-01 | Stop reason: SDUPTHER

## 2019-01-01 RX ORDER — LIDOCAINE HYDROCHLORIDE 20 MG/ML
INJECTION, SOLUTION EPIDURAL; INFILTRATION; INTRACAUDAL; PERINEURAL AS NEEDED
Status: DISCONTINUED | OUTPATIENT
Start: 2019-01-01 | End: 2019-01-01 | Stop reason: SURG

## 2019-01-01 RX ORDER — PALONOSETRON 0.05 MG/ML
0.25 INJECTION, SOLUTION INTRAVENOUS ONCE
Status: DISCONTINUED | OUTPATIENT
Start: 2019-01-01 | End: 2019-01-01 | Stop reason: HOSPADM

## 2019-01-01 RX ORDER — POTASSIUM CHLORIDE 20 MEQ/1
20 TABLET, EXTENDED RELEASE ORAL DAILY
Qty: 3 TABLET | Refills: 0 | Status: SHIPPED | OUTPATIENT
Start: 2019-01-01 | End: 2019-01-01

## 2019-01-01 RX ORDER — SODIUM CHLORIDE 9 MG/ML
250 INJECTION, SOLUTION INTRAVENOUS AS NEEDED
Status: CANCELLED | OUTPATIENT
Start: 2019-01-01

## 2019-01-01 RX ORDER — MIRTAZAPINE 15 MG/1
15 TABLET, FILM COATED ORAL NIGHTLY
Qty: 30 TABLET | Refills: 0 | Status: SHIPPED | OUTPATIENT
Start: 2019-01-01 | End: 2019-01-01 | Stop reason: ALTCHOICE

## 2019-01-01 RX ORDER — ALLOPURINOL 100 MG/1
100 TABLET ORAL 4 TIMES DAILY
Qty: 360 TABLET | Refills: 3 | Status: SHIPPED | OUTPATIENT
Start: 2019-01-01 | End: 2020-01-01 | Stop reason: SDUPTHER

## 2019-01-01 RX ORDER — SODIUM CHLORIDE 9 MG/ML
250 INJECTION, SOLUTION INTRAVENOUS AS NEEDED
Status: DISCONTINUED | OUTPATIENT
Start: 2019-01-01 | End: 2019-01-01 | Stop reason: HOSPADM

## 2019-01-01 RX ORDER — DEXTROSE AND SODIUM CHLORIDE 5; .45 G/100ML; G/100ML
30 INJECTION, SOLUTION INTRAVENOUS CONTINUOUS PRN
Status: DISCONTINUED | OUTPATIENT
Start: 2019-01-01 | End: 2019-01-01 | Stop reason: HOSPADM

## 2019-01-01 RX ORDER — OMEPRAZOLE 20 MG/1
20 CAPSULE, DELAYED RELEASE ORAL DAILY
Qty: 90 CAPSULE | Refills: 3 | Status: SHIPPED | OUTPATIENT
Start: 2019-01-01 | End: 2020-01-01 | Stop reason: SDUPTHER

## 2019-01-01 RX ORDER — SODIUM CHLORIDE 9 MG/ML
1000 INJECTION, SOLUTION INTRAVENOUS ONCE
Status: COMPLETED | OUTPATIENT
Start: 2019-01-01 | End: 2019-01-01

## 2019-01-01 RX ORDER — PROPOFOL 10 MG/ML
VIAL (ML) INTRAVENOUS AS NEEDED
Status: DISCONTINUED | OUTPATIENT
Start: 2019-01-01 | End: 2019-01-01 | Stop reason: SURG

## 2019-01-01 RX ORDER — PROMETHAZINE HYDROCHLORIDE 12.5 MG/1
12.5 TABLET ORAL EVERY 6 HOURS PRN
Qty: 90 TABLET | Refills: 0 | Status: SHIPPED | OUTPATIENT
Start: 2019-01-01

## 2019-01-01 RX ORDER — ONDANSETRON 4 MG/1
4 TABLET, FILM COATED ORAL 4 TIMES DAILY PRN
Qty: 40 TABLET | Refills: 3 | Status: SHIPPED | OUTPATIENT
Start: 2019-01-01

## 2019-01-01 RX ORDER — ONDANSETRON 4 MG/1
4 TABLET, FILM COATED ORAL ONCE
Status: CANCELLED
Start: 2019-01-01 | End: 2019-01-01

## 2019-01-01 RX ORDER — ONDANSETRON 4 MG/1
4 TABLET, FILM COATED ORAL ONCE
Status: COMPLETED | OUTPATIENT
Start: 2019-01-01 | End: 2019-01-01

## 2019-01-01 RX ORDER — LOPERAMIDE HYDROCHLORIDE 2 MG/1
2 TABLET ORAL
Qty: 40 TABLET | Refills: 3 | Status: SHIPPED | OUTPATIENT
Start: 2019-01-01

## 2019-01-01 RX ORDER — SODIUM CHLORIDE 9 MG/ML
1000 INJECTION, SOLUTION INTRAVENOUS ONCE
Status: CANCELLED | OUTPATIENT
Start: 2019-01-01

## 2019-01-01 RX ORDER — LANOLIN ALCOHOL/MO/W.PET/CERES
1000 CREAM (GRAM) TOPICAL DAILY
Qty: 90 TABLET | Refills: 1 | Status: SHIPPED | OUTPATIENT
Start: 2019-01-01

## 2019-01-01 RX ORDER — DULOXETIN HYDROCHLORIDE 30 MG/1
30 CAPSULE, DELAYED RELEASE ORAL DAILY
Qty: 30 CAPSULE | Refills: 11 | Status: SHIPPED | OUTPATIENT
Start: 2019-01-01

## 2019-01-01 RX ORDER — FOLIC ACID 1 MG/1
1 TABLET ORAL DAILY
Qty: 90 TABLET | Refills: 3 | Status: SHIPPED | OUTPATIENT
Start: 2019-01-01

## 2019-01-01 RX ADMIN — AZACITIDINE 155 MG: 100 INJECTION, POWDER, LYOPHILIZED, FOR SOLUTION INTRAVENOUS; SUBCUTANEOUS at 09:03

## 2019-01-01 RX ADMIN — AZACITIDINE 155 MG: 100 INJECTION, POWDER, LYOPHILIZED, FOR SOLUTION INTRAVENOUS; SUBCUTANEOUS at 09:36

## 2019-01-01 RX ADMIN — INFLUENZA A VIRUS A/IDAHO/07/2018 (H1N1) ANTIGEN (MDCK CELL DERIVED, PROPIOLACTONE INACTIVATED, INFLUENZA A VIRUS A/INDIANA/08/2018 (H3N2) ANTIGEN (MDCK CELL DERIVED, PROPIOLACTONE INACTIVATED), INFLUENZA B VIRUS B/SINGAPORE/INFTT-16-0610/2016 ANTIGEN (MDCK CELL DERIVED, PROPIOLACTONE INACTIVATED), INFLUENZA B VIRUS B/IOWA/06/2017 ANTIGEN (MDCK CELL DERIVED, PROPIOLACTONE INACTIVATED) 0.5 ML: 15; 15; 15; 15 INJECTION, SUSPENSION INTRAMUSCULAR at 11:01

## 2019-01-01 RX ADMIN — SODIUM CHLORIDE 250 ML: 9 INJECTION, SOLUTION INTRAVENOUS at 09:07

## 2019-01-01 RX ADMIN — AZACITIDINE 155 MG: 100 INJECTION, POWDER, LYOPHILIZED, FOR SOLUTION INTRAVENOUS; SUBCUTANEOUS at 09:19

## 2019-01-01 RX ADMIN — AZACITIDINE 155 MG: 100 INJECTION, POWDER, LYOPHILIZED, FOR SOLUTION INTRAVENOUS; SUBCUTANEOUS at 08:59

## 2019-01-01 RX ADMIN — AZACITIDINE 155 MG: 100 INJECTION, POWDER, LYOPHILIZED, FOR SOLUTION INTRAVENOUS; SUBCUTANEOUS at 10:21

## 2019-01-01 RX ADMIN — SODIUM CHLORIDE 250 ML: 9 INJECTION, SOLUTION INTRAVENOUS at 10:42

## 2019-01-01 RX ADMIN — SODIUM CHLORIDE 250 ML: 9 INJECTION, SOLUTION INTRAVENOUS at 09:19

## 2019-01-01 RX ADMIN — SODIUM CHLORIDE 250 ML: 9 INJECTION, SOLUTION INTRAVENOUS at 11:00

## 2019-01-01 RX ADMIN — AZACITIDINE 155 MG: 100 INJECTION, POWDER, LYOPHILIZED, FOR SOLUTION INTRAVENOUS; SUBCUTANEOUS at 09:02

## 2019-01-01 RX ADMIN — AZACITIDINE 155 MG: 100 INJECTION, POWDER, LYOPHILIZED, FOR SOLUTION INTRAVENOUS; SUBCUTANEOUS at 09:28

## 2019-01-01 RX ADMIN — SODIUM CHLORIDE 250 ML: 9 INJECTION, SOLUTION INTRAVENOUS at 08:51

## 2019-01-01 RX ADMIN — SODIUM CHLORIDE 250 ML: 9 INJECTION, SOLUTION INTRAVENOUS at 09:03

## 2019-01-01 RX ADMIN — AZACITIDINE 155 MG: 100 INJECTION, POWDER, LYOPHILIZED, FOR SOLUTION INTRAVENOUS; SUBCUTANEOUS at 11:33

## 2019-01-01 RX ADMIN — AZACITIDINE 155 MG: 100 INJECTION, POWDER, LYOPHILIZED, FOR SOLUTION INTRAVENOUS; SUBCUTANEOUS at 09:09

## 2019-01-01 RX ADMIN — SODIUM CHLORIDE 250 ML: 9 INJECTION, SOLUTION INTRAVENOUS at 08:44

## 2019-01-01 RX ADMIN — AZACITIDINE 155 MG: 100 INJECTION, POWDER, LYOPHILIZED, FOR SOLUTION INTRAVENOUS; SUBCUTANEOUS at 10:00

## 2019-01-01 RX ADMIN — DEXAMETHASONE SODIUM PHOSPHATE: 10 INJECTION, SOLUTION INTRAMUSCULAR; INTRAVENOUS at 10:47

## 2019-01-01 RX ADMIN — PALONOSETRON HYDROCHLORIDE 0.25 MG: 0.25 INJECTION, SOLUTION INTRAVENOUS at 13:41

## 2019-01-01 RX ADMIN — AZACITIDINE 155 MG: 100 INJECTION, POWDER, LYOPHILIZED, FOR SOLUTION INTRAVENOUS; SUBCUTANEOUS at 11:01

## 2019-01-01 RX ADMIN — SODIUM CHLORIDE 250 ML: 9 INJECTION, SOLUTION INTRAVENOUS at 11:13

## 2019-01-01 RX ADMIN — AZACITIDINE 155 MG: 100 INJECTION, POWDER, LYOPHILIZED, FOR SOLUTION INTRAVENOUS; SUBCUTANEOUS at 09:01

## 2019-01-01 RX ADMIN — AZACITIDINE 155 MG: 100 INJECTION, POWDER, LYOPHILIZED, FOR SOLUTION INTRAVENOUS; SUBCUTANEOUS at 08:48

## 2019-01-01 RX ADMIN — PALONOSETRON HYDROCHLORIDE 0.25 MG: 0.25 INJECTION, SOLUTION INTRAVENOUS at 08:44

## 2019-01-01 RX ADMIN — SODIUM CHLORIDE 250 ML: 9 INJECTION, SOLUTION INTRAVENOUS at 08:20

## 2019-01-01 RX ADMIN — SODIUM CHLORIDE 250 ML: 9 INJECTION, SOLUTION INTRAVENOUS at 09:36

## 2019-01-01 RX ADMIN — SODIUM CHLORIDE 250 ML: 9 INJECTION, SOLUTION INTRAVENOUS at 10:51

## 2019-01-01 RX ADMIN — AZACITIDINE 155 MG: 100 INJECTION, POWDER, LYOPHILIZED, FOR SOLUTION INTRAVENOUS; SUBCUTANEOUS at 13:57

## 2019-01-01 RX ADMIN — ONDANSETRON 4 MG: 4 TABLET, FILM COATED ORAL at 10:19

## 2019-01-01 RX ADMIN — DEXTROSE AND SODIUM CHLORIDE: 5; 450 INJECTION, SOLUTION INTRAVENOUS at 09:38

## 2019-01-01 RX ADMIN — SODIUM CHLORIDE 250 ML: 9 INJECTION, SOLUTION INTRAVENOUS at 08:40

## 2019-01-01 RX ADMIN — DEXTROSE AND SODIUM CHLORIDE 30 ML/HR: 5; 450 INJECTION, SOLUTION INTRAVENOUS at 08:29

## 2019-01-01 RX ADMIN — SODIUM CHLORIDE 250 ML: 9 INJECTION, SOLUTION INTRAVENOUS at 08:42

## 2019-01-01 RX ADMIN — PROPOFOL 30 MG: 10 INJECTION, EMULSION INTRAVENOUS at 09:48

## 2019-01-01 RX ADMIN — LIDOCAINE HYDROCHLORIDE 30 MG: 20 INJECTION, SOLUTION EPIDURAL; INFILTRATION; INTRACAUDAL; PERINEURAL at 09:42

## 2019-01-01 RX ADMIN — AZACITIDINE 155 MG: 100 INJECTION, POWDER, LYOPHILIZED, FOR SOLUTION INTRAVENOUS; SUBCUTANEOUS at 09:44

## 2019-01-01 RX ADMIN — SODIUM CHLORIDE 250 ML: 9 INJECTION, SOLUTION INTRAVENOUS at 08:48

## 2019-01-01 RX ADMIN — AZACITIDINE 155 MG: 100 INJECTION, POWDER, LYOPHILIZED, FOR SOLUTION INTRAVENOUS; SUBCUTANEOUS at 09:07

## 2019-01-01 RX ADMIN — PROPOFOL 30 MG: 10 INJECTION, EMULSION INTRAVENOUS at 09:52

## 2019-01-01 RX ADMIN — SODIUM CHLORIDE 250 ML: 9 INJECTION, SOLUTION INTRAVENOUS at 08:45

## 2019-01-01 RX ADMIN — AZACITIDINE 155 MG: 100 INJECTION, POWDER, LYOPHILIZED, FOR SOLUTION INTRAVENOUS; SUBCUTANEOUS at 10:51

## 2019-01-01 RX ADMIN — SODIUM CHLORIDE 1000 ML: 9 INJECTION, SOLUTION INTRAVENOUS at 10:18

## 2019-01-01 RX ADMIN — SODIUM CHLORIDE 250 ML: 9 INJECTION, SOLUTION INTRAVENOUS at 08:43

## 2019-01-01 RX ADMIN — SODIUM CHLORIDE 250 ML: 9 INJECTION, SOLUTION INTRAVENOUS at 09:16

## 2019-01-01 RX ADMIN — SODIUM CHLORIDE 250 ML: 9 INJECTION, SOLUTION INTRAVENOUS at 10:17

## 2019-01-01 RX ADMIN — AZACITIDINE 155 MG: 100 INJECTION, POWDER, LYOPHILIZED, FOR SOLUTION INTRAVENOUS; SUBCUTANEOUS at 11:46

## 2019-01-01 RX ADMIN — SODIUM CHLORIDE 250 ML: 9 INJECTION, SOLUTION INTRAVENOUS at 13:41

## 2019-01-01 RX ADMIN — PROPOFOL 60 MG: 10 INJECTION, EMULSION INTRAVENOUS at 09:42

## 2019-01-07 ENCOUNTER — OFFICE VISIT (OUTPATIENT)
Dept: ONCOLOGY | Facility: CLINIC | Age: 79
End: 2019-01-07

## 2019-01-07 ENCOUNTER — DOCUMENTATION (OUTPATIENT)
Dept: NUTRITION | Facility: HOSPITAL | Age: 79
End: 2019-01-07

## 2019-01-07 ENCOUNTER — INFUSION (OUTPATIENT)
Dept: ONCOLOGY | Facility: HOSPITAL | Age: 79
End: 2019-01-07

## 2019-01-07 VITALS
DIASTOLIC BLOOD PRESSURE: 76 MMHG | SYSTOLIC BLOOD PRESSURE: 143 MMHG | BODY MASS INDEX: 26.54 KG/M2 | RESPIRATION RATE: 16 BRPM | OXYGEN SATURATION: 96 % | HEART RATE: 83 BPM | WEIGHT: 189.6 LBS | HEIGHT: 71 IN | TEMPERATURE: 98.9 F

## 2019-01-07 DIAGNOSIS — D47.02 SYSTEMIC MASTOCYTOSIS WITH ASSOCIATED CLONAL HEMATOLOGICAL NON-MAST CELL LINEAGE DISEASE: Primary | ICD-10-CM

## 2019-01-07 DIAGNOSIS — C93.10 CHRONIC MYELOMONOCYTIC LEUKEMIA NOT HAVING ACHIEVED REMISSION (HCC): ICD-10-CM

## 2019-01-07 DIAGNOSIS — D63.0 ANEMIA IN NEOPLASTIC DISEASE: ICD-10-CM

## 2019-01-07 DIAGNOSIS — D69.6 THROMBOCYTOPENIA (HCC): ICD-10-CM

## 2019-01-07 DIAGNOSIS — E55.9 VITAMIN D DEFICIENCY: ICD-10-CM

## 2019-01-07 LAB
25(OH)D3 SERPL-MCNC: 49.3 NG/ML (ref 30–100)
ALBUMIN SERPL-MCNC: 4.4 G/DL (ref 3.4–4.8)
ALBUMIN/GLOB SERPL: 1.6 G/DL (ref 1.1–1.8)
ALP SERPL-CCNC: 41 U/L (ref 38–126)
ALT SERPL W P-5'-P-CCNC: 19 U/L (ref 21–72)
ANION GAP SERPL CALCULATED.3IONS-SCNC: 13 MMOL/L (ref 5–15)
ANISOCYTOSIS BLD QL: ABNORMAL
AST SERPL-CCNC: 17 U/L (ref 17–59)
BILIRUB SERPL-MCNC: 0.9 MG/DL (ref 0.2–1.3)
BUN BLD-MCNC: 16 MG/DL (ref 7–21)
BUN/CREAT SERPL: 18.6 (ref 7–25)
CALCIUM SPEC-SCNC: 8.9 MG/DL (ref 8.4–10.2)
CHLORIDE SERPL-SCNC: 103 MMOL/L (ref 95–110)
CO2 SERPL-SCNC: 22 MMOL/L (ref 22–31)
CREAT BLD-MCNC: 0.86 MG/DL (ref 0.7–1.3)
DEPRECATED RDW RBC AUTO: 97 FL (ref 35.1–43.9)
EOSINOPHIL # BLD MANUAL: 0.14 10*3/MM3 (ref 0–0.7)
EOSINOPHIL NFR BLD MANUAL: 1 % (ref 0–7)
ERYTHROCYTE [DISTWIDTH] IN BLOOD BY AUTOMATED COUNT: 26.3 % (ref 11.5–14.5)
GFR SERPL CREATININE-BSD FRML MDRD: 86 ML/MIN/1.73 (ref 42–98)
GLOBULIN UR ELPH-MCNC: 2.8 GM/DL (ref 2.3–3.5)
GLUCOSE BLD-MCNC: 103 MG/DL (ref 60–100)
HCT VFR BLD AUTO: 29.3 % (ref 39–49)
HGB BLD-MCNC: 10 G/DL (ref 13.7–17.3)
LDH SERPL-CCNC: 426 U/L (ref 313–618)
LYMPHOCYTES # BLD MANUAL: 2.39 10*3/MM3 (ref 0.6–4.2)
LYMPHOCYTES NFR BLD MANUAL: 17 % (ref 10–50)
LYMPHOCYTES NFR BLD MANUAL: 25 % (ref 0–12)
MCH RBC QN AUTO: 35 PG (ref 26.5–34)
MCHC RBC AUTO-ENTMCNC: 34.1 G/DL (ref 31.5–36.3)
MCV RBC AUTO: 102.4 FL (ref 80–98)
MONOCYTES # BLD AUTO: 3.52 10*3/MM3 (ref 0–0.9)
MYELOCYTES NFR BLD MANUAL: 2 % (ref 0–0)
NEUTROPHILS # BLD AUTO: 7.74 10*3/MM3 (ref 2–8.6)
NEUTROPHILS NFR BLD MANUAL: 52 % (ref 37–80)
NEUTS BAND NFR BLD MANUAL: 3 % (ref 0–5)
NRBC SPEC MANUAL: 1 /100 WBC (ref 0–0)
PLATELET # BLD AUTO: 144 10*3/MM3 (ref 150–450)
PMV BLD AUTO: ABNORMAL FL (ref 8–12)
POIKILOCYTOSIS BLD QL SMEAR: ABNORMAL
POTASSIUM BLD-SCNC: 4.2 MMOL/L (ref 3.5–5.1)
PROT SERPL-MCNC: 7.2 G/DL (ref 6.3–8.6)
RBC # BLD AUTO: 2.86 10*6/MM3 (ref 4.37–5.74)
SMALL PLATELETS BLD QL SMEAR: ADEQUATE
SODIUM BLD-SCNC: 138 MMOL/L (ref 137–145)
WBC MORPH BLD: NORMAL
WBC NRBC COR # BLD: 14.08 10*3/MM3 (ref 3.2–9.8)

## 2019-01-07 PROCEDURE — G8420 CALC BMI NORM PARAMETERS: HCPCS | Performed by: INTERNAL MEDICINE

## 2019-01-07 PROCEDURE — 85007 BL SMEAR W/DIFF WBC COUNT: CPT | Performed by: INTERNAL MEDICINE

## 2019-01-07 PROCEDURE — 96413 CHEMO IV INFUSION 1 HR: CPT | Performed by: INTERNAL MEDICINE

## 2019-01-07 PROCEDURE — 1123F ACP DISCUSS/DSCN MKR DOCD: CPT | Performed by: INTERNAL MEDICINE

## 2019-01-07 PROCEDURE — 83615 LACTATE (LD) (LDH) ENZYME: CPT | Performed by: INTERNAL MEDICINE

## 2019-01-07 PROCEDURE — 99214 OFFICE O/P EST MOD 30 MIN: CPT | Performed by: INTERNAL MEDICINE

## 2019-01-07 PROCEDURE — 82306 VITAMIN D 25 HYDROXY: CPT | Performed by: FAMILY MEDICINE

## 2019-01-07 PROCEDURE — 25010000002 AZACITIDINE 100 MG RECONSTITUTED SUSPENSION 1 EACH VIAL: Performed by: INTERNAL MEDICINE

## 2019-01-07 PROCEDURE — 80053 COMPREHEN METABOLIC PANEL: CPT | Performed by: INTERNAL MEDICINE

## 2019-01-07 PROCEDURE — 85025 COMPLETE CBC W/AUTO DIFF WBC: CPT | Performed by: INTERNAL MEDICINE

## 2019-01-07 RX ORDER — SODIUM CHLORIDE 9 MG/ML
250 INJECTION, SOLUTION INTRAVENOUS ONCE
Status: COMPLETED | OUTPATIENT
Start: 2019-01-07 | End: 2019-01-07

## 2019-01-07 RX ORDER — SODIUM CHLORIDE 9 MG/ML
250 INJECTION, SOLUTION INTRAVENOUS ONCE
Status: CANCELLED | OUTPATIENT
Start: 2019-01-10

## 2019-01-07 RX ORDER — SODIUM CHLORIDE 9 MG/ML
250 INJECTION, SOLUTION INTRAVENOUS ONCE
Status: CANCELLED | OUTPATIENT
Start: 2019-01-09

## 2019-01-07 RX ORDER — SODIUM CHLORIDE 9 MG/ML
250 INJECTION, SOLUTION INTRAVENOUS ONCE
Status: CANCELLED | OUTPATIENT
Start: 2019-01-07

## 2019-01-07 RX ORDER — PALONOSETRON 0.05 MG/ML
0.25 INJECTION, SOLUTION INTRAVENOUS ONCE
Status: CANCELLED | OUTPATIENT
Start: 2019-01-11

## 2019-01-07 RX ORDER — SODIUM CHLORIDE 9 MG/ML
250 INJECTION, SOLUTION INTRAVENOUS ONCE
Status: CANCELLED | OUTPATIENT
Start: 2019-01-08

## 2019-01-07 RX ORDER — SODIUM CHLORIDE 9 MG/ML
250 INJECTION, SOLUTION INTRAVENOUS ONCE
Status: CANCELLED | OUTPATIENT
Start: 2019-01-11

## 2019-01-07 RX ADMIN — AZACITIDINE 155 MG: 100 INJECTION, POWDER, LYOPHILIZED, FOR SOLUTION INTRAVENOUS; SUBCUTANEOUS at 10:49

## 2019-01-07 RX ADMIN — SODIUM CHLORIDE 250 ML: 9 INJECTION, SOLUTION INTRAVENOUS at 10:38

## 2019-01-07 NOTE — PROGRESS NOTES
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  Patient was also evaluated by Dr. Man Kimbrough at [place] in 2012.  Patient has been on observation since then without any active chemotherapy.  In November 2014 in view of worsening leukocytosis and anemia he had a repeat bone marrow biopsy done by Dr. Shore which showed systemic mastocytosis with associated clonal hematologic non-mass cell lineage disease, CMML-1.  Patient has not been on any chemotherapy since then.  In March 2018 patient was seen here at clinic by Goldie WHYTE.  In view of sclerotic lesion on lower extremity as well as skin lesion patient was referred to dermatology.  Biopsy done by Dr. Chen on June 20, 2018 showed increased MAST cells consistent with urticaria pigmentosa.  A she was last seen here at clinic on July 25, 2018.  After that patient was referred to [place] clinic when he was seen by Dr. Kimbrough on August 20, 2018 and a bone marrow biopsy done on August 23, 2018.  Was started on Vidaza on September 24, 2018.   On November 12, 2018, due to uncontrollable shakes and chills after port access and flushing, patient was admitted to the hospital and was found to have gram-negative bacteremia with Acinetobacter.  Patient had a port removed during hospital stay.  Patient decided not to do any chemotherapy upon last clinic visit on November 26, 2018 due to ill health of his wife.  Patient is here for follow-up appointment today.  Patient wants to restart his chemotherapy today.  Denies  any more chills or fevers. Denies any bleeding.  States his skin lesions are somewhat improved.      PAST MEDICAL HISTORY:    Past Medical History:   Diagnosis Date   • Atrophy of testis    • Benign prostatic hyperplasia    • Borderline glaucoma    • Chronic myelomonocytic leukemia (CMS/HCC)    • Degenerative joint disease involving multiple joints    • GERD (gastroesophageal reflux disease)    • Gout    • History of echocardiogram 05/08/2012    Normal left ventricular systolic function, EF 60%. Early diastolic dysfunction. Mild aortic and pulmonic regurgitation. Trace mitral and mild tricuspid regurgitation. No intracardiac mass pericardial effusion or cardiac thrombus.   • History of Holter monitoring 01/18/2011   • Impotence    • Lightheadedness    • Neck pain, musculoskeletal    • Sleep apnea     NOT WEARING C-PAP   • TIA (transient ischemic attack) 2014       SOCIAL HISTORY:    Social History     Tobacco Use   • Smoking status: Never Smoker   • Smokeless tobacco: Never Used   Substance Use Topics   • Alcohol use: No   • Drug use: No       Surgical History :  Past Surgical History:   Procedure Laterality Date   • CARDIAC CATHETERIZATION  09/30/2009    No epicardial coronary artery disease noted that would explain patient's chest pain of the abnormal stress test noted. Normal left ventricular systolic function with no wall motion abnormalities   • CARDIAC CATHETERIZATION  05/24/1989    Normal catheterization, false-positive exercise treadmill. Noncardiac chest pain   • CARDIAC ELECTROPHYSIOLOGY PROCEDURE N/A 5/18/2017    Procedure: PPM generator change - dual;  Surgeon: Geneva Day MD;  Location: [facility] INVASIVE LOCATION;  Service:    • CARDIAC PACEMAKER PLACEMENT  06/2008    Dual chamber permanent pacemaker implantation   • COLECTOMY PARTIAL / TOTAL  04/14/2000    Cecal diverticulitis. Removal of small segment of terminal ileum, cecum and right colon, sent to pathology   • COLON SURGERY   03/27/2016   • COLONOSCOPY  05/25/2012   • CYSTOSCOPY  11/15/2000    Urinary retention on the basis of medications and benign prostatic hypertrophy   • INGUINAL HERNIA REPAIR  02/15/2007    Recurrent right inguinal hernia. Repair of recurrent inguinal hernia with EPS Prolene mesh system.   • INGUINAL HERNIA REPAIR Right 1957   • LAPAROSCOPIC CHOLECYSTECTOMY  10/26/2006    Gallstones. Laparoscopic cholecystectomy with operative cholangiogram   • PROSTATECTOMY  11/17/2000    Benign prostatic hypertrophy with urinary retention. Prostatitis. transurethral resection of prostate.   • STEROID INJECTION  10/21/2010    Decadron (Gout)    • STEROID INJECTION  07/23/2013    Kenalog (Gout) (4)   • VENOUS ACCESS DEVICE (PORT) INSERTION N/A 9/13/2018    Procedure: ULTRASOUND ASSISTED RIGHT JUGULAR INSERTION VENOUS ACCESS DEVICE;  Surgeon: Luis E Babb MD;  Location: [facility];  Service: General   • VENOUS ACCESS DEVICE (PORT) REMOVAL N/A 11/14/2018    Procedure: REMOVAL VENOUS ACCESS DEVICE;  Surgeon: Tono Arevalo MD;  Location: [facility];  Service: General       ALLERGIES:    Allergies   Allergen Reactions   • Doxycycline Hyclate Other (See Comments)     Other reaction(s): lip swollen   • Sulfa Antibiotics Swelling     facial   • Other Rash     SILK TAPE   • Penicillins Rash     Reaction: rash   • Soma [Carisoprodol] Swelling and Rash         FAMILY HISTORY:  Family History   Problem Relation Age of Onset   • Cancer Other    • Colon cancer Other         parent   • Coronary artery disease Other    • Heart disease Other    • Hypertension Other    • Cholelithiasis Other    • Other Other         colon problems           REVIEW OF SYSTEMS:      CONSTITUTIONAL:  Complains of fatigue.  Denies any fever drenching night sweats or weight loss.     EYES: No visual disturbances. No discharge. No new lesions    ENMT:  No epistaxis, mouth sores or difficulty swallowing.    RESPIRATORY:  Complains of shortness of breath with exertion.  "No new cough or hemoptysis.    CARDIOVASCULAR:  Complains of intermittent chest pain.No palpitations.    GASTROINTESTINAL:  No abdominal pain nausea, vomiting or blood in the stool.    GENITOURINARY: No Dysuria or Hematuria.    MUSCULOSKELETAL:  Complains of worsening back pain.  Complains of worsening pain in neck region.    LYMPHATICS:  Denies any abnormal swollen glands anywhere in the body.    NEUROLOGICAL : No tingling or numbness. No headache or dizziness. No seizures or balance problems.    SKIN: Erythematous lesion present on chest, abdomen, back, upper and lower extremity.            PHYSICAL EXAMINATION:      VITAL SIGNS:  /76   Pulse 83   Temp 98.9 °F (37.2 °C) (Temporal)   Resp 16   Ht 180.3 cm (70.98\")   Wt 86 kg (189 lb 9.6 oz)   SpO2 96%   BMI 26.46 kg/m²       01/07/19  0955   Weight: 86 kg (189 lb 9.6 oz)         ECOG performance status: 2    CONSTITUTIONAL:  Not in any distress.  Having shakes and chills at the time of examination.    EYES: Mild conjunctival Pallor. No Icterus. No Pterygium. Extraocular Movements intact.No ptosis.    ENMT:  Normocephalic, Atraumatic.No Facial Asymmetry noted.    NECK:  No adenopathy.Trachea midline. NO JVD.    RESPIRATORY:  Fair air entry bilateral. No rhonchi or wheezing.Fair respiratory effort.    CARDIOVASCULAR:  S1, S2. Regular rate and rhythm. No murmur or gallop appreciated.Pacemaker present on left chest wall.    ABDOMEN:  Soft, obese, nontender. Bowel sounds present in all four quadrants.  No Hepatosplenomegaly appreciated.    MUSCULOSKELETAL:  No edema.No Calf Tenderness.Decreased range of motion.    NEUROLOGIC:    No  Motor  deficit appreciated. Cranial Nerves 2-12 grossly intact.    SKIN : Skin lesion consistent with urticaria pigmentosa present on chest, abdomen, back, upper and lower extremity.    LYMPHATICS: No new enlarged lymph nodes in neck or supraclavicular area.    PSYCHIATRY: Alert, awake and oriented ×3.Normal affect.  Normal " judgment.  Makes good eye contact.        DIAGNOSTIC DATA:    Glucose   Date Value Ref Range Status   01/07/2019 103 (H) 60 - 100 mg/dL Final     Sodium   Date Value Ref Range Status   01/07/2019 138 137 - 145 mmol/L Final     Potassium   Date Value Ref Range Status   01/07/2019 4.2 3.5 - 5.1 mmol/L Final     CO2   Date Value Ref Range Status   01/07/2019 22.0 22.0 - 31.0 mmol/L Final     Chloride   Date Value Ref Range Status   01/07/2019 103 95 - 110 mmol/L Final     Anion Gap   Date Value Ref Range Status   01/07/2019 13.0 5.0 - 15.0 mmol/L Final     Creatinine   Date Value Ref Range Status   01/07/2019 0.86 0.70 - 1.30 mg/dL Final     BUN   Date Value Ref Range Status   01/07/2019 16 7 - 21 mg/dL Final     BUN/Creatinine Ratio   Date Value Ref Range Status   01/07/2019 18.6 7.0 - 25.0 Final     Calcium   Date Value Ref Range Status   01/07/2019 8.9 8.4 - 10.2 mg/dL Final     eGFR Non  Amer   Date Value Ref Range Status   01/07/2019 86 42 - 98 mL/min/1.73 Final     Alkaline Phosphatase   Date Value Ref Range Status   01/07/2019 41 38 - 126 U/L Final     Total Protein   Date Value Ref Range Status   01/07/2019 7.2 6.3 - 8.6 g/dL Final     ALT (SGPT)   Date Value Ref Range Status   01/07/2019 19 (L) 21 - 72 U/L Final     AST (SGOT)   Date Value Ref Range Status   01/07/2019 17 17 - 59 U/L Final     Total Bilirubin   Date Value Ref Range Status   01/07/2019 0.9 0.2 - 1.3 mg/dL Final     Albumin   Date Value Ref Range Status   01/07/2019 4.40 3.40 - 4.80 g/dL Final     Globulin   Date Value Ref Range Status   01/07/2019 2.8 2.3 - 3.5 gm/dL Final     Lab Results   Component Value Date    WBC 14.08 (H) 01/07/2019    HGB 10.0 (L) 01/07/2019    HCT 29.3 (L) 01/07/2019    .4 (H) 01/07/2019     (L) 01/07/2019     Lab Results   Component Value Date    NEUTROABS 6.19 11/26/2018    IRON 107 07/25/2018    TIBC 276 07/25/2018    LABIRON 39 07/25/2018    FERRITIN 264.00 07/25/2018    LPNRLEEV09 803  09/11/2018    FOLATE >20.00 09/11/2018     Lab Results   Component Value Date    HCGQUANT <1 12/16/2014       Tryptase    Ref Range & Units 1mo ago   Tryptase 2.2 - 13.2 ug/L 93.9     Resulting Agency  [facility]   Narrative     Performed at:  01 - [facility] [address]  [id number]  : Tej Matias MD, Phone:  [phone]      Specimen Collected: 07/25/18 12:34 Last Resulted: 07/27/18 13:16                     Blood Culture - Blood, Blood, Venous Line   Specimen Information: Blood, Venous Line        Blood Culture Acinetobacter lwoffii group Abnormal        ISOLATED FROM Aerobic Bottle              Gram Stain  Aerobic Bottle Gram negative bacilli   2 bottles of 3 bottles drawn positive for gram neg bacillus         Resulting Agency: [facility]   Susceptibility      Acinetobacter lwoffii group     CHIVO     Ampicillin + Sulbactam <=8/4 ug/ml Susceptible     Cefepime <=8 ug/ml Susceptible     Ceftazidime 4 ug/ml Susceptible     Ceftriaxone <=8 ug/ml Susceptible     Levofloxacin <=2 ug/ml Susceptible     Piperacillin <=16 ug/ml Susceptible     Trimethoprim + Sulfamethoxazole <=2/38 ug/ml Susceptible                 Specimen Collected: 11/12/18 10:26 Last Resulted: 11/16/18 06:25                  PATHOLOGY:  Pathology report from August 23, 2018 at [place] showed:  Diagnosis:  BONE MARROW - PERIPHERAL BLOOD SMEAR, ASPIRATE SMEAR, PARTICLE PREPARATION, BIOPSY, AND FLOW CYTOMETRY: MARKEDLY HYPERCELLULAR MARROW WITH INVOLVEMENT BY SYSTEMIC MASTOCYTOSIS WITH AN ASSOCIATED HEMATOLOGICAL NEOPLASM, CHRONIC MYELOMONOCYTIC LEUKEMIA-1; SEE IMPRESSION     IMPRESSION:  The findings are of a markedly hypercellular marrow (90% cellular) with maturing trilineage hematopoiesis and multifocal mast cell aggregates, including spindled forms. Mast cells are positive for CD2 and CD25 by flow cytometric analysis. Recent testing at an outside institution showed an elevated tryptase level. The overall  findings are consistent with involvement by systemic mastocytosis. In addition, there is multilineage dysplasia with mildly increased blasts and blast equivalents (6.1% of cellularity in total) and a persistent relative and absolute peripheral monocytosis. The combined findings are consistent with involvement by chronic myelomonocytic leukemia-1 (CMML-1), proliferative type. Final diagnosis requires correlation with pending additional testing, as documented below, which will be included in the comprehensive hematopathology report.    PERIPHERAL BLOOD SMEAR:  CBC : WBC 18.9 k/microL, Hgb 9.7 g/dL, Plt-Ct 187 k/microL,  fL, RDW 26.3%.    A Yi's stained peripheral smear is reviewed. Erythrocytes are decreased and are macrocytic and normochromic with anisopoikilocytosis, including target cells and teardrop cells. Polychromasia is present. Nucleated red blood cells are present. Leukocytes are increased and include mature neutrophils, lymphocytes, and monocytes, with a relative and absolute monocytosis. Neutrophils show dysplasia (nuclear hypolobation, hypogranulation). Left-shifted myeloid elements are present. There are no circulating blasts or plasma cells. Platelets are adequate in number and show variation in size.     ASPIRATE SMEARS AND TOUCH PREPARATIONS:  Yi's stained aspirate smears and touch preparations are reviewed. The material is hypercellular and particulate. Myeloid elements are adequate and show dysplasia (zeqhodh-vt-porthxeikib dyssynchrony). Erythroid elements are adequate and show left-shifted maturation with mild dysplasia (megaloblastoid change, occasional nuclear membrane irregularity). The myeloid:erythroid ratio is 1.1 to 1. Megakaryocytes are increased in number, and demonstrate no significant atypia. Blasts and blast equivalents (monoblasts, promonocytes) comprise 6.1% of cellularity in total (600 cell count). There is no increase in plasma cells or lymphocytes. Focal particles  show significantly increased mast cells, including occasional spindle forms. A Prussian blue iron stain is performed on the sample, revealing increased storage iron. Sideroblastic iron is present. Ring sideroblasts are present (approximately 40-50%).     MARROW SMEAR DIFFERENTIAL:  Blasts (0-4): 3.8%  Monoblasts/promonocytes: 2.3%  Promyelocytes (1-8): 2.2%  Myelocytes and metamyelocytes (20-30): 15.8%  Bands and segmented neutrophils (12-25): 23.0%  Eosinophils and eosinophilic precursors (1-5): 0.3%  Basophils and basophilic precursors (0-1): 0.0%  Monocytes and monocytic precursors (0-2): 5.8%  Lymphocytes (10-15): 3.0%  Plasma cells (0-1): 0.2%  Erythroid precursors (15-27): 43.5%  Total cells counted: 600    BONE MARROW BIOPSY AND PARTICLE PREPARATION:  H&E and PAS stained bone marrow biopsy and particle preparation sections are reviewed. The material is adequate and markedly hypercellular (90% cellular). Myeloid elements are present in normal proportion and exhibit maturation. Erythroid elements are present in normal proportion and exhibit left-shifted maturation. Megakaryocytes are increased and include occasional hypolobated forms and forms with abnormal nuclear lobe separation. CD34-positive blasts comprise approximately 3-5% of marrow cellularity. Multifocal dense mast cell aggregates are present, including spindled forms, as confirmed by  and tryptase immunostains. A CD68 stain highlights increased monocytic/histiocytic forms. Bony trabeculae are normal for the patient's age. All stains performed with appropriate controls.     FLOW CYTOMETRY STUDIES:  IP ID:   Viability: 94.1%  DIAGNOSIS: No increase in blasts; abnormal mast cells present    COMMENT: Myeloblasts are not increased (0.9% of total cells), with the following normal immunophenotype: positive for CD33 (moderate), CD34, and CD45 (dim); and negative for CD19. B cells are not increased (0.1% of total cells), with the following normal  immunophenotype: positive for CD19 (bright) and CD45 (bright); and negative for CD34. T cells are not increased (3.1% of total cells), with the following normal immunophenotype: positive for CD45 (bright); and negative for CD34. Mast cells are present (0.03% of total cells), with the following abnormal immunophenotype: positive for CD2, CD25, CD45,  (bright), and FcER1.    CYTOGENETICS AND MOLECULAR DIAGNOSTIC RESULTS:  Additional testing (Karyotype, Myeloid NGS) is pending. Results will be included in the comprehensive hematopathology report.         **Electronically signed out by ELVIA TO,TYLER**on 8/27/2018  MS/EM/haritha  Case reviewed by Attending Pathologist        Pathology report from November 13, 2014 showed:  FINAL DIAGNOSIS:   PERIPHERAL SMEAR, REVIEW:        MONOCYTOSIS WITH LEFT MYELOID SHIFT.        ANEMIA, BORDERLINE.   BONE MARROW ASPIRATION AND BIOPSY:        SYSTEMIC MASTOCYTOSIS WITH ASSOCIATED CLONAL             HEMATOLOGIC NON-MAST CELL LINEAGE DISEASE,             CHRONIC MYELOMONOCYTIC LEUKEMIA-1.            RADIOLOGY DATA :  CT of abdomen with and without contrast done on May 14, 2018 showed:  The liver is normal. The gallbladder surgically absent. The  biliary system appears within normal limits status post  cholecystectomy. The pancreas is normal. The spleen is normal.  Bilateral adrenal glands are normal. Right kidney mid zone 4.7 mm  nonobstructive renal calculi. Stable right kidney lower pole oval  hyperdense lesion on unenhanced imaging which has Hounsfield  units of  49. Following enhancement this lesion has Hounsfield  units of 49 and 51 suggesting no significant enhancement. This  lesion measures 2.36 cm in greatest diameter and is not  appreciably changed. Otherwise right kidney and ureter are  unremarkable. Left kidney and visualized ureter are normal.  Visualized hollow viscera is normal. No lymphadenopathy in the  abdomen. No acute osseous abnormalities.     IMPRESSION:  1.  Stable right kidney lower pole oval hyperdense lesion which  does not enhance measuring 2.36 cm in greatest diameter. This  interval stability and appearance is most consistent with benign  hyperdense cyst.  2. Right kidney mid zone 4.7 mm stable nonobstructive renal  calculi..  3. Otherwise unremarkable CT abdomen study with without  contrast..      CT of left lower extremity with contrast done on March 23, 2018 showed:  COMMENTS:              A CT examination was performed of the lower extremities, with  images coned to the left femur.     There is a marrow-based focal sclerotic lesion involving the  proximal/mid femur, measuring approximately 2.6 cm in  craniocaudal extent. There is no evidence of endosteal  scalloping, or features to suggest an aggressive process. There  is no abnormal periosteal reaction.     The remainder the examination is unremarkable for age.     .     IMPRESSION:  CONCLUSION:          1. Focal sclerotic bone lesion which is marrow based in the  diaphysis of the left femur in the proximal/midportion. This is  likely a benign lesion and correlation with plain film  radiographs are suggested.            CT chest with contrast done on January 26, 2018 showed:  IMPRESSION:  CONCLUSION:  Multiple sclerotic lesions in the spine and right RIBS,  concerning for metastatic prostate cancer. Recommend correlation  with PSA levels and nuclear medicine bone scan.           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 Shore.  -Patient hasso far not require any chemotherapy or any other treatment.  -Recently in view of worsening skin lesion on the chest, abdomen and back he underwent a biopsy  by Dr. 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  -Patient is also found to have worsening anemia recently on blood work done from June 2018.  -Patient is also found to have multiple sclerotic lesion on the spine and hips on CT scan done from January 2018 until June 2018.  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  -Patient was evaluated by Dr. Kimbrough at [place] on August 20, 2018 and had a bone marrow biopsy done on August 23, 2018 that again showed systemic mastocytosis with CMML-1.  -In view of elevated tryptase level, his case was discussed at tumor board at [place].  -Case was discussed with Dr. Kimbrough on September 10, 2018, is recommending treatment with Vidaza for now.    -He was started on Vidaza on September 24, 2018.  -Patient was last seen here at clinic on November 12, 2018 and due to bacteremia and chemotherapy was delayed.  -On November 26, 2018, patient requested chemotherapy to be held because of ill health of his wife.  -Upon clinic visit today on January 7, 2019, patient wants to start cycle 2 of Vidaza today.  We will go ahead with cycle 2 today.  -We will ask patient to return to clinic in 4 weeks with a repeat CBC and CMP to be done on that day.    2.  Anemia: Most likely secondary to #1  -Remains on folic acid 1 mg by mouth daily.  Hemoglobin is 10.0.  We'll monitored with CBC.    3.  Leukocytosis: Secondary to chronic myelomonocytic leukemia.  We'll monitored with CBC for now.    4.  Urticaria pigmentosa, secondary to underlying 1 marrow pathology.    5.  Thrombocytopenia: Secondary to bone marrow involvement.  Platelet count is 144,000 today.  We'll monitored with CBC for now.    6.  History of bradycardia status post pacemaker in 2008.    7.  Health maintenance: Patient does not smoke.  Had a colonoscopy in  2014 by Dr. Guzman.    8. BMI: Patient's Body mass index is 26.46 kg/m². BMI is in reference range.    9. Advance Care Planning: For now patient remains full code and is able to make  His decisions.  Patient has health care surrogate mentioned on chart.           Jordin Roblero MD  1/7/2019  10:22 AM        EMR Dragon/Transcription disclaimer:   Much of this encounter note is an electronic transcription/translation of spoken language to printed text. The electronic translation of spoken language may permit erroneous, or at times, nonsensical words or phrases to be inadvertently transcribed; Although I have reviewed the note for such errors, some may still exist.

## 2019-01-07 NOTE — PROGRESS NOTES
"Adult Outpatient Nutrition  Assessment    Patient Name:  aJn Humphrey  YOB: 1940  MRN: [id number]    Assessment Date:  1/7/2019    Comments: Pleasant 78WM coming to [facility] due to leukemia. Had left voice mail for pt and mailed information in the past. Introductions today. Pt stated appetite/intake better. Actually enjoying food again. Realizes that hydration importance, but fears does not drink enough (trying to place emphasis on this). Regular diet. Three to four small feedings daily.  NKFA. Cooks for self and wife. Discussed importance of balanced nutrition. Wt 189.8 lb (recently stable). No nutrition related problems verbalized at present. Pt agreed to call on RD as needed.        Anthropometrics     Row Name 01/07/19 0955          Anthropometrics    Height  180.3 cm (70.98\")     Weight  86 kg (189 lb 9.6 oz)        Ideal Body Weight (IBW)    Ideal Body Weight (IBW) (kg)  79.23     % Ideal Body Weight  108.55        Body Mass Index (BMI)    BMI (kg/m2)  26.51        IBW Adjustment, Para/Tetraplegia    5% Adjustment, Para (IBW)  75.27     10% Adjustment, Para (IBW)  71.31     10% Adjustment, Tetra (IBW)  71.31     15% Adjustment, Tetra (IBW)  67.35             Estimated/Assessed Needs     Row Name 01/07/19 0955          Calculation Measurements    Height  180.3 cm (70.98\")         Evaluation of Prescribed Nutrient/Fluid Intake     Row Name 01/07/19 0955          Calculation Measurements    Height  180.3 cm (70.98\")               Electronically signed by:  Adwoa Woo RD  01/07/19 4:46 PM   "

## 2019-01-08 ENCOUNTER — INFUSION (OUTPATIENT)
Dept: ONCOLOGY | Facility: HOSPITAL | Age: 79
End: 2019-01-08

## 2019-01-08 ENCOUNTER — TELEPHONE (OUTPATIENT)
Dept: FAMILY MEDICINE CLINIC | Facility: CLINIC | Age: 79
End: 2019-01-08

## 2019-01-08 ENCOUNTER — DOCUMENTATION (OUTPATIENT)
Dept: NUTRITION | Facility: HOSPITAL | Age: 79
End: 2019-01-08

## 2019-01-08 VITALS
RESPIRATION RATE: 16 BRPM | DIASTOLIC BLOOD PRESSURE: 74 MMHG | TEMPERATURE: 98.2 F | HEART RATE: 85 BPM | SYSTOLIC BLOOD PRESSURE: 172 MMHG

## 2019-01-08 DIAGNOSIS — D47.02 SYSTEMIC MASTOCYTOSIS WITH ASSOCIATED CLONAL HEMATOLOGICAL NON-MAST CELL LINEAGE DISEASE: Primary | ICD-10-CM

## 2019-01-08 DIAGNOSIS — C93.10 CHRONIC MYELOMONOCYTIC LEUKEMIA NOT HAVING ACHIEVED REMISSION (HCC): ICD-10-CM

## 2019-01-08 PROCEDURE — 96413 CHEMO IV INFUSION 1 HR: CPT | Performed by: INTERNAL MEDICINE

## 2019-01-08 PROCEDURE — 25010000002 AZACITIDINE 100 MG RECONSTITUTED SUSPENSION 1 EACH VIAL: Performed by: INTERNAL MEDICINE

## 2019-01-08 RX ORDER — SODIUM CHLORIDE 9 MG/ML
250 INJECTION, SOLUTION INTRAVENOUS ONCE
Status: COMPLETED | OUTPATIENT
Start: 2019-01-08 | End: 2019-01-08

## 2019-01-08 RX ADMIN — AZACITIDINE 155 MG: 100 INJECTION, POWDER, LYOPHILIZED, FOR SOLUTION INTRAVENOUS; SUBCUTANEOUS at 10:38

## 2019-01-08 RX ADMIN — SODIUM CHLORIDE 250 ML: 9 INJECTION, SOLUTION INTRAVENOUS at 10:13

## 2019-01-08 NOTE — PROGRESS NOTES
Adult Outpatient Nutrition  Assessment    Patient Name:  Jan Humphrey  YOB: 1940  MRN: [id number]    Assessment Date:  1/8/2019    Comments: Brief follow-up to see if pt has questions re: yesterday's nutrition education. No question at this time.                        Electronically signed by:  Adwoa Woo RD  01/08/19 1:11 PM

## 2019-01-08 NOTE — TELEPHONE ENCOUNTER
Patient had blood work done yesterday. Please call with results.Patient says treatment helped a lot. Has been able to do things he could not in years  Call [phone]

## 2019-01-09 ENCOUNTER — INFUSION (OUTPATIENT)
Dept: ONCOLOGY | Facility: HOSPITAL | Age: 79
End: 2019-01-09

## 2019-01-09 VITALS
TEMPERATURE: 97.7 F | RESPIRATION RATE: 18 BRPM | HEART RATE: 95 BPM | SYSTOLIC BLOOD PRESSURE: 137 MMHG | DIASTOLIC BLOOD PRESSURE: 76 MMHG

## 2019-01-09 DIAGNOSIS — C93.10 CHRONIC MYELOMONOCYTIC LEUKEMIA NOT HAVING ACHIEVED REMISSION (HCC): ICD-10-CM

## 2019-01-09 DIAGNOSIS — D47.02 SYSTEMIC MASTOCYTOSIS WITH ASSOCIATED CLONAL HEMATOLOGICAL NON-MAST CELL LINEAGE DISEASE: Primary | ICD-10-CM

## 2019-01-09 PROCEDURE — 96413 CHEMO IV INFUSION 1 HR: CPT | Performed by: INTERNAL MEDICINE

## 2019-01-09 PROCEDURE — 25010000002 AZACITIDINE 100 MG RECONSTITUTED SUSPENSION 1 EACH VIAL: Performed by: INTERNAL MEDICINE

## 2019-01-09 RX ORDER — SODIUM CHLORIDE 9 MG/ML
250 INJECTION, SOLUTION INTRAVENOUS ONCE
Status: COMPLETED | OUTPATIENT
Start: 2019-01-09 | End: 2019-01-09

## 2019-01-09 RX ADMIN — AZACITIDINE 155 MG: 100 INJECTION, POWDER, LYOPHILIZED, FOR SOLUTION INTRAVENOUS; SUBCUTANEOUS at 10:24

## 2019-01-09 RX ADMIN — SODIUM CHLORIDE 250 ML: 9 INJECTION, SOLUTION INTRAVENOUS at 10:19

## 2019-01-10 ENCOUNTER — INFUSION (OUTPATIENT)
Dept: ONCOLOGY | Facility: HOSPITAL | Age: 79
End: 2019-01-10

## 2019-01-10 VITALS
RESPIRATION RATE: 16 BRPM | HEART RATE: 74 BPM | DIASTOLIC BLOOD PRESSURE: 70 MMHG | SYSTOLIC BLOOD PRESSURE: 139 MMHG | TEMPERATURE: 97.8 F

## 2019-01-10 DIAGNOSIS — C93.10 CHRONIC MYELOMONOCYTIC LEUKEMIA NOT HAVING ACHIEVED REMISSION (HCC): ICD-10-CM

## 2019-01-10 DIAGNOSIS — D47.02 SYSTEMIC MASTOCYTOSIS WITH ASSOCIATED CLONAL HEMATOLOGICAL NON-MAST CELL LINEAGE DISEASE: Primary | ICD-10-CM

## 2019-01-10 PROCEDURE — 96413 CHEMO IV INFUSION 1 HR: CPT | Performed by: INTERNAL MEDICINE

## 2019-01-10 PROCEDURE — 25010000002 AZACITIDINE 100 MG RECONSTITUTED SUSPENSION 1 EACH VIAL: Performed by: INTERNAL MEDICINE

## 2019-01-10 RX ORDER — SODIUM CHLORIDE 9 MG/ML
250 INJECTION, SOLUTION INTRAVENOUS ONCE
Status: COMPLETED | OUTPATIENT
Start: 2019-01-10 | End: 2019-01-10

## 2019-01-10 RX ADMIN — AZACITIDINE 155 MG: 100 INJECTION, POWDER, LYOPHILIZED, FOR SOLUTION INTRAVENOUS; SUBCUTANEOUS at 10:34

## 2019-01-10 RX ADMIN — SODIUM CHLORIDE 250 ML: 9 INJECTION, SOLUTION INTRAVENOUS at 09:53

## 2019-01-11 ENCOUNTER — INFUSION (OUTPATIENT)
Dept: ONCOLOGY | Facility: HOSPITAL | Age: 79
End: 2019-01-11

## 2019-01-11 VITALS
RESPIRATION RATE: 16 BRPM | DIASTOLIC BLOOD PRESSURE: 69 MMHG | TEMPERATURE: 97.6 F | HEART RATE: 83 BPM | SYSTOLIC BLOOD PRESSURE: 139 MMHG

## 2019-01-11 DIAGNOSIS — D47.02 SYSTEMIC MASTOCYTOSIS WITH ASSOCIATED CLONAL HEMATOLOGICAL NON-MAST CELL LINEAGE DISEASE: Primary | ICD-10-CM

## 2019-01-11 DIAGNOSIS — C93.10 CHRONIC MYELOMONOCYTIC LEUKEMIA NOT HAVING ACHIEVED REMISSION (HCC): ICD-10-CM

## 2019-01-11 PROCEDURE — 25010000002 AZACITIDINE 100 MG RECONSTITUTED SUSPENSION 1 EACH VIAL: Performed by: INTERNAL MEDICINE

## 2019-01-11 PROCEDURE — 96375 TX/PRO/DX INJ NEW DRUG ADDON: CPT | Performed by: INTERNAL MEDICINE

## 2019-01-11 PROCEDURE — 25010000002 PALONOSETRON PER 25 MCG: Performed by: INTERNAL MEDICINE

## 2019-01-11 PROCEDURE — 96413 CHEMO IV INFUSION 1 HR: CPT | Performed by: INTERNAL MEDICINE

## 2019-01-11 RX ORDER — PALONOSETRON 0.05 MG/ML
0.25 INJECTION, SOLUTION INTRAVENOUS ONCE
Status: COMPLETED | OUTPATIENT
Start: 2019-01-11 | End: 2019-01-11

## 2019-01-11 RX ORDER — SODIUM CHLORIDE 9 MG/ML
250 INJECTION, SOLUTION INTRAVENOUS ONCE
Status: COMPLETED | OUTPATIENT
Start: 2019-01-11 | End: 2019-01-11

## 2019-01-11 RX ADMIN — PALONOSETRON HYDROCHLORIDE 0.25 MG: 0.25 INJECTION INTRAVENOUS at 09:53

## 2019-01-11 RX ADMIN — AZACITIDINE 155 MG: 100 INJECTION, POWDER, LYOPHILIZED, FOR SOLUTION INTRAVENOUS; SUBCUTANEOUS at 10:09

## 2019-01-11 RX ADMIN — SODIUM CHLORIDE 250 ML: 9 INJECTION, SOLUTION INTRAVENOUS at 09:52

## 2019-01-16 ENCOUNTER — TELEPHONE (OUTPATIENT)
Dept: ONCOLOGY | Facility: CLINIC | Age: 79
End: 2019-01-16

## 2019-01-16 NOTE — TELEPHONE ENCOUNTER
Pt called. Pt states that the spots are now the size of a half a dollar. Pt informed that Dr ortega didn't think that it was caused by his chemo. Instructed pt to see PCP or urgent care MD for rash. Pt voiced understanding .

## 2019-02-04 ENCOUNTER — INFUSION (OUTPATIENT)
Dept: ONCOLOGY | Facility: HOSPITAL | Age: 79
End: 2019-02-04

## 2019-02-04 ENCOUNTER — OFFICE VISIT (OUTPATIENT)
Dept: ONCOLOGY | Facility: CLINIC | Age: 79
End: 2019-02-04

## 2019-02-04 VITALS
OXYGEN SATURATION: 98 % | TEMPERATURE: 98.4 F | RESPIRATION RATE: 16 BRPM | HEIGHT: 71 IN | DIASTOLIC BLOOD PRESSURE: 74 MMHG | HEART RATE: 70 BPM | SYSTOLIC BLOOD PRESSURE: 134 MMHG | BODY MASS INDEX: 26.15 KG/M2 | WEIGHT: 186.8 LBS

## 2019-02-04 DIAGNOSIS — D47.02 SYSTEMIC MASTOCYTOSIS WITH ASSOCIATED CLONAL HEMATOLOGICAL NON-MAST CELL LINEAGE DISEASE: Primary | ICD-10-CM

## 2019-02-04 DIAGNOSIS — C93.10 CHRONIC MYELOMONOCYTIC LEUKEMIA NOT HAVING ACHIEVED REMISSION (HCC): Primary | ICD-10-CM

## 2019-02-04 DIAGNOSIS — D63.0 ANEMIA IN NEOPLASTIC DISEASE: ICD-10-CM

## 2019-02-04 DIAGNOSIS — D69.6 THROMBOCYTOPENIA (HCC): ICD-10-CM

## 2019-02-04 DIAGNOSIS — D47.02 SYSTEMIC MASTOCYTOSIS WITH ASSOCIATED CLONAL HEMATOLOGICAL NON-MAST CELL LINEAGE DISEASE: ICD-10-CM

## 2019-02-04 DIAGNOSIS — C93.10 CHRONIC MYELOMONOCYTIC LEUKEMIA NOT HAVING ACHIEVED REMISSION (HCC): ICD-10-CM

## 2019-02-04 LAB
ALBUMIN SERPL-MCNC: 4.9 G/DL (ref 3.4–4.8)
ALBUMIN/GLOB SERPL: 1.9 G/DL (ref 1.1–1.8)
ALP SERPL-CCNC: 53 U/L (ref 38–126)
ALT SERPL W P-5'-P-CCNC: 9 U/L (ref 21–72)
ANION GAP SERPL CALCULATED.3IONS-SCNC: 7 MMOL/L (ref 5–15)
ANISOCYTOSIS BLD QL: ABNORMAL
AST SERPL-CCNC: 23 U/L (ref 17–59)
BASOPHILS # BLD MANUAL: 0.24 10*3/MM3 (ref 0–0.2)
BASOPHILS NFR BLD AUTO: 3 % (ref 0–2)
BILIRUB SERPL-MCNC: 0.8 MG/DL (ref 0.2–1.3)
BUN BLD-MCNC: 17 MG/DL (ref 7–21)
BUN/CREAT SERPL: 16 (ref 7–25)
CALCIUM SPEC-SCNC: 9.5 MG/DL (ref 8.4–10.2)
CHLORIDE SERPL-SCNC: 103 MMOL/L (ref 95–110)
CO2 SERPL-SCNC: 25 MMOL/L (ref 22–31)
CREAT BLD-MCNC: 1.06 MG/DL (ref 0.7–1.3)
DEPRECATED RDW RBC AUTO: 84.9 FL (ref 35.1–43.9)
EOSINOPHIL # BLD MANUAL: 0.08 10*3/MM3 (ref 0–0.7)
EOSINOPHIL NFR BLD MANUAL: 1 % (ref 0–7)
ERYTHROCYTE [DISTWIDTH] IN BLOOD BY AUTOMATED COUNT: 23.7 % (ref 11.5–14.5)
GFR SERPL CREATININE-BSD FRML MDRD: 68 ML/MIN/1.73 (ref 42–98)
GLOBULIN UR ELPH-MCNC: 2.6 GM/DL (ref 2.3–3.5)
GLUCOSE BLD-MCNC: 96 MG/DL (ref 60–100)
HCT VFR BLD AUTO: 32.5 % (ref 39–49)
HGB BLD-MCNC: 11.5 G/DL (ref 13.7–17.3)
HYPOCHROMIA BLD QL: ABNORMAL
LYMPHOCYTES # BLD MANUAL: 1.96 10*3/MM3 (ref 0.6–4.2)
LYMPHOCYTES NFR BLD MANUAL: 15 % (ref 0–12)
LYMPHOCYTES NFR BLD MANUAL: 25 % (ref 10–50)
MCH RBC QN AUTO: 35.8 PG (ref 26.5–34)
MCHC RBC AUTO-ENTMCNC: 35.4 G/DL (ref 31.5–36.3)
MCV RBC AUTO: 101.2 FL (ref 80–98)
MONOCYTES # BLD AUTO: 1.18 10*3/MM3 (ref 0–0.9)
NEUTROPHILS # BLD AUTO: 4.39 10*3/MM3 (ref 2–8.6)
NEUTROPHILS NFR BLD MANUAL: 55 % (ref 37–80)
NEUTS BAND NFR BLD MANUAL: 1 % (ref 0–5)
NRBC SPEC MANUAL: 1 /100 WBC (ref 0–0)
PLATELET # BLD AUTO: 236 10*3/MM3 (ref 150–450)
PMV BLD AUTO: ABNORMAL FL (ref 8–12)
POTASSIUM BLD-SCNC: 4.3 MMOL/L (ref 3.5–5.1)
PROT SERPL-MCNC: 7.5 G/DL (ref 6.3–8.6)
RBC # BLD AUTO: 3.21 10*6/MM3 (ref 4.37–5.74)
SMALL PLATELETS BLD QL SMEAR: ADEQUATE
SODIUM BLD-SCNC: 135 MMOL/L (ref 137–145)
WBC MORPH BLD: NORMAL
WBC NRBC COR # BLD: 7.84 10*3/MM3 (ref 3.2–9.8)

## 2019-02-04 PROCEDURE — 99214 OFFICE O/P EST MOD 30 MIN: CPT | Performed by: INTERNAL MEDICINE

## 2019-02-04 PROCEDURE — 85025 COMPLETE CBC W/AUTO DIFF WBC: CPT

## 2019-02-04 PROCEDURE — 96413 CHEMO IV INFUSION 1 HR: CPT | Performed by: INTERNAL MEDICINE

## 2019-02-04 PROCEDURE — 25010000002 AZACITIDINE 100 MG RECONSTITUTED SUSPENSION 1 EACH VIAL: Performed by: INTERNAL MEDICINE

## 2019-02-04 PROCEDURE — G8420 CALC BMI NORM PARAMETERS: HCPCS | Performed by: INTERNAL MEDICINE

## 2019-02-04 PROCEDURE — 1123F ACP DISCUSS/DSCN MKR DOCD: CPT | Performed by: INTERNAL MEDICINE

## 2019-02-04 PROCEDURE — 80053 COMPREHEN METABOLIC PANEL: CPT

## 2019-02-04 PROCEDURE — 85007 BL SMEAR W/DIFF WBC COUNT: CPT

## 2019-02-04 RX ORDER — SODIUM CHLORIDE 9 MG/ML
250 INJECTION, SOLUTION INTRAVENOUS ONCE
Status: CANCELLED | OUTPATIENT
Start: 2019-02-06

## 2019-02-04 RX ORDER — SODIUM CHLORIDE 9 MG/ML
250 INJECTION, SOLUTION INTRAVENOUS ONCE
Status: CANCELLED | OUTPATIENT
Start: 2019-02-04

## 2019-02-04 RX ORDER — SODIUM CHLORIDE 9 MG/ML
250 INJECTION, SOLUTION INTRAVENOUS ONCE
Status: CANCELLED | OUTPATIENT
Start: 2019-02-08

## 2019-02-04 RX ORDER — SODIUM CHLORIDE 9 MG/ML
250 INJECTION, SOLUTION INTRAVENOUS ONCE
Status: CANCELLED | OUTPATIENT
Start: 2019-02-05

## 2019-02-04 RX ORDER — SODIUM CHLORIDE 9 MG/ML
250 INJECTION, SOLUTION INTRAVENOUS ONCE
Status: COMPLETED | OUTPATIENT
Start: 2019-02-04 | End: 2019-02-04

## 2019-02-04 RX ORDER — SODIUM CHLORIDE 9 MG/ML
250 INJECTION, SOLUTION INTRAVENOUS ONCE
Status: CANCELLED | OUTPATIENT
Start: 2019-02-07

## 2019-02-04 RX ORDER — PALONOSETRON 0.05 MG/ML
0.25 INJECTION, SOLUTION INTRAVENOUS ONCE
Status: CANCELLED | OUTPATIENT
Start: 2019-02-08

## 2019-02-04 RX ADMIN — AZACITIDINE 155 MG: 100 INJECTION, POWDER, LYOPHILIZED, FOR SOLUTION INTRAVENOUS; SUBCUTANEOUS at 11:12

## 2019-02-04 RX ADMIN — SODIUM CHLORIDE 250 ML: 9 INJECTION, SOLUTION INTRAVENOUS at 10:50

## 2019-02-04 NOTE — PROGRESS NOTES
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  Patient was also evaluated by Dr. Man Kimbrough at [place] in 2012.  Patient has been on observation since then without any active chemotherapy.  In November 2014 in view of worsening leukocytosis and anemia he had a repeat bone marrow biopsy done by Dr. Shore which showed systemic mastocytosis with associated clonal hematologic non-mass cell lineage disease, CMML-1.  Patient has not been on any chemotherapy since then.  In March 2018 patient was seen here at clinic by Goldie WHYTE.  In view of sclerotic lesion on lower extremity as well as skin lesion patient was referred to dermatology.  Biopsy done by Dr. Chen on June 20, 2018 showed increased MAST cells consistent with urticaria pigmentosa.   patient was referred to [place] clinic when he was seen by Dr. Kimbrough on August 20, 2018 and a bone marrow biopsy done on August 23, 2018.  Was started on Vidaza on September 24, 2018.   On November 12, 2018, due to uncontrollable shakes and chills after port access and flushing, patient was admitted to the hospital and was found to have gram-negative bacteremia with Acinetobacter.  Patient had a port removed during hospital stay.  Patient decided not to do any chemotherapy upon last clinic visit on November 26, 2018 due to ill health of his wife.  Patient was started back on Vidaza on January 7, 2019.  Patient is here to get cycle 3 of Vidaza today.  States his appetite is improving.  States skin rash involving  lower extremity significantly improved.  Denies any more chills or fevers. Denies any bleeding.     PAST MEDICAL HISTORY:    Past Medical History:   Diagnosis Date   • Atrophy of testis    • Benign prostatic hyperplasia    • Borderline glaucoma    • Chronic myelomonocytic leukemia (CMS/HCC)    • Degenerative joint disease involving multiple joints    • GERD (gastroesophageal reflux disease)    • Gout    • History of echocardiogram 05/08/2012    Normal left ventricular systolic function, EF 60%. Early diastolic dysfunction. Mild aortic and pulmonic regurgitation. Trace mitral and mild tricuspid regurgitation. No intracardiac mass pericardial effusion or cardiac thrombus.   • History of Holter monitoring 01/18/2011   • Impotence    • Lightheadedness    • Neck pain, musculoskeletal    • Sleep apnea     NOT WEARING C-PAP   • TIA (transient ischemic attack) 2014       SOCIAL HISTORY:    Social History     Tobacco Use   • Smoking status: Never Smoker   • Smokeless tobacco: Never Used   Substance Use Topics   • Alcohol use: No   • Drug use: No       Surgical History :  Past Surgical History:   Procedure Laterality Date   • CARDIAC CATHETERIZATION  09/30/2009    No epicardial coronary artery disease noted that would explain patient's chest pain of the abnormal stress test noted. Normal left ventricular systolic function with no wall motion abnormalities   • CARDIAC CATHETERIZATION  05/24/1989    Normal catheterization, false-positive exercise treadmill. Noncardiac chest pain   • CARDIAC ELECTROPHYSIOLOGY PROCEDURE N/A 5/18/2017    Procedure: PPM generator change - dual;  Surgeon: Geneva Day MD;  Location: [facility] INVASIVE LOCATION;  Service:    • CARDIAC PACEMAKER PLACEMENT  06/2008    Dual chamber permanent pacemaker implantation   • COLECTOMY PARTIAL / TOTAL  04/14/2000    Cecal diverticulitis. Removal of small segment of terminal ileum, cecum and right colon, sent to pathology   • COLON SURGERY   03/27/2016   • COLONOSCOPY  05/25/2012   • CYSTOSCOPY  11/15/2000    Urinary retention on the basis of medications and benign prostatic hypertrophy   • INGUINAL HERNIA REPAIR  02/15/2007    Recurrent right inguinal hernia. Repair of recurrent inguinal hernia with EPS Prolene mesh system.   • INGUINAL HERNIA REPAIR Right 1957   • LAPAROSCOPIC CHOLECYSTECTOMY  10/26/2006    Gallstones. Laparoscopic cholecystectomy with operative cholangiogram   • PROSTATECTOMY  11/17/2000    Benign prostatic hypertrophy with urinary retention. Prostatitis. transurethral resection of prostate.   • STEROID INJECTION  10/21/2010    Decadron (Gout)    • STEROID INJECTION  07/23/2013    Kenalog (Gout) (4)   • VENOUS ACCESS DEVICE (PORT) INSERTION N/A 9/13/2018    Procedure: ULTRASOUND ASSISTED RIGHT JUGULAR INSERTION VENOUS ACCESS DEVICE;  Surgeon: Luis E Babb MD;  Location: [facility];  Service: General   • VENOUS ACCESS DEVICE (PORT) REMOVAL N/A 11/14/2018    Procedure: REMOVAL VENOUS ACCESS DEVICE;  Surgeon: Tono Arevalo MD;  Location: [facility];  Service: General       ALLERGIES:    Allergies   Allergen Reactions   • Doxycycline Hyclate Other (See Comments)     Other reaction(s): lip swollen   • Sulfa Antibiotics Swelling     facial   • Other Rash     SILK TAPE   • Penicillins Rash     Reaction: rash   • Soma [Carisoprodol] Swelling and Rash         FAMILY HISTORY:  Family History   Problem Relation Age of Onset   • Cancer Other    • Colon cancer Other         parent   • Coronary artery disease Other    • Heart disease Other    • Hypertension Other    • Cholelithiasis Other    • Other Other         colon problems           REVIEW OF SYSTEMS:      CONSTITUTIONAL:  Complains of fatigue.  Has lost 3 pounds since last clinic visit despite of good appetite.  Denies any fever drenching night sweats .     EYES: No visual disturbances. No discharge. No new lesions    ENMT:  No epistaxis, mouth sores or difficulty  "swallowing.    RESPIRATORY:  Complains of shortness of breath with exertion. No new cough or hemoptysis.    CARDIOVASCULAR:  Complains of intermittent chest pain.No palpitations.    GASTROINTESTINAL:  No abdominal pain nausea, vomiting or blood in the stool.    GENITOURINARY: No Dysuria or Hematuria.    MUSCULOSKELETAL:  Complains of worsening back pain.  Complains of worsening pain in neck region.    LYMPHATICS:  Denies any abnormal swollen glands anywhere in the body.    NEUROLOGICAL : No tingling or numbness. No headache or dizziness. No seizures or balance problems.    SKIN: Erythematous lesion present on chest, abdomen, back, upper and lower extremity.  States skin rash involving lower extremity is improved significantly.            PHYSICAL EXAMINATION:      VITAL SIGNS:  /74   Pulse 70   Temp 98.4 °F (36.9 °C) (Temporal)   Resp 16   Ht 180.3 cm (70.98\")   Wt 84.7 kg (186 lb 12.8 oz)   SpO2 98%   BMI 26.07 kg/m²       02/04/19  0940   Weight: 84.7 kg (186 lb 12.8 oz)         ECOG performance status: 2    CONSTITUTIONAL:  Not in any distress.  Having shakes and chills at the time of examination.    EYES: Mild conjunctival Pallor. No Icterus. No Pterygium. Extraocular Movements intact.No ptosis.    ENMT:  Normocephalic, Atraumatic.No Facial Asymmetry noted.    NECK:  No adenopathy.Trachea midline. NO JVD.    RESPIRATORY:  Fair air entry bilateral. No rhonchi or wheezing.Fair respiratory effort.    CARDIOVASCULAR:  S1, S2. Regular rate and rhythm. No murmur or gallop appreciated.Pacemaker present on left chest wall.    ABDOMEN:  Soft, obese, nontender. Bowel sounds present in all four quadrants.  No Hepatosplenomegaly appreciated.    MUSCULOSKELETAL:  No edema.No Calf Tenderness.Decreased range of motion.    NEUROLOGIC:    No  Motor  deficit appreciated. Cranial Nerves 2-12 grossly intact.    SKIN : Skin lesion consistent with urticaria pigmentosa present on chest, abdomen, back, upper and lower " extremity.    LYMPHATICS: No new enlarged lymph nodes in neck or supraclavicular area.    PSYCHIATRY: Alert, awake and oriented ×3.Normal affect.  Normal judgment.  Makes good eye contact.        DIAGNOSTIC DATA:    Glucose   Date Value Ref Range Status   02/04/2019 96 60 - 100 mg/dL Final     Sodium   Date Value Ref Range Status   02/04/2019 135 (L) 137 - 145 mmol/L Final     Potassium   Date Value Ref Range Status   02/04/2019 4.3 3.5 - 5.1 mmol/L Final     CO2   Date Value Ref Range Status   02/04/2019 25.0 22.0 - 31.0 mmol/L Final     Chloride   Date Value Ref Range Status   02/04/2019 103 95 - 110 mmol/L Final     Anion Gap   Date Value Ref Range Status   02/04/2019 7.0 5.0 - 15.0 mmol/L Final     Creatinine   Date Value Ref Range Status   02/04/2019 1.06 0.70 - 1.30 mg/dL Final     BUN   Date Value Ref Range Status   02/04/2019 17 7 - 21 mg/dL Final     BUN/Creatinine Ratio   Date Value Ref Range Status   02/04/2019 16.0 7.0 - 25.0 Final     Calcium   Date Value Ref Range Status   02/04/2019 9.5 8.4 - 10.2 mg/dL Final     eGFR Non  Amer   Date Value Ref Range Status   02/04/2019 68 42 - 98 mL/min/1.73 Final     Alkaline Phosphatase   Date Value Ref Range Status   02/04/2019 53 38 - 126 U/L Final     Total Protein   Date Value Ref Range Status   02/04/2019 7.5 6.3 - 8.6 g/dL Final     ALT (SGPT)   Date Value Ref Range Status   02/04/2019 9 (L) 21 - 72 U/L Final     AST (SGOT)   Date Value Ref Range Status   02/04/2019 23 17 - 59 U/L Final     Total Bilirubin   Date Value Ref Range Status   02/04/2019 0.8 0.2 - 1.3 mg/dL Final     Albumin   Date Value Ref Range Status   02/04/2019 4.90 (H) 3.40 - 4.80 g/dL Final     Globulin   Date Value Ref Range Status   02/04/2019 2.6 2.3 - 3.5 gm/dL Final     Lab Results   Component Value Date    WBC 7.84 02/04/2019    HGB 11.5 (L) 02/04/2019    HCT 32.5 (L) 02/04/2019    .2 (H) 02/04/2019     02/04/2019     Lab Results   Component Value Date     NEUTROABS 7.74 01/07/2019    IRON 107 07/25/2018    TIBC 276 07/25/2018    LABIRON 39 07/25/2018    FERRITIN 264.00 07/25/2018    VCKWZJCE18 803 09/11/2018    FOLATE >20.00 09/11/2018     Lab Results   Component Value Date    HCGQUANT <1 12/16/2014       Tryptase    Ref Range & Units 1mo ago   Tryptase 2.2 - 13.2 ug/L 93.9     Resulting Agency  [facility]   Narrative     Performed at:  01 - [facility] [address]  [id number]  : Tej Matias MD, Phone:  [phone]      Specimen Collected: 07/25/18 12:34 Last Resulted: 07/27/18 13:16                     Blood Culture - Blood, Blood, Venous Line   Specimen Information: Blood, Venous Line        Blood Culture Acinetobacter lwoffii group Abnormal        ISOLATED FROM Aerobic Bottle              Gram Stain  Aerobic Bottle Gram negative bacilli   2 bottles of 3 bottles drawn positive for gram neg bacillus         Resulting Agency: [facility]   Susceptibility      Acinetobacter lwoffii group     CHIVO     Ampicillin + Sulbactam <=8/4 ug/ml Susceptible     Cefepime <=8 ug/ml Susceptible     Ceftazidime 4 ug/ml Susceptible     Ceftriaxone <=8 ug/ml Susceptible     Levofloxacin <=2 ug/ml Susceptible     Piperacillin <=16 ug/ml Susceptible     Trimethoprim + Sulfamethoxazole <=2/38 ug/ml Susceptible                 Specimen Collected: 11/12/18 10:26 Last Resulted: 11/16/18 06:25                  PATHOLOGY:  Pathology report from August 23, 2018 at [place] showed:  Diagnosis:  BONE MARROW - PERIPHERAL BLOOD SMEAR, ASPIRATE SMEAR, PARTICLE PREPARATION, BIOPSY, AND FLOW CYTOMETRY: MARKEDLY HYPERCELLULAR MARROW WITH INVOLVEMENT BY SYSTEMIC MASTOCYTOSIS WITH AN ASSOCIATED HEMATOLOGICAL NEOPLASM, CHRONIC MYELOMONOCYTIC LEUKEMIA-1; SEE IMPRESSION     IMPRESSION:  The findings are of a markedly hypercellular marrow (90% cellular) with maturing trilineage hematopoiesis and multifocal mast cell aggregates, including spindled forms. Mast cells  are positive for CD2 and CD25 by flow cytometric analysis. Recent testing at an outside institution showed an elevated tryptase level. The overall findings are consistent with involvement by systemic mastocytosis. In addition, there is multilineage dysplasia with mildly increased blasts and blast equivalents (6.1% of cellularity in total) and a persistent relative and absolute peripheral monocytosis. The combined findings are consistent with involvement by chronic myelomonocytic leukemia-1 (CMML-1), proliferative type. Final diagnosis requires correlation with pending additional testing, as documented below, which will be included in the comprehensive hematopathology report.    PERIPHERAL BLOOD SMEAR:  CBC : WBC 18.9 k/microL, Hgb 9.7 g/dL, Plt-Ct 187 k/microL,  fL, RDW 26.3%.    A Yi's stained peripheral smear is reviewed. Erythrocytes are decreased and are macrocytic and normochromic with anisopoikilocytosis, including target cells and teardrop cells. Polychromasia is present. Nucleated red blood cells are present. Leukocytes are increased and include mature neutrophils, lymphocytes, and monocytes, with a relative and absolute monocytosis. Neutrophils show dysplasia (nuclear hypolobation, hypogranulation). Left-shifted myeloid elements are present. There are no circulating blasts or plasma cells. Platelets are adequate in number and show variation in size.     ASPIRATE SMEARS AND TOUCH PREPARATIONS:  Yi's stained aspirate smears and touch preparations are reviewed. The material is hypercellular and particulate. Myeloid elements are adequate and show dysplasia (rlroweq-mz-[id number] dyssynchrony). Erythroid elements are adequate and show left-shifted maturation with mild dysplasia (megaloblastoid change, occasional nuclear membrane irregularity). The myeloid:erythroid ratio is 1.1 to 1. Megakaryocytes are increased in number, and demonstrate no significant atypia. Blasts and blast equivalents  (monoblasts, promonocytes) comprise 6.1% of cellularity in total (600 cell count). There is no increase in plasma cells or lymphocytes. Focal particles show significantly increased mast cells, including occasional spindle forms. A Prussian blue iron stain is performed on the sample, revealing increased storage iron. Sideroblastic iron is present. Ring sideroblasts are present (approximately 40-50%).     MARROW SMEAR DIFFERENTIAL:  Blasts (0-4): 3.8%  Monoblasts/promonocytes: 2.3%  Promyelocytes (1-8): 2.2%  Myelocytes and metamyelocytes (20-30): 15.8%  Bands and segmented neutrophils (12-25): 23.0%  Eosinophils and eosinophilic precursors (1-5): 0.3%  Basophils and basophilic precursors (0-1): 0.0%  Monocytes and monocytic precursors (0-2): 5.8%  Lymphocytes (10-15): 3.0%  Plasma cells (0-1): 0.2%  Erythroid precursors (15-27): 43.5%  Total cells counted: 600    BONE MARROW BIOPSY AND PARTICLE PREPARATION:  H&E and PAS stained bone marrow biopsy and particle preparation sections are reviewed. The material is adequate and markedly hypercellular (90% cellular). Myeloid elements are present in normal proportion and exhibit maturation. Erythroid elements are present in normal proportion and exhibit left-shifted maturation. Megakaryocytes are increased and include occasional hypolobated forms and forms with abnormal nuclear lobe separation. CD34-positive blasts comprise approximately 3-5% of marrow cellularity. Multifocal dense mast cell aggregates are present, including spindled forms, as confirmed by  and tryptase immunostains. A CD68 stain highlights increased monocytic/histiocytic forms. Bony trabeculae are normal for the patient's age. All stains performed with appropriate controls.     FLOW CYTOMETRY STUDIES:  IP ID:   Viability: 94.1%  DIAGNOSIS: No increase in blasts; abnormal mast cells present    COMMENT: Myeloblasts are not increased (0.9% of total cells), with the following normal immunophenotype:  positive for CD33 (moderate), CD34, and CD45 (dim); and negative for CD19. B cells are not increased (0.1% of total cells), with the following normal immunophenotype: positive for CD19 (bright) and CD45 (bright); and negative for CD34. T cells are not increased (3.1% of total cells), with the following normal immunophenotype: positive for CD45 (bright); and negative for CD34. Mast cells are present (0.03% of total cells), with the following abnormal immunophenotype: positive for CD2, CD25, CD45,  (bright), and FcER1.    CYTOGENETICS AND MOLECULAR DIAGNOSTIC RESULTS:  Additional testing (Karyotype, Myeloid NGS) is pending. Results will be included in the comprehensive hematopathology report.         **Electronically signed out by GAN MD, EMILY**on 8/27/2018  MS/NAKUL/haritha  Case reviewed by Attending Pathologist        Pathology report from November 13, 2014 showed:  FINAL DIAGNOSIS:   PERIPHERAL SMEAR, REVIEW:        MONOCYTOSIS WITH LEFT MYELOID SHIFT.        ANEMIA, BORDERLINE.   BONE MARROW ASPIRATION AND BIOPSY:        SYSTEMIC MASTOCYTOSIS WITH ASSOCIATED CLONAL             HEMATOLOGIC NON-MAST CELL LINEAGE DISEASE,             CHRONIC MYELOMONOCYTIC LEUKEMIA-1.            RADIOLOGY DATA :  CT of abdomen with and without contrast done on May 14, 2018 showed:  The liver is normal. The gallbladder surgically absent. The  biliary system appears within normal limits status post  cholecystectomy. The pancreas is normal. The spleen is normal.  Bilateral adrenal glands are normal. Right kidney mid zone 4.7 mm  nonobstructive renal calculi. Stable right kidney lower pole oval  hyperdense lesion on unenhanced imaging which has Hounsfield  units of  49. Following enhancement this lesion has Hounsfield  units of 49 and 51 suggesting no significant enhancement. This  lesion measures 2.36 cm in greatest diameter and is not  appreciably changed. Otherwise right kidney and ureter are  unremarkable. Left kidney and  visualized ureter are normal.  Visualized hollow viscera is normal. No lymphadenopathy in the  abdomen. No acute osseous abnormalities.     IMPRESSION:  1. Stable right kidney lower pole oval hyperdense lesion which  does not enhance measuring 2.36 cm in greatest diameter. This  interval stability and appearance is most consistent with benign  hyperdense cyst.  2. Right kidney mid zone 4.7 mm stable nonobstructive renal  calculi..  3. Otherwise unremarkable CT abdomen study with without  contrast..      CT of left lower extremity with contrast done on March 23, 2018 showed:  COMMENTS:              A CT examination was performed of the lower extremities, with  images coned to the left femur.     There is a marrow-based focal sclerotic lesion involving the  proximal/mid femur, measuring approximately 2.6 cm in  craniocaudal extent. There is no evidence of endosteal  scalloping, or features to suggest an aggressive process. There  is no abnormal periosteal reaction.     The remainder the examination is unremarkable for age.     .     IMPRESSION:  CONCLUSION:          1. Focal sclerotic bone lesion which is marrow based in the  diaphysis of the left femur in the proximal/midportion. This is  likely a benign lesion and correlation with plain film  radiographs are suggested.            CT chest with contrast done on January 26, 2018 showed:  IMPRESSION:  CONCLUSION:  Multiple sclerotic lesions in the spine and right RIBS,  concerning for metastatic prostate cancer. Recommend correlation  with PSA levels and nuclear medicine bone scan.           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 Shore.  -Patient rafaela  far not require any chemotherapy or any other treatment.  -Recently in view of worsening skin lesion on the chest, abdomen and back he underwent a biopsy by Dr. 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  -Patient is also found to have worsening anemia recently on blood work done from June 2018.  -Patient is also found to have multiple sclerotic lesion on the spine and hips on CT scan done from January 2018 until June 2018.  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  -Patient was evaluated by Dr. Kimbrough at [place] on August 20, 2018 and had a bone marrow biopsy done on August 23, 2018 that again showed systemic mastocytosis with CMML-1.  -In view of elevated tryptase level, his case was discussed at tumor board at [place].  -Case was discussed with Dr. Kimbrough on September 10, 2018, is recommending treatment with Vidaza for now.    -He was started on Vidaza on September 24, 2018.  -Upon next clinic visit on November 12, 2018,patient was admitted to hospital due to bacteremia and chemotherapy was delayed.  -On November 26, 2018, patient requested chemotherapy to be held because of ill health of his wife.  -Patient received cycle 2 of  Vidaza on January 7, 2019.  White blood cell count today is normal at 7.84, hemoglobin is improved to 11.5.  Platelet count is normal at 2 36,000.  -We'll go ahead with cycle 3 of Vidaza today.  -We will ask patient to return to clinic in 4 weeks with a repeat CBC and CMP to be done on that day.    2.  Anemia: Most likely secondary to #1  -Remains on folic acid 1 mg by mouth daily.  Hemoglobin is 11.5.  We'll monitored with CBC.    3.  Leukocytosis: Secondary to chronic myelomonocytic leukemia. Resolved We'll monitored with CBC for now.    4.  Urticaria pigmentosa, secondary to underlying 1 marrow  pathology.    5.  Thrombocytopenia: Secondary to bone marrow involvement. Resolved. Platelet count is 236,000 today.  We'll monitored with CBC for now.    6.  History of bradycardia status post pacemaker in 2008.    7.  Health maintenance: Patient does not smoke.  Had a colonoscopy in 2014 by Dr. Guzman.    8. BMI: Patient's Body mass index is 26.07 kg/m². BMI is in reference range.    9. Advance Care Planning: For now patient remains full code and is able to make  His decisions.  Patient has health care surrogate mentioned on chart.           Jordin Roblero MD  2/4/2019  10:14 AM        EMR Dragon/Transcription disclaimer:   Much of this encounter note is an electronic transcription/translation of spoken language to printed text. The electronic translation of spoken language may permit erroneous, or at times, nonsensical words or phrases to be inadvertently transcribed; Although I have reviewed the note for such errors, some may still exist.

## 2019-02-05 ENCOUNTER — INFUSION (OUTPATIENT)
Dept: ONCOLOGY | Facility: HOSPITAL | Age: 79
End: 2019-02-05

## 2019-02-05 VITALS
SYSTOLIC BLOOD PRESSURE: 158 MMHG | DIASTOLIC BLOOD PRESSURE: 68 MMHG | RESPIRATION RATE: 18 BRPM | TEMPERATURE: 98.9 F | HEART RATE: 78 BPM

## 2019-02-05 DIAGNOSIS — C93.10 CHRONIC MYELOMONOCYTIC LEUKEMIA NOT HAVING ACHIEVED REMISSION (HCC): ICD-10-CM

## 2019-02-05 DIAGNOSIS — D47.02 SYSTEMIC MASTOCYTOSIS WITH ASSOCIATED CLONAL HEMATOLOGICAL NON-MAST CELL LINEAGE DISEASE: Primary | ICD-10-CM

## 2019-02-05 PROCEDURE — 25010000002 AZACITIDINE 100 MG RECONSTITUTED SUSPENSION 1 EACH VIAL: Performed by: INTERNAL MEDICINE

## 2019-02-05 PROCEDURE — 96413 CHEMO IV INFUSION 1 HR: CPT | Performed by: INTERNAL MEDICINE

## 2019-02-05 RX ORDER — SODIUM CHLORIDE 9 MG/ML
250 INJECTION, SOLUTION INTRAVENOUS ONCE
Status: COMPLETED | OUTPATIENT
Start: 2019-02-05 | End: 2019-02-05

## 2019-02-05 RX ADMIN — AZACITIDINE 155 MG: 100 INJECTION, POWDER, LYOPHILIZED, FOR SOLUTION INTRAVENOUS; SUBCUTANEOUS at 10:22

## 2019-02-05 RX ADMIN — SODIUM CHLORIDE 250 ML: 9 INJECTION, SOLUTION INTRAVENOUS at 10:19

## 2019-02-05 NOTE — PROGRESS NOTES
1004- previous iv to left forearm- blood return present but red streaking present to upper forearm.  Pt c/o of no pain to area, just redness.  IV removed per p/p, catheter intact.  Will continue to assess site and monitor pt.

## 2019-02-06 ENCOUNTER — CLINICAL SUPPORT (OUTPATIENT)
Dept: CARDIOLOGY | Facility: CLINIC | Age: 79
End: 2019-02-06

## 2019-02-06 ENCOUNTER — INFUSION (OUTPATIENT)
Dept: ONCOLOGY | Facility: HOSPITAL | Age: 79
End: 2019-02-06

## 2019-02-06 VITALS
DIASTOLIC BLOOD PRESSURE: 74 MMHG | SYSTOLIC BLOOD PRESSURE: 146 MMHG | RESPIRATION RATE: 16 BRPM | TEMPERATURE: 98.9 F | HEART RATE: 86 BPM

## 2019-02-06 DIAGNOSIS — C93.10 CHRONIC MYELOMONOCYTIC LEUKEMIA NOT HAVING ACHIEVED REMISSION (HCC): ICD-10-CM

## 2019-02-06 DIAGNOSIS — I48.0 PAROXYSMAL ATRIAL FIBRILLATION (HCC): ICD-10-CM

## 2019-02-06 DIAGNOSIS — I49.5 SICK SINUS SYNDROME (HCC): Primary | ICD-10-CM

## 2019-02-06 DIAGNOSIS — D47.02 SYSTEMIC MASTOCYTOSIS WITH ASSOCIATED CLONAL HEMATOLOGICAL NON-MAST CELL LINEAGE DISEASE: Primary | ICD-10-CM

## 2019-02-06 DIAGNOSIS — Z79.01 CURRENT USE OF LONG TERM ANTICOAGULATION: ICD-10-CM

## 2019-02-06 DIAGNOSIS — Z95.0 PACEMAKER: ICD-10-CM

## 2019-02-06 PROCEDURE — 96413 CHEMO IV INFUSION 1 HR: CPT | Performed by: INTERNAL MEDICINE

## 2019-02-06 PROCEDURE — 25010000002 AZACITIDINE 100 MG RECONSTITUTED SUSPENSION 1 EACH VIAL: Performed by: INTERNAL MEDICINE

## 2019-02-06 PROCEDURE — 93288 INTERROG EVL PM/LDLS PM IP: CPT | Performed by: NURSE PRACTITIONER

## 2019-02-06 RX ORDER — SODIUM CHLORIDE 9 MG/ML
250 INJECTION, SOLUTION INTRAVENOUS ONCE
Status: COMPLETED | OUTPATIENT
Start: 2019-02-06 | End: 2019-02-06

## 2019-02-06 RX ADMIN — SODIUM CHLORIDE 250 ML: 9 INJECTION, SOLUTION INTRAVENOUS at 09:16

## 2019-02-06 RX ADMIN — AZACITIDINE 155 MG: 100 INJECTION, POWDER, LYOPHILIZED, FOR SOLUTION INTRAVENOUS; SUBCUTANEOUS at 09:41

## 2019-02-06 NOTE — PROGRESS NOTES
Pacemaker Evaluation Report    February 6, 2019    Primary Cardiologist: Shay Schwartz  Implanting MD: Dr. Day  :[employer] Model: A2DR01 Serial Number: [id number]  Implant date: 5/18/2017    Reason for evaluation:Office, PPM   Cardiac device indication(s): A Fib, Sinus node dysfunction    Battery  ERIKA: 6 years @ 3.00V    Interrogation Results  Atrial sensing: P wave: 1.1 mV  Atrial capture: 2.00V @ 0.60 ms  Atrial lead impedance: 437 ohms  Ventricular sensing: R wave: 7.3 mV  Ventricular capture: 0.75 V @ 0.40 ms  Ventricular lead impedance: right  817 ohms    Parameters  Mode: AAIR<=>DDDR  Base Rate: 60/130    Diagnostic Data  Atrial paced:95.9 %   Ventricular paced: 0.5 %  Mode switch: 0%  AT/AF McWilliams: 1.2%  AHR: 17   Longest 118 minutes  VHR: 0    Changes made: No changes made    Conclusions: normal device function and Follow up in 6 months    Assessment:  1. Sick sinus syndrome (CMS/HCC)    2. Paroxysmal atrial fibrillation (CMS/HCC)    3. Current use of long term anticoagulation    4. Pacemaker            This document has been electronically signed by ISABELL Lee on February 6, 2019 11:19 AM

## 2019-02-07 ENCOUNTER — INFUSION (OUTPATIENT)
Dept: ONCOLOGY | Facility: HOSPITAL | Age: 79
End: 2019-02-07

## 2019-02-07 VITALS
DIASTOLIC BLOOD PRESSURE: 68 MMHG | HEART RATE: 86 BPM | TEMPERATURE: 98 F | SYSTOLIC BLOOD PRESSURE: 135 MMHG | RESPIRATION RATE: 18 BRPM

## 2019-02-07 DIAGNOSIS — C93.10 CHRONIC MYELOMONOCYTIC LEUKEMIA NOT HAVING ACHIEVED REMISSION (HCC): ICD-10-CM

## 2019-02-07 DIAGNOSIS — D47.02 SYSTEMIC MASTOCYTOSIS WITH ASSOCIATED CLONAL HEMATOLOGICAL NON-MAST CELL LINEAGE DISEASE: Primary | ICD-10-CM

## 2019-02-07 PROCEDURE — 25010000002 AZACITIDINE 100 MG RECONSTITUTED SUSPENSION 1 EACH VIAL: Performed by: INTERNAL MEDICINE

## 2019-02-07 PROCEDURE — 96413 CHEMO IV INFUSION 1 HR: CPT | Performed by: INTERNAL MEDICINE

## 2019-02-07 RX ORDER — SODIUM CHLORIDE 9 MG/ML
250 INJECTION, SOLUTION INTRAVENOUS ONCE
Status: COMPLETED | OUTPATIENT
Start: 2019-02-07 | End: 2019-02-07

## 2019-02-07 RX ADMIN — AZACITIDINE 155 MG: 100 INJECTION, POWDER, LYOPHILIZED, FOR SOLUTION INTRAVENOUS; SUBCUTANEOUS at 10:09

## 2019-02-07 RX ADMIN — SODIUM CHLORIDE 250 ML: 9 INJECTION, SOLUTION INTRAVENOUS at 09:50

## 2019-02-08 ENCOUNTER — INFUSION (OUTPATIENT)
Dept: ONCOLOGY | Facility: HOSPITAL | Age: 79
End: 2019-02-08

## 2019-02-08 VITALS
DIASTOLIC BLOOD PRESSURE: 70 MMHG | TEMPERATURE: 97.9 F | HEART RATE: 82 BPM | RESPIRATION RATE: 18 BRPM | SYSTOLIC BLOOD PRESSURE: 152 MMHG

## 2019-02-08 DIAGNOSIS — C93.10 CHRONIC MYELOMONOCYTIC LEUKEMIA NOT HAVING ACHIEVED REMISSION (HCC): ICD-10-CM

## 2019-02-08 DIAGNOSIS — D47.02 SYSTEMIC MASTOCYTOSIS WITH ASSOCIATED CLONAL HEMATOLOGICAL NON-MAST CELL LINEAGE DISEASE: Primary | ICD-10-CM

## 2019-02-08 PROCEDURE — 96375 TX/PRO/DX INJ NEW DRUG ADDON: CPT | Performed by: INTERNAL MEDICINE

## 2019-02-08 PROCEDURE — 96413 CHEMO IV INFUSION 1 HR: CPT | Performed by: INTERNAL MEDICINE

## 2019-02-08 PROCEDURE — 25010000002 AZACITIDINE 100 MG RECONSTITUTED SUSPENSION 1 EACH VIAL: Performed by: INTERNAL MEDICINE

## 2019-02-08 RX ORDER — SODIUM CHLORIDE 9 MG/ML
250 INJECTION, SOLUTION INTRAVENOUS ONCE
Status: COMPLETED | OUTPATIENT
Start: 2019-02-08 | End: 2019-02-08

## 2019-02-08 RX ORDER — PALONOSETRON 0.05 MG/ML
0.25 INJECTION, SOLUTION INTRAVENOUS ONCE
Status: DISCONTINUED | OUTPATIENT
Start: 2019-02-08 | End: 2019-02-08 | Stop reason: HOSPADM

## 2019-02-08 RX ADMIN — SODIUM CHLORIDE 250 ML: 9 INJECTION, SOLUTION INTRAVENOUS at 13:30

## 2019-02-08 RX ADMIN — AZACITIDINE 155 MG: 100 INJECTION, POWDER, LYOPHILIZED, FOR SOLUTION INTRAVENOUS; SUBCUTANEOUS at 14:01

## 2019-03-04 NOTE — PROGRESS NOTES
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  Patient was also evaluated by Dr. Man Kimbrough at [place] in 2012.  Patient has been on observation since then without any active chemotherapy.  In November 2014 in view of worsening leukocytosis and anemia he had a repeat bone marrow biopsy done by Dr. Shore which showed systemic mastocytosis with associated clonal hematologic non-mass cell lineage disease, CMML-1.  Patient has not been on any chemotherapy since then.  In March 2018 patient was seen here at clinic by Goldie WHYTE.  In view of sclerotic lesion on lower extremity as well as skin lesion patient was referred to dermatology.  Biopsy done by Dr. Chen on June 20, 2018 showed increased MAST cells consistent with urticaria pigmentosa.   patient was referred to [place] clinic when he was seen by Dr. Kimbrough on August 20, 2018 and a bone marrow biopsy done on August 23, 2018.  Was started on Vidaza on September 24, 2018.   On November 12, 2018, due to uncontrollable shakes and chills after port access and flushing, patient was admitted to the hospital and was found to have gram-negative bacteremia with Acinetobacter.  Patient had a port removed during hospital stay.  Patient decided not to do any chemotherapy upon last clinic visit on November 26, 2018 due to ill health of his wife.  Patient was started back on Vidaza on January 7, 2019.  Patient is here to get cycle 4 of Vidaza today.  States his appetite is improving.  Complains of worsening skin  rash affecting chest, back, lower extremity.  Complains of pruritus with skin rash.  Denies any more chills or fevers. Denies any bleeding.     PAST MEDICAL HISTORY:    Past Medical History:   Diagnosis Date   • Atrophy of testis    • Benign prostatic hyperplasia    • Borderline glaucoma    • Chronic myelomonocytic leukemia (CMS/HCC)    • Degenerative joint disease involving multiple joints    • GERD (gastroesophageal reflux disease)    • Gout    • History of echocardiogram 05/08/2012    Normal left ventricular systolic function, EF 60%. Early diastolic dysfunction. Mild aortic and pulmonic regurgitation. Trace mitral and mild tricuspid regurgitation. No intracardiac mass pericardial effusion or cardiac thrombus.   • History of Holter monitoring 01/18/2011   • Impotence    • Lightheadedness    • Neck pain, musculoskeletal    • Sleep apnea     NOT WEARING C-PAP   • TIA (transient ischemic attack) 2014       SOCIAL HISTORY:    Social History     Tobacco Use   • Smoking status: Never Smoker   • Smokeless tobacco: Never Used   Substance Use Topics   • Alcohol use: No   • Drug use: No       Surgical History :  Past Surgical History:   Procedure Laterality Date   • CARDIAC CATHETERIZATION  09/30/2009    No epicardial coronary artery disease noted that would explain patient's chest pain of the abnormal stress test noted. Normal left ventricular systolic function with no wall motion abnormalities   • CARDIAC CATHETERIZATION  05/24/1989    Normal catheterization, false-positive exercise treadmill. Noncardiac chest pain   • CARDIAC ELECTROPHYSIOLOGY PROCEDURE N/A 5/18/2017    Procedure: PPM generator change - dual;  Surgeon: [place] Day MD;  Location: [facility] INVASIVE LOCATION;  Service:    • CARDIAC PACEMAKER PLACEMENT  06/2008    Dual chamber permanent pacemaker implantation   • COLECTOMY PARTIAL / TOTAL  04/14/2000    Cecal diverticulitis. Removal of small segment of terminal ileum, cecum and right colon,  sent to pathology   • COLON SURGERY  03/27/2016   • COLONOSCOPY  05/25/2012   • CYSTOSCOPY  11/15/2000    Urinary retention on the basis of medications and benign prostatic hypertrophy   • INGUINAL HERNIA REPAIR  02/15/2007    Recurrent right inguinal hernia. Repair of recurrent inguinal hernia with EPS Prolene mesh system.   • INGUINAL HERNIA REPAIR Right 1957   • LAPAROSCOPIC CHOLECYSTECTOMY  10/26/2006    Gallstones. Laparoscopic cholecystectomy with operative cholangiogram   • PROSTATECTOMY  11/17/2000    Benign prostatic hypertrophy with urinary retention. Prostatitis. transurethral resection of prostate.   • STEROID INJECTION  10/21/2010    Decadron (Gout)    • STEROID INJECTION  07/23/2013    Kenalog (Gout) (4)   • VENOUS ACCESS DEVICE (PORT) INSERTION N/A 9/13/2018    Procedure: ULTRASOUND ASSISTED RIGHT JUGULAR INSERTION VENOUS ACCESS DEVICE;  Surgeon: Luis E Babb MD;  Location: [facility];  Service: General   • VENOUS ACCESS DEVICE (PORT) REMOVAL N/A 11/14/2018    Procedure: REMOVAL VENOUS ACCESS DEVICE;  Surgeon: Tono Arevalo MD;  Location: [facility];  Service: General       ALLERGIES:    Allergies   Allergen Reactions   • Doxycycline Hyclate Other (See Comments)     Other reaction(s): lip swollen   • Sulfa Antibiotics Swelling     facial   • Other Rash     SILK TAPE   • Penicillins Rash     Reaction: rash   • Soma [Carisoprodol] Swelling and Rash         FAMILY HISTORY:  Family History   Problem Relation Age of Onset   • Cancer Other    • Colon cancer Other         parent   • Coronary artery disease Other    • Heart disease Other    • Hypertension Other    • Cholelithiasis Other    • Other Other         colon problems           REVIEW OF SYSTEMS:      CONSTITUTIONAL:  Complains of fatigue.  Has gained 6 pounds since last clinic visit despite of good appetite.  Denies any fever drenching night sweats .     EYES: No visual disturbances. No discharge. No new lesions    ENMT:  No epistaxis, mouth  "sores or difficulty swallowing.    RESPIRATORY:  Complains of shortness of breath with exertion. No new cough or hemoptysis.    CARDIOVASCULAR:  Complains of intermittent chest pain.No palpitations.    GASTROINTESTINAL:  No abdominal pain nausea, vomiting or blood in the stool.    GENITOURINARY: No Dysuria or Hematuria.    MUSCULOSKELETAL:  Complains of worsening back pain.  Complains of worsening pain in neck region.    LYMPHATICS:  Denies any abnormal swollen glands anywhere in the body.    NEUROLOGICAL : No tingling or numbness. No headache or dizziness. No seizures or balance problems.    SKIN: Erythematous lesion present on chest, abdomen, back, upper and lower extremity.  Complains of recent worsening of skin rash.            PHYSICAL EXAMINATION:      VITAL SIGNS:  /73   Pulse 78   Temp 97.8 °F (36.6 °C) (Temporal)   Resp 16   Ht 180.3 cm (70.98\")   Wt 86.9 kg (191 lb 9.6 oz)   SpO2 97%   BMI 26.73 kg/m²       03/04/19  0857   Weight: 86.9 kg (191 lb 9.6 oz)         ECOG performance status: 2    CONSTITUTIONAL:  Not in any distress.  Having shakes and chills at the time of examination.    EYES: Mild conjunctival Pallor. No Icterus. No Pterygium. Extraocular Movements intact.No ptosis.    ENMT:  Normocephalic, Atraumatic.No Facial Asymmetry noted.    NECK:  No adenopathy.Trachea midline. NO JVD.    RESPIRATORY:  Fair air entry bilateral. No rhonchi or wheezing.Fair respiratory effort.    CARDIOVASCULAR:  S1, S2. Regular rate and rhythm. No murmur or gallop appreciated.Pacemaker present on left chest wall.    ABDOMEN:  Soft, obese, nontender. Bowel sounds present in all four quadrants.  No Hepatosplenomegaly appreciated.    MUSCULOSKELETAL:  No edema.No Calf Tenderness.Decreased range of motion.    NEUROLOGIC:    No  Motor  deficit appreciated. Cranial Nerves 2-12 grossly intact.    SKIN : Skin lesion consistent with urticaria pigmentosa present on chest, abdomen, back, upper and lower " extremity.    LYMPHATICS: No new enlarged lymph nodes in neck or supraclavicular area.    PSYCHIATRY: Alert, awake and oriented ×3.Normal affect.  Normal judgment.  Makes good eye contact.        DIAGNOSTIC DATA:    Glucose   Date Value Ref Range Status   02/04/2019 96 60 - 100 mg/dL Final     Sodium   Date Value Ref Range Status   02/04/2019 135 (L) 137 - 145 mmol/L Final     Potassium   Date Value Ref Range Status   02/04/2019 4.3 3.5 - 5.1 mmol/L Final     CO2   Date Value Ref Range Status   02/04/2019 25.0 22.0 - 31.0 mmol/L Final     Chloride   Date Value Ref Range Status   02/04/2019 103 95 - 110 mmol/L Final     Anion Gap   Date Value Ref Range Status   02/04/2019 7.0 5.0 - 15.0 mmol/L Final     Creatinine   Date Value Ref Range Status   02/04/2019 1.06 0.70 - 1.30 mg/dL Final     BUN   Date Value Ref Range Status   02/04/2019 17 7 - 21 mg/dL Final     BUN/Creatinine Ratio   Date Value Ref Range Status   02/04/2019 16.0 7.0 - 25.0 Final     Calcium   Date Value Ref Range Status   02/04/2019 9.5 8.4 - 10.2 mg/dL Final     eGFR Non  Amer   Date Value Ref Range Status   02/04/2019 68 42 - 98 mL/min/1.73 Final     Alkaline Phosphatase   Date Value Ref Range Status   02/04/2019 53 38 - 126 U/L Final     Total Protein   Date Value Ref Range Status   02/04/2019 7.5 6.3 - 8.6 g/dL Final     ALT (SGPT)   Date Value Ref Range Status   02/04/2019 9 (L) 21 - 72 U/L Final     AST (SGOT)   Date Value Ref Range Status   02/04/2019 23 17 - 59 U/L Final     Total Bilirubin   Date Value Ref Range Status   02/04/2019 0.8 0.2 - 1.3 mg/dL Final     Albumin   Date Value Ref Range Status   02/04/2019 4.90 (H) 3.40 - 4.80 g/dL Final     Globulin   Date Value Ref Range Status   02/04/2019 2.6 2.3 - 3.5 gm/dL Final     Lab Results   Component Value Date    WBC 8.43 03/04/2019    HGB 11.3 (L) 03/04/2019    HCT 33.3 (L) 03/04/2019    .4 (H) 03/04/2019     03/04/2019     Lab Results   Component Value Date     NEUTROABS 4.78 03/04/2019    IRON 107 07/25/2018    TIBC 276 07/25/2018    LABIRON 39 07/25/2018    FERRITIN 264.00 07/25/2018    [id number] 803 09/11/2018    FOLATE >20.00 09/11/2018     Lab Results   Component Value Date    HCGQUANT <1 12/16/2014       Tryptase    Ref Range & Units 1mo ago   Tryptase 2.2 - 13.2 ug/L 93.9     Resulting Agency  [facility]   Narrative     Performed at:  01 - [facility] [address]  [id number]  : Tej Matias MD, Phone:  [phone]      Specimen Collected: 07/25/18 12:34 Last Resulted: 07/27/18 13:16                     Blood Culture - Blood, Blood, Venous Line   Specimen Information: Blood, Venous Line        Blood Culture Acinetobacter lwoffii group Abnormal        ISOLATED FROM Aerobic Bottle              Gram Stain  Aerobic Bottle Gram negative bacilli   2 bottles of 3 bottles drawn positive for gram neg bacillus         Resulting Agency: [facility]   Susceptibility      Acinetobacter lwoffii group     CHIVO     Ampicillin + Sulbactam <=8/4 ug/ml Susceptible     Cefepime <=8 ug/ml Susceptible     Ceftazidime 4 ug/ml Susceptible     Ceftriaxone <=8 ug/ml Susceptible     Levofloxacin <=2 ug/ml Susceptible     Piperacillin <=16 ug/ml Susceptible     Trimethoprim + Sulfamethoxazole <=2/38 ug/ml Susceptible                 Specimen Collected: 11/12/18 10:26 Last Resulted: 11/16/18 06:25                  PATHOLOGY:  Pathology report from August 23, 2018 at [place] showed:  Diagnosis:  BONE MARROW - PERIPHERAL BLOOD SMEAR, ASPIRATE SMEAR, PARTICLE PREPARATION, BIOPSY, AND FLOW CYTOMETRY: MARKEDLY HYPERCELLULAR MARROW WITH INVOLVEMENT BY SYSTEMIC MASTOCYTOSIS WITH AN ASSOCIATED HEMATOLOGICAL NEOPLASM, CHRONIC MYELOMONOCYTIC LEUKEMIA-1; SEE IMPRESSION     IMPRESSION:  The findings are of a markedly hypercellular marrow (90% cellular) with maturing trilineage hematopoiesis and multifocal mast cell aggregates, including spindled forms. Mast cells  are positive for CD2 and CD25 by flow cytometric analysis. Recent testing at an outside institution showed an elevated tryptase level. The overall findings are consistent with involvement by systemic mastocytosis. In addition, there is multilineage dysplasia with mildly increased blasts and blast equivalents (6.1% of cellularity in total) and a persistent relative and absolute peripheral monocytosis. The combined findings are consistent with involvement by chronic myelomonocytic leukemia-1 (CMML-1), proliferative type. Final diagnosis requires correlation with pending additional testing, as documented below, which will be included in the comprehensive hematopathology report.    PERIPHERAL BLOOD SMEAR:  CBC : WBC 18.9 k/microL, Hgb 9.7 g/dL, Plt-Ct 187 k/microL,  fL, RDW 26.3%.    A Yi's stained peripheral smear is reviewed. Erythrocytes are decreased and are macrocytic and normochromic with anisopoikilocytosis, including target cells and teardrop cells. Polychromasia is present. Nucleated red blood cells are present. Leukocytes are increased and include mature neutrophils, lymphocytes, and monocytes, with a relative and absolute monocytosis. Neutrophils show dysplasia (nuclear hypolobation, hypogranulation). Left-shifted myeloid elements are present. There are no circulating blasts or plasma cells. Platelets are adequate in number and show variation in size.     ASPIRATE SMEARS AND TOUCH PREPARATIONS:  Yi's stained aspirate smears and touch preparations are reviewed. The material is hypercellular and particulate. Myeloid elements are adequate and show dysplasia (mxadgpi-ji-wsvvotbfqtx dyssynchrony). Erythroid elements are adequate and show left-shifted maturation with mild dysplasia (megaloblastoid change, occasional nuclear membrane irregularity). The myeloid:erythroid ratio is 1.1 to 1. Megakaryocytes are increased in number, and demonstrate no significant atypia. Blasts and blast equivalents  (monoblasts, promonocytes) comprise 6.1% of cellularity in total (600 cell count). There is no increase in plasma cells or lymphocytes. Focal particles show significantly increased mast cells, including occasional spindle forms. A Prussian blue iron stain is performed on the sample, revealing increased storage iron. Sideroblastic iron is present. Ring sideroblasts are present (approximately 40-50%).     MARROW SMEAR DIFFERENTIAL:  Blasts (0-4): 3.8%  Monoblasts/promonocytes: 2.3%  Promyelocytes (1-8): 2.2%  Myelocytes and metamyelocytes (20-30): 15.8%  Bands and segmented neutrophils (12-25): 23.0%  Eosinophils and eosinophilic precursors (1-5): 0.3%  Basophils and basophilic precursors (0-1): 0.0%  Monocytes and monocytic precursors (0-2): 5.8%  Lymphocytes (10-15): 3.0%  Plasma cells (0-1): 0.2%  Erythroid precursors (15-27): 43.5%  Total cells counted: 600    BONE MARROW BIOPSY AND PARTICLE PREPARATION:  H&E and PAS stained bone marrow biopsy and particle preparation sections are reviewed. The material is adequate and markedly hypercellular (90% cellular). Myeloid elements are present in normal proportion and exhibit maturation. Erythroid elements are present in normal proportion and exhibit left-shifted maturation. Megakaryocytes are increased and include occasional hypolobated forms and forms with abnormal nuclear lobe separation. CD34-positive blasts comprise approximately 3-5% of marrow cellularity. Multifocal dense mast cell aggregates are present, including spindled forms, as confirmed by  and tryptase immunostains. A CD68 stain highlights increased monocytic/histiocytic forms. Bony trabeculae are normal for the patient's age. All stains performed with appropriate controls.     FLOW CYTOMETRY STUDIES:  IP ID:   Viability: 94.1%  DIAGNOSIS: No increase in blasts; abnormal mast cells present    COMMENT: Myeloblasts are not increased (0.9% of total cells), with the following normal immunophenotype:  positive for CD33 (moderate), CD34, and CD45 (dim); and negative for CD19. B cells are not increased (0.1% of total cells), with the following normal immunophenotype: positive for CD19 (bright) and CD45 (bright); and negative for CD34. T cells are not increased (3.1% of total cells), with the following normal immunophenotype: positive for CD45 (bright); and negative for CD34. Mast cells are present (0.03% of total cells), with the following abnormal immunophenotype: positive for CD2, CD25, CD45,  (bright), and FcER1.    CYTOGENETICS AND MOLECULAR DIAGNOSTIC RESULTS:  Additional testing (Karyotype, Myeloid NGS) is pending. Results will be included in the comprehensive hematopathology report.         **Electronically signed out by GAN MD, EMILY**on 8/27/2018  MS/NAKUL/haritha  Case reviewed by Attending Pathologist        Pathology report from November 13, 2014 showed:  FINAL DIAGNOSIS:   PERIPHERAL SMEAR, REVIEW:        MONOCYTOSIS WITH LEFT MYELOID SHIFT.        ANEMIA, BORDERLINE.   BONE MARROW ASPIRATION AND BIOPSY:        SYSTEMIC MASTOCYTOSIS WITH ASSOCIATED CLONAL             HEMATOLOGIC NON-MAST CELL LINEAGE DISEASE,             CHRONIC MYELOMONOCYTIC LEUKEMIA-1.            RADIOLOGY DATA :  CT of abdomen with and without contrast done on May 14, 2018 showed:  The liver is normal. The gallbladder surgically absent. The  biliary system appears within normal limits status post  cholecystectomy. The pancreas is normal. The spleen is normal.  Bilateral adrenal glands are normal. Right kidney mid zone 4.7 mm  nonobstructive renal calculi. Stable right kidney lower pole oval  hyperdense lesion on unenhanced imaging which has Hounsfield  units of  49. Following enhancement this lesion has Hounsfield  units of 49 and 51 suggesting no significant enhancement. This  lesion measures 2.36 cm in greatest diameter and is not  appreciably changed. Otherwise right kidney and ureter are  unremarkable. Left kidney and  visualized ureter are normal.  Visualized hollow viscera is normal. No lymphadenopathy in the  abdomen. No acute osseous abnormalities.     IMPRESSION:  1. Stable right kidney lower pole oval hyperdense lesion which  does not enhance measuring 2.36 cm in greatest diameter. This  interval stability and appearance is most consistent with benign  hyperdense cyst.  2. Right kidney mid zone 4.7 mm stable nonobstructive renal  calculi..  3. Otherwise unremarkable CT abdomen study with without  contrast..      CT of left lower extremity with contrast done on March 23, 2018 showed:  COMMENTS:              A CT examination was performed of the lower extremities, with  images coned to the left femur.     There is a marrow-based focal sclerotic lesion involving the  proximal/mid femur, measuring approximately 2.6 cm in  craniocaudal extent. There is no evidence of endosteal  scalloping, or features to suggest an aggressive process. There  is no abnormal periosteal reaction.     The remainder the examination is unremarkable for age.     .     IMPRESSION:  CONCLUSION:          1. Focal sclerotic bone lesion which is marrow based in the  diaphysis of the left femur in the proximal/midportion. This is  likely a benign lesion and correlation with plain film  radiographs are suggested.            CT chest with contrast done on January 26, 2018 showed:  IMPRESSION:  CONCLUSION:  Multiple sclerotic lesions in the spine and right RIBS,  concerning for metastatic prostate cancer. Recommend correlation  with PSA levels and nuclear medicine bone scan.           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 Shore.  -Patient rafaela  far not require any chemotherapy or any other treatment.  -Recently in view of worsening skin lesion on the chest, abdomen and back he underwent a biopsy by Dr. 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  -Patient is also found to have worsening anemia recently on blood work done from June 2018.  -Patient is also found to have multiple sclerotic lesion on the spine and hips on CT scan done from January 2018 until June 2018.  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  -Patient was evaluated by Dr. Kimbrough at [place] on August 20, 2018 and had a bone marrow biopsy done on August 23, 2018 that again showed systemic mastocytosis with CMML-1.  -In view of elevated tryptase level, his case was discussed at tumor board at [place].  -Case was discussed with Dr. Kimbrough on September 10, 2018, is recommending treatment with Vidaza for now.    -He was started on Vidaza on September 24, 2018.  -Upon next clinic visit on November 12, 2018,patient was admitted to hospital due to bacteremia and chemotherapy was delayed.  -On November 26, 2018, patient requested chemotherapy to be held because of ill health of his wife.  -Patient received cycle 2 of  Vidaza on January 7, 2019.  White blood cell count today is normal at 8.4, hemoglobin is improved to 11.3.  Platelet count is normal at 221,000.  -We'll go ahead with cycle 4 of Vidaza today.  -We will ask patient to return to clinic in 4 weeks with a repeat CBC and CMP to be done on that day.    2.  Anemia: Most likely secondary to #1  -Remains on folic acid 1 mg by mouth daily.  Hemoglobin is 11.3.  We'll monitored with CBC.    3.  Leukocytosis: Secondary to chronic myelomonocytic leukemia. Resolved We'll monitored with CBC for now.    4.  Urticaria pigmentosa, secondary to underlying  marrow  pathology.    5.  Thrombocytopenia: Secondary to bone marrow involvement. Resolved. Platelet count is 221,000 today.  We'll monitored with CBC for now.    6.  History of bradycardia status post pacemaker in 2008.    7.  Health maintenance: Patient does not smoke.  Had a colonoscopy in 2014 by Dr. Guzman.    8. BMI: Patient's Body mass index is 26.73 kg/m². BMI is higher than reference range.  Patient was notified about elevated BMI.    9. Advance Care Planning: For now patient remains full code and is able to make  His decisions.  Patient has health care surrogate mentioned on chart.           Jordin Roblero MD  3/4/2019  9:36 AM        EMR Dragon/Transcription disclaimer:   Much of this encounter note is an electronic transcription/translation of spoken language to printed text. The electronic translation of spoken language may permit erroneous, or at times, nonsensical words or phrases to be inadvertently transcribed; Although I have reviewed the note for such errors, some may still exist.

## 2019-04-01 NOTE — PROGRESS NOTES
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  Patient was also evaluated by Dr. Man Kimbrough at [facility] in 2012.  Patient has been on observation since then without any active chemotherapy.  In November 2014 in view of worsening leukocytosis and anemia he had a repeat bone marrow biopsy done by Dr. Shore which showed systemic mastocytosis with associated clonal hematologic non-mass cell lineage disease, CMML-1.  Patient has not been on any chemotherapy since then.  In March 2018 patient was seen here at clinic by Goldie WHYTE.  In view of sclerotic lesion on lower extremity as well as skin lesion patient was referred to dermatology.  Biopsy done by Dr. Chen on June 20, 2018 showed increased MAST cells consistent with urticaria pigmentosa.   patient was referred to [facility] when he was seen by Dr. Kimbrough on August 20, 2018 and a bone marrow biopsy done on August 23, 2018.  Was started on Vidaza on September 24, 2018.   On November 12, 2018, due to uncontrollable shakes and chills after port access and flushing, patient was admitted to the hospital and was found to have gram-negative bacteremia with Acinetobacter.  Patient had a port removed during hospital stay.  Patient decided not to do any chemotherapy upon last clinic visit on November 26, 2018 due to ill health of his wife.  Patient was started back on Vidaza on January 7, 2019.  Patient is here to get cycle 5 of Vidaza today.  States his appetite is improving.  Complains of worsening skin  rash affecting lower extremity is improved. Complains of worsening rash affecting chest.  Complains of pruritus with skin rash.  Denies any more chills or fevers. Denies any bleeding.         PAST MEDICAL HISTORY:    Past Medical History:   Diagnosis Date   • Atrophy of testis    • Benign prostatic hyperplasia    • Borderline glaucoma    • Chronic myelomonocytic leukemia (CMS/HCC)    • Degenerative joint disease involving multiple joints    • GERD (gastroesophageal reflux disease)    • Gout    • History of echocardiogram 05/08/2012    Normal left ventricular systolic function, EF 60%. Early diastolic dysfunction. Mild aortic and pulmonic regurgitation. Trace mitral and mild tricuspid regurgitation. No intracardiac mass pericardial effusion or cardiac thrombus.   • History of Holter monitoring 01/18/2011   • Impotence    • Lightheadedness    • Neck pain, musculoskeletal    • Sleep apnea     NOT WEARING C-PAP   • TIA (transient ischemic attack) 2014       SOCIAL HISTORY:    Social History     Tobacco Use   • Smoking status: Never Smoker   • Smokeless tobacco: Never Used   Substance Use Topics   • Alcohol use: No   • Drug use: No       Surgical History :  Past Surgical History:   Procedure Laterality Date   • CARDIAC CATHETERIZATION  09/30/2009    No epicardial coronary artery disease noted that would explain patient's chest pain of the abnormal stress test noted. Normal left ventricular systolic function with no wall motion abnormalities   • CARDIAC CATHETERIZATION  05/24/1989    Normal catheterization, false-positive exercise treadmill. Noncardiac chest pain   • CARDIAC ELECTROPHYSIOLOGY PROCEDURE N/A 5/18/2017    Procedure: PPM generator change - dual;  Surgeon: Geneva Day MD;  Location: [facility] INVASIVE LOCATION;  Service:    • CARDIAC PACEMAKER PLACEMENT  06/2008    Dual chamber permanent pacemaker implantation   • COLECTOMY PARTIAL / TOTAL  04/14/2000    Cecal diverticulitis. Removal of small  segment of terminal ileum, cecum and right colon, sent to pathology   • COLON SURGERY  03/27/2016   • COLONOSCOPY  05/25/2012   • CYSTOSCOPY  11/15/2000    Urinary retention on the basis of medications and benign prostatic hypertrophy   • INGUINAL HERNIA REPAIR  02/15/2007    Recurrent right inguinal hernia. Repair of recurrent inguinal hernia with EPS Prolene mesh system.   • INGUINAL HERNIA REPAIR Right 1957   • LAPAROSCOPIC CHOLECYSTECTOMY  10/26/2006    Gallstones. Laparoscopic cholecystectomy with operative cholangiogram   • PROSTATECTOMY  11/17/2000    Benign prostatic hypertrophy with urinary retention. Prostatitis. transurethral resection of prostate.   • STEROID INJECTION  10/21/2010    Decadron (Gout)    • STEROID INJECTION  07/23/2013    Kenalog (Gout) (4)   • VENOUS ACCESS DEVICE (PORT) INSERTION N/A 9/13/2018    Procedure: ULTRASOUND ASSISTED RIGHT JUGULAR INSERTION VENOUS ACCESS DEVICE;  Surgeon: Luis E Babb MD;  Location: [facility];  Service: General   • VENOUS ACCESS DEVICE (PORT) REMOVAL N/A 11/14/2018    Procedure: REMOVAL VENOUS ACCESS DEVICE;  Surgeon: Tono Arevalo MD;  Location: [facility];  Service: General       ALLERGIES:    Allergies   Allergen Reactions   • Doxycycline Hyclate Other (See Comments)     Other reaction(s): lip swollen   • Sulfa Antibiotics Swelling     facial   • Other Rash     SILK TAPE   • Penicillins Rash     Reaction: rash   • Soma [Carisoprodol] Swelling and Rash         FAMILY HISTORY:  Family History   Problem Relation Age of Onset   • Cancer Other    • Colon cancer Other         parent   • Coronary artery disease Other    • Heart disease Other    • Hypertension Other    • Cholelithiasis Other    • Other Other         colon problems           REVIEW OF SYSTEMS:      CONSTITUTIONAL:  Complains of fatigue.  Has lost 1 pounds since last clinic visit despite of good appetite.  Denies any fever drenching night sweats .     EYES: No visual disturbances. No discharge.  "No new lesions    ENMT:  No epistaxis, mouth sores or difficulty swallowing.    RESPIRATORY:  Complains of shortness of breath with exertion. No new cough or hemoptysis.    CARDIOVASCULAR:  Complains of intermittent chest pain.No palpitations.    GASTROINTESTINAL:  No abdominal pain nausea, vomiting or blood in the stool.    GENITOURINARY: No Dysuria or Hematuria.    MUSCULOSKELETAL:  Complains of worsening back pain.  Complains of worsening pain in neck region.    LYMPHATICS:  Denies any abnormal swollen glands anywhere in the body.    NEUROLOGICAL : No tingling or numbness. No headache or dizziness. No seizures or balance problems.    SKIN: Erythematous lesion present on chest, abdomen, back, upper and lower extremity.  States rash affecting lower extremity is improved since last clinic visit.  States rash affecting chest has got Worse since last clinic visit.            PHYSICAL EXAMINATION:      VITAL SIGNS:  /68   Pulse 77   Temp 97.8 °F (36.6 °C) (Temporal)   Resp 16   Ht 180.3 cm (70.98\")   Wt 86.5 kg (190 lb 9.6 oz)   SpO2 95%   BMI 26.60 kg/m²       04/01/19  0952   Weight: 86.5 kg (190 lb 9.6 oz)         ECOG performance status: 2    CONSTITUTIONAL:  Not in any distress.  Having shakes and chills at the time of examination.    EYES: Mild conjunctival Pallor. No Icterus. No Pterygium. Extraocular Movements intact.No ptosis.    ENMT:  Normocephalic, Atraumatic.No Facial Asymmetry noted.    NECK:  No adenopathy.Trachea midline. NO JVD.    RESPIRATORY:  Fair air entry bilateral. No rhonchi or wheezing.Fair respiratory effort.    CARDIOVASCULAR:  S1, S2. Regular rate and rhythm. No murmur or gallop appreciated.Pacemaker present on left chest wall.    ABDOMEN:  Soft, obese, nontender. Bowel sounds present in all four quadrants.  No Hepatosplenomegaly appreciated.    MUSCULOSKELETAL:  No edema.No Calf Tenderness.Decreased range of motion.    NEUROLOGIC:    No  Motor  deficit appreciated. Cranial " Nerves 2-12 grossly intact.    SKIN : Skin lesion consistent with urticaria pigmentosa present on chest, abdomen, back, upper and lower extremity.    LYMPHATICS: No new enlarged lymph nodes in neck or supraclavicular area.    PSYCHIATRY: Alert, awake and oriented ×3.Normal affect.  Normal judgment.  Makes good eye contact.        DIAGNOSTIC DATA:    Glucose   Date Value Ref Range Status   04/01/2019 106 (H) 65 - 99 mg/dL Final     Sodium   Date Value Ref Range Status   04/01/2019 138 136 - 145 mmol/L Final     Potassium   Date Value Ref Range Status   04/01/2019 4.2 3.5 - 5.2 mmol/L Final     CO2   Date Value Ref Range Status   04/01/2019 22.0 22.0 - 29.0 mmol/L Final     Chloride   Date Value Ref Range Status   04/01/2019 105 98 - 107 mmol/L Final     Anion Gap   Date Value Ref Range Status   04/01/2019 11.0 mmol/L Final     Creatinine   Date Value Ref Range Status   04/01/2019 0.93 0.76 - 1.27 mg/dL Final     BUN   Date Value Ref Range Status   04/01/2019 14 8 - 23 mg/dL Final     BUN/Creatinine Ratio   Date Value Ref Range Status   04/01/2019 15.1 7.0 - 25.0 Final     Calcium   Date Value Ref Range Status   04/01/2019 8.9 8.6 - 10.5 mg/dL Final     eGFR Non  Amer   Date Value Ref Range Status   04/01/2019 79 >60 mL/min/1.73 Final     Alkaline Phosphatase   Date Value Ref Range Status   04/01/2019 63 39 - 117 U/L Final     Total Protein   Date Value Ref Range Status   04/01/2019 7.0 6.0 - 8.5 g/dL Final     ALT (SGPT)   Date Value Ref Range Status   04/01/2019 14 1 - 41 U/L Final     AST (SGOT)   Date Value Ref Range Status   04/01/2019 17 1 - 40 U/L Final     Total Bilirubin   Date Value Ref Range Status   04/01/2019 0.6 0.2 - 1.2 mg/dL Final     Albumin   Date Value Ref Range Status   04/01/2019 4.40 3.50 - 5.20 g/dL Final     Globulin   Date Value Ref Range Status   04/01/2019 2.6 gm/dL Final     Lab Results   Component Value Date    WBC 8.42 04/01/2019    HGB 11.2 (L) 04/01/2019    HCT 32.8 (L)  04/01/2019    .9 (H) 04/01/2019     04/01/2019     Lab Results   Component Value Date    NEUTROABS 4.47 04/01/2019    IRON 107 07/25/2018    TIBC 276 07/25/2018    LABIRON 39 07/25/2018    FERRITIN 264.00 07/25/2018    [id number] 803 09/11/2018    FOLATE >20.00 09/11/2018     Lab Results   Component Value Date    HCGQUANT <1 12/16/2014       Tryptase    Ref Range & Units 1mo ago   Tryptase 2.2 - 13.2 ug/L 93.9     Resulting Agency  [facility]   Narrative     Performed at:  01 - [facility] [address]  [id number]  : Tej Matias MD, Phone:  [phone]      Specimen Collected: 07/25/18 12:34 Last Resulted: 07/27/18 13:16                     Blood Culture - Blood, Blood, Venous Line   Specimen Information: Blood, Venous Line        Blood Culture Acinetobacter lwoffii group Abnormal        ISOLATED FROM Aerobic Bottle              Gram Stain  Aerobic Bottle Gram negative bacilli   2 bottles of 3 bottles drawn positive for gram neg bacillus         Resulting Agency: [facility]   Susceptibility      Acinetobacter lwoffii group     CHIVO     Ampicillin + Sulbactam <=8/4 ug/ml Susceptible     Cefepime <=8 ug/ml Susceptible     Ceftazidime 4 ug/ml Susceptible     Ceftriaxone <=8 ug/ml Susceptible     Levofloxacin <=2 ug/ml Susceptible     Piperacillin <=16 ug/ml Susceptible     Trimethoprim + Sulfamethoxazole <=2/38 ug/ml Susceptible                 Specimen Collected: 11/12/18 10:26 Last Resulted: 11/16/18 06:25                  PATHOLOGY:  Pathology report from August 23, 2018 at [facility] showed:  Diagnosis:  BONE MARROW - PERIPHERAL BLOOD SMEAR, ASPIRATE SMEAR, PARTICLE PREPARATION, BIOPSY, AND FLOW CYTOMETRY: MARKEDLY HYPERCELLULAR MARROW WITH INVOLVEMENT BY SYSTEMIC MASTOCYTOSIS WITH AN ASSOCIATED HEMATOLOGICAL NEOPLASM, CHRONIC MYELOMONOCYTIC LEUKEMIA-1; SEE IMPRESSION     IMPRESSION:  The findings are of a markedly hypercellular marrow (90% cellular) with  maturing trilineage hematopoiesis and multifocal mast cell aggregates, including spindled forms. Mast cells are positive for CD2 and CD25 by flow cytometric analysis. Recent testing at an outside institution showed an elevated tryptase level. The overall findings are consistent with involvement by systemic mastocytosis. In addition, there is multilineage dysplasia with mildly increased blasts and blast equivalents (6.1% of cellularity in total) and a persistent relative and absolute peripheral monocytosis. The combined findings are consistent with involvement by chronic myelomonocytic leukemia-1 (CMML-1), proliferative type. Final diagnosis requires correlation with pending additional testing, as documented below, which will be included in the comprehensive hematopathology report.    PERIPHERAL BLOOD SMEAR:  CBC : WBC 18.9 k/microL, Hgb 9.7 g/dL, Plt-Ct 187 k/microL,  fL, RDW 26.3%.    A Yi's stained peripheral smear is reviewed. Erythrocytes are decreased and are macrocytic and normochromic with anisopoikilocytosis, including target cells and teardrop cells. Polychromasia is present. Nucleated red blood cells are present. Leukocytes are increased and include mature neutrophils, lymphocytes, and monocytes, with a relative and absolute monocytosis. Neutrophils show dysplasia (nuclear hypolobation, hypogranulation). Left-shifted myeloid elements are present. There are no circulating blasts or plasma cells. Platelets are adequate in number and show variation in size.     ASPIRATE SMEARS AND TOUCH PREPARATIONS:  Yi's stained aspirate smears and touch preparations are reviewed. The material is hypercellular and particulate. Myeloid elements are adequate and show dysplasia (ivmalok-ne-drnmubphkfg dyssynchrony). Erythroid elements are adequate and show left-shifted maturation with mild dysplasia (megaloblastoid change, occasional nuclear membrane irregularity). The myeloid:erythroid ratio is 1.1 to 1.  Megakaryocytes are increased in number, and demonstrate no significant atypia. Blasts and blast equivalents (monoblasts, promonocytes) comprise 6.1% of cellularity in total (600 cell count). There is no increase in plasma cells or lymphocytes. Focal particles show significantly increased mast cells, including occasional spindle forms. A Prussian blue iron stain is performed on the sample, revealing increased storage iron. Sideroblastic iron is present. Ring sideroblasts are present (approximately 40-50%).     MARROW SMEAR DIFFERENTIAL:  Blasts (0-4): 3.8%  Monoblasts/promonocytes: 2.3%  Promyelocytes (1-8): 2.2%  Myelocytes and metamyelocytes (20-30): 15.8%  Bands and segmented neutrophils (12-25): 23.0%  Eosinophils and eosinophilic precursors (1-5): 0.3%  Basophils and basophilic precursors (0-1): 0.0%  Monocytes and monocytic precursors (0-2): 5.8%  Lymphocytes (10-15): 3.0%  Plasma cells (0-1): 0.2%  Erythroid precursors (15-27): 43.5%  Total cells counted: 600    BONE MARROW BIOPSY AND PARTICLE PREPARATION:  H&E and PAS stained bone marrow biopsy and particle preparation sections are reviewed. The material is adequate and markedly hypercellular (90% cellular). Myeloid elements are present in normal proportion and exhibit maturation. Erythroid elements are present in normal proportion and exhibit left-shifted maturation. Megakaryocytes are increased and include occasional hypolobated forms and forms with abnormal nuclear lobe separation. CD34-positive blasts comprise approximately 3-5% of marrow cellularity. Multifocal dense mast cell aggregates are present, including spindled forms, as confirmed by  and tryptase immunostains. A CD68 stain highlights increased monocytic/histiocytic forms. Bony trabeculae are normal for the patient's age. All stains performed with appropriate controls.     FLOW CYTOMETRY STUDIES:  IP ID:   Viability: 94.1%  DIAGNOSIS: No increase in blasts; abnormal mast cells  present    COMMENT: Myeloblasts are not increased (0.9% of total cells), with the following normal immunophenotype: positive for CD33 (moderate), CD34, and CD45 (dim); and negative for CD19. B cells are not increased (0.1% of total cells), with the following normal immunophenotype: positive for CD19 (bright) and CD45 (bright); and negative for CD34. T cells are not increased (3.1% of total cells), with the following normal immunophenotype: positive for CD45 (bright); and negative for CD34. Mast cells are present (0.03% of total cells), with the following abnormal immunophenotype: positive for CD2, CD25, CD45,  (bright), and FcER1.    CYTOGENETICS AND MOLECULAR DIAGNOSTIC RESULTS:  Additional testing (Karyotype, Myeloid NGS) is pending. Results will be included in the comprehensive hematopathology report.         **Electronically signed out by ELVIA TO,TYLER**on 8/27/2018  MS/NAKUL/haritah  Case reviewed by Attending Pathologist        Pathology report from November 13, 2014 showed:  FINAL DIAGNOSIS:   PERIPHERAL SMEAR, REVIEW:        MONOCYTOSIS WITH LEFT MYELOID SHIFT.        ANEMIA, BORDERLINE.   BONE MARROW ASPIRATION AND BIOPSY:        SYSTEMIC MASTOCYTOSIS WITH ASSOCIATED CLONAL             HEMATOLOGIC NON-MAST CELL LINEAGE DISEASE,             CHRONIC MYELOMONOCYTIC LEUKEMIA-1.            RADIOLOGY DATA :  CT of abdomen with and without contrast done on May 14, 2018 showed:  The liver is normal. The gallbladder surgically absent. The  biliary system appears within normal limits status post  cholecystectomy. The pancreas is normal. The spleen is normal.  Bilateral adrenal glands are normal. Right kidney mid zone 4.7 mm  nonobstructive renal calculi. Stable right kidney lower pole oval  hyperdense lesion on unenhanced imaging which has Hounsfield  units of  49. Following enhancement this lesion has Hounsfield  units of 49 and 51 suggesting no significant enhancement. This  lesion measures 2.36 cm in greatest  diameter and is not  appreciably changed. Otherwise right kidney and ureter are  unremarkable. Left kidney and visualized ureter are normal.  Visualized hollow viscera is normal. No lymphadenopathy in the  abdomen. No acute osseous abnormalities.     IMPRESSION:  1. Stable right kidney lower pole oval hyperdense lesion which  does not enhance measuring 2.36 cm in greatest diameter. This  interval stability and appearance is most consistent with benign  hyperdense cyst.  2. Right kidney mid zone 4.7 mm stable nonobstructive renal  calculi..  3. Otherwise unremarkable CT abdomen study with without  contrast..      CT of left lower extremity with contrast done on March 23, 2018 showed:  COMMENTS:              A CT examination was performed of the lower extremities, with  images coned to the left femur.     There is a marrow-based focal sclerotic lesion involving the  proximal/mid femur, measuring approximately 2.6 cm in  craniocaudal extent. There is no evidence of endosteal  scalloping, or features to suggest an aggressive process. There  is no abnormal periosteal reaction.     The remainder the examination is unremarkable for age.     .     IMPRESSION:  CONCLUSION:          1. Focal sclerotic bone lesion which is marrow based in the  diaphysis of the left femur in the proximal/midportion. This is  likely a benign lesion and correlation with plain film  radiographs are suggested.            CT chest with contrast done on January 26, 2018 showed:  IMPRESSION:  CONCLUSION:  Multiple sclerotic lesions in the spine and right RIBS,  concerning for metastatic prostate cancer. Recommend correlation  with PSA levels and nuclear medicine bone scan.           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 Shore.  -Patient rafaela far not require any chemotherapy or any other treatment.  -Recently in view of worsening skin lesion on the chest, abdomen and back he underwent a biopsy by Dr. 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  -Patient is also found to have worsening anemia recently on blood work done from June 2018.  -Patient is also found to have multiple sclerotic lesion on the spine and hips on CT scan done from January 2018 until June 2018.  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  -Patient was evaluated by Dr. Kimbrough at [place] on August 20, 2018 and had a bone marrow biopsy done on August 23, 2018 that again showed systemic mastocytosis with CMML-1.  -In view of elevated tryptase level, his case was discussed at tumor board at [place].  -Case was discussed with Dr. Kimbrough on September 10, 2018, is recommending treatment with Vidaza for now.    -He was started on Vidaza on September 24, 2018.  -Upon next clinic visit on November 12, 2018,patient was admitted to hospital due to bacteremia and chemotherapy was delayed.  -On November 26, 2018, patient requested chemotherapy to be held because of ill health of his wife.  -Patient received cycle 2 of  Vidaza on January 7, 2019.  White blood cell count today is normal at 8.4, hemoglobin is improved to 11.3.  Platelet count is normal at 221,000.  -We'll go ahead with cycle 5 of Vidaza today.  -We will ask patient to return to clinic in 4 weeks with a repeat CBC and CMP to be done on that day.    2.  Anemia: Most likely secondary to #1  -Remains on folic acid 1 mg by mouth daily.  Hemoglobin is 11.2.  We'll monitored with CBC.    3.  Leukocytosis: Secondary to chronic myelomonocytic  leukemia. Resolved. Wbc is 8.4 today. We'll monitored with CBC for now.    4.  Urticaria pigmentosa, secondary to underlying  marrow pathology.    5.  Thrombocytopenia: Secondary to bone marrow involvement. Resolved. Platelet count is 262,000 today.  We'll monitored with CBC for now.    6.  History of bradycardia status post pacemaker in 2008.    7.  Health maintenance: Patient does not smoke.  Had a colonoscopy in 2014 by Dr. Guzman.    8. BMI: Patient's Body mass index is 26.6 kg/m². BMI is higher than reference range.  Patient was notified about elevated BMI.    9. Advance Care Planning: Patient has a living will on chart.           Jordin Roblero MD  4/1/2019  4:07 PM        EMR Dragon/Transcription disclaimer:   Much of this encounter note is an electronic transcription/translation of spoken language to printed text. The electronic translation of spoken language may permit erroneous, or at times, nonsensical words or phrases to be inadvertently transcribed; Although I have reviewed the note for such errors, some may still exist.

## 2019-04-03 NOTE — PROGRESS NOTES
0945-Pt presents with c/o sore throat since yesterday morning. States he has not had relief with OTC meds. No fever. Dr. Roblero notified of symptoms and orders received to hold chemo for the remainder of the week and bring patient back on Monday to resume current cycle of chemo. Pt verbalized understanding and given appt.

## 2019-05-06 NOTE — PROGRESS NOTES
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.  Patient was also evaluated by Dr. Man Kimbrough at [place] in 2012.  Patient has been on observation since then without any active chemotherapy.  In November 2014 in view of worsening leukocytosis and anemia he had a repeat bone marrow biopsy done by Dr. Shore which showed systemic mastocytosis with associated clonal hematologic non-mass cell lineage disease, CMML-1.  Patient has not been on any chemotherapy since then.  In March 2018 patient was seen here at clinic by Goldie WHYTE.  In view of sclerotic lesion on lower extremity as well as skin lesion patient was referred to dermatology.  Biopsy done by Dr. Chen on June 20, 2018 showed increased MAST cells consistent with urticaria pigmentosa.   patient was referred to [place] clinic when he was seen by Dr. Kimbrough on August 20, 2018 and a bone marrow biopsy done on August 23, 2018.  Was started on Vidaza on September 24, 2018.   On November 12, 2018, due to uncontrollable shakes and chills after port access and flushing, patient was admitted to the hospital and was found to have gram-negative bacteremia with Acinetobacter.  Patient had a port removed during hospital stay.  Patient decided not to do any chemotherapy upon last clinic visit on November 26, 2018 due to ill health of his wife.  Patient was started back on Vidaza on January 7, 2019.  Patient is here to get cycle 6 of Vidaza today.  States his appetite is improving. States Skin rash is  improving.  Complains of rectal bleeding for 1 day last week.  Denies any more chills or fevers.        PAST MEDICAL HISTORY:    Past Medical History:   Diagnosis Date   • Atrophy of testis    • Benign prostatic hyperplasia    • Borderline glaucoma    • Chronic myelomonocytic leukemia (CMS/HCC)    • Degenerative joint disease involving multiple joints    • GERD (gastroesophageal reflux disease)    • Gout    • History of echocardiogram 05/08/2012    Normal left ventricular systolic function, EF 60%. Early diastolic dysfunction. Mild aortic and pulmonic regurgitation. Trace mitral and mild tricuspid regurgitation. No intracardiac mass pericardial effusion or cardiac thrombus.   • History of Holter monitoring 01/18/2011   • Impotence    • Lightheadedness    • Neck pain, musculoskeletal    • Sleep apnea     NOT WEARING C-PAP   • TIA (transient ischemic attack) 2014       SOCIAL HISTORY:    Social History     Tobacco Use   • Smoking status: Never Smoker   • Smokeless tobacco: Never Used   Substance Use Topics   • Alcohol use: No   • Drug use: No       Surgical History :  Past Surgical History:   Procedure Laterality Date   • CARDIAC CATHETERIZATION  09/30/2009    No epicardial coronary artery disease noted that would explain patient's chest pain of the abnormal stress test noted. Normal left ventricular systolic function with no wall motion abnormalities   • CARDIAC CATHETERIZATION  05/24/1989    Normal catheterization, false-positive exercise treadmill. Noncardiac chest pain   • CARDIAC ELECTROPHYSIOLOGY PROCEDURE N/A 5/18/2017    Procedure: PPM generator change - dual;  Surgeon: Geneva Day MD;  Location: [facility] INVASIVE LOCATION;  Service:    • CARDIAC PACEMAKER PLACEMENT  06/2008    Dual chamber permanent pacemaker implantation   • COLECTOMY PARTIAL / TOTAL  04/14/2000    Cecal diverticulitis. Removal of small segment of terminal ileum, cecum and right colon, sent to pathology   • COLON SURGERY   03/27/2016   • COLONOSCOPY  05/25/2012   • CYSTOSCOPY  11/15/2000    Urinary retention on the basis of medications and benign prostatic hypertrophy   • INGUINAL HERNIA REPAIR  02/15/2007    Recurrent right inguinal hernia. Repair of recurrent inguinal hernia with EPS Prolene mesh system.   • INGUINAL HERNIA REPAIR Right 1957   • LAPAROSCOPIC CHOLECYSTECTOMY  10/26/2006    Gallstones. Laparoscopic cholecystectomy with operative cholangiogram   • PROSTATECTOMY  11/17/2000    Benign prostatic hypertrophy with urinary retention. Prostatitis. transurethral resection of prostate.   • STEROID INJECTION  10/21/2010    Decadron (Gout)    • STEROID INJECTION  07/23/2013    Kenalog (Gout) (4)   • VENOUS ACCESS DEVICE (PORT) INSERTION N/A 9/13/2018    Procedure: ULTRASOUND ASSISTED RIGHT JUGULAR INSERTION VENOUS ACCESS DEVICE;  Surgeon: Luis E Babb MD;  Location: [facility];  Service: General   • VENOUS ACCESS DEVICE (PORT) REMOVAL N/A 11/14/2018    Procedure: REMOVAL VENOUS ACCESS DEVICE;  Surgeon: Tono Arevalo MD;  Location: [facility];  Service: General       ALLERGIES:    Allergies   Allergen Reactions   • Doxycycline Hyclate Other (See Comments)     Other reaction(s): lip swollen   • Sulfa Antibiotics Swelling     facial   • Other Rash     SILK TAPE   • Penicillins Rash     Reaction: rash   • Soma [Carisoprodol] Swelling and Rash         FAMILY HISTORY:  Family History   Problem Relation Age of Onset   • Cancer Other    • Colon cancer Other         parent   • Coronary artery disease Other    • Heart disease Other    • Hypertension Other    • Cholelithiasis Other    • Other Other         colon problems           REVIEW OF SYSTEMS:      CONSTITUTIONAL:  Complains of fatigue.  Has lost 1 pounds since last clinic visit despite of good appetite.  Denies any fever drenching night sweats .     EYES: No visual disturbances. No discharge. No new lesions    ENMT:  No epistaxis, mouth sores or difficulty  "swallowing.    RESPIRATORY:  Complains of shortness of breath with exertion. No new cough or hemoptysis.    CARDIOVASCULAR:  Complains of intermittent chest pain.No palpitations.    GASTROINTESTINAL: Complains of diarrhea last week.  Complains of episode of rectal bleeding for 1 day last week.  Denies any bleeding at present.  No abdominal pain nausea, vomiting or blood in the stool.    GENITOURINARY: No Dysuria or Hematuria.    MUSCULOSKELETAL:  Complains of worsening back pain.  Complains of worsening pain in neck region.    LYMPHATICS:  Denies any abnormal swollen glands anywhere in the body.    NEUROLOGICAL : No tingling or numbness. No headache or dizziness. No seizures or balance problems.    SKIN: States Skin rash is improving.          PHYSICAL EXAMINATION:      VITAL SIGNS:  /79   Pulse 75   Temp 97.1 °F (36.2 °C) (Temporal)   Resp 18   Ht 180.3 cm (70.98\")   Wt 86.5 kg (190 lb 9.6 oz)   SpO2 96%   BMI 26.60 kg/m²       05/06/19  0855   Weight: 86.5 kg (190 lb 9.6 oz)         ECOG performance status: 2    CONSTITUTIONAL:  Not in any distress.  Having shakes and chills at the time of examination.    EYES: Mild conjunctival Pallor. No Icterus. No Pterygium. Extraocular Movements intact.No ptosis.    ENMT:  Normocephalic, Atraumatic.No Facial Asymmetry noted.    NECK:  No adenopathy.Trachea midline. NO JVD.    RESPIRATORY:  Fair air entry bilateral. No rhonchi or wheezing.Fair respiratory effort.    CARDIOVASCULAR:  S1, S2. Regular rate and rhythm. No murmur or gallop appreciated.Pacemaker present on left chest wall.    ABDOMEN:  Soft, obese, nontender. Bowel sounds present in all four quadrants.  No Hepatosplenomegaly appreciated.    MUSCULOSKELETAL:  No edema.No Calf Tenderness.Decreased range of motion.    NEUROLOGIC:    No  Motor  deficit appreciated. Cranial Nerves 2-12 grossly intact.    SKIN : Skin lesion consistent with urticaria pigmentosa present on chest, abdomen, back, upper and lower " extremity.    LYMPHATICS: No new enlarged lymph nodes in neck or supraclavicular area.    PSYCHIATRY: Alert, awake and oriented ×3.Normal affect.  Normal judgment.  Makes good eye contact.        DIAGNOSTIC DATA:    Glucose   Date Value Ref Range Status   05/06/2019 95 65 - 99 mg/dL Final     Sodium   Date Value Ref Range Status   05/06/2019 137 136 - 145 mmol/L Final     Potassium   Date Value Ref Range Status   05/06/2019 4.0 3.5 - 5.2 mmol/L Final     CO2   Date Value Ref Range Status   05/06/2019 23.0 22.0 - 29.0 mmol/L Final     Chloride   Date Value Ref Range Status   05/06/2019 104 98 - 107 mmol/L Final     Anion Gap   Date Value Ref Range Status   05/06/2019 10.0 mmol/L Final     Creatinine   Date Value Ref Range Status   05/06/2019 0.78 0.76 - 1.27 mg/dL Final     BUN   Date Value Ref Range Status   05/06/2019 13 8 - 23 mg/dL Final     BUN/Creatinine Ratio   Date Value Ref Range Status   05/06/2019 16.7 7.0 - 25.0 Final     Calcium   Date Value Ref Range Status   05/06/2019 8.9 8.6 - 10.5 mg/dL Final     eGFR Non  Amer   Date Value Ref Range Status   05/06/2019 97 >60 mL/min/1.73 Final     Alkaline Phosphatase   Date Value Ref Range Status   05/06/2019 57 39 - 117 U/L Final     Total Protein   Date Value Ref Range Status   05/06/2019 7.0 6.0 - 8.5 g/dL Final     ALT (SGPT)   Date Value Ref Range Status   05/06/2019 12 1 - 41 U/L Final     AST (SGOT)   Date Value Ref Range Status   05/06/2019 16 1 - 40 U/L Final     Total Bilirubin   Date Value Ref Range Status   05/06/2019 0.6 0.2 - 1.2 mg/dL Final     Albumin   Date Value Ref Range Status   05/06/2019 4.30 3.50 - 5.20 g/dL Final     Globulin   Date Value Ref Range Status   05/06/2019 2.7 gm/dL Final     Lab Results   Component Value Date    WBC 9.80 05/06/2019    HGB 10.7 (L) 05/06/2019    HCT 31.1 (L) 05/06/2019    .3 (H) 05/06/2019     05/06/2019     Lab Results   Component Value Date    NEUTROABS 5.19 05/06/2019    IRON 107  07/25/2018    TIBC 276 07/25/2018    LABIRON 39 07/25/2018    FERRITIN 264.00 07/25/2018    KOQAXXET68 803 09/11/2018    FOLATE >20.00 09/11/2018     Lab Results   Component Value Date    HCGQUANT <1 12/16/2014       Tryptase    Ref Range & Units 1mo ago   Tryptase 2.2 - 13.2 ug/L 93.9     Resulting Agency  [facility]   Narrative     Performed at:  01 - [facility] [address]  [id number]  : Tej Matias MD, Phone:  [phone]      Specimen Collected: 07/25/18 12:34 Last Resulted: 07/27/18 13:16                     Blood Culture - Blood, Blood, Venous Line   Specimen Information: Blood, Venous Line        Blood Culture Acinetobacter lwoffii group Abnormal        ISOLATED FROM Aerobic Bottle              Gram Stain  Aerobic Bottle Gram negative bacilli   2 bottles of 3 bottles drawn positive for gram neg bacillus         Resulting Agency: [facility]   Susceptibility      Acinetobacter lwoffii group     CHIVO     Ampicillin + Sulbactam <=8/4 ug/ml Susceptible     Cefepime <=8 ug/ml Susceptible     Ceftazidime 4 ug/ml Susceptible     Ceftriaxone <=8 ug/ml Susceptible     Levofloxacin <=2 ug/ml Susceptible     Piperacillin <=16 ug/ml Susceptible     Trimethoprim + Sulfamethoxazole <=2/38 ug/ml Susceptible                 Specimen Collected: 11/12/18 10:26 Last Resulted: 11/16/18 06:25                  PATHOLOGY:  Pathology report from August 23, 2018 at [place] showed:  Diagnosis:  BONE MARROW - PERIPHERAL BLOOD SMEAR, ASPIRATE SMEAR, PARTICLE PREPARATION, BIOPSY, AND FLOW CYTOMETRY: MARKEDLY HYPERCELLULAR MARROW WITH INVOLVEMENT BY SYSTEMIC MASTOCYTOSIS WITH AN ASSOCIATED HEMATOLOGICAL NEOPLASM, CHRONIC MYELOMONOCYTIC LEUKEMIA-1; SEE IMPRESSION     IMPRESSION:  The findings are of a markedly hypercellular marrow (90% cellular) with maturing trilineage hematopoiesis and multifocal mast cell aggregates, including spindled forms. Mast cells are positive for CD2 and CD25 by flow  cytometric analysis. Recent testing at an outside institution showed an elevated tryptase level. The overall findings are consistent with involvement by systemic mastocytosis. In addition, there is multilineage dysplasia with mildly increased blasts and blast equivalents (6.1% of cellularity in total) and a persistent relative and absolute peripheral monocytosis. The combined findings are consistent with involvement by chronic myelomonocytic leukemia-1 (CMML-1), proliferative type. Final diagnosis requires correlation with pending additional testing, as documented below, which will be included in the comprehensive hematopathology report.    PERIPHERAL BLOOD SMEAR:  CBC : WBC 18.9 k/microL, Hgb 9.7 g/dL, Plt-Ct 187 k/microL,  fL, RDW 26.3%.    A Yi's stained peripheral smear is reviewed. Erythrocytes are decreased and are macrocytic and normochromic with anisopoikilocytosis, including target cells and teardrop cells. Polychromasia is present. Nucleated red blood cells are present. Leukocytes are increased and include mature neutrophils, lymphocytes, and monocytes, with a relative and absolute monocytosis. Neutrophils show dysplasia (nuclear hypolobation, hypogranulation). Left-shifted myeloid elements are present. There are no circulating blasts or plasma cells. Platelets are adequate in number and show variation in size.     ASPIRATE SMEARS AND TOUCH PREPARATIONS:  Yi's stained aspirate smears and touch preparations are reviewed. The material is hypercellular and particulate. Myeloid elements are adequate and show dysplasia (kogduae-lx-soctxeeyxao dyssynchrony). Erythroid elements are adequate and show left-shifted maturation with mild dysplasia (megaloblastoid change, occasional nuclear membrane irregularity). The myeloid:erythroid ratio is 1.1 to 1. Megakaryocytes are increased in number, and demonstrate no significant atypia. Blasts and blast equivalents (monoblasts, promonocytes) comprise 6.1% of  cellularity in total (600 cell count). There is no increase in plasma cells or lymphocytes. Focal particles show significantly increased mast cells, including occasional spindle forms. A Prussian blue iron stain is performed on the sample, revealing increased storage iron. Sideroblastic iron is present. Ring sideroblasts are present (approximately 40-50%).     MARROW SMEAR DIFFERENTIAL:  Blasts (0-4): 3.8%  Monoblasts/promonocytes: 2.3%  Promyelocytes (1-8): 2.2%  Myelocytes and metamyelocytes (20-30): 15.8%  Bands and segmented neutrophils (12-25): 23.0%  Eosinophils and eosinophilic precursors (1-5): 0.3%  Basophils and basophilic precursors (0-1): 0.0%  Monocytes and monocytic precursors (0-2): 5.8%  Lymphocytes (10-15): 3.0%  Plasma cells (0-1): 0.2%  Erythroid precursors (15-27): 43.5%  Total cells counted: 600    BONE MARROW BIOPSY AND PARTICLE PREPARATION:  H&E and PAS stained bone marrow biopsy and particle preparation sections are reviewed. The material is adequate and markedly hypercellular (90% cellular). Myeloid elements are present in normal proportion and exhibit maturation. Erythroid elements are present in normal proportion and exhibit left-shifted maturation. Megakaryocytes are increased and include occasional hypolobated forms and forms with abnormal nuclear lobe separation. CD34-positive blasts comprise approximately 3-5% of marrow cellularity. Multifocal dense mast cell aggregates are present, including spindled forms, as confirmed by  and tryptase immunostains. A CD68 stain highlights increased monocytic/histiocytic forms. Bony trabeculae are normal for the patient's age. All stains performed with appropriate controls.     FLOW CYTOMETRY STUDIES:  IP ID:   Viability: 94.1%  DIAGNOSIS: No increase in blasts; abnormal mast cells present    COMMENT: Myeloblasts are not increased (0.9% of total cells), with the following normal immunophenotype: positive for CD33 (moderate), CD34, and  CD45 (dim); and negative for CD19. B cells are not increased (0.1% of total cells), with the following normal immunophenotype: positive for CD19 (bright) and CD45 (bright); and negative for CD34. T cells are not increased (3.1% of total cells), with the following normal immunophenotype: positive for CD45 (bright); and negative for CD34. Mast cells are present (0.03% of total cells), with the following abnormal immunophenotype: positive for CD2, CD25, CD45,  (bright), and FcER1.    CYTOGENETICS AND MOLECULAR DIAGNOSTIC RESULTS:  Additional testing (Karyotype, Myeloid NGS) is pending. Results will be included in the comprehensive hematopathology report.         **Electronically signed out by ELVIA TO,TYLER**on 8/27/2018  MS/NAKUL/haritha  Case reviewed by Attending Pathologist        Pathology report from November 13, 2014 showed:  FINAL DIAGNOSIS:   PERIPHERAL SMEAR, REVIEW:        MONOCYTOSIS WITH LEFT MYELOID SHIFT.        ANEMIA, BORDERLINE.   BONE MARROW ASPIRATION AND BIOPSY:        SYSTEMIC MASTOCYTOSIS WITH ASSOCIATED CLONAL             HEMATOLOGIC NON-MAST CELL LINEAGE DISEASE,             CHRONIC MYELOMONOCYTIC LEUKEMIA-1.            RADIOLOGY DATA :  CT of abdomen with and without contrast done on May 14, 2018 showed:  The liver is normal. The gallbladder surgically absent. The  biliary system appears within normal limits status post  cholecystectomy. The pancreas is normal. The spleen is normal.  Bilateral adrenal glands are normal. Right kidney mid zone 4.7 mm  nonobstructive renal calculi. Stable right kidney lower pole oval  hyperdense lesion on unenhanced imaging which has Hounsfield  units of  49. Following enhancement this lesion has Hounsfield  units of 49 and 51 suggesting no significant enhancement. This  lesion measures 2.36 cm in greatest diameter and is not  appreciably changed. Otherwise right kidney and ureter are  unremarkable. Left kidney and visualized ureter are normal.  Visualized hollow  viscera is normal. No lymphadenopathy in the  abdomen. No acute osseous abnormalities.     IMPRESSION:  1. Stable right kidney lower pole oval hyperdense lesion which  does not enhance measuring 2.36 cm in greatest diameter. This  interval stability and appearance is most consistent with benign  hyperdense cyst.  2. Right kidney mid zone 4.7 mm stable nonobstructive renal  calculi..  3. Otherwise unremarkable CT abdomen study with without  contrast..      CT of left lower extremity with contrast done on March 23, 2018 showed:  COMMENTS:              A CT examination was performed of the lower extremities, with  images coned to the left femur.     There is a marrow-based focal sclerotic lesion involving the  proximal/mid femur, measuring approximately 2.6 cm in  craniocaudal extent. There is no evidence of endosteal  scalloping, or features to suggest an aggressive process. There  is no abnormal periosteal reaction.     The remainder the examination is unremarkable for age.     .     IMPRESSION:  CONCLUSION:          1. Focal sclerotic bone lesion which is marrow based in the  diaphysis of the left femur in the proximal/midportion. This is  likely a benign lesion and correlation with plain film  radiographs are suggested.            CT chest with contrast done on January 26, 2018 showed:  IMPRESSION:  CONCLUSION:  Multiple sclerotic lesions in the spine and right RIBS,  concerning for metastatic prostate cancer. Recommend correlation  with PSA levels and nuclear medicine bone scan.           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 Shore.  -Patient hasso far not require any chemotherapy or any other  treatment.  -Recently in view of worsening skin lesion on the chest, abdomen and back he underwent a biopsy by Dr. 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  -Patient is also found to have worsening anemia recently on blood work done from June 2018.  -Patient is also found to have multiple sclerotic lesion on the spine and hips on CT scan done from January 2018 until June 2018.  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  -Patient was evaluated by Dr. Kimbrough at [place] on August 20, 2018 and had a bone marrow biopsy done on August 23, 2018 that again showed systemic mastocytosis with CMML-1.  -In view of elevated tryptase level, his case was discussed at tumor board at [place].  -Case was discussed with Dr. Kimbrough on September 10, 2018, is recommending treatment with Vidaza for now.    -He was started on Vidaza on September 24, 2018.  -Upon next clinic visit on November 12, 2018,patient was admitted to hospital due to bacteremia and chemotherapy was delayed.  -On November 26, 2018, patient requested chemotherapy to be held because of ill health of his wife.  -Patient received cycle 2 of  Vidaza on January 7, 2019.  White blood cell count today is normal at 8.4, hemoglobin is improved to 11.3.  Platelet count is normal at 221,000.  -We'll go ahead with cycle 6 of Vidaza today.  -We will ask patient to return to clinic in 4 weeks with a repeat CBC and CMP to be done on that day.    2.  Anemia: Most likely secondary to #1  -Remains on folic acid 1 mg by mouth daily.  Hemoglobin is 10.7.  We'll monitored with CBC.    3.  Leukocytosis: Secondary to chronic myelomonocytic leukemia. Resolved. Wbc is 9.7 today. We'll monitored with CBC for now.    4.  Urticaria pigmentosa, secondary to underlying  marrow pathology.    5.  Thrombocytopenia:  Secondary to bone marrow involvement. Resolved. Platelet count is 217,000 today.  We'll monitored with CBC for now.    6.  History of bradycardia status post pacemaker in 2008.    7.  Health maintenance: Patient does not smoke.  Had a colonoscopy in 2014 by Dr. Guzman.    8. BMI: Patient's Body mass index is 26.6 kg/m². BMI is higher than reference range.  Patient was notified about elevated BMI.    9. Advance Care Planning: Patient has a living will on chart.           Jordin Roblero MD  5/6/2019  7:23 PM        EMR Dragon/Transcription disclaimer:   Much of this encounter note is an electronic transcription/translation of spoken language to printed text. The electronic translation of spoken language may permit erroneous, or at times, nonsensical words or phrases to be inadvertently transcribed; Although I have reviewed the note for such errors, some may still exist.

## 2019-06-03 PROBLEM — D47.01 URTICARIA PIGMENTOSA: Status: ACTIVE | Noted: 2019-01-01

## 2019-06-03 NOTE — PROGRESS NOTES
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  Patient was also evaluated by Dr. Man Kimbrough at [place] in 2012.  Patient has been on observation since then without any active chemotherapy.  In November 2014 in view of worsening leukocytosis and anemia he had a repeat bone marrow biopsy done by Dr. Shore which showed systemic mastocytosis with associated clonal hematologic non-mass cell lineage disease, CMML-1.  Patient has not been on any chemotherapy since then.  In March 2018 patient was seen here at clinic by Goldie WHYTE.  In view of sclerotic lesion on lower extremity as well as skin lesion patient was referred to dermatology.  Biopsy done by Dr. Chen on June 20, 2018 showed increased MAST cells consistent with urticaria pigmentosa.   patient was referred to [place] clinic when he was seen by Dr. Kimbrough on August 20, 2018 and a bone marrow biopsy done on August 23, 2018.  Was started on Vidaza on September 24, 2018.   On November 12, 2018, due to uncontrollable shakes and chills after port access and flushing, patient was admitted to the hospital and was found to have gram-negative bacteremia with Acinetobacter.  Patient had a port removed during hospital stay.  Patient decided not to do any chemotherapy upon last clinic visit on November 26, 2018 due to ill health of his wife.  Patient was started back on Vidaza on January 7, 2019.  Patient is here to get cycle 7 of Vidaza today.  States his appetite is improving.  Complains of worsening skin  rash affecting chest and back.  Denies any bleeding.  Denies any more chills or fevers.        PAST MEDICAL HISTORY:    Past Medical History:   Diagnosis Date   • Atrophy of testis    • Benign prostatic hyperplasia    • Borderline glaucoma    • Chronic myelomonocytic leukemia (CMS/HCC)    • Degenerative joint disease involving multiple joints    • GERD (gastroesophageal reflux disease)    • Gout    • History of echocardiogram 05/08/2012    Normal left ventricular systolic function, EF 60%. Early diastolic dysfunction. Mild aortic and pulmonic regurgitation. Trace mitral and mild tricuspid regurgitation. No intracardiac mass pericardial effusion or cardiac thrombus.   • History of Holter monitoring 01/18/2011   • Impotence    • Lightheadedness    • Neck pain, musculoskeletal    • Sleep apnea     NOT WEARING C-PAP   • TIA (transient ischemic attack) 2014       SOCIAL HISTORY:    Social History     Tobacco Use   • Smoking status: Never Smoker   • Smokeless tobacco: Never Used   Substance Use Topics   • Alcohol use: No   • Drug use: No       Surgical History :  Past Surgical History:   Procedure Laterality Date   • CARDIAC CATHETERIZATION  09/30/2009    No epicardial coronary artery disease noted that would explain patient's chest pain of the abnormal stress test noted. Normal left ventricular systolic function with no wall motion abnormalities   • CARDIAC CATHETERIZATION  05/24/1989    Normal catheterization, false-positive exercise treadmill. Noncardiac chest pain   • CARDIAC ELECTROPHYSIOLOGY PROCEDURE N/A 5/18/2017    Procedure: PPM generator change - dual;  Surgeon: [place] Day MD;  Location: [facility] INVASIVE LOCATION;  Service:    • CARDIAC PACEMAKER PLACEMENT  06/2008    Dual chamber permanent pacemaker implantation   • COLECTOMY PARTIAL / TOTAL  04/14/2000    Cecal diverticulitis. Removal of small segment of terminal ileum, cecum and right colon, sent to pathology   • COLON SURGERY  03/27/2016    • COLONOSCOPY  05/25/2012   • CYSTOSCOPY  11/15/2000    Urinary retention on the basis of medications and benign prostatic hypertrophy   • INGUINAL HERNIA REPAIR  02/15/2007    Recurrent right inguinal hernia. Repair of recurrent inguinal hernia with EPS Prolene mesh system.   • INGUINAL HERNIA REPAIR Right 1957   • LAPAROSCOPIC CHOLECYSTECTOMY  10/26/2006    Gallstones. Laparoscopic cholecystectomy with operative cholangiogram   • PROSTATECTOMY  11/17/2000    Benign prostatic hypertrophy with urinary retention. Prostatitis. transurethral resection of prostate.   • STEROID INJECTION  10/21/2010    Decadron (Gout)    • STEROID INJECTION  07/23/2013    Kenalog (Gout) (4)   • VENOUS ACCESS DEVICE (PORT) INSERTION N/A 9/13/2018    Procedure: ULTRASOUND ASSISTED RIGHT JUGULAR INSERTION VENOUS ACCESS DEVICE;  Surgeon: Luis E Babb MD;  Location: [facility];  Service: General   • VENOUS ACCESS DEVICE (PORT) REMOVAL N/A 11/14/2018    Procedure: REMOVAL VENOUS ACCESS DEVICE;  Surgeon: Tono Arevalo MD;  Location: [facility];  Service: General       ALLERGIES:    Allergies   Allergen Reactions   • Doxycycline Hyclate Other (See Comments)     Other reaction(s): lip swollen   • Sulfa Antibiotics Swelling     facial   • Other Rash     SILK TAPE   • Penicillins Rash     Reaction: rash   • Soma [Carisoprodol] Swelling and Rash         FAMILY HISTORY:  Family History   Problem Relation Age of Onset   • Cancer Other    • Colon cancer Other         parent   • Coronary artery disease Other    • Heart disease Other    • Hypertension Other    • Cholelithiasis Other    • Other Other         colon problems           REVIEW OF SYSTEMS:      CONSTITUTIONAL:  Complains of fatigue.  Has lost 1 pounds since last clinic visit despite of good appetite.  Denies any fever drenching night sweats .     EYES: No visual disturbances. No discharge. No new lesions    ENMT:  No epistaxis, mouth sores or difficulty swallowing.    RESPIRATORY:   "Complains of shortness of breath with exertion. No new cough or hemoptysis.    CARDIOVASCULAR:  Complains of intermittent chest pain.No palpitations.    GASTROINTESTINAL:   Denies any bleeding at present.  No abdominal pain nausea, vomiting or blood in the stool.    GENITOURINARY: No Dysuria or Hematuria.    MUSCULOSKELETAL:  Complains of worsening back pain.  Complains of worsening pain in neck region.    LYMPHATICS:  Denies any abnormal swollen glands anywhere in the body.    NEUROLOGICAL : No tingling or numbness. No headache or dizziness. No seizures or balance problems.    SKIN: Complains of worsening skin rash affecting chest and back.          PHYSICAL EXAMINATION:      VITAL SIGNS:  /66   Pulse 63   Temp 98.5 °F (36.9 °C) (Temporal)   Resp 16   Ht 180.3 cm (70.98\")   Wt 87.3 kg (192 lb 6.4 oz)   SpO2 95%   BMI 26.85 kg/m²       06/03/19  0847   Weight: 87.3 kg (192 lb 6.4 oz)         ECOG performance status: 2    CONSTITUTIONAL:  Not in any distress.  Having shakes and chills at the time of examination.    EYES: Mild conjunctival Pallor. No Icterus. No Pterygium. Extraocular Movements intact.No ptosis.    ENMT:  Normocephalic, Atraumatic.No Facial Asymmetry noted.    NECK:  No adenopathy.Trachea midline. NO JVD.    RESPIRATORY:  Fair air entry bilateral. No rhonchi or wheezing.Fair respiratory effort.    CARDIOVASCULAR:  S1, S2. Regular rate and rhythm. No murmur or gallop appreciated.Pacemaker present on left chest wall.    ABDOMEN:  Soft, obese, nontender. Bowel sounds present in all four quadrants.  No Hepatosplenomegaly appreciated.    MUSCULOSKELETAL:  No edema.No Calf Tenderness.Decreased range of motion.    NEUROLOGIC:    No  Motor  deficit appreciated. Cranial Nerves 2-12 grossly intact.    SKIN : Skin lesion consistent with urticaria pigmentosa present on chest, abdomen, back, upper and lower extremity. Skin rash Appears worse as compared to last month.    LYMPHATICS: No new enlarged " lymph nodes in neck or supraclavicular area.    PSYCHIATRY: Alert, awake and oriented ×3.Normal affect.  Normal judgment.  Makes good eye contact.        DIAGNOSTIC DATA:    Glucose   Date Value Ref Range Status   06/03/2019 120 (H) 65 - 99 mg/dL Final     Sodium   Date Value Ref Range Status   06/03/2019 138 136 - 145 mmol/L Final     Potassium   Date Value Ref Range Status   06/03/2019 4.4 3.5 - 5.2 mmol/L Final     CO2   Date Value Ref Range Status   06/03/2019 24.0 22.0 - 29.0 mmol/L Final     Chloride   Date Value Ref Range Status   06/03/2019 103 98 - 107 mmol/L Final     Anion Gap   Date Value Ref Range Status   06/03/2019 11.0 mmol/L Final     Creatinine   Date Value Ref Range Status   06/03/2019 0.94 0.76 - 1.27 mg/dL Final     BUN   Date Value Ref Range Status   06/03/2019 14 8 - 23 mg/dL Final     BUN/Creatinine Ratio   Date Value Ref Range Status   06/03/2019 14.9 7.0 - 25.0 Final     Calcium   Date Value Ref Range Status   06/03/2019 8.9 8.6 - 10.5 mg/dL Final     eGFR Non  Amer   Date Value Ref Range Status   06/03/2019 78 >60 mL/min/1.73 Final     Alkaline Phosphatase   Date Value Ref Range Status   06/03/2019 55 39 - 117 U/L Final     Total Protein   Date Value Ref Range Status   06/03/2019 7.2 6.0 - 8.5 g/dL Final     ALT (SGPT)   Date Value Ref Range Status   06/03/2019 14 1 - 41 U/L Final     AST (SGOT)   Date Value Ref Range Status   06/03/2019 15 1 - 40 U/L Final     Total Bilirubin   Date Value Ref Range Status   06/03/2019 0.5 0.2 - 1.2 mg/dL Final     Albumin   Date Value Ref Range Status   06/03/2019 4.50 3.50 - 5.20 g/dL Final     Globulin   Date Value Ref Range Status   06/03/2019 2.7 gm/dL Final     Lab Results   Component Value Date    WBC 7.64 06/03/2019    HGB 10.0 (L) 06/03/2019    HCT 29.3 (L) 06/03/2019    .7 (H) 06/03/2019     06/03/2019     Lab Results   Component Value Date    NEUTROABS 4.51 06/03/2019    IRON 107 07/25/2018    TIBC 276 07/25/2018    LABIRON  39 07/25/2018    FERRITIN 264.00 07/25/2018    [id number] 803 09/11/2018    FOLATE >20.00 09/11/2018     Lab Results   Component Value Date    HCGQUANT <1 12/16/2014       Tryptase    Ref Range & Units 1mo ago   Tryptase 2.2 - 13.2 ug/L 93.9     Resulting Agency  [facility]   Narrative     Performed at:  01 - [facility] [address]  [id number]  : Tej Matias MD, Phone:  [phone]      Specimen Collected: 07/25/18 12:34 Last Resulted: 07/27/18 13:16                     Blood Culture - Blood, Blood, Venous Line   Specimen Information: Blood, Venous Line        Blood Culture Acinetobacter lwoffii group Abnormal        ISOLATED FROM Aerobic Bottle              Gram Stain  Aerobic Bottle Gram negative bacilli   2 bottles of 3 bottles drawn positive for gram neg bacillus         Resulting Agency: [facility]   Susceptibility      Acinetobacter lwoffii group     CHIVO     Ampicillin + Sulbactam <=8/4 ug/ml Susceptible     Cefepime <=8 ug/ml Susceptible     Ceftazidime 4 ug/ml Susceptible     Ceftriaxone <=8 ug/ml Susceptible     Levofloxacin <=2 ug/ml Susceptible     Piperacillin <=16 ug/ml Susceptible     Trimethoprim + Sulfamethoxazole <=2/38 ug/ml Susceptible                 Specimen Collected: 11/12/18 10:26 Last Resulted: 11/16/18 06:25                  PATHOLOGY:  Pathology report from August 23, 2018 at [place] showed:  Diagnosis:  BONE MARROW - PERIPHERAL BLOOD SMEAR, ASPIRATE SMEAR, PARTICLE PREPARATION, BIOPSY, AND FLOW CYTOMETRY: MARKEDLY HYPERCELLULAR MARROW WITH INVOLVEMENT BY SYSTEMIC MASTOCYTOSIS WITH AN ASSOCIATED HEMATOLOGICAL NEOPLASM, CHRONIC MYELOMONOCYTIC LEUKEMIA-1; SEE IMPRESSION     IMPRESSION:  The findings are of a markedly hypercellular marrow (90% cellular) with maturing trilineage hematopoiesis and multifocal mast cell aggregates, including spindled forms. Mast cells are positive for CD2 and CD25 by flow cytometric analysis. Recent testing at an  outside institution showed an elevated tryptase level. The overall findings are consistent with involvement by systemic mastocytosis. In addition, there is multilineage dysplasia with mildly increased blasts and blast equivalents (6.1% of cellularity in total) and a persistent relative and absolute peripheral monocytosis. The combined findings are consistent with involvement by chronic myelomonocytic leukemia-1 (CMML-1), proliferative type. Final diagnosis requires correlation with pending additional testing, as documented below, which will be included in the comprehensive hematopathology report.    PERIPHERAL BLOOD SMEAR:  CBC : WBC 18.9 k/microL, Hgb 9.7 g/dL, Plt-Ct 187 k/microL,  fL, RDW 26.3%.    A Yi's stained peripheral smear is reviewed. Erythrocytes are decreased and are macrocytic and normochromic with anisopoikilocytosis, including target cells and teardrop cells. Polychromasia is present. Nucleated red blood cells are present. Leukocytes are increased and include mature neutrophils, lymphocytes, and monocytes, with a relative and absolute monocytosis. Neutrophils show dysplasia (nuclear hypolobation, hypogranulation). Left-shifted myeloid elements are present. There are no circulating blasts or plasma cells. Platelets are adequate in number and show variation in size.     ASPIRATE SMEARS AND TOUCH PREPARATIONS:  Yi's stained aspirate smears and touch preparations are reviewed. The material is hypercellular and particulate. Myeloid elements are adequate and show dysplasia (tgcbfgo-bd-trmbyjgbays dyssynchrony). Erythroid elements are adequate and show left-shifted maturation with mild dysplasia (megaloblastoid change, occasional nuclear membrane irregularity). The myeloid:erythroid ratio is 1.1 to 1. Megakaryocytes are increased in number, and demonstrate no significant atypia. Blasts and blast equivalents (monoblasts, promonocytes) comprise 6.1% of cellularity in total (600 cell count).  There is no increase in plasma cells or lymphocytes. Focal particles show significantly increased mast cells, including occasional spindle forms. A Prussian blue iron stain is performed on the sample, revealing increased storage iron. Sideroblastic iron is present. Ring sideroblasts are present (approximately 40-50%).     MARROW SMEAR DIFFERENTIAL:  Blasts (0-4): 3.8%  Monoblasts/promonocytes: 2.3%  Promyelocytes (1-8): 2.2%  Myelocytes and metamyelocytes (20-30): 15.8%  Bands and segmented neutrophils (12-25): 23.0%  Eosinophils and eosinophilic precursors (1-5): 0.3%  Basophils and basophilic precursors (0-1): 0.0%  Monocytes and monocytic precursors (0-2): 5.8%  Lymphocytes (10-15): 3.0%  Plasma cells (0-1): 0.2%  Erythroid precursors (15-27): 43.5%  Total cells counted: 600    BONE MARROW BIOPSY AND PARTICLE PREPARATION:  H&E and PAS stained bone marrow biopsy and particle preparation sections are reviewed. The material is adequate and markedly hypercellular (90% cellular). Myeloid elements are present in normal proportion and exhibit maturation. Erythroid elements are present in normal proportion and exhibit left-shifted maturation. Megakaryocytes are increased and include occasional hypolobated forms and forms with abnormal nuclear lobe separation. CD34-positive blasts comprise approximately 3-5% of marrow cellularity. Multifocal dense mast cell aggregates are present, including spindled forms, as confirmed by  and tryptase immunostains. A CD68 stain highlights increased monocytic/histiocytic forms. Bony trabeculae are normal for the patient's age. All stains performed with appropriate controls.     FLOW CYTOMETRY STUDIES:  IP ID:   Viability: 94.1%  DIAGNOSIS: No increase in blasts; abnormal mast cells present    COMMENT: Myeloblasts are not increased (0.9% of total cells), with the following normal immunophenotype: positive for CD33 (moderate), CD34, and CD45 (dim); and negative for CD19. B cells  are not increased (0.1% of total cells), with the following normal immunophenotype: positive for CD19 (bright) and CD45 (bright); and negative for CD34. T cells are not increased (3.1% of total cells), with the following normal immunophenotype: positive for CD45 (bright); and negative for CD34. Mast cells are present (0.03% of total cells), with the following abnormal immunophenotype: positive for CD2, CD25, CD45,  (bright), and FcER1.    CYTOGENETICS AND MOLECULAR DIAGNOSTIC RESULTS:  Additional testing (Karyotype, Myeloid NGS) is pending. Results will be included in the comprehensive hematopathology report.         **Electronically signed out by ELVIA TO,TYLER**on 8/27/2018  MS/NAKUL/haritha  Case reviewed by Attending Pathologist        Pathology report from November 13, 2014 showed:  FINAL DIAGNOSIS:   PERIPHERAL SMEAR, REVIEW:        MONOCYTOSIS WITH LEFT MYELOID SHIFT.        ANEMIA, BORDERLINE.   BONE MARROW ASPIRATION AND BIOPSY:        SYSTEMIC MASTOCYTOSIS WITH ASSOCIATED CLONAL             HEMATOLOGIC NON-MAST CELL LINEAGE DISEASE,             CHRONIC MYELOMONOCYTIC LEUKEMIA-1.            RADIOLOGY DATA :  CT of abdomen with and without contrast done on May 14, 2018 showed:  The liver is normal. The gallbladder surgically absent. The  biliary system appears within normal limits status post  cholecystectomy. The pancreas is normal. The spleen is normal.  Bilateral adrenal glands are normal. Right kidney mid zone 4.7 mm  nonobstructive renal calculi. Stable right kidney lower pole oval  hyperdense lesion on unenhanced imaging which has Hounsfield  units of  49. Following enhancement this lesion has Hounsfield  units of 49 and 51 suggesting no significant enhancement. This  lesion measures 2.36 cm in greatest diameter and is not  appreciably changed. Otherwise right kidney and ureter are  unremarkable. Left kidney and visualized ureter are normal.  Visualized hollow viscera is normal. No lymphadenopathy in  the  abdomen. No acute osseous abnormalities.     IMPRESSION:  1. Stable right kidney lower pole oval hyperdense lesion which  does not enhance measuring 2.36 cm in greatest diameter. This  interval stability and appearance is most consistent with benign  hyperdense cyst.  2. Right kidney mid zone 4.7 mm stable nonobstructive renal  calculi..  3. Otherwise unremarkable CT abdomen study with without  contrast..      CT of left lower extremity with contrast done on March 23, 2018 showed:  COMMENTS:              A CT examination was performed of the lower extremities, with  images coned to the left femur.     There is a marrow-based focal sclerotic lesion involving the  proximal/mid femur, measuring approximately 2.6 cm in  craniocaudal extent. There is no evidence of endosteal  scalloping, or features to suggest an aggressive process. There  is no abnormal periosteal reaction.     The remainder the examination is unremarkable for age.     .     IMPRESSION:  CONCLUSION:          1. Focal sclerotic bone lesion which is marrow based in the  diaphysis of the left femur in the proximal/midportion. This is  likely a benign lesion and correlation with plain film  radiographs are suggested.            CT chest with contrast done on January 26, 2018 showed:  IMPRESSION:  CONCLUSION:  Multiple sclerotic lesions in the spine and right RIBS,  concerning for metastatic prostate cancer. Recommend correlation  with PSA levels and nuclear medicine bone scan.           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 Shore.  -Patient hasso far not require any chemotherapy or any other treatment.  -Recently in view of worsening  skin lesion on the chest, abdomen and back he underwent a biopsy by Dr. 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  -Patient is also found to have worsening anemia recently on blood work done from June 2018.  -Patient is also found to have multiple sclerotic lesion on the spine and hips on CT scan done from January 2018 until June 2018.  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  -Patient was evaluated by Dr. Kimbrough at [place] on August 20, 2018 and had a bone marrow biopsy done on August 23, 2018 that again showed systemic mastocytosis with CMML-1.  -In view of elevated tryptase level, his case was discussed at tumor board at [place].  -Case was discussed with Dr. Kimbrough on September 10, 2018, is recommending treatment with Vidaza for now.    -He was started on Vidaza on September 24, 2018.  -Upon next clinic visit on November 12, 2018,patient was admitted to hospital due to bacteremia and chemotherapy was delayed.  -On November 26, 2018, patient requested chemotherapy to be held because of ill health of his wife.  -Patient received cycle 2 of  Vidaza on January 7, 2019.  White blood cell count today is normal at 8.4, hemoglobin is improved to 11.3.  Platelet count is normal at 221,000.  -We'll go ahead with cycle 7 of Vidaza today.  -Patient is developing worsening anemia as well as worsening skin rash over last 1 month.  Will refer patient back to [place] clinic to get evaluated by Dr. Kimbrough for further treatment recommendation.  Patient may need to be changed to midostaurin.  -We will ask patient to return to clinic in 4 weeks with a repeat CBC and CMP to be done on that day.    2.  Anemia: Most likely secondary to #1  -Remains on folic acid 1 mg by mouth daily.  Hemoglobin is 10.0.  We'll monitored with CBC.  -We will repeat  anemia work-up prior to next clinic visit in 4 weeks.    3.  Leukocytosis: Secondary to chronic myelomonocytic leukemia. Resolved. Wbc is 7.64 today. We'll monitored with CBC for now.    4.  Urticaria pigmentosa, secondary to underlying  marrow pathology.    5.  Thrombocytopenia: Secondary to bone marrow involvement. Resolved. Platelet count is 177,000 today.  We'll monitored with CBC for now.    6.  History of bradycardia status post pacemaker in 2008.    7.  Health maintenance: Patient does not smoke.  Had a colonoscopy in 2014 by Dr. Guzman.    8. BMI: Patient's Body mass index is 26.85 kg/m². BMI is higher than reference range.  Patient was notified about elevated BMI.    9. Advance Care Planning: Patient has a living will on chart.           Jordin Roblero MD  6/3/2019  5:22 PM        EMR Dragon/Transcription disclaimer:   Much of this encounter note is an electronic transcription/translation of spoken language to printed text. The electronic translation of spoken language may permit erroneous, or at times, nonsensical words or phrases to be inadvertently transcribed; Although I have reviewed the note for such errors, some may still exist.

## 2019-06-05 NOTE — PROGRESS NOTES
Patient to stop eliquis at this time due to rectal bleeding per dr mc and follow up with GI. Will see patient on June 26th appt.

## 2019-06-05 NOTE — PROGRESS NOTES
Patient had a second episode of rectal blood last night--constipation after chemo with bowel movement loss of approximately 1/4 cup of blood.  Called Jaoquin, patient is taking Eliquis for cardiac--we will refer the patient back to follow-up with Cardiology.  We will collect CBC today to evaluate blood counts.  Heather Berry RN  June 5, 2019  8:19 AM

## 2019-06-05 NOTE — PATIENT INSTRUCTIONS
Apixaban oral tablets  What is this medicine?  APIXABAN (a PIX a ban) is an anticoagulant (blood thinner). It is used to lower the chance of stroke in people with a medical condition called atrial fibrillation. It is also used to treat or prevent blood clots in the lungs or in the veins.  This medicine may be used for other purposes; ask your health care provider or pharmacist if you have questions.  COMMON BRAND NAME(S): Eliquis  What should I tell my health care provider before I take this medicine?  They need to know if you have any of these conditions:  -bleeding disorders  -bleeding in the brain  -blood in your stools (black or tarry stools) or if you have blood in your vomit  -history of stomach bleeding  -kidney disease  -liver disease  -mechanical heart valve  -an unusual or allergic reaction to apixaban, other medicines, foods, dyes, or preservatives  -pregnant or trying to get pregnant  -breast-feeding  How should I use this medicine?  Take this medicine by mouth with a glass of water. Follow the directions on the prescription label. You can take it with or without food. If it upsets your stomach, take it with food. Take your medicine at regular intervals. Do not take it more often than directed. Do not stop taking except on your doctor's advice. Stopping this medicine may increase your risk of a blood clot. Be sure to refill your prescription before you run out of medicine.  Talk to your pediatrician regarding the use of this medicine in children. Special care may be needed.  Overdosage: If you think you have taken too much of this medicine contact a poison control center or emergency room at once.  NOTE: This medicine is only for you. Do not share this medicine with others.  What if I miss a dose?  If you miss a dose, take it as soon as you can. If it is almost time for your next dose, take only that dose. Do not take double or extra doses.  What may interact with this medicine?  This medicine may  interact with the following:  -aspirin and aspirin-like medicines  -certain medicines for fungal infections like ketoconazole and itraconazole  -certain medicines for seizures like carbamazepine and phenytoin  -certain medicines that treat or prevent blood clots like warfarin, enoxaparin, and dalteparin  -clarithromycin  -NSAIDs, medicines for pain and inflammation, like ibuprofen or naproxen  -rifampin  -ritonavir  -Quin's wort  This list may not describe all possible interactions. Give your health care provider a list of all the medicines, herbs, non-prescription drugs, or dietary supplements you use. Also tell them if you smoke, drink alcohol, or use illegal drugs. Some items may interact with your medicine.  What should I watch for while using this medicine?  Visit your healthcare professional for regular checks on your progress. You may need blood work done while you are taking this medicine. Your condition will be monitored carefully while you are receiving this medicine. It is important not to miss any appointments.  Avoid sports and activities that might cause injury while you are using this medicine. Severe falls or injuries can cause unseen bleeding. Be careful when using sharp tools or knives. Consider using an electric razor. Take special care brushing or flossing your teeth. Report any injuries, bruising, or red spots on the skin to your healthcare professional.  If you are going to need surgery or other procedure, tell your healthcare professional that you are taking this medicine.  Wear a medical ID bracelet or chain. Carry a card that describes your disease and details of your medicine and dosage times.  What side effects may I notice from receiving this medicine?  Side effects that you should report to your doctor or health care professional as soon as possible:  -allergic reactions like skin rash, itching or hives, swelling of the face, lips, or tongue  -signs and symptoms of bleeding such as  bloody or black, tarry stools; red or dark-brown urine; spitting up blood or brown material that looks like coffee grounds; red spots on the skin; unusual bruising or bleeding from the eye, gums, or nose  -signs and symptoms of a blood clot such as chest pain; shortness of breath; pain, swelling, or warmth in the leg  -signs and symptoms of a stroke such as changes in vision; confusion; trouble speaking or understanding; severe headaches; sudden numbness or weakness of the face, arm or leg; trouble walking; dizziness; loss of coordination  This list may not describe all possible side effects. Call your doctor for medical advice about side effects. You may report side effects to FDA at 2-058-ZIM-9706.  Where should I keep my medicine?  Keep out of the reach of children.  Store at room temperature between 20 and 25 degrees C (68 and 77 degrees F). Throw away any unused medicine after the expiration date.  NOTE: This sheet is a summary. It may not cover all possible information. If you have questions about this medicine, talk to your doctor, pharmacist, or health care provider.  © 2019 [employer] Standard (2018-12-13 11:20:07)  Stop taking Eliquis until cardiology appointment on 6/25/19. Per Dr. Day, pt is to see GI doctor as well to rule out issue related to GI.

## 2019-06-05 NOTE — PROGRESS NOTES
Cardiology appointment is on June 25--per Dr. Day discontinue Eliquis until his scheduled appointment and schedule patient with GI--patient has seen Jeannine in the past.  We will call [facility] office for appointment.  Heather Berry RN  June 5, 2019  9:23 AM

## 2019-06-07 NOTE — TELEPHONE ENCOUNTER
Called pt and let him know I had called Dr. Guzman's office, appointment made for Monday 6/10 at 0930.  Pt gives v/u.

## 2019-06-25 NOTE — PROGRESS NOTES
Jan Humphrey  79 y.o. male    06/25/2019  1. Sick sinus syndrome (CMS/HCC)    2. Paroxysmal atrial fibrillation (CMS/HCC)    3. Essential hypertension        History of Present Illness  Mr. Humphrey is here for follow-up of his above-stated problems.  He denied any chest pain, shortness of breath, palpitation, dizziness or syncope.  The patient has had lower GI bleeding and hence anticoagulation with Eliquis was discontinued.  He has been evaluated by Dr. Guzman and I understand that GI work-up is being considered.  His pacemaker was checked in February this year and the findings were as follows:    :Medtronic Model: [id number] Serial Number: [id number]  Implant date: 5/18/2017      Battery  ERIKA: 6 years @ 3.00V     Interrogation Results  Atrial sensing: P wave: 1.1 mV  Atrial capture: 2.00V @ 0.60 ms  Atrial lead impedance: 437 ohms  Ventricular sensing: R wave: 7.3 mV  Ventricular capture: 0.75 V @ 0.40 ms  Ventricular lead impedance: right  817 ohms     Parameters  Mode: AAIR<=>DDDR  Base Rate: 60/130     Diagnostic Data  Atrial paced:95.9 %   Ventricular paced: 0.5 %  Mode switch: 0%  AT/AF Willard: 1.2%  AHR: 17   Longest 118 minutes  VHR: 0    The patient has had a macular skin lesion throughout the body and I understand that his medication for myelomonocytic leukemia is being altered.    SUBJECTIVE    Allergies   Allergen Reactions   • Doxycycline Hyclate Other (See Comments)     Other reaction(s): lip swollen   • Sulfa Antibiotics Swelling     facial   • Other Rash     SILK TAPE   • Penicillins Rash     Reaction: rash   • Soma [Carisoprodol] Swelling and Rash         Past Medical History:   Diagnosis Date   • Atrophy of testis    • Benign prostatic hyperplasia    • Borderline glaucoma    • Chronic myelomonocytic leukemia (CMS/HCC)    • Degenerative joint disease involving multiple joints    • GERD (gastroesophageal reflux disease)    • Gout    • History of echocardiogram 05/08/2012    Normal  left ventricular systolic function, EF 60%. Early diastolic dysfunction. Mild aortic and pulmonic regurgitation. Trace mitral and mild tricuspid regurgitation. No intracardiac mass pericardial effusion or cardiac thrombus.   • History of Holter monitoring 01/18/2011   • Impotence    • Lightheadedness    • Neck pain, musculoskeletal    • Sleep apnea     NOT WEARING C-PAP   • TIA (transient ischemic attack) 2014         Past Surgical History:   Procedure Laterality Date   • CARDIAC CATHETERIZATION  09/30/2009    No epicardial coronary artery disease noted that would explain patient's chest pain of the abnormal stress test noted. Normal left ventricular systolic function with no wall motion abnormalities   • CARDIAC CATHETERIZATION  05/24/1989    Normal catheterization, false-positive exercise treadmill. Noncardiac chest pain   • CARDIAC ELECTROPHYSIOLOGY PROCEDURE N/A 5/18/2017    Procedure: PPM generator change - dual;  Surgeon: Geneva Day MD;  Location: [facility] INVASIVE LOCATION;  Service:    • CARDIAC PACEMAKER PLACEMENT  06/2008    Dual chamber permanent pacemaker implantation   • COLECTOMY PARTIAL / TOTAL  04/14/2000    Cecal diverticulitis. Removal of small segment of terminal ileum, cecum and right colon, sent to pathology   • COLON SURGERY  03/27/2016   • COLONOSCOPY  05/25/2012   • CYSTOSCOPY  11/15/2000    Urinary retention on the basis of medications and benign prostatic hypertrophy   • INGUINAL HERNIA REPAIR  02/15/2007    Recurrent right inguinal hernia. Repair of recurrent inguinal hernia with EPS Prolene mesh system.   • INGUINAL HERNIA REPAIR Right 1957   • LAPAROSCOPIC CHOLECYSTECTOMY  10/26/2006    Gallstones. Laparoscopic cholecystectomy with operative cholangiogram   • PROSTATECTOMY  11/17/2000    Benign prostatic hypertrophy with urinary retention. Prostatitis. transurethral resection of prostate.   • STEROID INJECTION  10/21/2010    Decadron (Gout)    • STEROID INJECTION   07/23/2013    Kenalog (Gout) (4)   • VENOUS ACCESS DEVICE (PORT) INSERTION N/A 9/13/2018    Procedure: ULTRASOUND ASSISTED RIGHT JUGULAR INSERTION VENOUS ACCESS DEVICE;  Surgeon: Luis E Babb MD;  Location: [facility] OR;  Service: General   • VENOUS ACCESS DEVICE (PORT) REMOVAL N/A 11/14/2018    Procedure: REMOVAL VENOUS ACCESS DEVICE;  Surgeon: Tono Arevalo MD;  Location: [facility];  Service: General         Family History   Problem Relation Age of Onset   • Cancer Other    • Colon cancer Other         parent   • Coronary artery disease Other    • Heart disease Other    • Hypertension Other    • Cholelithiasis Other    • Other Other         colon problems         Social History     Socioeconomic History   • Marital status:      Spouse name: Not on file   • Number of children: Not on file   • Years of education: Not on file   • Highest education level: Not on file   Tobacco Use   • Smoking status: Never Smoker   • Smokeless tobacco: Never Used   Substance and Sexual Activity   • Alcohol use: No   • Drug use: No   • Sexual activity: Defer         Current Outpatient Medications   Medication Sig Dispense Refill   • acetaminophen (TYLENOL) 325 MG tablet Take 1 tablet by mouth As Needed.     • allopurinol (ZYLOPRIM) 100 MG tablet Take 1 tablet by mouth 4 (Four) Times a Day. 360 tablet 1   • aspirin 81 MG EC tablet Take 81 mg by mouth Daily.     • colchicine 0.6 MG tablet Take 0.6 mg by mouth Daily As Needed for Muscle / Joint Pain.     • diclofenac (VOLTAREN) 50 MG EC tablet Take 1 tablet by mouth 2 (Two) Times a Day. 60 tablet 11   • folic acid (FOLVITE) 1 MG tablet Take 1 tablet by mouth Daily. 90 tablet 3   • omeprazole (PRILOSEC) 20 MG capsule Take 1 capsule by mouth Daily. 90 capsule 3   • ondansetron (ZOFRAN) 4 MG tablet Take 1 tablet by mouth 4 (Four) Times a Day As Needed for Nausea or Vomiting. 40 tablet 3   • oxybutynin (DITROPAN) 5 MG tablet Take 5 mg by mouth 2 (Two) Times a Day.     • sotalol  "(BETAPACE) 80 MG tablet Take 0.5 tablets by mouth 2 (Two) Times a Day. 90 tablet 3   • tamsulosin (FLOMAX) 0.4 MG capsule 24 hr capsule Take 1 capsule by mouth every night.     • vitamin D (ERGOCALCIFEROL) 63409 units capsule capsule Take 1 capsule by mouth Every 7 (Seven) Days. 4 capsule 5     No current facility-administered medications for this visit.          OBJECTIVE    /74   Pulse 97   Ht 180.3 cm (71\")   Wt 86.9 kg (191 lb 8 oz)   SpO2 99%   BMI 26.71 kg/m²         Review of Systems     Constitutional:  Denies recent weight loss, weight gain, fever or chills     HENT:  Denies any hearing loss, epistaxis, hoarseness, or difficulty speaking.     Eyes: Wears eyeglasses or contact lenses     Respiratory:  Denies dyspnea with exertion,no cough, wheezing, or hemoptysis.     Cardiovascular: Negative for palpations, chest pain, orthopnea, PND, peripheral edema, syncope, or claudication.     Gastrointestinal:  Denies change in bowel habits, dyspepsia, ulcer disease, hematochezia, or melena.     Endocrine: Negative for cold intolerance, heat intolerance, polydipsia, polyphagia and polyuria.    Genitourinary: Negative.      Musculoskeletal: DJD, Gout    Skin:  Denies any change in hair or nails, rashes, or skin lesions.     Allergic/Immunologic: Negative.  Negative for environmental allergies, food allergies and immunocompromised state.     Neurological:  Denies any history of recurrent headaches, strokes, TIA, or seizure disorder.     Hematological: History of myelomonocytic leukemia    Psychiatric/Behavioral: Denies any history of depression, substance abuse, or change in cognitive function.         Physical Exam     Constitutional: Cooperative, alert and oriented,  in no acute distress.     HENT:   Head: Normocephalic, normal hair patterns, no masses or tenderness.  Ears, Nose, and Throat: No gross abnormalities. No pallor or cyanosis.   Eyes: EOMS intact, PERRL, conjunctivae and lids unremarkable. " Fundoscopic exam and visual fields not performed.   Neck: No palpable masses or adenopathy, no thyromegaly, no JVD, carotid pulses are full and equal bilaterally and without  Bruits.     Cardiovascular: Regular rhythm, S1 and S2 normal, no S3 or S4. No murmurs, gallops, or rubs detected.     Pulmonary/Chest: Chest: normal symmetry, normal respiratory excursion, no intercostal retraction, no use of accessory muscles.            Pulmonary: Normal breath sounds. No rales or ronchi.    Abdominal: Abdomen soft, bowel sounds normoactive, no masses, no hepatosplenomegaly, non-tender, no bruits.     Musculoskeletal: No deformities, clubbing, cyanosis, erythema, or edema observed.     Neurological: No gross motor or sensory deficits noted, affect appropriate, oriented to time, person, place.     Skin: Warm and dry to the touch, no apparent skin lesions or masses noted.     Psychiatric: He has a normal mood and affect. His behavior is normal. Judgment and thought content normal.         Procedures      Lab Results   Component Value Date    WBC 8.26 06/05/2019    HGB 10.7 (L) 06/05/2019    HCT 31.3 (L) 06/05/2019    .3 (H) 06/05/2019     06/05/2019     Lab Results   Component Value Date    GLUCOSE 120 (H) 06/03/2019    BUN 14 06/03/2019    CREATININE 0.94 06/03/2019    EGFRIFNONA 78 06/03/2019    BCR 14.9 06/03/2019    CO2 24.0 06/03/2019    CALCIUM 8.9 06/03/2019    ALBUMIN 4.50 06/03/2019    AST 15 06/03/2019    ALT 14 06/03/2019     Lab Results   Component Value Date    CHOL 78 05/21/2018    CHOL 96 03/21/2017     Lab Results   Component Value Date    TRIG 85 05/21/2018    TRIG 170 03/21/2017    TRIG 113 09/22/2015     Lab Results   Component Value Date    HDL 23 (L) 05/21/2018    HDL 26 (L) 03/21/2017     No components found for: LDLCALC  Lab Results   Component Value Date    LDL 44 05/21/2018    LDL 45 03/21/2017    LDL 62 09/22/2015     No results found for: HDLLDLRATIO  No components found for:  CHOLHDL  Lab Results   Component Value Date    HGBA1C 6.2 (H) 05/21/2018     Lab Results   Component Value Date    TSH 1.79 12/16/2014           ASSESSMENT AND PLAN  Mr. Humphrey is stable with regards to his heart and remains in sinus rhythm.  I have continued antiarrhythmic therapy with sotalol.  Antiplatelet therapy with aspirin has been continued.  He has had issues with lower GI bleeding and this is being addressed by Dr. Guzman and I understand that he is considered for a colonoscopy.  Pacemaker will be followed closely.    Jan was seen today for follow-up.    Diagnoses and all orders for this visit:    Sick sinus syndrome (CMS/HCC)    Paroxysmal atrial fibrillation (CMS/HCC)    Essential hypertension        Patient's Body mass index is 26.71 kg/m². BMI is above normal parameters. Recommendations include: nutrition counseling.  Patient is a non-smoker        Geneva Day MD  6/25/2019  11:48 AM

## 2019-07-08 NOTE — PROGRESS NOTES
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  Patient was also evaluated by Dr. Man Kimbrough at [facility] in 2012.  Patient has been on observation since then without any active chemotherapy.  In November 2014 in view of worsening leukocytosis and anemia he had a repeat bone marrow biopsy done by Dr. Shore which showed systemic mastocytosis with associated clonal hematologic non-mass cell lineage disease, CMML-1.  Patient has not been on any chemotherapy since then.  In March 2018 patient was seen here at clinic by Goldie WHYTE.  In view of sclerotic lesion on lower extremity as well as skin lesion patient was referred to dermatology.  Biopsy done by Dr. Chen on June 20, 2018 showed increased MAST cells consistent with urticaria pigmentosa.   patient was referred to [facility] when he was seen by Dr. Kimbrough on August 20, 2018 and a bone marrow biopsy done on August 23, 2018.  Was started on Vidaza on September 24, 2018.   On November 12, 2018, due to uncontrollable shakes and chills after port access and flushing, patient was admitted to the hospital and was found to have gram-negative bacteremia with Acinetobacter.  Patient had a port removed during hospital stay.  Patient decided not to do any chemotherapy upon last clinic visit on November 26, 2018 due to ill health of his wife.  Patient was started back on Vidaza on January 7, 2019.  Patient is here to get cycle 8 of Vidaza today.  In view of worsening rash recently, patient was  referred back to Dr. Kimbrough.  Patient was seen by Dr. Kimbrough at [facility] on June 17, 2019.  He is here to discuss further treatment recommendation.  States his appetite is improving.  Complains of worsening skin rash affecting chest and back.  Denies any bleeding.  Denies any more chills or fevers.        PAST MEDICAL HISTORY:    Past Medical History:   Diagnosis Date   • Atrophy of testis    • Benign prostatic hyperplasia    • Borderline glaucoma    • Chronic myelomonocytic leukemia (CMS/HCC)    • Degenerative joint disease involving multiple joints    • GERD (gastroesophageal reflux disease)    • Gout    • History of echocardiogram 05/08/2012    Normal left ventricular systolic function, EF 60%. Early diastolic dysfunction. Mild aortic and pulmonic regurgitation. Trace mitral and mild tricuspid regurgitation. No intracardiac mass pericardial effusion or cardiac thrombus.   • History of Holter monitoring 01/18/2011   • Impotence    • Lightheadedness    • Neck pain, musculoskeletal    • Sleep apnea     NOT WEARING C-PAP   • TIA (transient ischemic attack) 2014       SOCIAL HISTORY:    Social History     Tobacco Use   • Smoking status: Never Smoker   • Smokeless tobacco: Never Used   Substance Use Topics   • Alcohol use: No   • Drug use: No       Surgical History :  Past Surgical History:   Procedure Laterality Date   • CARDIAC CATHETERIZATION  09/30/2009    No epicardial coronary artery disease noted that would explain patient's chest pain of the abnormal stress test noted. Normal left ventricular systolic function with no wall motion abnormalities   • CARDIAC CATHETERIZATION  05/24/1989    Normal catheterization, false-positive exercise treadmill. Noncardiac chest pain   • CARDIAC ELECTROPHYSIOLOGY PROCEDURE N/A 5/18/2017    Procedure: PPM generator change - dual;  Surgeon: Geneva Day MD;  Location: [facility] INVASIVE LOCATION;  Service:    • CARDIAC PACEMAKER PLACEMENT  06/2008    Dual  chamber permanent pacemaker implantation   • COLECTOMY PARTIAL / TOTAL  04/14/2000    Cecal diverticulitis. Removal of small segment of terminal ileum, cecum and right colon, sent to pathology   • COLON SURGERY  03/27/2016   • COLONOSCOPY  05/25/2012   • CYSTOSCOPY  11/15/2000    Urinary retention on the basis of medications and benign prostatic hypertrophy   • INGUINAL HERNIA REPAIR  02/15/2007    Recurrent right inguinal hernia. Repair of recurrent inguinal hernia with EPS Prolene mesh system.   • INGUINAL HERNIA REPAIR Right 1957   • LAPAROSCOPIC CHOLECYSTECTOMY  10/26/2006    Gallstones. Laparoscopic cholecystectomy with operative cholangiogram   • PROSTATECTOMY  11/17/2000    Benign prostatic hypertrophy with urinary retention. Prostatitis. transurethral resection of prostate.   • STEROID INJECTION  10/21/2010    Decadron (Gout)    • STEROID INJECTION  07/23/2013    Kenalog (Gout) (4)   • VENOUS ACCESS DEVICE (PORT) INSERTION N/A 9/13/2018    Procedure: ULTRASOUND ASSISTED RIGHT JUGULAR INSERTION VENOUS ACCESS DEVICE;  Surgeon: Luis E Babb MD;  Location: [facility] OR;  Service: General   • VENOUS ACCESS DEVICE (PORT) REMOVAL N/A 11/14/2018    Procedure: REMOVAL VENOUS ACCESS DEVICE;  Surgeon: Tono Arevalo MD;  Location: [facility];  Service: General       ALLERGIES:    Allergies   Allergen Reactions   • Doxycycline Hyclate Other (See Comments)     Other reaction(s): lip swollen   • Sulfa Antibiotics Swelling     facial   • Other Rash     SILK TAPE   • Penicillins Rash     Reaction: rash   • Soma [Carisoprodol] Swelling and Rash         FAMILY HISTORY:  Family History   Problem Relation Age of Onset   • Cancer Other    • Colon cancer Other         parent   • Coronary artery disease Other    • Heart disease Other    • Hypertension Other    • Cholelithiasis Other    • Other Other         colon problems           REVIEW OF SYSTEMS:      CONSTITUTIONAL:  Complains of fatigue.  Has lost 2 pounds since last clinic  "visit despite of good appetite.  Denies any fever drenching night sweats .     EYES: No visual disturbances. No discharge. No new lesions    ENMT:  No epistaxis, mouth sores or difficulty swallowing.    RESPIRATORY:  Complains of shortness of breath with exertion. No new cough or hemoptysis.    CARDIOVASCULAR:  Complains of intermittent chest pain.No palpitations.    GASTROINTESTINAL:   Denies any bleeding at present.  No abdominal pain nausea, vomiting or blood in the stool.    GENITOURINARY: No Dysuria or Hematuria.    MUSCULOSKELETAL:  Complains of worsening back pain.  Complains of worsening pain in neck region.    LYMPHATICS:  Denies any abnormal swollen glands anywhere in the body.    NEUROLOGICAL : No tingling or numbness. No headache or dizziness. No seizures or balance problems.    SKIN: Complains of worsening skin rash affecting chest and back.          PHYSICAL EXAMINATION:      VITAL SIGNS:  /74   Pulse 78   Temp 98.1 °F (36.7 °C) (Temporal)   Resp 18   Ht 180.3 cm (70.98\")   Wt 85.8 kg (189 lb 3.2 oz)   BMI 26.40 kg/m²       07/08/19  1012   Weight: 85.8 kg (189 lb 3.2 oz)         ECOG performance status: 2    CONSTITUTIONAL:  Not in any distress.  Having shakes and chills at the time of examination.    EYES: Mild conjunctival Pallor. No Icterus. No Pterygium. Extraocular Movements intact.No ptosis.    ENMT:  Normocephalic, Atraumatic.No Facial Asymmetry noted.    NECK:  No adenopathy.Trachea midline. NO JVD.    RESPIRATORY:  Fair air entry bilateral. No rhonchi or wheezing.Fair respiratory effort.    CARDIOVASCULAR:  S1, S2. Regular rate and rhythm. No murmur or gallop appreciated.Pacemaker present on left chest wall.    ABDOMEN:  Soft, obese, nontender. Bowel sounds present in all four quadrants.  No Hepatosplenomegaly appreciated.    MUSCULOSKELETAL:  No edema.No Calf Tenderness.Decreased range of motion.    NEUROLOGIC:    No  Motor  deficit appreciated. Cranial Nerves 2-12 grossly " intact.    SKIN : Skin lesion consistent with urticaria pigmentosa present on chest, abdomen, back, upper and lower extremity. Skin rash Appears worse as compared to last month.    LYMPHATICS: No new enlarged lymph nodes in neck or supraclavicular area.    PSYCHIATRY: Alert, awake and oriented ×3.Normal affect.  Normal judgment.  Makes good eye contact.        DIAGNOSTIC DATA:    Glucose   Date Value Ref Range Status   07/08/2019 96 65 - 99 mg/dL Final     Sodium   Date Value Ref Range Status   07/08/2019 137 136 - 145 mmol/L Final     Potassium   Date Value Ref Range Status   07/08/2019 4.6 3.5 - 5.2 mmol/L Final     CO2   Date Value Ref Range Status   07/08/2019 26.0 22.0 - 29.0 mmol/L Final     Chloride   Date Value Ref Range Status   07/08/2019 102 98 - 107 mmol/L Final     Anion Gap   Date Value Ref Range Status   07/08/2019 9.0 5.0 - 15.0 mmol/L Final     Creatinine   Date Value Ref Range Status   07/08/2019 0.90 0.76 - 1.27 mg/dL Final     BUN   Date Value Ref Range Status   07/08/2019 14 8 - 23 mg/dL Final     BUN/Creatinine Ratio   Date Value Ref Range Status   07/08/2019 15.6 7.0 - 25.0 Final     Calcium   Date Value Ref Range Status   07/08/2019 9.1 8.6 - 10.5 mg/dL Final     eGFR Non  Amer   Date Value Ref Range Status   07/08/2019 82 >60 mL/min/1.73 Final     Alkaline Phosphatase   Date Value Ref Range Status   07/08/2019 57 39 - 117 U/L Final     Total Protein   Date Value Ref Range Status   07/08/2019 7.2 6.0 - 8.5 g/dL Final     ALT (SGPT)   Date Value Ref Range Status   07/08/2019 16 1 - 41 U/L Final     AST (SGOT)   Date Value Ref Range Status   07/08/2019 21 1 - 40 U/L Final     Total Bilirubin   Date Value Ref Range Status   07/08/2019 0.4 0.2 - 1.2 mg/dL Final     Albumin   Date Value Ref Range Status   07/08/2019 4.50 3.50 - 5.20 g/dL Final     Globulin   Date Value Ref Range Status   07/08/2019 2.7 gm/dL Final     Lab Results   Component Value Date    WBC 10.09 07/08/2019    HGB 11.3  (L) 07/08/2019    HCT 33.2 (L) 07/08/2019    MCV 99.7 (H) 07/08/2019     07/08/2019     Lab Results   Component Value Date    NEUTROABS 6.66 07/08/2019    IRON 107 07/25/2018    TIBC 276 07/25/2018    LABIRON 39 07/25/2018    FERRITIN 264.00 07/25/2018    KHVXULNS30 803 09/11/2018    FOLATE >20.00 09/11/2018     Lab Results   Component Value Date    HCGQUANT <1 12/16/2014       Tryptase    Ref Range & Units 1mo ago   Tryptase 2.2 - 13.2 ug/L 93.9     Resulting Agency  [facility]   Narrative     Performed at:  [address] [facility]  [id number]  : Tej Matias MD, Phone:  [phone]      Specimen Collected: 07/25/18 12:34 Last Resulted: 07/27/18 13:16                     Blood Culture - Blood, Blood, Venous Line   Specimen Information: Blood, Venous Line        Blood Culture Acinetobacter lwoffii group Abnormal        ISOLATED FROM Aerobic Bottle              Gram Stain  Aerobic Bottle Gram negative bacilli   2 bottles of 3 bottles drawn positive for gram neg bacillus         Resulting Agency: [facility]   Susceptibility      Acinetobacter lwoffii group     CHIVO     Ampicillin + Sulbactam <=8/4 ug/ml Susceptible     Cefepime <=8 ug/ml Susceptible     Ceftazidime 4 ug/ml Susceptible     Ceftriaxone <=8 ug/ml Susceptible     Levofloxacin <=2 ug/ml Susceptible     Piperacillin <=16 ug/ml Susceptible     Trimethoprim + Sulfamethoxazole <=2/38 ug/ml Susceptible                 Specimen Collected: 11/12/18 10:26 Last Resulted: 11/16/18 06:25                  PATHOLOGY:  Pathology report from August 23, 2018 at [place] showed:  Diagnosis:  BONE MARROW - PERIPHERAL BLOOD SMEAR, ASPIRATE SMEAR, PARTICLE PREPARATION, BIOPSY, AND FLOW CYTOMETRY: MARKEDLY HYPERCELLULAR MARROW WITH INVOLVEMENT BY SYSTEMIC MASTOCYTOSIS WITH AN ASSOCIATED HEMATOLOGICAL NEOPLASM, CHRONIC MYELOMONOCYTIC LEUKEMIA-1; SEE IMPRESSION     IMPRESSION:  The findings are of a markedly hypercellular  marrow (90% cellular) with maturing trilineage hematopoiesis and multifocal mast cell aggregates, including spindled forms. Mast cells are positive for CD2 and CD25 by flow cytometric analysis. Recent testing at an outside institution showed an elevated tryptase level. The overall findings are consistent with involvement by systemic mastocytosis. In addition, there is multilineage dysplasia with mildly increased blasts and blast equivalents (6.1% of cellularity in total) and a persistent relative and absolute peripheral monocytosis. The combined findings are consistent with involvement by chronic myelomonocytic leukemia-1 (CMML-1), proliferative type. Final diagnosis requires correlation with pending additional testing, as documented below, which will be included in the comprehensive hematopathology report.    PERIPHERAL BLOOD SMEAR:  CBC : WBC 18.9 k/microL, Hgb 9.7 g/dL, Plt-Ct 187 k/microL,  fL, RDW 26.3%.    A Yi's stained peripheral smear is reviewed. Erythrocytes are decreased and are macrocytic and normochromic with anisopoikilocytosis, including target cells and teardrop cells. Polychromasia is present. Nucleated red blood cells are present. Leukocytes are increased and include mature neutrophils, lymphocytes, and monocytes, with a relative and absolute monocytosis. Neutrophils show dysplasia (nuclear hypolobation, hypogranulation). Left-shifted myeloid elements are present. There are no circulating blasts or plasma cells. Platelets are adequate in number and show variation in size.     ASPIRATE SMEARS AND TOUCH PREPARATIONS:  Yi's stained aspirate smears and touch preparations are reviewed. The material is hypercellular and particulate. Myeloid elements are adequate and show dysplasia (ahfkaks-og-tjopvzhxmti dyssynchrony). Erythroid elements are adequate and show left-shifted maturation with mild dysplasia (megaloblastoid change, occasional nuclear membrane irregularity). The  myeloid:erythroid ratio is 1.1 to 1. Megakaryocytes are increased in number, and demonstrate no significant atypia. Blasts and blast equivalents (monoblasts, promonocytes) comprise 6.1% of cellularity in total (600 cell count). There is no increase in plasma cells or lymphocytes. Focal particles show significantly increased mast cells, including occasional spindle forms. A Prussian blue iron stain is performed on the sample, revealing increased storage iron. Sideroblastic iron is present. Ring sideroblasts are present (approximately 40-50%).     MARROW SMEAR DIFFERENTIAL:  Blasts (0-4): 3.8%  Monoblasts/promonocytes: 2.3%  Promyelocytes (1-8): 2.2%  Myelocytes and metamyelocytes (20-30): 15.8%  Bands and segmented neutrophils (12-25): 23.0%  Eosinophils and eosinophilic precursors (1-5): 0.3%  Basophils and basophilic precursors (0-1): 0.0%  Monocytes and monocytic precursors (0-2): 5.8%  Lymphocytes (10-15): 3.0%  Plasma cells (0-1): 0.2%  Erythroid precursors (15-27): 43.5%  Total cells counted: 600    BONE MARROW BIOPSY AND PARTICLE PREPARATION:  H&E and PAS stained bone marrow biopsy and particle preparation sections are reviewed. The material is adequate and markedly hypercellular (90% cellular). Myeloid elements are present in normal proportion and exhibit maturation. Erythroid elements are present in normal proportion and exhibit left-shifted maturation. Megakaryocytes are increased and include occasional hypolobated forms and forms with abnormal nuclear lobe separation. CD34-positive blasts comprise approximately 3-5% of marrow cellularity. Multifocal dense mast cell aggregates are present, including spindled forms, as confirmed by  and tryptase immunostains. A CD68 stain highlights increased monocytic/histiocytic forms. Bony trabeculae are normal for the patient's age. All stains performed with appropriate controls.     FLOW CYTOMETRY STUDIES:  IP ID:   Viability: 94.1%  DIAGNOSIS: No increase  in blasts; abnormal mast cells present    COMMENT: Myeloblasts are not increased (0.9% of total cells), with the following normal immunophenotype: positive for CD33 (moderate), CD34, and CD45 (dim); and negative for CD19. B cells are not increased (0.1% of total cells), with the following normal immunophenotype: positive for CD19 (bright) and CD45 (bright); and negative for CD34. T cells are not increased (3.1% of total cells), with the following normal immunophenotype: positive for CD45 (bright); and negative for CD34. Mast cells are present (0.03% of total cells), with the following abnormal immunophenotype: positive for CD2, CD25, CD45,  (bright), and FcER1.    CYTOGENETICS AND MOLECULAR DIAGNOSTIC RESULTS:  Additional testing (Karyotype, Myeloid NGS) is pending. Results will be included in the comprehensive hematopathology report.         **Electronically signed out by ELVIA TO,TYLER**on 8/27/2018  MS/EM/haritha  Case reviewed by Attending Pathologist        Pathology report from November 13, 2014 showed:  FINAL DIAGNOSIS:   PERIPHERAL SMEAR, REVIEW:        MONOCYTOSIS WITH LEFT MYELOID SHIFT.        ANEMIA, BORDERLINE.   BONE MARROW ASPIRATION AND BIOPSY:        SYSTEMIC MASTOCYTOSIS WITH ASSOCIATED CLONAL             HEMATOLOGIC NON-MAST CELL LINEAGE DISEASE,             CHRONIC MYELOMONOCYTIC LEUKEMIA-1.            RADIOLOGY DATA :  CT of abdomen with and without contrast done on May 14, 2018 showed:  The liver is normal. The gallbladder surgically absent. The  biliary system appears within normal limits status post  cholecystectomy. The pancreas is normal. The spleen is normal.  Bilateral adrenal glands are normal. Right kidney mid zone 4.7 mm  nonobstructive renal calculi. Stable right kidney lower pole oval  hyperdense lesion on unenhanced imaging which has Hounsfield  units of  49. Following enhancement this lesion has Hounsfield  units of 49 and 51 suggesting no significant enhancement. This  lesion  measures 2.36 cm in greatest diameter and is not  appreciably changed. Otherwise right kidney and ureter are  unremarkable. Left kidney and visualized ureter are normal.  Visualized hollow viscera is normal. No lymphadenopathy in the  abdomen. No acute osseous abnormalities.     IMPRESSION:  1. Stable right kidney lower pole oval hyperdense lesion which  does not enhance measuring 2.36 cm in greatest diameter. This  interval stability and appearance is most consistent with benign  hyperdense cyst.  2. Right kidney mid zone 4.7 mm stable nonobstructive renal  calculi..  3. Otherwise unremarkable CT abdomen study with without  contrast..      CT of left lower extremity with contrast done on March 23, 2018 showed:  COMMENTS:              A CT examination was performed of the lower extremities, with  images coned to the left femur.     There is a marrow-based focal sclerotic lesion involving the  proximal/mid femur, measuring approximately 2.6 cm in  craniocaudal extent. There is no evidence of endosteal  scalloping, or features to suggest an aggressive process. There  is no abnormal periosteal reaction.     The remainder the examination is unremarkable for age.     .     IMPRESSION:  CONCLUSION:          1. Focal sclerotic bone lesion which is marrow based in the  diaphysis of the left femur in the proximal/midportion. This is  likely a benign lesion and correlation with plain film  radiographs are suggested.            CT chest with contrast done on January 26, 2018 showed:  IMPRESSION:  CONCLUSION:  Multiple sclerotic lesions in the spine and right RIBS,  concerning for metastatic prostate cancer. Recommend correlation  with PSA levels and nuclear medicine bone scan.           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 Shore.  -Patient hasso far not require any chemotherapy or any other treatment.  -Recently in view of worsening skin lesion on the chest, abdomen and back he underwent a biopsy by Dr. 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  -Patient is also found to have worsening anemia recently on blood work done from June 2018.  -Patient is also found to have multiple sclerotic lesion on the spine and hips on CT scan done from January 2018 until June 2018.  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  -Patient was evaluated by Dr. Kimbrough at [facility] on August 20, 2018 and had a bone marrow biopsy done on August 23, 2018 that again showed systemic mastocytosis with CMML-1.  -In view of elevated tryptase level, his case was discussed at tumor board at [facility].  -Case was discussed with Dr. Kimbrough on September 10, 2018, is recommending treatment with Vidaza for now.    -He was started on Vidaza on September 24, 2018.  -Upon next clinic visit on November 12, 2018,patient was admitted to hospital due to bacteremia and chemotherapy was delayed.  -On November 26, 2018, patient requested chemotherapy to be held because of ill health of his wife.  -Patient received cycle 2 of  Vidaza on January 7, 2019.    -Patient has received so far 7 cycles of Vidaza from September 24, 2018 until Eugenia 3, 2019.  -In view of persistent anemia, mastocytosis and worsening skin rash.  Patient was referred back to Dr. Kimbrough at [facility].  Patient was seen by Dr. Truong on June 17, 2019.  -Case was discussed with Dr. Espinosa 1 over the phone today on July 8, 2019.  -Due to worsening skin lesion and anemia due to mastocytosis, recommendation is to change treatment  to  midostaurin.  This recommendation were discussed with the patient today.  -Chemotherapy side effects  Including but not limited to risk of low blood counts, risk of infection, need for blood transfusion,risk of bleeding, risk of kidney failure, diarrhea, nausea,vomitting ,risk of neuropathy, risk of heart failure, risk of thrombosis, equal risk of GI bleeding, risk of perforation, risk of interstitial fibrosis and risk of allergic reaction which can be life-threatening were discussed with patient and family. We will also provide them some information today to read about the chemotherapy consisting of midostaurin  -Due to his age and comorbidities, will start patient on dose of 50 mg twice daily with midostaurin.  If he tolerates 50 mg dose well, will slowly escalate him up to 100 mg twice daily.  This recommendation were discussed with patient.  -We will ask patient to return to clinic in 3 weeks with a repeat CBC and CMP to be done on that day.  -We will do weekly CBC and CMP after starting midostaurin.    2.  Anemia: Most likely secondary to #1  -Remains on folic acid 1 mg by mouth daily.  Hemoglobin is 11.3.  We'll monitored with CBC.      3.  Leukocytosis: Secondary to chronic myelomonocytic leukemia. Resolved. Wbc is 10.0 today. We'll monitored with CBC for now.    4.  Urticaria pigmentosa, secondary to underlying  marrow pathology.    5.  Thrombocytopenia: Secondary to bone marrow involvement. Resolved. Platelet count is 262,000 today.  We'll monitored with CBC for now.    6.  History of bradycardia status post pacemaker in 2008.    7.  Health maintenance: Patient does not smoke.  Had a colonoscopy in 2014 by Dr. Guzman.    8. BMI: Patient's Body mass index is 26.4 kg/m². BMI is higher than reference range.  Patient was notified about elevated BMI.    9. Advance Care Planning: Patient has a living will on chart.    10.  Pain assessment:  -Patient denies any pain today.           Jordin Roblero,  MD  7/8/2019  7:32 PM        EMR Dragon/Transcription disclaimer:   Much of this encounter note is an electronic transcription/translation of spoken language to printed text. The electronic translation of spoken language may permit erroneous, or at times, nonsensical words or phrases to be inadvertently transcribed; Although I have reviewed the note for such errors, some may still exist.

## 2019-07-08 NOTE — PATIENT INSTRUCTIONS
Midostaurin capsules  What is this medicine?  MIDOSTAURIN (mi solano stor in) is a medicine that targets proteins in cancer cells and stops the cancer cells from growing. It is used to treat acute myelogenous leukemia and systemic mastocytosis.  This medicine may be used for other purposes; ask your health care provider or pharmacist if you have questions.  COMMON BRAND NAME(S): Rydapt  What should I tell my health care provider before I take this medicine?  They need to know if you have any of these conditions:  -lung or breathing disease  -an unusual or allergic reaction to midostaurin, other medicines, foods, dyes, or preservatives  -pregnant or trying to get pregnant  -breast-feeding  How should I use this medicine?  Take this medicine by mouth with a glass of water. Follow the directions on the prescription label. Take this medicine with food. Do not take with grapefruit juice. Do not cut, crush or chew this medicine. Take your medicine at regular intervals. Do not take it more often than directed. Do not stop taking except on your doctor's advice.  Talk to your pediatrician regarding the use of this medicine in children. Special care may be needed.  Overdosage: If you think you have taken too much of this medicine contact a poison control center or emergency room at once.  NOTE: This medicine is only for you. Do not share this medicine with others.  What if I miss a dose?  If you miss a dose or vomit after taking it, skip that dose. Do not take double or extra doses.  What may interact with this medicine?  Do not take this medicine with any of the following medications:  -cisapride  -dofetilide  -dronedarone  -pimozide  -thioridazine  -ziprasidone  This medicine may also interact with the following medications:  -certain medicines for HIV or AIDS like boceprevir, cobicistat, danoprevir, elvitegravir, indinavir, lopinavir/ritonavir, nelfinavir, paritaprevir/ritonavir (and ombitasvir and/or dasabuvir), ritonavir,  saquinavir, and tipranavir  -certain medicines for fungal infections like ketoconazole, itraconazole, posaconazole, and voriconazole  -certain medicines for seizures like carbamazepine and phenytoin  -clarithromycin  -conivaptan  -diltiazem  -enzalutamide  -grapefruit juice  -idelalisib  -mitotane  -nefazodone  -other medicines that prolong the QT interval (cause an abnormal heart rhythm)  -rifampin  -[place]'s Wort  -troleandomycin  This list may not describe all possible interactions. Give your health care provider a list of all the medicines, herbs, non-prescription drugs, or dietary supplements you use. Also tell them if you smoke, drink alcohol, or use illegal drugs. Some items may interact with your medicine.  What should I watch for while using this medicine?  You may need blood work done while you are taking this medicine.  Call your doctor or health care professional for advice if you get a fever, chills or sore throat, or other symptoms of a cold or flu. Do not treat yourself. This drug decreases your body's ability to fight infections. Try to avoid being around people who are sick.  This medicine may increase your risk to bruise or bleed. Call your doctor or health care professional if you notice any unusual bleeding.  Do not become pregnant while taking this medicine or for at least 4 months after stopping it. Women should inform their doctor if they wish to become pregnant or think they might be pregnant. Men should not father a child while taking this medicine and for at least 4 months after stopping it. There is a potential for serious side effects to an unborn child. Talk to your health care professional or pharmacist for more information. Do not breast-feed an infant while taking this medicine or for at least 4 months after stopping it.  This medicine may interfere with the ability to have a child. Talk with your doctor or health care professional if you are concerned about your fertility.  What  side effects may I notice from receiving this medicine?  Side effects that you should report to your doctor or health care professional as soon as possible:  -allergic reactions like skin rash, itching or hives, swelling of the face, lips, or tongue  -breathing problems  -chest pain or chest tightness  -cough  -low blood counts - this medicine may decrease the number of white blood cells, red blood cells and platelets. You may be at increased risk for infections and bleeding.  -shortness of breath  -signs of infection - fever or chills, cough, sore throat, pain or difficulty passing urine  -signs and symptoms of high blood sugar such as dizziness; dry mouth; dry skin; fruity breath; nausea; stomach pain; increased hunger or thirst; increased urination  -signs of decreased platelets or bleeding - bruising, pinpoint red spots on the skin, black, tarry stools, blood in the urine  -signs of decreased red blood cells - unusually weak or tired, feeling faint or lightheaded, falls  Side effects that usually do not require medical attention (report these to your doctor or health care professional if they continue or are bothersome):  -bone pain  -constipation  -diarrhea  -headache  -mouth sores  -muscle pain  -nausea  -stomach pain  -tiredness  -vomiting  This list may not describe all possible side effects. Call your doctor for medical advice about side effects. You may report side effects to FDA at 7-311-FDA-5362.  Where should I keep my medicine?  Keep out of the reach of children.  Store between 20 and 25 degrees C (68 and 77 degrees F). Keep this medicine in the original container. Throw away any unused medicine after the expiration date.  NOTE: This sheet is a summary. It may not cover all possible information. If you have questions about this medicine, talk to your doctor, pharmacist, or health care provider.  © 2019 [employer] (2017-05-16 18:09:10)

## 2019-07-10 NOTE — TELEPHONE ENCOUNTER
Prior auth approved form [place], per [facility] copay is 2,000.00 applying for assistance through [employer] [facility]  Waiting on determination

## 2019-07-11 NOTE — TELEPHONE ENCOUNTER
Called [facility] to follow up on medication, they have tried to reach patient to enroll him in patient assistance, callled patient and provided him with the number to call. Medication is approved, enrolling patient in copay assistance.

## 2019-07-12 NOTE — TELEPHONE ENCOUNTER
Spoke with Dr. Roblero and informed. He states that patient does not need lab until 1 week after he starts new chemo pill. Informed patient of this. Canceled appointment for 7/15. Next lab appt is 7/23. Informed patient that he needs lab 1 week after receiving and starting chemo pill and to let us know if the date changes. He states understanding.

## 2019-07-17 NOTE — TELEPHONE ENCOUNTER
----- Message from Lashon Aguirre sent at 7/17/2019  2:49 PM CDT -----  Contact: Patient  Pt called asking if it is ok for him to have the hepatitis shot that is being offered.  Please call his cell at [phone].

## 2019-07-23 NOTE — TELEPHONE ENCOUNTER
Patient received hie Rydapt in the mail today. He took a dose today at 1330 not realizing that it was to be taken 12 hours apart. We reviewed the directions. He did verbalize understanding. His appointments have been moved accordingly and he has been notified. He verbalized understanding of his appointments.

## 2019-07-23 NOTE — TELEPHONE ENCOUNTER
PATIENT SAID HE HAS STARTED A NEW MEDICATION AND HE WAS TOLD TO LET YOU KNOW.    RYDAPT 25 MG TAKES 4 DAILY.

## 2019-07-30 NOTE — ANESTHESIA PREPROCEDURE EVALUATION
Anesthesia Evaluation     NPO Solid Status: > 8 hours  NPO Liquid Status: > 4 hours           Airway   Mallampati: II  TM distance: >3 FB  Neck ROM: limited  No difficulty expected  Dental - normal exam     Pulmonary    (+) sleep apnea,   Cardiovascular   Exercise tolerance: good (4-7 METS)    Rhythm: regular  Rate: normal    (+) pacemaker pacemaker, hypertension less than 2 medications, dysrhythmias Bradycardia,       Neuro/Psych  GI/Hepatic/Renal/Endo    (+)  GERD well controlled,      Musculoskeletal     Abdominal    Substance History      OB/GYN          Other   (+) blood dyscrasia, arthritis   history of cancer active      Other Comment: leukemia                  Anesthesia Plan    ASA 3     MAC     Anesthetic plan, all risks, benefits, and alternatives have been provided, discussed and informed consent has been obtained with: patient.    Plan discussed with CRNA.

## 2019-07-30 NOTE — ANESTHESIA POSTPROCEDURE EVALUATION
Patient: Jan Humphrey    Procedure Summary     Date:  07/30/19 Room / Location:  [facility] ENDOSCOPY 2 / [facility] ENDOSCOPY    Anesthesia Start:  0938 Anesthesia Stop:  0954    Procedure:  COLONOSCOPY (N/A ) Diagnosis:       Chronic gastrointestinal hemorrhage      (Chronic gastrointestinal hemorrhage [K92.2])    Surgeon:  Seferino Guzman DO Provider:  Tamiko Solomon CRNA    Anesthesia Type:  MAC ASA Status:  3          Anesthesia Type: MAC  Last vitals  BP   158/77 (07/30/19 0823)   Temp   96.6 °F (35.9 °C) (07/30/19 0823)   Pulse   70 (07/30/19 0823)   Resp   20 (07/30/19 0823)     SpO2   (!) 9 % (07/30/19 0823)     Post Anesthesia Care and Evaluation    Patient location during evaluation: bedside  Patient participation: complete - patient participated  Level of consciousness: awake  Pain score: 0  Pain management: adequate  Airway patency: patent  Anesthetic complications: No anesthetic complications  PONV Status: none  Cardiovascular status: acceptable and hemodynamically stable  Respiratory status: acceptable and spontaneous ventilation  Hydration status: acceptable

## 2019-08-07 NOTE — PROGRESS NOTES
Pacemaker Evaluation Report    August 7, 2019    Primary Cardiologist: Shay Schwartz  Implanting MD: Dr. Day  :Offers[employer].Me Model: A2DR01 Serial Number: [id number]  Implant date: 5/18/2017    Reason for evaluation: PPM, Remote   Cardiac device indication(s): A Fib, Sinus node dysfunction    Battery  ERIKA: 5 years @ 3.00V    Interrogation Results  Atrial sensing: P wave: 1.3 mV  Atrial capture: high  Atrial lead impedance: 399 ohms  Ventricular sensing: R wave: 7.6 mV  Ventricular capture: 0.625 V @ 0.40 ms  Ventricular lead impedance: right  817 ohms    Parameters  Mode: AAIR<=>DDDR  Base Rate: 60/130    Diagnostic Data  Atrial paced: 98.4%   Ventricular paced: 0.8 %  Mode switch: 0%  AT/AF [place]: <0.1%  AHR: 26 longest 5 hrs 33 min 6/16/2019  VHR: 1 SVT 45 sec 4/1/2019    Changes made: No changes made    Conclusions: normal device function and Follow up in 6 months    Assessment:  1. Sick sinus syndrome (CMS/HCC)    2. Pacemaker      The patient will be brought into the cardiac device clinic next month for further assessment re: atrial capture.          This document has been electronically signed by ISABELL Lee on August 7, 2019 3:00 PM

## 2019-08-14 NOTE — TELEPHONE ENCOUNTER
Pt was in office today. Pt states that he needs all his teeth pulled and wants to know when you recommend doing that. Pt has only been to his regular dentist so far.

## 2019-08-14 NOTE — TELEPHONE ENCOUNTER
Once date is finalised about teeth pulling he should hold chemo pill for 3 days prior to procedure . resume chemotherapy 1 week after the procedure.thanks

## 2019-08-15 NOTE — TELEPHONE ENCOUNTER
Informed patient. He states understanding. He hasn't made appointment yet but states he will notify us when it is made. He is still deciding on when to have procedure due to concern about being off of chemo pill. Informed patient that he doesn't want to wait too long and get infection. He states he will make appointment to see oral surgeon to talk to them about it first and will notify us of procedure date.

## 2019-08-16 NOTE — TELEPHONE ENCOUNTER
Left a voicemail on his home phone regarding the refill on rydapt. I spoke with the pharmacy to verify that they had received the prescription. I called a verbal to get the prescription expedited.

## 2019-08-16 NOTE — TELEPHONE ENCOUNTER
Attempted to call patient regarding his Rydapt refill. Inquiring to see if he had heard from the pharmacy regarding delivery.

## 2019-08-20 NOTE — TELEPHONE ENCOUNTER
Had received a voicemail from the patient regarding the delivery of his rydapt. I called [employer] and set up delivery. It will be arriving on Thursday. I phoned the patient and relayed the message to him. Instructed the patient that the med needs to be signed for. He verbalized understanding.

## 2019-08-21 NOTE — PROGRESS NOTES
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  Patient was also evaluated by Dr. Man Kimbrough at [place] in 2012.  Patient has been on observation since then without any active chemotherapy.  In November 2014 in view of worsening leukocytosis and anemia he had a repeat bone marrow biopsy done by Dr. Shore which showed systemic mastocytosis with associated clonal hematologic non-mass cell lineage disease, CMML-1.  Patient has not been on any chemotherapy since then.  In March 2018 patient was seen here at clinic by Goldie WHYTE.  In view of sclerotic lesion on lower extremity as well as skin lesion patient was referred to dermatology.  Biopsy done by Dr. Chen on June 20, 2018 showed increased MAST cells consistent with urticaria pigmentosa.   patient was referred to [place] clinic when he was seen by Dr. Kimbrough on August 20, 2018 and a bone marrow biopsy done on August 23, 2018.  Was started on Vidaza on September 24, 2018.  Due to worsening skin lesion, treatment has been changed to midostaurin on July 24, 2019.  Patient denies any excessive nausea or vomiting or diarrhea.    States his appetite is improving.  States he is skin rash affecting chest and back improved.  Denies any bleeding.  Denies any more chills or fevers.      PAST MEDICAL HISTORY:    Past Medical History:   Diagnosis Date   • Atrophy of testis    • Benign prostatic hyperplasia    • Borderline glaucoma    • Chronic myelomonocytic leukemia (CMS/HCC)    • Degenerative joint  disease involving multiple joints    • GERD (gastroesophageal reflux disease)    • Gout    • History of echocardiogram 05/08/2012    Normal left ventricular systolic function, EF 60%. Early diastolic dysfunction. Mild aortic and pulmonic regurgitation. Trace mitral and mild tricuspid regurgitation. No intracardiac mass pericardial effusion or cardiac thrombus.   • History of Holter monitoring 01/18/2011   • Impotence    • Lightheadedness    • Neck pain, musculoskeletal    • Sleep apnea     NOT WEARING C-PAP   • TIA (transient ischemic attack) 2014       SOCIAL HISTORY:    Social History     Tobacco Use   • Smoking status: Never Smoker   • Smokeless tobacco: Never Used   Substance Use Topics   • Alcohol use: No   • Drug use: No       Surgical History :  Past Surgical History:   Procedure Laterality Date   • CARDIAC CATHETERIZATION  09/30/2009    No epicardial coronary artery disease noted that would explain patient's chest pain of the abnormal stress test noted. Normal left ventricular systolic function with no wall motion abnormalities   • CARDIAC CATHETERIZATION  05/24/1989    Normal catheterization, false-positive exercise treadmill. Noncardiac chest pain   • CARDIAC ELECTROPHYSIOLOGY PROCEDURE N/A 5/18/2017    Procedure: PPM generator change - dual;  Surgeon: [place] Day MD;  Location: [facility] CATH INVASIVE LOCATION;  Service:    • CARDIAC PACEMAKER PLACEMENT  06/2008    Dual chamber permanent pacemaker implantation   • COLECTOMY PARTIAL / TOTAL  04/14/2000    Cecal diverticulitis. Removal of small segment of terminal ileum, cecum and right colon, sent to pathology   • COLON SURGERY  03/27/2016   • COLONOSCOPY  05/25/2012   • COLONOSCOPY N/A 7/30/2019    Procedure: COLONOSCOPY;  Surgeon: Seferino Guzman DO;  Location: [facility] ENDOSCOPY;  Service: Gastroenterology   • CYSTOSCOPY  11/15/2000    Urinary retention on the basis of medications and benign prostatic hypertrophy   • INGUINAL HERNIA REPAIR   02/15/2007    Recurrent right inguinal hernia. Repair of recurrent inguinal hernia with EPS Prolene mesh system.   • INGUINAL HERNIA REPAIR Right 1957   • LAPAROSCOPIC CHOLECYSTECTOMY  10/26/2006    Gallstones. Laparoscopic cholecystectomy with operative cholangiogram   • PROSTATECTOMY  11/17/2000    Benign prostatic hypertrophy with urinary retention. Prostatitis. transurethral resection of prostate.   • STEROID INJECTION  10/21/2010    Decadron (Gout)    • STEROID INJECTION  07/23/2013    Kenalog (Gout) (4)   • VENOUS ACCESS DEVICE (PORT) INSERTION N/A 9/13/2018    Procedure: ULTRASOUND ASSISTED RIGHT JUGULAR INSERTION VENOUS ACCESS DEVICE;  Surgeon: Luis E Babb MD;  Location: [facility];  Service: General   • VENOUS ACCESS DEVICE (PORT) REMOVAL N/A 11/14/2018    Procedure: REMOVAL VENOUS ACCESS DEVICE;  Surgeon: Tono Arevalo MD;  Location: [facility];  Service: General       ALLERGIES:    Allergies   Allergen Reactions   • Doxycycline Hyclate Other (See Comments)     Other reaction(s): lip swollen   • Sulfa Antibiotics Swelling     facial   • Other Rash     SILK TAPE   • Penicillins Rash     Reaction: rash   • Soma [Carisoprodol] Swelling and Rash       REVIEW OF SYSTEMS:      CONSTITUTIONAL:  No fever, chills, or night sweats.     HEENT:  No epistaxis, mouth sores, or difficulty swallowing.    RESPIRATORY:  No new shortness of breath or cough at present.    CARDIOVASCULAR:  No chest pain or palpitations.    GASTROINTESTINAL:  No abdominal pain, nausea, vomiting, or blood in the stool.    GENITOURINARY:  No dysuria or hematuria.    MUSCULOSKELETAL:  Chronic low back and neck pain; no new arthralgias.     NEUROLOGICAL:  No tingling or numbness. No new headache or dizziness.     LYMPHATICS:  Denies any abnormal swollen and anywhere in the body.    SKIN:  States he thinks rash is improving.    PHYSICAL EXAMINATION:      VITAL SIGNS:  /65   Pulse 84   Temp 98.1 °F (36.7 °C) (Temporal)   Resp 18   Ht  "180.3 cm (70.98\")   Wt 87.9 kg (193 lb 12.8 oz)   SpO2 96%   BMI 27.04 kg/m²     GENERAL:  Not in any distress.    HEENT:  Normocephalic, Atraumatic.Mild Conjunctival pallor. No icterus. Extraocular Movements Intact. No Facial Asymmetry noted.    NECK:  No adenopathy. No JVD.    RESPIRATORY:  Fair air entry bilateral. No rhonchi or wheezing.    CARDIOVASCULAR:  S1, S2. Regular rate and rhythm. No murmur or gallop appreciated.    ABDOMEN:  Soft, obese, nontender. Bowel sounds present in all four quadrants.  No organomegaly appreciated.    EXTREMITIES:  No edema.No Calf Tenderness.    NEUROLOGIC:  Alert, awake and oriented ×3.  No  Motor or sensory deficit appreciated. Cranial Nerves 2-12 grossly intact.    SKIN : Skin lesion consistent with urticaria pigmentosa present on chest, abdomen, back, upper and lower extremity. Skin rash Appears improved  DIAGNOSTIC DATA:    Glucose   Date Value Ref Range Status   08/21/2019 133 (H) 65 - 99 mg/dL Final     Sodium   Date Value Ref Range Status   08/21/2019 136 136 - 145 mmol/L Final     Potassium   Date Value Ref Range Status   08/21/2019 4.5 3.5 - 5.2 mmol/L Final     CO2   Date Value Ref Range Status   08/21/2019 26.0 22.0 - 29.0 mmol/L Final     Chloride   Date Value Ref Range Status   08/21/2019 102 98 - 107 mmol/L Final     Anion Gap   Date Value Ref Range Status   08/21/2019 8.0 5.0 - 15.0 mmol/L Final     Creatinine   Date Value Ref Range Status   08/21/2019 0.90 0.76 - 1.27 mg/dL Final     BUN   Date Value Ref Range Status   08/21/2019 14 8 - 23 mg/dL Final     BUN/Creatinine Ratio   Date Value Ref Range Status   08/21/2019 15.6 7.0 - 25.0 Final     Calcium   Date Value Ref Range Status   08/21/2019 9.2 8.6 - 10.5 mg/dL Final     eGFR Non  Amer   Date Value Ref Range Status   08/21/2019 82 >60 mL/min/1.73 Final     Alkaline Phosphatase   Date Value Ref Range Status   08/21/2019 59 39 - 117 U/L Final     Total Protein   Date Value Ref Range Status "   08/21/2019 7.1 6.0 - 8.5 g/dL Final     ALT (SGPT)   Date Value Ref Range Status   08/21/2019 14 1 - 41 U/L Final     AST (SGOT)   Date Value Ref Range Status   08/21/2019 23 1 - 40 U/L Final     Total Bilirubin   Date Value Ref Range Status   08/21/2019 0.8 0.2 - 1.2 mg/dL Final     Albumin   Date Value Ref Range Status   08/21/2019 4.60 3.50 - 5.20 g/dL Final     Globulin   Date Value Ref Range Status   08/21/2019 2.5 gm/dL Final     Lab Results   Component Value Date    WBC 7.73 08/21/2019    HGB 10.6 (L) 08/21/2019    HCT 30.7 (L) 08/21/2019    .0 (H) 08/21/2019     08/21/2019     Lab Results   Component Value Date    NEUTROABS 3.63 08/21/2019    IRON 72 07/31/2019    TIBC 302 07/31/2019    LABIRON 24 07/31/2019    FERRITIN 150.90 07/31/2019    IJCPMWHK26 808 07/31/2019    FOLATE >20.00 07/31/2019     Lab Results   Component Value Date    HCGQUANT <1 12/16/2014   ]  Pathology report from August 23, 2018 at [facility] showed:  Diagnosis:  BONE MARROW - PERIPHERAL BLOOD SMEAR, ASPIRATE SMEAR, PARTICLE PREPARATION, BIOPSY, AND FLOW CYTOMETRY: MARKEDLY HYPERCELLULAR MARROW WITH INVOLVEMENT BY SYSTEMIC MASTOCYTOSIS WITH AN ASSOCIATED HEMATOLOGICAL NEOPLASM, CHRONIC MYELOMONOCYTIC LEUKEMIA-1; SEE IMPRESSION     IMPRESSION:  The findings are of a markedly hypercellular marrow (90% cellular) with maturing trilineage hematopoiesis and multifocal mast cell aggregates, including spindled forms. Mast cells are positive for CD2 and CD25 by flow cytometric analysis. Recent testing at an outside institution showed an elevated tryptase level. The overall findings are consistent with involvement by systemic mastocytosis. In addition, there is multilineage dysplasia with mildly increased blasts and blast equivalents (6.1% of cellularity in total) and a persistent relative and absolute peripheral monocytosis. The combined findings are consistent with involvement by chronic myelomonocytic leukemia-1 (CMML-1),  proliferative type. Final diagnosis requires correlation with pending additional testing, as documented below, which will be included in the comprehensive hematopathology report.    PERIPHERAL BLOOD SMEAR:  CBC : WBC 18.9 k/microL, Hgb 9.7 g/dL, Plt-Ct 187 k/microL,  fL, RDW 26.3%.    A Yi's stained peripheral smear is reviewed. Erythrocytes are decreased and are macrocytic and normochromic with anisopoikilocytosis, including target cells and teardrop cells. Polychromasia is present. Nucleated red blood cells are present. Leukocytes are increased and include mature neutrophils, lymphocytes, and monocytes, with a relative and absolute monocytosis. Neutrophils show dysplasia (nuclear hypolobation, hypogranulation). Left-shifted myeloid elements are present. There are no circulating blasts or plasma cells. Platelets are adequate in number and show variation in size.     ASPIRATE SMEARS AND TOUCH PREPARATIONS:  Yi's stained aspirate smears and touch preparations are reviewed. The material is hypercellular and particulate. Myeloid elements are adequate and show dysplasia (quomcin-gs-qxacvrrwvaf dyssynchrony). Erythroid elements are adequate and show left-shifted maturation with mild dysplasia (megaloblastoid change, occasional nuclear membrane irregularity). The myeloid:erythroid ratio is 1.1 to 1. Megakaryocytes are increased in number, and demonstrate no significant atypia. Blasts and blast equivalents (monoblasts, promonocytes) comprise 6.1% of cellularity in total (600 cell count). There is no increase in plasma cells or lymphocytes. Focal particles show significantly increased mast cells, including occasional spindle forms. A Prussian blue iron stain is performed on the sample, revealing increased storage iron. Sideroblastic iron is present. Ring sideroblasts are present (approximately 40-50%).     MARROW SMEAR DIFFERENTIAL:  Blasts (0-4): 3.8%  Monoblasts/promonocytes: 2.3%  Promyelocytes (1-8):  2.2%  Myelocytes and metamyelocytes (20-30): 15.8%  Bands and segmented neutrophils (12-25): 23.0%  Eosinophils and eosinophilic precursors (1-5): 0.3%  Basophils and basophilic precursors (0-1): 0.0%  Monocytes and monocytic precursors (0-2): 5.8%  Lymphocytes (10-15): 3.0%  Plasma cells (0-1): 0.2%  Erythroid precursors (15-27): 43.5%  Total cells counted: 600    BONE MARROW BIOPSY AND PARTICLE PREPARATION:  H&E and PAS stained bone marrow biopsy and particle preparation sections are reviewed. The material is adequate and markedly hypercellular (90% cellular). Myeloid elements are present in normal proportion and exhibit maturation. Erythroid elements are present in normal proportion and exhibit left-shifted maturation. Megakaryocytes are increased and include occasional hypolobated forms and forms with abnormal nuclear lobe separation. CD34-positive blasts comprise approximately 3-5% of marrow cellularity. Multifocal dense mast cell aggregates are present, including spindled forms, as confirmed by  and tryptase immunostains. A CD68 stain highlights increased monocytic/histiocytic forms. Bony trabeculae are normal for the patient's age. All stains performed with appropriate controls.     FLOW CYTOMETRY STUDIES:  IP ID:   Viability: 94.1%  DIAGNOSIS: No increase in blasts; abnormal mast cells present    COMMENT: Myeloblasts are not increased (0.9% of total cells), with the following normal immunophenotype: positive for CD33 (moderate), CD34, and CD45 (dim); and negative for CD19. B cells are not increased (0.1% of total cells), with the following normal immunophenotype: positive for CD19 (bright) and CD45 (bright); and negative for CD34. T cells are not increased (3.1% of total cells), with the following normal immunophenotype: positive for CD45 (bright); and negative for CD34. Mast cells are present (0.03% of total cells), with the following abnormal immunophenotype: positive for CD2, CD25, CD45,   (bright), and FcER1.    CYTOGENETICS AND MOLECULAR DIAGNOSTIC RESULTS:  Additional testing (Karyotype, Myeloid NGS) is pending. Results will be included in the comprehensive hematopathology report.         **Electronically signed out by GAN MD, EMILY**on 8/27/2018  MS/NAKUL/haritha  Case reviewed by Attending Pathologist           Pathology report from November 13, 2014 showed:  FINAL DIAGNOSIS:   PERIPHERAL SMEAR, REVIEW:        MONOCYTOSIS WITH LEFT MYELOID SHIFT.        ANEMIA, BORDERLINE.   BONE MARROW ASPIRATION AND BIOPSY:        SYSTEMIC MASTOCYTOSIS WITH ASSOCIATED CLONAL             HEMATOLOGIC NON-MAST CELL LINEAGE DISEASE,             CHRONIC MYELOMONOCYTIC LEUKEMIA-1.                 RADIOLOGY DATA :  CT of abdomen with and without contrast done on May 14, 2018 showed:  The liver is normal. The gallbladder surgically absent. The  biliary system appears within normal limits status post  cholecystectomy. The pancreas is normal. The spleen is normal.  Bilateral adrenal glands are normal. Right kidney mid zone 4.7 mm  nonobstructive renal calculi. Stable right kidney lower pole oval  hyperdense lesion on unenhanced imaging which has Hounsfield  units of  49. Following enhancement this lesion has Hounsfield  units of 49 and 51 suggesting no significant enhancement. This  lesion measures 2.36 cm in greatest diameter and is not  appreciably changed. Otherwise right kidney and ureter are  unremarkable. Left kidney and visualized ureter are normal.  Visualized hollow viscera is normal. No lymphadenopathy in the  abdomen. No acute osseous abnormalities.     IMPRESSION:  1. Stable right kidney lower pole oval hyperdense lesion which  does not enhance measuring 2.36 cm in greatest diameter. This  interval stability and appearance is most consistent with benign  hyperdense cyst.  2. Right kidney mid zone 4.7 mm stable nonobstructive renal  calculi..  3. Otherwise unremarkable CT abdomen study with  without  contrast..        CT of left lower extremity with contrast done on March 23, 2018 showed:  COMMENTS:             A CT examination was performed of the lower extremities, with  images coned to the left femur.     There is a marrow-based focal sclerotic lesion involving the  proximal/mid femur, measuring approximately 2.6 cm in  craniocaudal extent. There is no evidence of endosteal  scalloping, or features to suggest an aggressive process. There  is no abnormal periosteal reaction.     The remainder the examination is unremarkable for age.     .     IMPRESSION:  CONCLUSION:        1. Focal sclerotic bone lesion which is marrow based in the  diaphysis of the left femur in the proximal/midportion. This is  likely a benign lesion and correlation with plain film  radiographs are suggested.               CT chest with contrast done on January 26, 2018 showed:  IMPRESSION:  CONCLUSION:  Multiple sclerotic lesions in the spine and right RIBS,  concerning for metastatic prostate cancer. Recommend correlation  with PSA levels and nuclear medicine bone scan.               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 Shore.  -Patient hasso far not require any chemotherapy or any other treatment.  -Recently in view of worsening skin lesion on the chest, abdomen and back he underwent a biopsy by Dr. 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  -Patient is also found to have worsening anemia recently on blood work done from June 2018.  -Patient is also found to have multiple sclerotic lesion on the spine and hips on CT scan done from January 2018 until June  2018.  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  -Patient was evaluated by Dr. Kimbrough at [place] on August 20, 2018 and had a bone marrow biopsy done on August 23, 2018 that again showed systemic mastocytosis with CMML-1.  -In view of elevated tryptase level, his case was discussed at tumor board at [place].  -Case was discussed with Dr. Kimbrough on September 10, 2018, is recommending treatment with Vidaza for now.    -He was started on Vidaza on September 24, 2018.  -Upon next clinic visit on November 12, 2018,patient was admitted to hospital due to bacteremia and chemotherapy was delayed.  -On November 26, 2018, patient requested chemotherapy to be held because of ill health of his wife.  -Patient received cycle 2 of  Vidaza on January 7, 2019.    -Patient has received so far 7 cycles of Vidaza from September 24, 2018 until Eugenia 3, 2019.  -In view of persistent anemia, mastocytosis and worsening skin rash.  Patient was referred back to Dr. Kimbrough at [place].  Patient was seen by Dr. Truong on June 17, 2019.  -Case was discussed with Dr. Kimbrough  on July 8, 2019.  -Due to worsening skin lesion and anemia due to mastocytosis, recommendation is to change treatment to  midostaurin.  This recommendation were discussed with the patient today.  -Chemotherapy side effects  Including but not limited to risk of low blood counts, risk of infection, need for blood transfusion,risk of bleeding, risk of kidney failure, diarrhea, nausea,vomitting ,risk of neuropathy, risk of heart failure, risk of thrombosis, equal risk of GI bleeding, risk of perforation, risk of interstitial fibrosis and risk of allergic reaction which can be life-threatening were discussed with patient and family. We will also provide them some information today to read about the chemotherapy consisting of midostaurin  -Due to his  age and comorbidities, will start patient on dose of 50 mg twice daily with midostaurin. He is currently on this dose and tolerating. Will continue with weekly labs and he will RTC in 3 weeks to see Dr. Roblero and decide if dose escalation is needed.    2.  Anemia: Most likely secondary to #1  -Hemoglobin is decreased to 10.6  today.  Iron studies done earlier today on July 31, 2019 does not show any deficiency.  B12 and folate also does not show any evidence of deficiency.        3.  Leukocytosis: Secondary to chronic myelomonocytic leukemia. Resolved. Wbc is 7.73 today. We'll monitored with CBC for now.     4.  Urticaria pigmentosa, secondary to underlying  marrow pathology.     5.  Thrombocytopenia: Secondary to bone marrow involvement. Resolved. Platelet count is 150,000 today.  We'll monitored with CBC for now.     6.  History of bradycardia status post pacemaker in 2008.     7.  Health maintenance: Patient does not smoke.  Had a colonoscopy in 2014 by Dr. Guzman.      This document has been signed by ISABELL Torres on August 21, 2019 12:58 PM

## 2019-09-04 NOTE — PROGRESS NOTES
Pacemaker Evaluation Report    September 4, 2019    Primary Cardiologist: Shay Schwartz  Implanting MD: Dr. Day  :Relux Model: A2DR01 Serial Number: [id number]  Implant date: 5/18/2017    Reason for evaluation: PPM, Office check atrial impedance   Cardiac device indication(s): A Fib, Sinus node dysfunction    Battery  ERIKA: 4 years 2.97V    Interrogation Results  Atrial sensing: P wave: 1.0 mV  Atrial capture: high ( per rep functioning properly)  Atrial lead impedance: 380 ohms  Ventricular sensing: R wave: 6.6 mV  Ventricular capture: 0.625 V @ 0.40 ms  Ventricular lead impedance: right 722 ohms    Parameters  Mode: AAIR<=>DDDR  Base Rate: 60/130    Diagnostic Data  Atrial paced: 99.1%   Ventricular paced: 0.2 %  Mode switch: 0%  AT/AF [place]: <0.1%  AHR: 2 <1 min  VHR: 0  Changes made: No changes made    Conclusions: normal device function and Follow up in 6 months    Assessment:  1. Sick sinus syndrome (CMS/HCC)    2. Paroxysmal atrial fibrillation (CMS/HCC)    3. Pacemaker      The patient will be brought into the cardiac device clinic next month for further assessment re: atrial capture.          This document has been electronically signed by ISABELL Lee on September 4, 2019 3:01 PM

## 2019-09-15 NOTE — PROGRESS NOTES
Jan Humphrey  [id number]  1940    Subjective     Mr Humphrey was seen in follow up for CMML and mastocytosis.   He follows with Dr Roblero, but was seen in his absence.   He currently takes midostaurin.   He is tolerating treatment well without any particular side effects.  He reports neck pain which I believe is from chronic arthritis and is not related to treatment.  He reports that his rash is improving.  Reviewed the results of CBC which continue to show mild anemia and thrombocytopenia.  Is scheduled to undergo dental extraction and is needing clearance from hematology.  ROS as below.     Past Medical History, Past Surgical History, Social History, Family History have been reviewed and are without significant changes except as mentioned.    Review of Systems       CONSTITUTIONAL: fatigue + No weight loss, fever, chills, weakness.  HEENT: Eyes: No visual loss, blurred vision, double vision or yellow sclerae. Ears, Nose, Throat: No hearing loss, sneezing, congestion, runny nose or sore throat.  SKIN: rash +   CARDIOVASCULAR: No chest pain, chest pressure or chest discomfort. No palpitations or edema.  RESPIRATORY: No shortness of breath, cough or sputum.  GASTROINTESTINAL: No anorexia, nausea, vomiting or diarrhea. No abdominal pain or blood.  GENITOURINARY: Negative for urgency, frequency or dysuria.   NEUROLOGICAL: No headache, dizziness, syncope, paralysis, ataxia, numbness or tingling in the extremities. No change in bowel or bladder control.  MUSCULOSKELETAL: Chronic joint pain + neck pain +   HEMATOLOGIC: No anemia, bleeding. Easy bruising +   LYMPHATICS: No enlarged nodes. No history of splenectomy.  PSYCHIATRIC: No history of depression or anxiety.  ENDOCRINOLOGIC: No reports of sweating, cold or heat intolerance. No polyuria or polydipsia.  ALLERGIES: No history of asthma, hives, eczema or rhinitis.      Medications:  The current medication list was reviewed in the EMR    ALLERGIES:    Allergies  "  Allergen Reactions   • Doxycycline Hyclate Other (See Comments)     Other reaction(s): lip swollen   • Sulfa Antibiotics Swelling     facial   • Other Rash     SILK TAPE   • Penicillins Rash     Reaction: rash   • Soma [Carisoprodol] Swelling and Rash       Objective      Vitals:    09/11/19 0934   BP: 145/70   Pulse: 80   Resp: 18   Temp: 97.9 °F (36.6 °C)   TempSrc: Temporal   Weight: 88.1 kg (194 lb 3.2 oz)   Height: 180.3 cm (70.98\")   PainSc: 0-No pain     Current Status 9/11/2019   ECOG score 1       Physical Exam      GENERAL: Alert, awake, oriented.  Well dressed.  Not in apparent distress. Vitals as above.   HEAD: Normocephalic, atraumatic.   EYES: PERRL, EOMI.  vision is grossly intact.  Conjunctival pallor +   NECK: Supple, no adenopathy or thyromegaly.   THROAT: Normal oral cavity and pharynx. No inflammation, swelling, exudate, or lesions.  CARDIAC: Normal S1 and S2. No S3, S4 or murmurs. Rhythm is regular.  Extremities are warm and well perfused.   LUNGS: Clear to auscultation and percussion without rales, rhonchi, wheezing or diminished breath sounds.  ABDOMEN: Positive bowel sounds. Soft, nondistended, nontender. No guarding or rebound. No masses.  BACK:  No bony tenderness.   EXTREMITIES: Osteoarthritis involving multiple joints + Peripheral pulses intact. No varicosities.  SKIN: rash - trunk, upper extremities - urticaria  PSYCH: Mood and affect normal. No hallucination or suicidal thoughts.   LYMPHATIC: No cervical, supraclavicular or axillary adenopathy.       RECENT LABS:Independently reviewed and summarized.  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 Shore.    He is currently on midostaurin. He was seen in absence of Dr Roblero.     He is tolerating treatment well. No specific side effects.     Skin rash improving. Labs stable.     He is also needing clearance for dental work-up.  His labs are stable.  We will give him a letter of clearance for dental procedure.      Recommendations:   - Continue midostaurin.   - Weekly CBC.   - Ok from hematology standpoint for dental procedure.     Guero Jones MD       9/15/2019      CC:

## 2019-09-19 PROBLEM — E86.0 DEHYDRATION: Status: ACTIVE | Noted: 2019-01-01

## 2019-09-19 PROBLEM — R11.15 INTRACTABLE CYCLICAL VOMITING WITH NAUSEA: Status: ACTIVE | Noted: 2019-01-01

## 2019-09-26 NOTE — PROGRESS NOTES
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  Patient was also evaluated by Dr. Man Kimbrough at [facility] in 2012.  Patient has been on observation since then without any active chemotherapy.  In November 2014 in view of worsening leukocytosis and anemia he had a repeat bone marrow biopsy done by Dr. Shore which showed systemic mastocytosis with associated clonal hematologic non-mass cell lineage disease, CMML-1.  Patient has not been on any chemotherapy since then.  In March 2018 patient was seen here at clinic by Goldie WHYTE.  In view of sclerotic lesion on lower extremity as well as skin lesion patient was referred to dermatology.  Biopsy done by Dr. Chen on June 20, 2018 showed increased MAST cells consistent with urticaria pigmentosa.   patient was referred to [facility] when he was seen by Dr. Kimbrough on August 20, 2018 and a bone marrow biopsy done on August 23, 2018.  Was started on Vidaza on September 24, 2018.  Due to worsening skin lesion, treatment has been changed to midostaurin on July 24, 2019.  Patient is here for follow-up appointment today.  Complains of worsening fatigue.  Patient denies any excessive nausea or vomiting or diarrhea.      States he is skin rash affecting chest and back improved.  Denies any bleeding.  Denies any more chills or fevers.        PAST MEDICAL HISTORY:    Past Medical History:   Diagnosis Date   • Atrophy of testis    • Benign prostatic hyperplasia    • Borderline glaucoma    • Chronic  myelomonocytic leukemia (CMS/HCC)    • Degenerative joint disease involving multiple joints    • GERD (gastroesophageal reflux disease)    • Gout    • History of echocardiogram 05/08/2012    Normal left ventricular systolic function, EF 60%. Early diastolic dysfunction. Mild aortic and pulmonic regurgitation. Trace mitral and mild tricuspid regurgitation. No intracardiac mass pericardial effusion or cardiac thrombus.   • History of Holter monitoring 01/18/2011   • Impotence    • Lightheadedness    • Neck pain, musculoskeletal    • Sleep apnea     NOT WEARING C-PAP   • TIA (transient ischemic attack) 2014       SOCIAL HISTORY:    Social History     Tobacco Use   • Smoking status: Never Smoker   • Smokeless tobacco: Never Used   Substance Use Topics   • Alcohol use: No   • Drug use: No       Surgical History :  Past Surgical History:   Procedure Laterality Date   • CARDIAC CATHETERIZATION  09/30/2009    No epicardial coronary artery disease noted that would explain patient's chest pain of the abnormal stress test noted. Normal left ventricular systolic function with no wall motion abnormalities   • CARDIAC CATHETERIZATION  05/24/1989    Normal catheterization, false-positive exercise treadmill. Noncardiac chest pain   • CARDIAC ELECTROPHYSIOLOGY PROCEDURE N/A 5/18/2017    Procedure: PPM generator change - dual;  Surgeon: Geneva Day MD;  Location: [facility] CATH INVASIVE LOCATION;  Service:    • CARDIAC PACEMAKER PLACEMENT  06/2008    Dual chamber permanent pacemaker implantation   • COLECTOMY PARTIAL / TOTAL  04/14/2000    Cecal diverticulitis. Removal of small segment of terminal ileum, cecum and right colon, sent to pathology   • COLON SURGERY  03/27/2016   • COLONOSCOPY  05/25/2012   • COLONOSCOPY N/A 7/30/2019    Procedure: COLONOSCOPY;  Surgeon: Seferino Guzman DO;  Location: [facility] ENDOSCOPY;  Service: Gastroenterology   • CYSTOSCOPY  11/15/2000    Urinary retention on the basis of medications and  benign prostatic hypertrophy   • INGUINAL HERNIA REPAIR  02/15/2007    Recurrent right inguinal hernia. Repair of recurrent inguinal hernia with EPS Prolene mesh system.   • INGUINAL HERNIA REPAIR Right 1957   • LAPAROSCOPIC CHOLECYSTECTOMY  10/26/2006    Gallstones. Laparoscopic cholecystectomy with operative cholangiogram   • PROSTATECTOMY  11/17/2000    Benign prostatic hypertrophy with urinary retention. Prostatitis. transurethral resection of prostate.   • STEROID INJECTION  10/21/2010    Decadron (Gout)    • STEROID INJECTION  07/23/2013    Kenalog (Gout) (4)   • VENOUS ACCESS DEVICE (PORT) INSERTION N/A 9/13/2018    Procedure: ULTRASOUND ASSISTED RIGHT JUGULAR INSERTION VENOUS ACCESS DEVICE;  Surgeon: Luis E Babb MD;  Location: [facility] OR;  Service: General   • VENOUS ACCESS DEVICE (PORT) REMOVAL N/A 11/14/2018    Procedure: REMOVAL VENOUS ACCESS DEVICE;  Surgeon: Tono Arevalo MD;  Location: [facility];  Service: General       ALLERGIES:    Allergies   Allergen Reactions   • Doxycycline Hyclate Other (See Comments)     Other reaction(s): lip swollen   • Sulfa Antibiotics Swelling     facial   • Other Rash     SILK TAPE   • Penicillins Rash     Reaction: rash   • Soma [Carisoprodol] Swelling and Rash         FAMILY HISTORY:  Family History   Problem Relation Age of Onset   • Cancer Other    • Colon cancer Other         parent   • Coronary artery disease Other    • Heart disease Other    • Hypertension Other    • Cholelithiasis Other    • Other Other         colon problems           REVIEW OF SYSTEMS:      CONSTITUTIONAL:  Complains of worsening fatigue.  Has lost 1 pounds since last clinic visit despite of good appetite.  Denies any fever drenching night sweats .     EYES: No visual disturbances. No discharge. No new lesions    ENMT:  No epistaxis, mouth sores or difficulty swallowing.    RESPIRATORY:  Complains of shortness of breath with exertion. No new cough or hemoptysis.    CARDIOVASCULAR:   "Complains of intermittent chest pain.No palpitations.    GASTROINTESTINAL:   Denies any bleeding at present.  No abdominal pain nausea, vomiting or blood in the stool.    GENITOURINARY: No Dysuria or Hematuria.    MUSCULOSKELETAL:  Complains of worsening back pain.  Complains of worsening pain in neck region.    LYMPHATICS:  Denies any abnormal swollen glands anywhere in the body.    NEUROLOGICAL : No tingling or numbness. No headache or dizziness. No seizures or balance problems.    SKIN: States skin rash affecting chest and back is improved          PHYSICAL EXAMINATION:      VITAL SIGNS:  /71   Pulse 83   Temp 98.2 °F (36.8 °C) (Temporal)   Resp 18   Ht 180.3 cm (70.98\")   Wt 87.5 kg (192 lb 12.8 oz)   SpO2 97%   BMI 26.90 kg/m²       09/26/19 0919   Weight: 87.5 kg (192 lb 12.8 oz)         ECOG performance status: 2    CONSTITUTIONAL:  Not in any distress.  Having shakes and chills at the time of examination.    EYES: Mild conjunctival Pallor. No Icterus. No Pterygium. Extraocular Movements intact.No ptosis.    ENMT:  Normocephalic, Atraumatic.No Facial Asymmetry noted.    NECK:  No adenopathy.Trachea midline. NO JVD.    RESPIRATORY:  Fair air entry bilateral. No rhonchi or wheezing.Fair respiratory effort.    CARDIOVASCULAR:  S1, S2. Regular rate and rhythm. No murmur or gallop appreciated.Pacemaker present on left chest wall.    ABDOMEN:  Soft, obese, nontender. Bowel sounds present in all four quadrants.  No Hepatosplenomegaly appreciated.    MUSCULOSKELETAL:  No edema.No Calf Tenderness.Decreased range of motion.    NEUROLOGIC:    No  Motor  deficit appreciated. Cranial Nerves 2-12 grossly intact.    SKIN : Skin lesion consistent with urticaria pigmentosa present on chest, abdomen, back, upper and lower extremity. Skin rash Appears improved    LYMPHATICS: No new enlarged lymph nodes in neck or supraclavicular area.    PSYCHIATRY: Alert, awake and oriented ×3.Normal affect.  Normal judgment.  " Makes good eye contact.        DIAGNOSTIC DATA:    Glucose   Date Value Ref Range Status   09/26/2019 124 (H) 65 - 99 mg/dL Final     Sodium   Date Value Ref Range Status   09/26/2019 137 136 - 145 mmol/L Final     Potassium   Date Value Ref Range Status   09/26/2019 3.9 3.5 - 5.2 mmol/L Final     CO2   Date Value Ref Range Status   09/26/2019 25.0 22.0 - 29.0 mmol/L Final     Chloride   Date Value Ref Range Status   09/26/2019 102 98 - 107 mmol/L Final     Anion Gap   Date Value Ref Range Status   09/26/2019 10.0 5.0 - 15.0 mmol/L Final     Creatinine   Date Value Ref Range Status   09/26/2019 0.89 0.76 - 1.27 mg/dL Final     BUN   Date Value Ref Range Status   09/26/2019 13 8 - 23 mg/dL Final     BUN/Creatinine Ratio   Date Value Ref Range Status   09/26/2019 14.6 7.0 - 25.0 Final     Calcium   Date Value Ref Range Status   09/26/2019 8.6 8.6 - 10.5 mg/dL Final     eGFR Non  Amer   Date Value Ref Range Status   09/26/2019 83 >60 mL/min/1.73 Final     Alkaline Phosphatase   Date Value Ref Range Status   09/26/2019 55 39 - 117 U/L Final     Total Protein   Date Value Ref Range Status   09/26/2019 6.3 6.0 - 8.5 g/dL Final     ALT (SGPT)   Date Value Ref Range Status   09/26/2019 9 1 - 41 U/L Final     AST (SGOT)   Date Value Ref Range Status   09/26/2019 12 1 - 40 U/L Final     Total Bilirubin   Date Value Ref Range Status   09/26/2019 0.7 0.2 - 1.2 mg/dL Final     Albumin   Date Value Ref Range Status   09/26/2019 3.90 3.50 - 5.20 g/dL Final     Globulin   Date Value Ref Range Status   09/26/2019 2.4 gm/dL Final     Lab Results   Component Value Date    WBC 7.56 09/26/2019    HGB 8.5 (L) 09/26/2019    HCT 24.8 (L) 09/26/2019    MCV 98.4 (H) 09/26/2019     (L) 09/26/2019     Lab Results   Component Value Date    NEUTROABS 3.36 09/26/2019    NEUTROABS 3.40 09/26/2019    IRON 93 09/26/2019    TIBC 238 (L) 09/26/2019    LABIRON 39 09/26/2019    FERRITIN 248.10 09/26/2019    ABGYREMZ23 365 09/26/2019     FOLATE >20.00 09/26/2019     Lab Results   Component Value Date    HCGQUANT <1 12/16/2014       Tryptase    Ref Range & Units 1mo ago   Tryptase 2.2 - 13.2 ug/L 93.9     Resulting Agency  [facility]   Narrative     Performed at:  01 - [facility] [address]  [id number]  : Tej Matias MD, Phone:  [phone]      Specimen Collected: 07/25/18 12:34 Last Resulted: 07/27/18 13:16                     Blood Culture - Blood, Blood, Venous Line   Specimen Information: Blood, Venous Line        Blood Culture Acinetobacter lwoffii group Abnormal        ISOLATED FROM Aerobic Bottle              Gram Stain  Aerobic Bottle Gram negative bacilli   2 bottles of 3 bottles drawn positive for gram neg bacillus         Resulting Agency: [facility]   Susceptibility      Acinetobacter lwoffii group     CHIVO     Ampicillin + Sulbactam <=8/4 ug/ml Susceptible     Cefepime <=8 ug/ml Susceptible     Ceftazidime 4 ug/ml Susceptible     Ceftriaxone <=8 ug/ml Susceptible     Levofloxacin <=2 ug/ml Susceptible     Piperacillin <=16 ug/ml Susceptible     Trimethoprim + Sulfamethoxazole <=2/38 ug/ml Susceptible                 Specimen Collected: 11/12/18 10:26 Last Resulted: 11/16/18 06:25                  PATHOLOGY:  Pathology report from August 23, 2018 at [facility] showed:  Diagnosis:  BONE MARROW - PERIPHERAL BLOOD SMEAR, ASPIRATE SMEAR, PARTICLE PREPARATION, BIOPSY, AND FLOW CYTOMETRY: MARKEDLY HYPERCELLULAR MARROW WITH INVOLVEMENT BY SYSTEMIC MASTOCYTOSIS WITH AN ASSOCIATED HEMATOLOGICAL NEOPLASM, CHRONIC MYELOMONOCYTIC LEUKEMIA-1; SEE IMPRESSION     IMPRESSION:  The findings are of a markedly hypercellular marrow (90% cellular) with maturing trilineage hematopoiesis and multifocal mast cell aggregates, including spindled forms. Mast cells are positive for CD2 and CD25 by flow cytometric analysis. Recent testing at an outside institution showed an elevated tryptase level. The overall findings are  consistent with involvement by systemic mastocytosis. In addition, there is multilineage dysplasia with mildly increased blasts and blast equivalents (6.1% of cellularity in total) and a persistent relative and absolute peripheral monocytosis. The combined findings are consistent with involvement by chronic myelomonocytic leukemia-1 (CMML-1), proliferative type. Final diagnosis requires correlation with pending additional testing, as documented below, which will be included in the comprehensive hematopathology report.    PERIPHERAL BLOOD SMEAR:  CBC : WBC 18.9 k/microL, Hgb 9.7 g/dL, Plt-Ct 187 k/microL,  fL, RDW 26.3%.    A Yi's stained peripheral smear is reviewed. Erythrocytes are decreased and are macrocytic and normochromic with anisopoikilocytosis, including target cells and teardrop cells. Polychromasia is present. Nucleated red blood cells are present. Leukocytes are increased and include mature neutrophils, lymphocytes, and monocytes, with a relative and absolute monocytosis. Neutrophils show dysplasia (nuclear hypolobation, hypogranulation). Left-shifted myeloid elements are present. There are no circulating blasts or plasma cells. Platelets are adequate in number and show variation in size.     ASPIRATE SMEARS AND TOUCH PREPARATIONS:  Yi's stained aspirate smears and touch preparations are reviewed. The material is hypercellular and particulate. Myeloid elements are adequate and show dysplasia ([id number] dyssynchrony). Erythroid elements are adequate and show left-shifted maturation with mild dysplasia (megaloblastoid change, occasional nuclear membrane irregularity). The myeloid:erythroid ratio is 1.1 to 1. Megakaryocytes are increased in number, and demonstrate no significant atypia. Blasts and blast equivalents (monoblasts, promonocytes) comprise 6.1% of cellularity in total (600 cell count). There is no increase in plasma cells or lymphocytes. Focal particles show  significantly increased mast cells, including occasional spindle forms. A Prussian blue iron stain is performed on the sample, revealing increased storage iron. Sideroblastic iron is present. Ring sideroblasts are present (approximately 40-50%).     MARROW SMEAR DIFFERENTIAL:  Blasts (0-4): 3.8%  Monoblasts/promonocytes: 2.3%  Promyelocytes (1-8): 2.2%  Myelocytes and metamyelocytes (20-30): 15.8%  Bands and segmented neutrophils (12-25): 23.0%  Eosinophils and eosinophilic precursors (1-5): 0.3%  Basophils and basophilic precursors (0-1): 0.0%  Monocytes and monocytic precursors (0-2): 5.8%  Lymphocytes (10-15): 3.0%  Plasma cells (0-1): 0.2%  Erythroid precursors (15-27): 43.5%  Total cells counted: 600    BONE MARROW BIOPSY AND PARTICLE PREPARATION:  H&E and PAS stained bone marrow biopsy and particle preparation sections are reviewed. The material is adequate and markedly hypercellular (90% cellular). Myeloid elements are present in normal proportion and exhibit maturation. Erythroid elements are present in normal proportion and exhibit left-shifted maturation. Megakaryocytes are increased and include occasional hypolobated forms and forms with abnormal nuclear lobe separation. CD34-positive blasts comprise approximately 3-5% of marrow cellularity. Multifocal dense mast cell aggregates are present, including spindled forms, as confirmed by  and tryptase immunostains. A CD68 stain highlights increased monocytic/histiocytic forms. Bony trabeculae are normal for the patient's age. All stains performed with appropriate controls.     FLOW CYTOMETRY STUDIES:  IP ID:   Viability: 94.1%  DIAGNOSIS: No increase in blasts; abnormal mast cells present    COMMENT: Myeloblasts are not increased (0.9% of total cells), with the following normal immunophenotype: positive for CD33 (moderate), CD34, and CD45 (dim); and negative for CD19. B cells are not increased (0.1% of total cells), with the following normal  immunophenotype: positive for CD19 (bright) and CD45 (bright); and negative for CD34. T cells are not increased (3.1% of total cells), with the following normal immunophenotype: positive for CD45 (bright); and negative for CD34. Mast cells are present (0.03% of total cells), with the following abnormal immunophenotype: positive for CD2, CD25, CD45,  (bright), and FcER1.    CYTOGENETICS AND MOLECULAR DIAGNOSTIC RESULTS:  Additional testing (Karyotype, Myeloid NGS) is pending. Results will be included in the comprehensive hematopathology report.         **Electronically signed out by ELVIA TO,TYLER**on 8/27/2018  MS/EM/haritha  Case reviewed by Attending Pathologist        Pathology report from November 13, 2014 showed:  FINAL DIAGNOSIS:   PERIPHERAL SMEAR, REVIEW:        MONOCYTOSIS WITH LEFT MYELOID SHIFT.        ANEMIA, BORDERLINE.   BONE MARROW ASPIRATION AND BIOPSY:        SYSTEMIC MASTOCYTOSIS WITH ASSOCIATED CLONAL             HEMATOLOGIC NON-MAST CELL LINEAGE DISEASE,             CHRONIC MYELOMONOCYTIC LEUKEMIA-1.            RADIOLOGY DATA :  CT of abdomen with and without contrast done on May 14, 2018 showed:  The liver is normal. The gallbladder surgically absent. The  biliary system appears within normal limits status post  cholecystectomy. The pancreas is normal. The spleen is normal.  Bilateral adrenal glands are normal. Right kidney mid zone 4.7 mm  nonobstructive renal calculi. Stable right kidney lower pole oval  hyperdense lesion on unenhanced imaging which has Hounsfield  units of  49. Following enhancement this lesion has Hounsfield  units of 49 and 51 suggesting no significant enhancement. This  lesion measures 2.36 cm in greatest diameter and is not  appreciably changed. Otherwise right kidney and ureter are  unremarkable. Left kidney and visualized ureter are normal.  Visualized hollow viscera is normal. No lymphadenopathy in the  abdomen. No acute osseous abnormalities.     IMPRESSION:  1.  Stable right kidney lower pole oval hyperdense lesion which  does not enhance measuring 2.36 cm in greatest diameter. This  interval stability and appearance is most consistent with benign  hyperdense cyst.  2. Right kidney mid zone 4.7 mm stable nonobstructive renal  calculi..  3. Otherwise unremarkable CT abdomen study with without  contrast..      CT of left lower extremity with contrast done on March 23, 2018 showed:  COMMENTS:              A CT examination was performed of the lower extremities, with  images coned to the left femur.     There is a marrow-based focal sclerotic lesion involving the  proximal/mid femur, measuring approximately 2.6 cm in  craniocaudal extent. There is no evidence of endosteal  scalloping, or features to suggest an aggressive process. There  is no abnormal periosteal reaction.     The remainder the examination is unremarkable for age.     .     IMPRESSION:  CONCLUSION:          1. Focal sclerotic bone lesion which is marrow based in the  diaphysis of the left femur in the proximal/midportion. This is  likely a benign lesion and correlation with plain film  radiographs are suggested.            CT chest with contrast done on January 26, 2018 showed:  IMPRESSION:  CONCLUSION:  Multiple sclerotic lesions in the spine and right RIBS,  concerning for metastatic prostate cancer. Recommend correlation  with PSA levels and nuclear medicine bone scan.           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 Shore.  -Patient hasso far not require any chemotherapy or any other treatment.  -Recently in view of worsening skin lesion on the chest, abdomen and back he underwent a biopsy  by Dr. 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  -Patient is also found to have worsening anemia recently on blood work done from June 2018.  -Patient is also found to have multiple sclerotic lesion on the spine and hips on CT scan done from January 2018 until June 2018.  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  -Patient was evaluated by Dr. Kimbrough at [facility] on August 20, 2018 and had a bone marrow biopsy done on August 23, 2018 that again showed systemic mastocytosis with CMML-1.  -In view of elevated tryptase level, his case was discussed at tumor board at [facility].  -Case was discussed with Dr. Kimbrough on September 10, 2018, is recommending treatment with Vidaza for now.    -He was started on Vidaza on September 24, 2018.  -Upon next clinic visit on November 12, 2018,patient was admitted to hospital due to bacteremia and chemotherapy was delayed.  -On November 26, 2018, patient requested chemotherapy to be held because of ill health of his wife.  -Patient received cycle 2 of  Vidaza on January 7, 2019.    -Patient has received so far 7 cycles of Vidaza from September 24, 2018 until Eugenia 3, 2019.  -In view of persistent anemia, mastocytosis and worsening skin rash.  Patient was referred back to Dr. Kimbrough at [facility].  Patient was seen by Dr. Truong on June 17, 2019.  -Case was discussed with Dr. Kimbrough  on July 8, 2019.  -Due to worsening skin lesion and anemia due to mastocytosis, recommendation is to change treatment to  midostaurin.  This recommendation were discussed with the patient today.  -Chemotherapy side effects  Including but not limited to risk of low blood counts, risk of infection, need for blood transfusion,risk of bleeding, risk of kidney failure, diarrhea, nausea,vomitting ,risk of neuropathy, risk of heart  failure, risk of thrombosis, equal risk of GI bleeding, risk of perforation, risk of interstitial fibrosis and risk of allergic reaction which can be life-threatening were discussed with patient and family. We will also provide them some information today to read about the chemotherapy consisting of midostaurin  -Due to his age and comorbidities, will start patient on dose of 50 mg twice daily with midostaurin.  If he tolerates 50 mg dose well, will slowly escalate him up to 100 mg twice daily.  This recommendation were discussed with patient.  -Patient started taking midostaurin 50 mg twice daily on July 24, 2019.  So far is tolerating midostaurin well.  -CBC done earlier today on September 26, 2019 shows hemoglobin is decreased to 8.5.  Platelet count is also decreased 208,000.  Due to patient having severe fatigue.  Will hold chemotherapy for 2 weeks.  We will ask patient to return to clinic in 2 weeks with repeat CBC and CMP on that day.  If blood counts are improved, will restart midostaurin upon next clinic visit.        2.  Anemia: Most likely secondary to #1  -Hemoglobin is decreased to 8.5 today.  -Anemia work-up done today on September 26, 2019 shows borderline B12 level.  We will start him on B12 supplement.  Prescription has been sent to his pharmacy today.      3.  Leukocytosis: Secondary to chronic myelomonocytic leukemia. Resolved. Wbc is 7.5 today. We'll monitored with CBC for now.    4.  Urticaria pigmentosa, secondary to underlying  marrow pathology.    5.  Thrombocytopenia: Secondary to bone marrow involvement. Platelet count is 108,000 today.  We'll monitored with CBC for now.    6.  History of bradycardia status post pacemaker in 2008.    7.  Health maintenance: Patient does not smoke.  Had a colonoscopy in 2014 by Dr. Guzman.    8. BMI: Patient's Body mass index is 26.9 kg/m². BMI is higher than reference range.  Patient was notified about elevated BMI.    9. Advance Care Planning: Patient has  a living will on chart.    10.  Pain assessment:  -Patient denies any pain today.           Jordin Roblero MD  9/27/2019  6:00 PM        EMR Dragon/Transcription disclaimer:   Much of this encounter note is an electronic transcription/translation of spoken language to printed text. The electronic translation of spoken language may permit erroneous, or at times, nonsensical words or phrases to be inadvertently transcribed; Although I have reviewed the note for such errors, some may still exist.

## 2019-09-30 NOTE — TELEPHONE ENCOUNTER
----- Message from Jordin Roblero MD sent at 9/27/2019  5:56 PM CDT -----  Please let patient know, I have sent prescription for vitamin B12 to his pharmacy.  Thank

## 2019-10-10 PROBLEM — R19.7 DIARRHEA: Status: ACTIVE | Noted: 2019-01-01

## 2019-10-10 PROBLEM — Z23 FLU VACCINE NEED: Status: ACTIVE | Noted: 2019-01-01

## 2019-10-10 PROBLEM — E87.6 HYPOKALEMIA: Status: ACTIVE | Noted: 2019-01-01

## 2019-10-10 NOTE — PROGRESS NOTES
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  Patient was also evaluated by Dr. Man Kimbrough at [place] in 2012.  Patient has been on observation since then without any active chemotherapy.  In November 2014 in view of worsening leukocytosis and anemia he had a repeat bone marrow biopsy done by Dr. Shore which showed systemic mastocytosis with associated clonal hematologic non-mass cell lineage disease, CMML-1.  Patient has not been on any chemotherapy since then.  In March 2018 patient was seen here at clinic by Goldie WHYTE.  In view of sclerotic lesion on lower extremity as well as skin lesion patient was referred to dermatology.  Biopsy done by Dr. Chen on June 20, 2018 showed increased MAST cells consistent with urticaria pigmentosa.   patient was referred to [facility] clinic when he was seen by Dr. Kimbrough on August 20, 2018 and a bone marrow biopsy done on August 23, 2018.  Was started on Vidaza on September 24, 2018.  Due to worsening skin lesion, treatment has been changed to midostaurin on July 24, 2019.  Due to worsening cytopenias, midostaurin was held on September 26, 2019.  Patient is here for follow-up appointment today.  Complains of worsening fatigue.  Complains of 5-6 loose watery bowel movement ongoing for last 7 to 10 days.  Complains of loss of appetite and 8 pound of weight loss in last 2 weeks.  Complains of depression.  Denies any homicidal or suicidal ideation.  Patient denies any excessive nausea  or vomiting .      States he is skin rash affecting chest and back improved.  Denies any bleeding.  Denies any more chills or fevers.        PAST MEDICAL HISTORY:    Past Medical History:   Diagnosis Date   • Atrophy of testis    • Benign prostatic hyperplasia    • Borderline glaucoma    • Chronic myelomonocytic leukemia (CMS/HCC)    • Degenerative joint disease involving multiple joints    • GERD (gastroesophageal reflux disease)    • Gout    • History of echocardiogram 05/08/2012    Normal left ventricular systolic function, EF 60%. Early diastolic dysfunction. Mild aortic and pulmonic regurgitation. Trace mitral and mild tricuspid regurgitation. No intracardiac mass pericardial effusion or cardiac thrombus.   • History of Holter monitoring 01/18/2011   • Impotence    • Lightheadedness    • Neck pain, musculoskeletal    • Sleep apnea     NOT WEARING C-PAP   • TIA (transient ischemic attack) 2014       SOCIAL HISTORY:    Social History     Tobacco Use   • Smoking status: Never Smoker   • Smokeless tobacco: Never Used   Substance Use Topics   • Alcohol use: No   • Drug use: No       Surgical History :  Past Surgical History:   Procedure Laterality Date   • CARDIAC CATHETERIZATION  09/30/2009    No epicardial coronary artery disease noted that would explain patient's chest pain of the abnormal stress test noted. Normal left ventricular systolic function with no wall motion abnormalities   • CARDIAC CATHETERIZATION  05/24/1989    Normal catheterization, false-positive exercise treadmill. Noncardiac chest pain   • CARDIAC ELECTROPHYSIOLOGY PROCEDURE N/A 5/18/2017    Procedure: PPM generator change - dual;  Surgeon: Geneva Day MD;  Location: [facility] INVASIVE LOCATION;  Service:    • CARDIAC PACEMAKER PLACEMENT  06/2008    Dual chamber permanent pacemaker implantation   • COLECTOMY PARTIAL / TOTAL  04/14/2000    Cecal diverticulitis. Removal of small segment of terminal ileum, cecum and right colon,  sent to pathology   • COLON SURGERY  03/27/2016   • COLONOSCOPY  05/25/2012   • COLONOSCOPY N/A 7/30/2019    Procedure: COLONOSCOPY;  Surgeon: Seferino Guzman DO;  Location: [facility] ENDOSCOPY;  Service: Gastroenterology   • CYSTOSCOPY  11/15/2000    Urinary retention on the basis of medications and benign prostatic hypertrophy   • INGUINAL HERNIA REPAIR  02/15/2007    Recurrent right inguinal hernia. Repair of recurrent inguinal hernia with EPS Prolene mesh system.   • INGUINAL HERNIA REPAIR Right 1957   • LAPAROSCOPIC CHOLECYSTECTOMY  10/26/2006    Gallstones. Laparoscopic cholecystectomy with operative cholangiogram   • PROSTATECTOMY  11/17/2000    Benign prostatic hypertrophy with urinary retention. Prostatitis. transurethral resection of prostate.   • STEROID INJECTION  10/21/2010    Decadron (Gout)    • STEROID INJECTION  07/23/2013    Kenalog (Gout) (4)   • VENOUS ACCESS DEVICE (PORT) INSERTION N/A 9/13/2018    Procedure: ULTRASOUND ASSISTED RIGHT JUGULAR INSERTION VENOUS ACCESS DEVICE;  Surgeon: Luis E Babb MD;  Location: [facility] OR;  Service: General   • VENOUS ACCESS DEVICE (PORT) REMOVAL N/A 11/14/2018    Procedure: REMOVAL VENOUS ACCESS DEVICE;  Surgeon: Tono Arevalo MD;  Location: [facility] OR;  Service: General       ALLERGIES:    Allergies   Allergen Reactions   • Doxycycline Hyclate Other (See Comments)     Other reaction(s): lip swollen   • Sulfa Antibiotics Swelling     facial   • Other Rash     SILK TAPE   • Penicillins Rash     Reaction: rash   • Soma [Carisoprodol] Swelling and Rash         FAMILY HISTORY:  Family History   Problem Relation Age of Onset   • Cancer Other    • Colon cancer Other         parent   • Coronary artery disease Other    • Heart disease Other    • Hypertension Other    • Cholelithiasis Other    • Other Other         colon problems           REVIEW OF SYSTEMS:      CONSTITUTIONAL:  Complains of worsening fatigue.  Has lost 9 pounds since last clinic visit.  Complains  "of loss of appetite.  Denies any fever drenching night sweats .     EYES: No visual disturbances. No discharge. No new lesions    ENMT:  No epistaxis, mouth sores or difficulty swallowing.    RESPIRATORY:  Complains of shortness of breath with exertion. No new cough or hemoptysis.    CARDIOVASCULAR:  Complains of intermittent chest pain.No palpitations.    GASTROINTESTINAL:   Denies any bleeding at present.  Complains of 5-6 loose watery bowel movement ongoing for 7 to 10 days.  No abdominal pain nausea, vomiting or blood in the stool.    GENITOURINARY: No Dysuria or Hematuria.    MUSCULOSKELETAL:  Complains of worsening back pain.  Complains of worsening pain in neck region.    LYMPHATICS:  Denies any abnormal swollen glands anywhere in the body.    NEUROLOGICAL : No tingling or numbness. No headache or dizziness. No seizures or balance problems.    SKIN: States skin rash affecting chest and back is improved    PSYCHIATRICS: Complains of feeling depressed.  Denies any homicidal or suicidal ideation.          PHYSICAL EXAMINATION:      VITAL SIGNS:  /60   Pulse 79   Temp 97.2 °F (36.2 °C) (Temporal)   Resp 18   Ht 180.3 cm (70.98\")   Wt 83.1 kg (183 lb 3.2 oz)   BMI 25.56 kg/m²       10/10/19  0932   Weight: 83.1 kg (183 lb 3.2 oz)         ECOG performance status: 2    CONSTITUTIONAL:  Not in any distress.  Having shakes and chills at the time of examination.    EYES: Mild conjunctival Pallor. No Icterus. No Pterygium. Extraocular Movements intact.No ptosis.    ENMT:  Normocephalic, Atraumatic.No Facial Asymmetry noted.    NECK:  No adenopathy.Trachea midline. NO JVD.    RESPIRATORY:  Fair air entry bilateral. No rhonchi or wheezing.Fair respiratory effort.    CARDIOVASCULAR:  S1, S2. Regular rate and rhythm. No murmur or gallop appreciated.Pacemaker present on left chest wall.    ABDOMEN:  Soft, obese, nontender. Bowel sounds present in all four quadrants.  No Hepatosplenomegaly " appreciated.    MUSCULOSKELETAL:  No edema.No Calf Tenderness.Decreased range of motion.    NEUROLOGIC:    No  Motor  deficit appreciated. Cranial Nerves 2-12 grossly intact.    SKIN : Skin lesion consistent with urticaria pigmentosa present on chest, abdomen, back, upper and lower extremity. Skin rash Appears improved    LYMPHATICS: No new enlarged lymph nodes in neck or supraclavicular area.    PSYCHIATRY: Alert, awake and oriented ×3.Normal affect.  Normal judgment.  Makes good eye contact.        DIAGNOSTIC DATA:    Glucose   Date Value Ref Range Status   10/10/2019 102 (H) 65 - 99 mg/dL Final     Sodium   Date Value Ref Range Status   10/10/2019 138 136 - 145 mmol/L Final     Potassium   Date Value Ref Range Status   10/10/2019 3.4 (L) 3.5 - 5.2 mmol/L Final     CO2   Date Value Ref Range Status   10/10/2019 20.0 (L) 22.0 - 29.0 mmol/L Final     Chloride   Date Value Ref Range Status   10/10/2019 106 98 - 107 mmol/L Final     Anion Gap   Date Value Ref Range Status   10/10/2019 12.0 5.0 - 15.0 mmol/L Final     Creatinine   Date Value Ref Range Status   10/10/2019 0.91 0.76 - 1.27 mg/dL Final     BUN   Date Value Ref Range Status   10/10/2019 21 8 - 23 mg/dL Final     BUN/Creatinine Ratio   Date Value Ref Range Status   10/10/2019 23.1 7.0 - 25.0 Final     Calcium   Date Value Ref Range Status   10/10/2019 8.3 (L) 8.6 - 10.5 mg/dL Final     eGFR Non  Amer   Date Value Ref Range Status   10/10/2019 81 >60 mL/min/1.73 Final     Alkaline Phosphatase   Date Value Ref Range Status   10/10/2019 73 39 - 117 U/L Final     Total Protein   Date Value Ref Range Status   10/10/2019 6.6 6.0 - 8.5 g/dL Final     ALT (SGPT)   Date Value Ref Range Status   10/10/2019 13 1 - 41 U/L Final     AST (SGOT)   Date Value Ref Range Status   10/10/2019 20 1 - 40 U/L Final     Total Bilirubin   Date Value Ref Range Status   10/10/2019 1.0 0.2 - 1.2 mg/dL Final     Albumin   Date Value Ref Range Status   10/10/2019 4.00 3.50 - 5.20  g/dL Final     Globulin   Date Value Ref Range Status   10/10/2019 2.6 gm/dL Final     Lab Results   Component Value Date    WBC 7.97 10/10/2019    HGB 7.8 (L) 10/10/2019    HCT 22.5 (L) 10/10/2019    .7 (H) 10/10/2019     (L) 10/10/2019     Lab Results   Component Value Date    NEUTROABS 3.58 10/10/2019    IRON 93 09/26/2019    TIBC 238 (L) 09/26/2019    LABIRON 39 09/26/2019    FERRITIN 248.10 09/26/2019    SQFLBSRP22 365 09/26/2019    FOLATE >20.00 09/26/2019     Lab Results   Component Value Date    HCGQUANT <1 12/16/2014       Tryptase    Ref Range & Units 1mo ago   Tryptase 2.2 - 13.2 ug/L 93.9     Resulting Agency  [facility]   Narrative     Performed at:  [address] [facility]  [id number]  : Tej Matias MD, Phone:  [phone]      Specimen Collected: 07/25/18 12:34 Last Resulted: 07/27/18 13:16                     Blood Culture - Blood, Blood, Venous Line   Specimen Information: Blood, Venous Line        Blood Culture Acinetobacter lwoffii group Abnormal        ISOLATED FROM Aerobic Bottle              Gram Stain  Aerobic Bottle Gram negative bacilli   2 bottles of 3 bottles drawn positive for gram neg bacillus         Resulting Agency: [facility]   Susceptibility      Acinetobacter lwoffii group     CHIVO     Ampicillin + Sulbactam <=8/4 ug/ml Susceptible     Cefepime <=8 ug/ml Susceptible     Ceftazidime 4 ug/ml Susceptible     Ceftriaxone <=8 ug/ml Susceptible     Levofloxacin <=2 ug/ml Susceptible     Piperacillin <=16 ug/ml Susceptible     Trimethoprim + Sulfamethoxazole <=2/38 ug/ml Susceptible                 Specimen Collected: 11/12/18 10:26 Last Resulted: 11/16/18 06:25                  PATHOLOGY:  Pathology report from August 23, 2018 at [place] showed:  Diagnosis:  BONE MARROW - PERIPHERAL BLOOD SMEAR, ASPIRATE SMEAR, PARTICLE PREPARATION, BIOPSY, AND FLOW CYTOMETRY: MARKEDLY HYPERCELLULAR MARROW WITH INVOLVEMENT BY SYSTEMIC  MASTOCYTOSIS WITH AN ASSOCIATED HEMATOLOGICAL NEOPLASM, CHRONIC MYELOMONOCYTIC LEUKEMIA-1; SEE IMPRESSION     IMPRESSION:  The findings are of a markedly hypercellular marrow (90% cellular) with maturing trilineage hematopoiesis and multifocal mast cell aggregates, including spindled forms. Mast cells are positive for CD2 and CD25 by flow cytometric analysis. Recent testing at an outside institution showed an elevated tryptase level. The overall findings are consistent with involvement by systemic mastocytosis. In addition, there is multilineage dysplasia with mildly increased blasts and blast equivalents (6.1% of cellularity in total) and a persistent relative and absolute peripheral monocytosis. The combined findings are consistent with involvement by chronic myelomonocytic leukemia-1 (CMML-1), proliferative type. Final diagnosis requires correlation with pending additional testing, as documented below, which will be included in the comprehensive hematopathology report.    PERIPHERAL BLOOD SMEAR:  CBC : WBC 18.9 k/microL, Hgb 9.7 g/dL, Plt-Ct 187 k/microL,  fL, RDW 26.3%.    A Yi's stained peripheral smear is reviewed. Erythrocytes are decreased and are macrocytic and normochromic with anisopoikilocytosis, including target cells and teardrop cells. Polychromasia is present. Nucleated red blood cells are present. Leukocytes are increased and include mature neutrophils, lymphocytes, and monocytes, with a relative and absolute monocytosis. Neutrophils show dysplasia (nuclear hypolobation, hypogranulation). Left-shifted myeloid elements are present. There are no circulating blasts or plasma cells. Platelets are adequate in number and show variation in size.     ASPIRATE SMEARS AND TOUCH PREPARATIONS:  Yi's stained aspirate smears and touch preparations are reviewed. The material is hypercellular and particulate. Myeloid elements are adequate and show dysplasia (adxlzxt-fu-qtzsmgloksz dyssynchrony).  Erythroid elements are adequate and show left-shifted maturation with mild dysplasia (megaloblastoid change, occasional nuclear membrane irregularity). The myeloid:erythroid ratio is 1.1 to 1. Megakaryocytes are increased in number, and demonstrate no significant atypia. Blasts and blast equivalents (monoblasts, promonocytes) comprise 6.1% of cellularity in total (600 cell count). There is no increase in plasma cells or lymphocytes. Focal particles show significantly increased mast cells, including occasional spindle forms. A Prussian blue iron stain is performed on the sample, revealing increased storage iron. Sideroblastic iron is present. Ring sideroblasts are present (approximately 40-50%).     MARROW SMEAR DIFFERENTIAL:  Blasts (0-4): 3.8%  Monoblasts/promonocytes: 2.3%  Promyelocytes (1-8): 2.2%  Myelocytes and metamyelocytes (20-30): 15.8%  Bands and segmented neutrophils (12-25): 23.0%  Eosinophils and eosinophilic precursors (1-5): 0.3%  Basophils and basophilic precursors (0-1): 0.0%  Monocytes and monocytic precursors (0-2): 5.8%  Lymphocytes (10-15): 3.0%  Plasma cells (0-1): 0.2%  Erythroid precursors (15-27): 43.5%  Total cells counted: 600    BONE MARROW BIOPSY AND PARTICLE PREPARATION:  H&E and PAS stained bone marrow biopsy and particle preparation sections are reviewed. The material is adequate and markedly hypercellular (90% cellular). Myeloid elements are present in normal proportion and exhibit maturation. Erythroid elements are present in normal proportion and exhibit left-shifted maturation. Megakaryocytes are increased and include occasional hypolobated forms and forms with abnormal nuclear lobe separation. CD34-positive blasts comprise approximately 3-5% of marrow cellularity. Multifocal dense mast cell aggregates are present, including spindled forms, as confirmed by  and tryptase immunostains. A CD68 stain highlights increased monocytic/histiocytic forms. Bony trabeculae are normal  for the patient's age. All stains performed with appropriate controls.     FLOW CYTOMETRY STUDIES:  IP ID:   Viability: 94.1%  DIAGNOSIS: No increase in blasts; abnormal mast cells present    COMMENT: Myeloblasts are not increased (0.9% of total cells), with the following normal immunophenotype: positive for CD33 (moderate), CD34, and CD45 (dim); and negative for CD19. B cells are not increased (0.1% of total cells), with the following normal immunophenotype: positive for CD19 (bright) and CD45 (bright); and negative for CD34. T cells are not increased (3.1% of total cells), with the following normal immunophenotype: positive for CD45 (bright); and negative for CD34. Mast cells are present (0.03% of total cells), with the following abnormal immunophenotype: positive for CD2, CD25, CD45,  (bright), and FcER1.    CYTOGENETICS AND MOLECULAR DIAGNOSTIC RESULTS:  Additional testing (Karyotype, Myeloid NGS) is pending. Results will be included in the comprehensive hematopathology report.         **Electronically signed out by ELVIA TO,TYLER**on 8/27/2018  MS/NAKUL/haritha  Case reviewed by Attending Pathologist        Pathology report from November 13, 2014 showed:  FINAL DIAGNOSIS:   PERIPHERAL SMEAR, REVIEW:        MONOCYTOSIS WITH LEFT MYELOID SHIFT.        ANEMIA, BORDERLINE.   BONE MARROW ASPIRATION AND BIOPSY:        SYSTEMIC MASTOCYTOSIS WITH ASSOCIATED CLONAL             HEMATOLOGIC NON-MAST CELL LINEAGE DISEASE,             CHRONIC MYELOMONOCYTIC LEUKEMIA-1.            RADIOLOGY DATA :  CT of abdomen with and without contrast done on May 14, 2018 showed:  The liver is normal. The gallbladder surgically absent. The  biliary system appears within normal limits status post  cholecystectomy. The pancreas is normal. The spleen is normal.  Bilateral adrenal glands are normal. Right kidney mid zone 4.7 mm  nonobstructive renal calculi. Stable right kidney lower pole oval  hyperdense lesion on unenhanced imaging  which has Hounsfield  units of  49. Following enhancement this lesion has Hounsfield  units of 49 and 51 suggesting no significant enhancement. This  lesion measures 2.36 cm in greatest diameter and is not  appreciably changed. Otherwise right kidney and ureter are  unremarkable. Left kidney and visualized ureter are normal.  Visualized hollow viscera is normal. No lymphadenopathy in the  abdomen. No acute osseous abnormalities.     IMPRESSION:  1. Stable right kidney lower pole oval hyperdense lesion which  does not enhance measuring 2.36 cm in greatest diameter. This  interval stability and appearance is most consistent with benign  hyperdense cyst.  2. Right kidney mid zone 4.7 mm stable nonobstructive renal  calculi..  3. Otherwise unremarkable CT abdomen study with without  contrast..      CT of left lower extremity with contrast done on March 23, 2018 showed:  COMMENTS:              A CT examination was performed of the lower extremities, with  images coned to the left femur.     There is a marrow-based focal sclerotic lesion involving the  proximal/mid femur, measuring approximately 2.6 cm in  craniocaudal extent. There is no evidence of endosteal  scalloping, or features to suggest an aggressive process. There  is no abnormal periosteal reaction.     The remainder the examination is unremarkable for age.     .     IMPRESSION:  CONCLUSION:          1. Focal sclerotic bone lesion which is marrow based in the  diaphysis of the left femur in the proximal/midportion. This is  likely a benign lesion and correlation with plain film  radiographs are suggested.            CT chest with contrast done on January 26, 2018 showed:  IMPRESSION:  CONCLUSION:  Multiple sclerotic lesions in the spine and right RIBS,  concerning for metastatic prostate cancer. Recommend correlation  with PSA levels and nuclear medicine bone scan.           ASSESSMENT AND PLAN:      1.SYSTEMIC MASTOCYTOSIS WITH ASSOCIATED CLONAL  HEMATOLOGIC  NON-MAST CELL LINEAGE DISEASE,  CHRONIC MYELOMONOCYTIC LEUKEMIA-1:  -Patient has been diagnosed with systemic mastocytosis with associated clonal hematologic non-mass cell lineage disease, 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 Shore.  -Patient hasso far not require any chemotherapy or any other treatment.  -Recently in view of worsening skin lesion on the chest, abdomen and back he underwent a biopsy by Dr. 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  -Patient is also found to have worsening anemia recently on blood work done from June 2018.  -Patient is also found to have multiple sclerotic lesion on the spine and hips on CT scan done from January 2018 until June 2018.  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  -Patient was evaluated by Dr. Kimbrough at [facility] on August 20, 2018 and had a bone marrow biopsy done on August 23, 2018 that again showed systemic mastocytosis with CMML-1.  -In view of elevated tryptase level, his case was discussed at tumor board at [facility].  -Case was discussed with Dr. Kimbrough on September 10, 2018, is recommending treatment with Vidaza for now.    -He was started on Vidaza on September 24, 2018.  -Upon next clinic visit on November 12, 2018,patient was admitted to hospital due to bacteremia and chemotherapy was delayed.  -On November 26, 2018, patient requested chemotherapy to be held because of ill health of his wife.  -Patient received cycle 2 of  Vidaza on January 7, 2019.    -Patient has received so far 7 cycles of Vidaza from September 24, 2018 until Eugenia 3, 2019.  -In view of persistent anemia, mastocytosis and worsening skin rash.  Patient was referred back to Dr. Kimbrough at [facility].  Patient was seen by Dr. Truong on June 17, 2019.  -Case was  discussed with Dr. Kimbrough  on July 8, 2019.  -Due to worsening skin lesion and anemia due to mastocytosis, recommendation is to change treatment to  midostaurin.  This recommendation were discussed with the patient today.  -Chemotherapy side effects  Including but not limited to risk of low blood counts, risk of infection, need for blood transfusion,risk of bleeding, risk of kidney failure, diarrhea, nausea,vomitting ,risk of neuropathy, risk of heart failure, risk of thrombosis, equal risk of GI bleeding, risk of perforation, risk of interstitial fibrosis and risk of allergic reaction which can be life-threatening were discussed with patient and family. We will also provide them some information today to read about the chemotherapy consisting of midostaurin  -Due to his age and comorbidities, will start patient on dose of 50 mg twice daily with midostaurin.  If he tolerates 50 mg dose well, will slowly escalate him up to 100 mg twice daily.  This recommendation were discussed with patient.  -Patient started taking midostaurin 50 mg twice daily on July 24, 2019.    -CBC done  on September 26, 2019 shows hemoglobin is decreased to 8.5.  Platelet count is also decreased  To 108,000.  Midostaurin has been held since September 26, 2019.  - CBC done today on October 10, 2019 shows hemoglobin is decreased to 7.8, platelet count is 111,000.  Will continue to midostaurin for now.  - We will do weekly CBC for now.  Will transfuse patient as needed if hemoglobin is less than 7.  -We will ask patient to return to clinic in 3 weeks with repeat CBC CMP and LDH on that day.      2.  Anemia: Most likely secondary to #1  -Hemoglobin is decreased to 7.8today.  -Anemia work-up done today on September 26, 2019 shows borderline B12 level.  We will start him on B12 supplement.  Prescription has been sent to his pharmacy today.      3.  Leukocytosis: Secondary to chronic myelomonocytic leukemia. Resolved. Wbc is 7.97 today. We'll  monitored with CBC for now.    4.  Urticaria pigmentosa, secondary to underlying  marrow pathology.    5.  Thrombocytopenia: Secondary to bone marrow involvement. Platelet count is 111,000 today.  We'll monitored with CBC for now.    6.  History of bradycardia status post pacemaker in 2008.    7.  Diarrhea:( problem is new to me)  -Patient was complaining of 5-6 loose watery bowel movement for last 7 to 10 days.  Stool for C. difficile done earlier today on October 10, 2019 is negative.  -Recommend using Imodium as required for diarrhea.    8.  Hypokalemia:( problem is new to me)  -Patient's potassium is 3.4 prescription for K-Dur 20 milk: P.o. daily for 3 days has been sent to his pharmacy today.    9.  Depression and loss of appetite:  -Patient denies any homicidal or suicidal ideation.  -We will start patient on Remeron 15 mg every night to help him depression as well as appetite issue.  -Recommend following up with primary medical provider in 1 week if Remeron does not help with depression.    10.  Health maintenance: Patient does not smoke.  Had a colonoscopy in 2014 by Dr. Guzman.    11. BMI: Patient's Body mass index is 25.56 kg/m². BMI is higher than reference range.  Patient was notified about elevated BMI.    12. Advance Care Planning: Patient has a living will on chart.    13.  Pain assessment:  -Patient denies any pain today.           Jordin Roblero MD  10/10/2019  9:58 AM        EMR Dragon/Transcription disclaimer:   Much of this encounter note is an electronic transcription/translation of spoken language to printed text. The electronic translation of spoken language may permit erroneous, or at times, nonsensical words or phrases to be inadvertently transcribed; Although I have reviewed the note for such errors, some may still exist.

## 2019-10-11 NOTE — TELEPHONE ENCOUNTER
----- Message from Jordin Roblero MD sent at 10/10/2019  6:28 PM CDT -----  Regarding: Results  Please let patient know, his stool studies for C. difficile were negative.  He can start using Imodium.  I have sent prescription for Imodium to his pharmacy.  Thank you

## 2019-10-14 NOTE — PROGRESS NOTES
LCSW met with patient as he presents for hematology/oncology follow up with Dr. Roblero. Pt. Completed PHQ-0 depression self report with score and indicators of depression and risk.  (total score 18).    LCSW met with patient in the exam room both before and after apt.  Pt. Presents alert and oriented x 3, affect blunted and mood depressed. Pt. Evidences good eye contact and becomes tearful as he expressed his current feelings and notable decline in mood. Pt. endorses symptoms of depression that have exacerbated over the past few weeks.  Pt. Describes poor appetite with weight loss, insomnia, fatigue, poor concentration, feelings of hopelessness and helplessness and most notably increased anhedonia that is impairing his quality of life and functioning.  Pt.describes symptoms of anxious distress.  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 Pt has been unable to participate in the activities that once served to aid in stabilization of mood.   Pt. Denies thoughts of self inflicted violence, denies SI/HI, no plan or intent. Pt. Describes acceptance and sometimes wish for natural death.  Pt. States good insight as to his level of depression indicating he knows something is wrong and feels that he could benefit from intervention.  LCSW Blue provided education as to symptoms, treatment recommendations and support.  Pt. Offered emotional support, person centered therapeutic feedback, validation of feelings and encouragement to seek treatment for depression.  Dr. Roblero started pt on Remeron and follow up with PCP for further evaluation and management.  SW contacted Dr. Ly office and was able to move his apt up to Monday, 10-14-19.  Pt. Was agreeable and presents motivated for interventions. Pt. Was encouraged to contact SW for further  if wanted. Pt. states plan to meet again with undersigned. Pt. Voiced some improvement in his mood as he shared openly his  distress.

## 2019-10-22 NOTE — PROGRESS NOTES
" Angelo Julio Workman is a 79 y.o. male.     History of Present Illness     Left upper quadrant abdominal pain ever since transfusion last Thursday.  Has cancer, they suspect gi reason for anemia.  Also depression.  remeron is making him sleep all the time, even 1/2 tablet.     Review of Systems   Constitutional: Negative for chills, fatigue and fever.   HENT: Negative for congestion, ear discharge, ear pain, facial swelling, hearing loss, postnasal drip, rhinorrhea, sinus pressure, sore throat, trouble swallowing and voice change.    Eyes: Negative for discharge, redness and visual disturbance.   Respiratory: Negative for cough, chest tightness, shortness of breath and wheezing.    Cardiovascular: Negative for chest pain and palpitations.   Gastrointestinal: Positive for abdominal pain. Negative for blood in stool, constipation, diarrhea, nausea and vomiting.   Endocrine: Negative for polydipsia and polyuria.   Genitourinary: Negative for dysuria, flank pain, hematuria and urgency.   Musculoskeletal: Negative for arthralgias, back pain, joint swelling and myalgias.   Skin: Negative for rash.   Neurological: Negative for dizziness, weakness, numbness and headaches.   Hematological: Negative for adenopathy.   Psychiatric/Behavioral: Negative for confusion and sleep disturbance. The patient is not nervous/anxious.            /58 (BP Location: Left arm, Patient Position: Sitting, Cuff Size: Adult)   Pulse 84   Temp 97.5 °F (36.4 °C) (Temporal)   Ht 180.3 cm (70.98\")   Wt 85.4 kg (188 lb 3.2 oz)   SpO2 99%   BMI 26.26 kg/m²       Objective     Physical Exam   Constitutional: He is oriented to person, place, and time. He appears well-developed and well-nourished.   HENT:   Head: Normocephalic and atraumatic.   Right Ear: External ear normal.   Left Ear: External ear normal.   Nose: Nose normal.   Eyes: Conjunctivae and EOM are normal. Pupils are equal, round, and reactive to light.   Neck: Normal " range of motion.   Pulmonary/Chest: Effort normal.   Musculoskeletal: Normal range of motion.   Left uq pain palpation, mild   Neurological: He is alert and oriented to person, place, and time.   Psychiatric: He has a normal mood and affect. His behavior is normal. Judgment and thought content normal.   Nursing note and vitals reviewed.          PAST MEDICAL HISTORY     Past Medical History:   Diagnosis Date   • Atrophy of testis    • Benign prostatic hyperplasia    • Borderline glaucoma    • Chronic myelomonocytic leukemia (CMS/HCC)    • Degenerative joint disease involving multiple joints    • GERD (gastroesophageal reflux disease)    • Gout    • History of echocardiogram 05/08/2012    Normal left ventricular systolic function, EF 60%. Early diastolic dysfunction. Mild aortic and pulmonic regurgitation. Trace mitral and mild tricuspid regurgitation. No intracardiac mass pericardial effusion or cardiac thrombus.   • History of Holter monitoring 01/18/2011   • Impotence    • Lightheadedness    • Neck pain, musculoskeletal    • Sleep apnea     NOT WEARING C-PAP   • TIA (transient ischemic attack) 2014      PAST SURGICAL HISTORY     Past Surgical History:   Procedure Laterality Date   • CARDIAC CATHETERIZATION  09/30/2009    No epicardial coronary artery disease noted that would explain patient's chest pain of the abnormal stress test noted. Normal left ventricular systolic function with no wall motion abnormalities   • CARDIAC CATHETERIZATION  05/24/1989    Normal catheterization, false-positive exercise treadmill. Noncardiac chest pain   • CARDIAC ELECTROPHYSIOLOGY PROCEDURE N/A 5/18/2017    Procedure: PPM generator change - dual;  Surgeon: Geneva Day MD;  Location: [facility] INVASIVE LOCATION;  Service:    • CARDIAC PACEMAKER PLACEMENT  06/2008    Dual chamber permanent pacemaker implantation   • COLECTOMY PARTIAL / TOTAL  04/14/2000    Cecal diverticulitis. Removal of small segment of terminal  ileum, cecum and right colon, sent to pathology   • COLON SURGERY  03/27/2016   • COLONOSCOPY  05/25/2012   • COLONOSCOPY N/A 7/30/2019    Procedure: COLONOSCOPY;  Surgeon: Seferino Guzman DO;  Location: [facility] ENDOSCOPY;  Service: Gastroenterology   • CYSTOSCOPY  11/15/2000    Urinary retention on the basis of medications and benign prostatic hypertrophy   • INGUINAL HERNIA REPAIR  02/15/2007    Recurrent right inguinal hernia. Repair of recurrent inguinal hernia with EPS Prolene mesh system.   • INGUINAL HERNIA REPAIR Right 1957   • LAPAROSCOPIC CHOLECYSTECTOMY  10/26/2006    Gallstones. Laparoscopic cholecystectomy with operative cholangiogram   • PROSTATECTOMY  11/17/2000    Benign prostatic hypertrophy with urinary retention. Prostatitis. transurethral resection of prostate.   • STEROID INJECTION  10/21/2010    Decadron (Gout)    • STEROID INJECTION  07/23/2013    Kenalog (Gout) (4)   • VENOUS ACCESS DEVICE (PORT) INSERTION N/A 9/13/2018    Procedure: ULTRASOUND ASSISTED RIGHT JUGULAR INSERTION VENOUS ACCESS DEVICE;  Surgeon: Luis E Babb MD;  Location: [facility] OR;  Service: General   • VENOUS ACCESS DEVICE (PORT) REMOVAL N/A 11/14/2018    Procedure: REMOVAL VENOUS ACCESS DEVICE;  Surgeon: Tono Arevalo MD;  Location: [facility] OR;  Service: General      SOCIAL HISTORY     Social History     Socioeconomic History   • Marital status:      Spouse name: Not on file   • Number of children: Not on file   • Years of education: Not on file   • Highest education level: Not on file   Tobacco Use   • Smoking status: Never Smoker   • Smokeless tobacco: Never Used   Substance and Sexual Activity   • Alcohol use: No   • Drug use: No   • Sexual activity: Defer      ALLERGIES   Doxycycline hyclate; Sulfa antibiotics; Other; Penicillins; and Soma [carisoprodol]   MEDICATIONS     Current Outpatient Medications   Medication Sig Dispense Refill   • acetaminophen (TYLENOL) 325 MG tablet Take 1 tablet by mouth As  Needed.     • allopurinol (ZYLOPRIM) 100 MG tablet Take 1 tablet by mouth 4 (Four) Times a Day. 360 tablet 3   • aspirin 81 MG EC tablet Take 81 mg by mouth Daily.     • diclofenac (VOLTAREN) 50 MG EC tablet Take 50 mg by mouth.     • folic acid (FOLVITE) 1 MG tablet Take 1 tablet by mouth Daily. 90 tablet 3   • loperamide (IMODIUM A-D) 2 MG tablet Take 1 tablet by mouth 5 (Five) Times a Day As Needed for Diarrhea. 40 tablet 3   • omeprazole (PRILOSEC) 20 MG capsule Take 1 capsule by mouth Daily. 90 capsule 3   • promethazine (PHENERGAN) 12.5 MG tablet Take 1 tablet by mouth Every 6 (Six) Hours As Needed for Nausea or Vomiting. 90 tablet 0   • sotalol (BETAPACE) 80 MG tablet Take 0.5 tablets by mouth 2 (Two) Times a Day. 90 tablet 3   • tamsulosin (FLOMAX) 0.4 MG capsule 24 hr capsule Take 1 capsule by mouth every night.     • vitamin B-12 (CYANOCOBALAMIN) 1000 MCG tablet Take 1 tablet by mouth Daily. 90 tablet 1   • colchicine 0.6 MG tablet Take 0.6 mg by mouth Daily As Needed for Muscle / Joint Pain.     • DULoxetine (CYMBALTA) 30 MG capsule Take 1 capsule by mouth Daily. 30 capsule 11   • midostaurin (RYDAPT) 25 mg capsule chemo capsule Take 2 capsules by mouth 2 (Two) Times a Day. 120 capsule 0   • ondansetron (ZOFRAN) 4 MG tablet Take 1 tablet by mouth 4 (Four) Times a Day As Needed for Nausea or Vomiting. 40 tablet 3   • oxybutynin (DITROPAN) 5 MG tablet Take 5 mg by mouth 2 (Two) Times a Day.       No current facility-administered medications for this visit.         The following portions of the patient's history were reviewed and updated as appropriate: allergies, current medications, past family history, past medical history, past social history, past surgical history and problem list.        Assessment/Plan   Jan was seen today for pain.    Diagnoses and all orders for this visit:    Left upper quadrant abdominal tenderness, rebound tenderness presence not specified    Left upper quadrant abdominal pain  affecting pregnancy  -     XR Abdomen Flat & Upright (In Office)  -     CT Abdomen Pelvis Without Contrast    Reactive depression    Other orders  -     DULoxetine (CYMBALTA) 30 MG capsule; Take 1 capsule by mouth Daily.    I wonder if spleen is enlarged, holding onto his blood and causing pain?  Will get ct scan and send to hematologist/oncologist if so      Will change remeron to cymbalta, may help pain also.   Considered increasing remeron to 30mg, explained increasing dose of this medicine decreases drowsiness.    IT WAS BROUGHT TO MY ATTENTION, I PICKED LEFT UPPER QUADRANT ABDOMINAL PAIN AFFECTING PREGNANCY.  I DIDN'T SEE THE PREGNANCY PART WHEN PICKING DIAGNOSIS FROM Epic.  I TRIED TO CHANGE DIAGNOSIS BUT I COULD NOT BECAUSE OF ATTACHED TO SCANS.               No Follow-up on file.                  This document has been electronically signed by Seferino Tan MD on October 22, 2019 9:39 AM

## 2019-10-28 NOTE — TELEPHONE ENCOUNTER
----- Message from Kosta Kapoor sent at 10/28/2019  9:12 AM CDT -----  I NOTICED ON PATIENT'S CT ORDER AND IN OFFICE NOTES, YOU PUT  Left upper quadrant abdominal pain affecting pregnancy.  DIDN'T KNOW IF YOU NEED TO CHANGE THIS.

## 2019-11-01 PROBLEM — R16.1 SPLENOMEGALY: Status: ACTIVE | Noted: 2019-01-01

## 2019-11-06 NOTE — TELEPHONE ENCOUNTER
Patient stated that he was feeling okay. I informed him that it was okay for him to cancel lab appointment tomorrow per Dr. Roblero. He verbalized understanding.

## 2019-11-06 NOTE — TELEPHONE ENCOUNTER
----- Message from Jordin Roblero MD sent at 11/6/2019  1:36 PM CST -----  Contact: pt  If he is feeling fine, okay to cancel lab for tomorrow.  Thank you  ----- Message -----  From: Rafaela Amor RN  Sent: 11/6/2019  11:43 AM  To: Jordin Roblero MD    Please advise.     ----- Message -----  From: Lashon Aguirre  Sent: 11/6/2019  10:22 AM  To: [facility]    Patient called and stated that he is on our lab list for tomorrow.  However, he is to go to [facility] next week for labs.  He is wanting to know if he can cancel tomorrow, and just get the labs done at [facility].  Please call back at [phone]

## 2019-12-05 NOTE — PROGRESS NOTES
LCSW encounter with pt who presents for follow up.  PHQ 9 significant for risk.   Pt. Known to SW from previous encounter and discussion related to stress/distress.  Pt presents with bright affect and mood congruent, pt presents calm and easily engaged.  Rapport established.  Pt. Discussed his role as sole caregiver for wife who is total care, he receives some support from a hired . Resources and supports were  discussed to include home DME that would aid transport and mobility and lifeline alert system.  Pt. Denies any current or recent thoughts of self inflicted harm. He was screened for SI/HI and both negative. Pt. Describes his feeling of acceptance if natural death would occur and sometime wish for death as peace. He states his care and love of his wife would prevent him from ever acting on thoughts of self harm.  SW offered emotional support, encouragement, education as to supports and resources, and validation of emotions.

## 2019-12-05 NOTE — PROGRESS NOTES
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  Patient was also evaluated by Dr. Man Kimbrough at [place] in 2012.  Patient has been on observation since then without any active chemotherapy.  In November 2014 in view of worsening leukocytosis and anemia he had a repeat bone marrow biopsy done by Dr. Shore which showed systemic mastocytosis with associated clonal hematologic non-mass cell lineage disease, CMML-1.  Patient has not been on any chemotherapy since then.  In March 2018 patient was seen here at clinic by Goldie WHYTE.  In view of sclerotic lesion on lower extremity as well as skin lesion patient was referred to dermatology.  Biopsy done by Dr. Chen on June 20, 2018 showed increased MAST cells consistent with urticaria pigmentosa.   patient was referred to [facility] when he was seen by Dr. Kimbrough on August 20, 2018 and a bone marrow biopsy done on August 23, 2018.  Was started on Vidaza on September 24, 2018.  Due to worsening skin lesion, treatment has been changed to midostaurin on July 24, 2019.  Due to worsening cytopenias, midostaurin was discontinued on September 26, 2019.  Patient is here for follow-up appointment today.  Due to persistent cytopenia requiring blood transfusion.  Patient was referred to [facility].  Patient was evaluated by Dr. Kimbrough and underwent bone marrow biopsy on November 21, 2019 at [place].  Patient is here to discuss the results and further recommendation.   Complains of worsening fatigue.  Complains of dizziness which is getting worse recently.  Complains of intermittent diarrhea.  Complains of left upper quadrant pain. Complains of depression.  Denies any homicidal or suicidal ideation.  Patient denies any excessive nausea or vomiting .      States he is skin rash affecting chest and back is stable.  Denies any bleeding.  Denies any more chills or fevers.        PAST MEDICAL HISTORY:    Past Medical History:   Diagnosis Date   • Atrophy of testis    • Benign prostatic hyperplasia    • Borderline glaucoma    • Chronic myelomonocytic leukemia (CMS/HCC)    • Degenerative joint disease involving multiple joints    • GERD (gastroesophageal reflux disease)    • Gout    • History of echocardiogram 05/08/2012    Normal left ventricular systolic function, EF 60%. Early diastolic dysfunction. Mild aortic and pulmonic regurgitation. Trace mitral and mild tricuspid regurgitation. No intracardiac mass pericardial effusion or cardiac thrombus.   • History of Holter monitoring 01/18/2011   • Impotence    • Lightheadedness    • Neck pain, musculoskeletal    • Sleep apnea     NOT WEARING C-PAP   • TIA (transient ischemic attack) 2014       SOCIAL HISTORY:    Social History     Tobacco Use   • Smoking status: Never Smoker   • Smokeless tobacco: Never Used   Substance Use Topics   • Alcohol use: No   • Drug use: No       Surgical History :  Past Surgical History:   Procedure Laterality Date   • CARDIAC CATHETERIZATION  09/30/2009    No epicardial coronary artery disease noted that would explain patient's chest pain of the abnormal stress test noted. Normal left ventricular systolic function with no wall motion abnormalities   • CARDIAC CATHETERIZATION  05/24/1989    Normal catheterization, false-positive exercise treadmill. Noncardiac chest pain   • CARDIAC ELECTROPHYSIOLOGY PROCEDURE N/A 5/18/2017    Procedure: PPM generator change - dual;  Surgeon: Geneva Day MD;   Location: [facility] CATH INVASIVE LOCATION;  Service:    • CARDIAC PACEMAKER PLACEMENT  06/2008    Dual chamber permanent pacemaker implantation   • COLECTOMY PARTIAL / TOTAL  04/14/2000    Cecal diverticulitis. Removal of small segment of terminal ileum, cecum and right colon, sent to pathology   • COLON SURGERY  03/27/2016   • COLONOSCOPY  05/25/2012   • COLONOSCOPY N/A 7/30/2019    Procedure: COLONOSCOPY;  Surgeon: Seferino Guzman DO;  Location: [facility] ENDOSCOPY;  Service: Gastroenterology   • CYSTOSCOPY  11/15/2000    Urinary retention on the basis of medications and benign prostatic hypertrophy   • INGUINAL HERNIA REPAIR  02/15/2007    Recurrent right inguinal hernia. Repair of recurrent inguinal hernia with EPS Prolene mesh system.   • INGUINAL HERNIA REPAIR Right 1957   • LAPAROSCOPIC CHOLECYSTECTOMY  10/26/2006    Gallstones. Laparoscopic cholecystectomy with operative cholangiogram   • PROSTATECTOMY  11/17/2000    Benign prostatic hypertrophy with urinary retention. Prostatitis. transurethral resection of prostate.   • STEROID INJECTION  10/21/2010    Decadron (Gout)    • STEROID INJECTION  07/23/2013    Kenalog (Gout) (4)   • VENOUS ACCESS DEVICE (PORT) INSERTION N/A 9/13/2018    Procedure: ULTRASOUND ASSISTED RIGHT JUGULAR INSERTION VENOUS ACCESS DEVICE;  Surgeon: Luis E Babb MD;  Location: [facility] OR;  Service: General   • VENOUS ACCESS DEVICE (PORT) REMOVAL N/A 11/14/2018    Procedure: REMOVAL VENOUS ACCESS DEVICE;  Surgeon: Tono Arevalo MD;  Location: [facility] OR;  Service: General       ALLERGIES:    Allergies   Allergen Reactions   • Doxycycline Hyclate Other (See Comments)     Other reaction(s): lip swollen   • Sulfa Antibiotics Swelling     facial   • Other Rash     SILK TAPE   • Penicillins Rash     Reaction: rash   • Soma [Carisoprodol] Swelling and Rash         FAMILY HISTORY:  Family History   Problem Relation Age of Onset   • Cancer Other    • Colon cancer Other         parent   •  Coronary artery disease Other    • Heart disease Other    • Hypertension Other    • Cholelithiasis Other    • Other Other         colon problems           REVIEW OF SYSTEMS:      CONSTITUTIONAL:  Complains of worsening fatigue.  Has lost 1 pounds since last clinic visit.  Complains of loss of appetite.  Denies any fever drenching night sweats .     EYES: No visual disturbances. No discharge. No new lesions    ENMT:  No epistaxis, mouth sores or difficulty swallowing.    RESPIRATORY:  Complains of shortness of breath with exertion. No new cough or hemoptysis.    CARDIOVASCULAR:  Complains of intermittent chest pain.No palpitations.    GASTROINTESTINAL:   Complains of intermittent diarrhea.  Had one episode of blood in stool, denies any more bleeding.  No abdominal pain nausea, vomiting .    GENITOURINARY: No Dysuria or Hematuria.    MUSCULOSKELETAL:  Complains of worsening back pain.  Complains of worsening pain in neck region.    LYMPHATICS:  Denies any abnormal swollen glands anywhere in the body.    NEUROLOGICAL : Complains of worsening dizziness.  No tingling or numbness. No headache . No seizures or balance problems.    SKIN: States skin rash affecting chest and back is stable    PSYCHIATRICS: Complains of feeling depressed.  Denies any homicidal or suicidal ideation.          PHYSICAL EXAMINATION:      VITAL SIGNS:  /61 Comment: standing-right arm  Pulse 84   Temp 97.6 °F (36.4 °C) (Temporal)   Wt 79.8 kg (176 lb)   SpO2 100%   BMI 24.56 kg/m²       12/05/19  1007   Weight: 79.8 kg (176 lb)         ECOG performance status: 2    CONSTITUTIONAL:  Not in any distress.      EYES: Mild conjunctival Pallor. No Icterus. No Pterygium. Extraocular Movements intact.No ptosis.    ENMT:  Normocephalic, Atraumatic.No Facial Asymmetry noted.    NECK:  No adenopathy.Trachea midline. NO JVD.    RESPIRATORY:  Fair air entry bilateral. No rhonchi or wheezing.Fair respiratory effort.    CARDIOVASCULAR:  S1, S2. Regular  rate and rhythm. No murmur or gallop appreciated.Pacemaker present on left chest wall.    ABDOMEN:  Soft, obese, nontender. Bowel sounds present in all four quadrants.  No Hepatosplenomegaly appreciated.    MUSCULOSKELETAL:  No edema.No Calf Tenderness.Decreased range of motion.    NEUROLOGIC:    No  Motor  deficit appreciated. Cranial Nerves 2-12 grossly intact.    SKIN : Skin lesion consistent with urticaria pigmentosa present on chest, abdomen, back, upper and lower extremity. Skin rash Appears stable    LYMPHATICS: No new enlarged lymph nodes in neck or supraclavicular area.    PSYCHIATRY: Alert, awake and oriented ×3.Normal affect.  Normal judgment.  Makes good eye contact.        DIAGNOSTIC DATA:    Glucose   Date Value Ref Range Status   12/05/2019 87 65 - 99 mg/dL Final     Sodium   Date Value Ref Range Status   12/05/2019 136 136 - 145 mmol/L Final     Potassium   Date Value Ref Range Status   12/05/2019 4.1 3.5 - 5.2 mmol/L Final     CO2   Date Value Ref Range Status   12/05/2019 22.0 22.0 - 29.0 mmol/L Final     Chloride   Date Value Ref Range Status   12/05/2019 105 98 - 107 mmol/L Final     Anion Gap   Date Value Ref Range Status   12/05/2019 9.0 5.0 - 15.0 mmol/L Final     Creatinine   Date Value Ref Range Status   12/05/2019 0.75 (L) 0.76 - 1.27 mg/dL Final     BUN   Date Value Ref Range Status   12/05/2019 13 8 - 23 mg/dL Final     BUN/Creatinine Ratio   Date Value Ref Range Status   12/05/2019 17.3 7.0 - 25.0 Final     Calcium   Date Value Ref Range Status   12/05/2019 8.3 (L) 8.6 - 10.5 mg/dL Final     eGFR Non  Amer   Date Value Ref Range Status   12/05/2019 101 >60 mL/min/1.73 Final     Alkaline Phosphatase   Date Value Ref Range Status   12/05/2019 136 (H) 39 - 117 U/L Final     Total Protein   Date Value Ref Range Status   12/05/2019 6.4 6.0 - 8.5 g/dL Final     ALT (SGPT)   Date Value Ref Range Status   12/05/2019 7 1 - 41 U/L Final     AST (SGOT)   Date Value Ref Range Status    12/05/2019 11 1 - 40 U/L Final     Total Bilirubin   Date Value Ref Range Status   12/05/2019 1.0 0.2 - 1.2 mg/dL Final     Albumin   Date Value Ref Range Status   12/05/2019 3.90 3.50 - 5.20 g/dL Final     Globulin   Date Value Ref Range Status   12/05/2019 2.5 gm/dL Final     Lab Results   Component Value Date    WBC 7.44 12/05/2019    HGB 7.4 (L) 12/05/2019    HCT 22.5 (L) 12/05/2019    .1 (H) 12/05/2019    PLT 61 (L) 12/05/2019     Lab Results   Component Value Date    NEUTROABS 4.02 12/05/2019    IRON 93 09/26/2019    TIBC 238 (L) 09/26/2019    LABIRON 39 09/26/2019    FERRITIN 248.10 09/26/2019    [id number] 365 09/26/2019    FOLATE >20.00 09/26/2019     Lab Results   Component Value Date    HCGQUANT <1 12/16/2014       Tryptase    Ref Range & Units 1mo ago   Tryptase 2.2 - 13.2 ug/L 93.9     Resulting Agency  [facility]   Narrative     Performed at:  01 - [facility] [address]  [id number]  : Tej Matias MD, Phone:  [phone]      Specimen Collected: 07/25/18 12:34 Last Resulted: 07/27/18 13:16                     Blood Culture - Blood, Blood, Venous Line   Specimen Information: Blood, Venous Line        Blood Culture Acinetobacter lwoffii group Abnormal        ISOLATED FROM Aerobic Bottle              Gram Stain  Aerobic Bottle Gram negative bacilli   2 bottles of 3 bottles drawn positive for gram neg bacillus         Resulting Agency: [facility]   Susceptibility      Acinetobacter lwoffii group     CHIVO     Ampicillin + Sulbactam <=8/4 ug/ml Susceptible     Cefepime <=8 ug/ml Susceptible     Ceftazidime 4 ug/ml Susceptible     Ceftriaxone <=8 ug/ml Susceptible     Levofloxacin <=2 ug/ml Susceptible     Piperacillin <=16 ug/ml Susceptible     Trimethoprim + Sulfamethoxazole <=2/38 ug/ml Susceptible                 Specimen Collected: 11/12/18 10:26 Last Resulted: 11/16/18 06:25                  PATHOLOGY:  Pathology report from [place] on November 21,  2019 showed:  Diagnosis:  BONE MARROW - PERIPHERAL BLOOD SMEAR, ASPIRATE SMEAR, PARTICLE PREPARATION, BIOPSY AND FLOW CYTOMETRY: PERSISTENT SYSTEMIC MASTOCYTOSIS WITH AN ASSOCIATED HEMATOLOGICAL NEOPLASM (CHRONIC MYELOMONOCYTIC LEUKEMIA)     IMPRESSION: This is a markedly hypercellular bone marrow exhibiting sheets of morphologically and immunophenotypically abnormal mast cells. In addition, there is relative monocytosis and trilineage dysplasia. These findings are indicative of persistence of the patient's previously diagnosed systemic mastocytosis with an associated hematological neoplasm (chronic myelomonocytic leukemia). There is no increase in blasts. Final diagnosis depends on correlation with pending additional testing, as documented below, which will be included in the comprehensive hematopathology report    MARROW SMEAR DIFFERENTIAL:  Blasts (0-4): 1.5%  Promyelocytes (1-8): 5.0%  Myelocytes and metamyelocytes (20-30): 21.0%  Bands and segmented neutrophils (12-25): 28.0%  Eosinophils and eosinophilic precursors (1-5): 0.0%  Basophils and basophilic precursors (0-1): 0.0%  Monocytes and monocytic precursors (0-2): 3.5%  Lymphocytes (10-15): 2.0%  Plasma cells (0-1): 0.0%  Erythroid precursors (15-27): 36.5%  Other: 2.5%  Total cells counted: 200    BONE MARROW BIOPSY AND PARTICLE PREPARATION:  H&E and PAS stained bone marrow biopsy and particle preparation sections are reviewed. The material is markedly hypercellular (90% cellular) and of adequate quality. The majority of cellularity is composed of mast cells, many of which show spindled morphology. Myeloid and erythroid elements are relatively decreased. Erythroid elements demonstrate atypia, including binucleation. Megakaryocytes are decreased in number, and demonstrate mild dysplasia, including small size and hypolobated nuclei. There is no increase in or abnormal distribution of blasts, plasma cells, or lymphocytes. There are no granulomata. Bony  trabeculae are normal for the patient's age.     FLOW CYTOMETRY STUDIES:  IP ID:   Viability: 89%    DIAGNOSIS: No increase in blasts; abnormal mast cells present    COMMENT: Myeloblasts are not increased (2.3% of total cells), with the following normal immunophenotype: positive for CD33 (moderate), CD34, and CD45 (dim); and negative for CD19. B cells are not increased (0.3% of total cells), with the following normal immunophenotype: positive for CD19 and CD45 (bright); and negative for CD34. T/NK cells are not increased (4.8% of total cells), with the following normal immunophenotype: positive for CD45 (bright); and negative for CD34 and CD19. There is a distinct population of abnormal mast cells (1.5% of total cells), with the following abnormal immunophenotype: positive for CD2, CD25, CD45,  (bright), and FcER1.    CYTOGENETICS AND MOLECULAR DIAGNOSTIC RESULTS:  Additional testing (karyotype, FLT3 ITD) is pending. Results will be included in the comprehensive hematopathology report.           Pathology report from August 23, 2018 at [place] showed:  Diagnosis:  BONE MARROW - PERIPHERAL BLOOD SMEAR, ASPIRATE SMEAR, PARTICLE PREPARATION, BIOPSY, AND FLOW CYTOMETRY: MARKEDLY HYPERCELLULAR MARROW WITH INVOLVEMENT BY SYSTEMIC MASTOCYTOSIS WITH AN ASSOCIATED HEMATOLOGICAL NEOPLASM, CHRONIC MYELOMONOCYTIC LEUKEMIA-1; SEE IMPRESSION     IMPRESSION:  The findings are of a markedly hypercellular marrow (90% cellular) with maturing trilineage hematopoiesis and multifocal mast cell aggregates, including spindled forms. Mast cells are positive for CD2 and CD25 by flow cytometric analysis. Recent testing at an outside institution showed an elevated tryptase level. The overall findings are consistent with involvement by systemic mastocytosis. In addition, there is multilineage dysplasia with mildly increased blasts and blast equivalents (6.1% of cellularity in total) and a persistent relative and absolute  peripheral monocytosis. The combined findings are consistent with involvement by chronic myelomonocytic leukemia-1 (CMML-1), proliferative type. Final diagnosis requires correlation with pending additional testing, as documented below, which will be included in the comprehensive hematopathology report.    PERIPHERAL BLOOD SMEAR:  CBC : WBC 18.9 k/microL, Hgb 9.7 g/dL, Plt-Ct 187 k/microL,  fL, RDW 26.3%.    A Yi's stained peripheral smear is reviewed. Erythrocytes are decreased and are macrocytic and normochromic with anisopoikilocytosis, including target cells and teardrop cells. Polychromasia is present. Nucleated red blood cells are present. Leukocytes are increased and include mature neutrophils, lymphocytes, and monocytes, with a relative and absolute monocytosis. Neutrophils show dysplasia (nuclear hypolobation, hypogranulation). Left-shifted myeloid elements are present. There are no circulating blasts or plasma cells. Platelets are adequate in number and show variation in size.     ASPIRATE SMEARS AND TOUCH PREPARATIONS:  Yi's stained aspirate smears and touch preparations are reviewed. The material is hypercellular and particulate. Myeloid elements are adequate and show dysplasia ([id number]-irhttkxvnip dyssynchrony). Erythroid elements are adequate and show left-shifted maturation with mild dysplasia (megaloblastoid change, occasional nuclear membrane irregularity). The myeloid:erythroid ratio is 1.1 to 1. Megakaryocytes are increased in number, and demonstrate no significant atypia. Blasts and blast equivalents (monoblasts, promonocytes) comprise 6.1% of cellularity in total (600 cell count). There is no increase in plasma cells or lymphocytes. Focal particles show significantly increased mast cells, including occasional spindle forms. A Prussian blue iron stain is performed on the sample, revealing increased storage iron. Sideroblastic iron is present. Ring sideroblasts are present  (approximately 40-50%).     MARROW SMEAR DIFFERENTIAL:  Blasts (0-4): 3.8%  Monoblasts/promonocytes: 2.3%  Promyelocytes (1-8): 2.2%  Myelocytes and metamyelocytes (20-30): 15.8%  Bands and segmented neutrophils (12-25): 23.0%  Eosinophils and eosinophilic precursors (1-5): 0.3%  Basophils and basophilic precursors (0-1): 0.0%  Monocytes and monocytic precursors (0-2): 5.8%  Lymphocytes (10-15): 3.0%  Plasma cells (0-1): 0.2%  Erythroid precursors (15-27): 43.5%  Total cells counted: 600    BONE MARROW BIOPSY AND PARTICLE PREPARATION:  H&E and PAS stained bone marrow biopsy and particle preparation sections are reviewed. The material is adequate and markedly hypercellular (90% cellular). Myeloid elements are present in normal proportion and exhibit maturation. Erythroid elements are present in normal proportion and exhibit left-shifted maturation. Megakaryocytes are increased and include occasional hypolobated forms and forms with abnormal nuclear lobe separation. CD34-positive blasts comprise approximately 3-5% of marrow cellularity. Multifocal dense mast cell aggregates are present, including spindled forms, as confirmed by  and tryptase immunostains. A CD68 stain highlights increased monocytic/histiocytic forms. Bony trabeculae are normal for the patient's age. All stains performed with appropriate controls.     FLOW CYTOMETRY STUDIES:  IP ID:   Viability: 94.1%  DIAGNOSIS: No increase in blasts; abnormal mast cells present    COMMENT: Myeloblasts are not increased (0.9% of total cells), with the following normal immunophenotype: positive for CD33 (moderate), CD34, and CD45 (dim); and negative for CD19. B cells are not increased (0.1% of total cells), with the following normal immunophenotype: positive for CD19 (bright) and CD45 (bright); and negative for CD34. T cells are not increased (3.1% of total cells), with the following normal immunophenotype: positive for CD45 (bright); and negative for CD34.  Mast cells are present (0.03% of total cells), with the following abnormal immunophenotype: positive for CD2, CD25, CD45,  (bright), and FcER1.    CYTOGENETICS AND MOLECULAR DIAGNOSTIC RESULTS:  Additional testing (Karyotype, Myeloid NGS) is pending. Results will be included in the comprehensive hematopathology report.         **Electronically signed out by ELVIA TO,, TYLER**on 8/27/2018  MS/EM/haritha  Case reviewed by Attending Pathologist        Pathology report from November 13, 2014 showed:  FINAL DIAGNOSIS:   PERIPHERAL SMEAR, REVIEW:        MONOCYTOSIS WITH LEFT MYELOID SHIFT.        ANEMIA, BORDERLINE.   BONE MARROW ASPIRATION AND BIOPSY:        SYSTEMIC MASTOCYTOSIS WITH ASSOCIATED CLONAL             HEMATOLOGIC NON-MAST CELL LINEAGE DISEASE,             CHRONIC MYELOMONOCYTIC LEUKEMIA-1.            RADIOLOGY DATA :  CT of abdomen and pelvis without contrast done on October 25, 2019 was reviewed, discussed with patient, it showed:  IMPRESSION:  Sigmoid diverticulitis.  No gross abscess.  Diffuse increase in extent of the sclerotic lesions in the  visualized bony structures in the dorsal spine, rib cage  bilaterally, sacrum, pelvic girdle, and also bilateral proximal  femora concerning for bony metastases enlargement involvement..    Correlation with total body bone scan is recommended as  follow-up.  Small left and trace amount of right pleural effusion. Trace  amount of pericardial effusion. Mild nonspecific atelectasis in  the lung bases bilaterally.  Small peritoneal free fluid.  Splenomegaly, new since prior CT scan study.  Stable hyperdense renal cyst in the right lower kidney.          CT of abdomen with and without contrast done on May 14, 2018 showed:  The liver is normal. The gallbladder surgically absent. The  biliary system appears within normal limits status post  cholecystectomy. The pancreas is normal. The spleen is normal.  Bilateral adrenal glands are normal. Right kidney mid zone 4.7  mm  nonobstructive renal calculi. Stable right kidney lower pole oval  hyperdense lesion on unenhanced imaging which has Hounsfield  units of  49. Following enhancement this lesion has Hounsfield  units of 49 and 51 suggesting no significant enhancement. This  lesion measures 2.36 cm in greatest diameter and is not  appreciably changed. Otherwise right kidney and ureter are  unremarkable. Left kidney and visualized ureter are normal.  Visualized hollow viscera is normal. No lymphadenopathy in the  abdomen. No acute osseous abnormalities.     IMPRESSION:  1. Stable right kidney lower pole oval hyperdense lesion which  does not enhance measuring 2.36 cm in greatest diameter. This  interval stability and appearance is most consistent with benign  hyperdense cyst.  2. Right kidney mid zone 4.7 mm stable nonobstructive renal  calculi..  3. Otherwise unremarkable CT abdomen study with without  contrast..      CT of left lower extremity with contrast done on March 23, 2018 showed:  COMMENTS:              A CT examination was performed of the lower extremities, with  images coned to the left femur.     There is a marrow-based focal sclerotic lesion involving the  proximal/mid femur, measuring approximately 2.6 cm in  craniocaudal extent. There is no evidence of endosteal  scalloping, or features to suggest an aggressive process. There  is no abnormal periosteal reaction.     The remainder the examination is unremarkable for age.     .     IMPRESSION:  CONCLUSION:          1. Focal sclerotic bone lesion which is marrow based in the  diaphysis of the left femur in the proximal/midportion. This is  likely a benign lesion and correlation with plain film  radiographs are suggested.            CT chest with contrast done on January 26, 2018 showed:  IMPRESSION:  CONCLUSION:  Multiple sclerotic lesions in the spine and right RIBS,  concerning for metastatic prostate cancer. Recommend correlation  with PSA levels and nuclear  medicine bone scan.               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 Shore.  -Patient hasso far not require any chemotherapy or any other treatment.  -Recently in view of worsening skin lesion on the chest, abdomen and back he underwent a biopsy by Dr. 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  -Patient is also found to have worsening anemia recently on blood work done from June 2018.  -Patient is also found to have multiple sclerotic lesion on the spine and hips on CT scan done from January 2018 until June 2018.  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  -Patient was evaluated by Dr. iKmbrough at [place] on August 20, 2018 and had a bone marrow biopsy done on August 23, 2018 that again showed systemic mastocytosis with CMML-1.  -In view of elevated tryptase level, his case was discussed at tumor board at [place].  -Case was discussed with Dr. Kimbrough on September 10, 2018, is recommending treatment with Vidaza for now.    -He was started on Vidaza on September 24, 2018.  -Upon next clinic visit on November 12, 2018,patient was admitted to hospital due to bacteremia and chemotherapy was delayed.  -On November 26, 2018, patient requested chemotherapy to be held because of ill health of his wife.  -Patient received cycle 2 of  Vidaza on January 7, 2019.    -Patient has received so far 7 cycles of Vidaza from September 24, 2018 until Eugenia 3, 2019.  -In view of persistent anemia, mastocytosis and worsening skin rash.   Patient was referred back to Dr. Kimbrough at [place].  Patient was seen by Dr. Truong on June 17, 2019.  -Case was discussed with Dr. Kimbrough  on July 8, 2019.  -Due to worsening skin lesion and anemia due to mastocytosis, recommendation is to change treatment to  midostaurin.  This recommendation were discussed with the patient today.  -Patient started taking midostaurin 50 mg twice daily on July 24, 2019.    -CBC done  on September 26, 2019 shows hemoglobin is decreased to 8.5.  Platelet count is also decreased  To 108,000.  Midostaurin has been held since September 26, 2019.  -Since patient has a persistent cytopenia despite avoiding midostaurin for 5 weeks and recent CT scan showing new onset splenomegaly with worsening sclerotic lesion on bone.  Patient was referred back to [place] clinic.  Patient was evaluated by Dr. Man Kimbrough and bone marrow biopsy was done on November 21, 2019.  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  There is no evidence of increased blast.  Case was discussed with Dr. Kimbrough over the phone on December 3, 2019.  Patient's case was discussed at tumor board at [place].  Recommendation is to continue with supportive care for now since patient has significant cytopenia requiring blood transfusion.  No further chemotherapy is recommended at present.  Recommendation were discussed with patient today.  -Since patient is complaining of worsening dizziness and fatigue.  Will transfuse him with 1 unit of PRBC tomorrow on December 6, 2019.  - We will do weekly CBC for now.  Will transfuse patient as needed if hemoglobin is less than 7.  -We will ask patient to return to clinic in 4 weeks with repeat CBC CMP and LDH on that day.      2.  Anemia: Most likely secondary to #1  -Hemoglobin is decreased to 7.4today.  -Anemia work-up done today on September 26, 2019 shows borderline B12 level.  We will start him on B12 supplement.    -Patient has  received 1 unit of PRBC on November 21, 2019.  Worsening fatigue and dizziness, will transfuse him with 1 unit of PRBC tomorrow on December 6, 2019.        3.  Leukocytosis: Secondary to chronic myelomonocytic leukemia. Resolved. Wbc is 7.44 today. We'll monitored with CBC for now.    4.  Urticaria pigmentosa, secondary to underlying  marrow pathology.    5.  Thrombocytopenia: Secondary to bone marrow involvement. Platelet count is 61,000 today.  We'll monitored with CBC for now.    6.  History of bradycardia status post pacemaker in 2008.    7.  Diarrhea:  -Patient was complaining of 2 -3 loose watery bowel movement daily.  Stool for C. difficile done  on October 10, 2019 is negative.  -Recommend using Imodium as required for diarrhea.    8.  Splenomegaly:  -Secondary to underlying systemic mastocytosis and chronic myelomonocytic leukemia.    9.  Depression and loss of appetite:  -Patient denies any homicidal or suicidal ideation.  -We will start patient on Remeron 15 mg every night to help him depression as well as appetite issue.  -Recommend following up with primary medical provider     10.  Health maintenance: Patient does not smoke.  Had a colonoscopy in 2014 by Dr. Guzman.    11. BMI: Patient's Body mass index is 24.56 kg/m². BMI is higher than reference range.  Patient was notified about elevated BMI.    12. Advance Care Planning: Patient has a living will on chart.    13.  Pain assessment:  -Patient denies any pain today.           Jordin Roblero MD  12/5/2019  6:29 PM        EMR Dragon/Transcription disclaimer:   Much of this encounter note is an electronic transcription/translation of spoken language to printed text. The electronic translation of spoken language may permit erroneous, or at times, nonsensical words or phrases to be inadvertently transcribed; Although I have reviewed the note for such errors, some may still exist.

## 2020-01-01 ENCOUNTER — LAB (OUTPATIENT)
Dept: ONCOLOGY | Facility: HOSPITAL | Age: 80
End: 2020-01-01

## 2020-01-01 ENCOUNTER — HOSPITAL ENCOUNTER (OUTPATIENT)
Dept: INFUSION THERAPY | Facility: HOSPITAL | Age: 80
Setting detail: INFUSION SERIES
Discharge: HOME | End: 2020-01-31

## 2020-01-01 ENCOUNTER — INFUSION (OUTPATIENT)
Dept: ONCOLOGY | Facility: HOSPITAL | Age: 80
End: 2020-01-01

## 2020-01-01 ENCOUNTER — TELEPHONE (OUTPATIENT)
Dept: ONCOLOGY | Facility: CLINIC | Age: 80
End: 2020-01-01

## 2020-01-01 ENCOUNTER — HOSPITAL ENCOUNTER (OUTPATIENT)
Dept: GENERAL RADIOLOGY | Facility: HOSPITAL | Age: 80
Discharge: HOME | End: 2020-01-29

## 2020-01-01 ENCOUNTER — HOSPITAL ENCOUNTER (INPATIENT)
Facility: HOSPITAL | Age: 80
LOS: 1 days | End: 2020-02-09
Attending: EMERGENCY MEDICINE | Admitting: INTERNAL MEDICINE

## 2020-01-01 ENCOUNTER — APPOINTMENT (OUTPATIENT)
Dept: ONCOLOGY | Facility: HOSPITAL | Age: 80
End: 2020-01-01

## 2020-01-01 ENCOUNTER — OFFICE VISIT (OUTPATIENT)
Dept: ONCOLOGY | Facility: CLINIC | Age: 80
End: 2020-01-01

## 2020-01-01 ENCOUNTER — APPOINTMENT (OUTPATIENT)
Dept: INFUSION THERAPY | Facility: HOSPITAL | Age: 80
End: 2020-01-01

## 2020-01-01 ENCOUNTER — INFUSION (OUTPATIENT)
Dept: PEDIATRICS | Facility: HOSPITAL | Age: 80
End: 2020-01-01

## 2020-01-01 ENCOUNTER — TELEPHONE (OUTPATIENT)
Dept: FAMILY MEDICINE CLINIC | Facility: CLINIC | Age: 80
End: 2020-01-01

## 2020-01-01 ENCOUNTER — HOSPITAL ENCOUNTER (OUTPATIENT)
Dept: INFUSION THERAPY | Facility: HOSPITAL | Age: 80
Setting detail: INFUSION SERIES
Discharge: HOME | End: 2020-01-03

## 2020-01-01 ENCOUNTER — HOSPITAL ENCOUNTER (OUTPATIENT)
Facility: HOSPITAL | Age: 80
Discharge: HOME | End: 2020-02-03
Attending: INTERNAL MEDICINE | Admitting: INTERNAL MEDICINE

## 2020-01-01 ENCOUNTER — APPOINTMENT (OUTPATIENT)
Dept: GENERAL RADIOLOGY | Facility: HOSPITAL | Age: 80
End: 2020-01-01

## 2020-01-01 ENCOUNTER — HOSPITAL ENCOUNTER (EMERGENCY)
Facility: HOSPITAL | Age: 80
Discharge: HOME | End: 2020-01-31
Attending: FAMILY MEDICINE | Admitting: EMERGENCY MEDICINE

## 2020-01-01 ENCOUNTER — HOSPITAL ENCOUNTER (OUTPATIENT)
Dept: ULTRASOUND IMAGING | Facility: HOSPITAL | Age: 80
Discharge: HOME | End: 2020-01-29

## 2020-01-01 ENCOUNTER — HOSPITAL ENCOUNTER (OUTPATIENT)
Dept: INTERVENTIONAL RADIOLOGY/VASCULAR | Facility: HOSPITAL | Age: 80
Discharge: HOME | End: 2020-01-29
Admitting: INTERNAL MEDICINE

## 2020-01-01 ENCOUNTER — TELEPHONE (OUTPATIENT)
Dept: ONCOLOGY | Facility: HOSPITAL | Age: 80
End: 2020-01-01

## 2020-01-01 ENCOUNTER — APPOINTMENT (OUTPATIENT)
Dept: ONCOLOGY | Facility: CLINIC | Age: 80
End: 2020-01-01

## 2020-01-01 ENCOUNTER — OFFICE VISIT (OUTPATIENT)
Dept: FAMILY MEDICINE CLINIC | Facility: CLINIC | Age: 80
End: 2020-01-01

## 2020-01-01 VITALS
HEART RATE: 64 BPM | TEMPERATURE: 97.9 F | RESPIRATION RATE: 18 BRPM | SYSTOLIC BLOOD PRESSURE: 118 MMHG | DIASTOLIC BLOOD PRESSURE: 55 MMHG

## 2020-01-01 VITALS
HEIGHT: 71 IN | BODY MASS INDEX: 24.46 KG/M2 | SYSTOLIC BLOOD PRESSURE: 126 MMHG | TEMPERATURE: 98 F | DIASTOLIC BLOOD PRESSURE: 64 MMHG | RESPIRATION RATE: 18 BRPM | WEIGHT: 174.7 LBS | HEART RATE: 64 BPM | OXYGEN SATURATION: 99 %

## 2020-01-01 VITALS
OXYGEN SATURATION: 85 % | SYSTOLIC BLOOD PRESSURE: 49 MMHG | HEART RATE: 65 BPM | DIASTOLIC BLOOD PRESSURE: 26 MMHG | RESPIRATION RATE: 6 BRPM | WEIGHT: 178.35 LBS | BODY MASS INDEX: 25.53 KG/M2 | HEIGHT: 70 IN

## 2020-01-01 VITALS
SYSTOLIC BLOOD PRESSURE: 134 MMHG | TEMPERATURE: 97.4 F | HEART RATE: 75 BPM | BODY MASS INDEX: 24.69 KG/M2 | DIASTOLIC BLOOD PRESSURE: 63 MMHG | HEIGHT: 71 IN | RESPIRATION RATE: 18 BRPM | WEIGHT: 176.4 LBS | OXYGEN SATURATION: 100 %

## 2020-01-01 VITALS
DIASTOLIC BLOOD PRESSURE: 60 MMHG | RESPIRATION RATE: 18 BRPM | HEIGHT: 71 IN | SYSTOLIC BLOOD PRESSURE: 131 MMHG | HEART RATE: 68 BPM | BODY MASS INDEX: 24.68 KG/M2 | WEIGHT: 176.3 LBS | TEMPERATURE: 98.4 F

## 2020-01-01 VITALS
SYSTOLIC BLOOD PRESSURE: 134 MMHG | DIASTOLIC BLOOD PRESSURE: 61 MMHG | TEMPERATURE: 97.1 F | RESPIRATION RATE: 18 BRPM | HEART RATE: 62 BPM

## 2020-01-01 VITALS
HEART RATE: 61 BPM | RESPIRATION RATE: 18 BRPM | WEIGHT: 172 LBS | BODY MASS INDEX: 24.62 KG/M2 | HEIGHT: 70 IN | OXYGEN SATURATION: 96 % | DIASTOLIC BLOOD PRESSURE: 52 MMHG | TEMPERATURE: 98.4 F | SYSTOLIC BLOOD PRESSURE: 105 MMHG

## 2020-01-01 VITALS
SYSTOLIC BLOOD PRESSURE: 122 MMHG | DIASTOLIC BLOOD PRESSURE: 52 MMHG | HEART RATE: 72 BPM | TEMPERATURE: 97.4 F | RESPIRATION RATE: 18 BRPM

## 2020-01-01 VITALS
DIASTOLIC BLOOD PRESSURE: 54 MMHG | TEMPERATURE: 97.4 F | HEART RATE: 52 BPM | SYSTOLIC BLOOD PRESSURE: 116 MMHG | RESPIRATION RATE: 18 BRPM

## 2020-01-01 VITALS
DIASTOLIC BLOOD PRESSURE: 67 MMHG | HEART RATE: 66 BPM | SYSTOLIC BLOOD PRESSURE: 122 MMHG | TEMPERATURE: 98 F | RESPIRATION RATE: 16 BRPM

## 2020-01-01 VITALS
HEART RATE: 76 BPM | DIASTOLIC BLOOD PRESSURE: 60 MMHG | BODY MASS INDEX: 24.11 KG/M2 | SYSTOLIC BLOOD PRESSURE: 122 MMHG | WEIGHT: 172.2 LBS | HEIGHT: 71 IN | OXYGEN SATURATION: 99 % | TEMPERATURE: 97.7 F

## 2020-01-01 VITALS — OXYGEN SATURATION: 96 % | HEART RATE: 82 BPM | DIASTOLIC BLOOD PRESSURE: 68 MMHG | SYSTOLIC BLOOD PRESSURE: 126 MMHG

## 2020-01-01 VITALS
DIASTOLIC BLOOD PRESSURE: 53 MMHG | HEART RATE: 64 BPM | SYSTOLIC BLOOD PRESSURE: 126 MMHG | RESPIRATION RATE: 18 BRPM | TEMPERATURE: 98.9 F | OXYGEN SATURATION: 100 %

## 2020-01-01 DIAGNOSIS — C92.10 CML (CHRONIC MYELOCYTIC LEUKEMIA) (HCC): Primary | ICD-10-CM

## 2020-01-01 DIAGNOSIS — J96.00 ACUTE RESPIRATORY FAILURE, UNSPECIFIED WHETHER WITH HYPOXIA OR HYPERCAPNIA (HCC): Primary | ICD-10-CM

## 2020-01-01 DIAGNOSIS — C93.10 CHRONIC MYELOMONOCYTIC LEUKEMIA NOT HAVING ACHIEVED REMISSION (HCC): Primary | ICD-10-CM

## 2020-01-01 DIAGNOSIS — R16.1 SPLENOMEGALY: ICD-10-CM

## 2020-01-01 DIAGNOSIS — D63.0 ANEMIA IN NEOPLASTIC DISEASE: ICD-10-CM

## 2020-01-01 DIAGNOSIS — D63.0 ANEMIA IN NEOPLASTIC DISEASE: Primary | ICD-10-CM

## 2020-01-01 DIAGNOSIS — C93.10 CHRONIC MYELOMONOCYTIC LEUKEMIA NOT HAVING ACHIEVED REMISSION (HCC): ICD-10-CM

## 2020-01-01 DIAGNOSIS — D69.6 THROMBOCYTOPENIA (HCC): ICD-10-CM

## 2020-01-01 DIAGNOSIS — D70.8 OTHER NEUTROPENIA (HCC): ICD-10-CM

## 2020-01-01 DIAGNOSIS — D47.02 SYSTEMIC MASTOCYTOSIS WITH ASSOCIATED CLONAL HEMATOLOGICAL NON-MAST CELL LINEAGE DISEASE: ICD-10-CM

## 2020-01-01 DIAGNOSIS — D47.01 URTICARIA PIGMENTOSA: ICD-10-CM

## 2020-01-01 DIAGNOSIS — I48.91 ATRIAL FIBRILLATION, UNSPECIFIED TYPE (HCC): ICD-10-CM

## 2020-01-01 DIAGNOSIS — C92.10 CML (CHRONIC MYELOCYTIC LEUKEMIA) (HCC): ICD-10-CM

## 2020-01-01 DIAGNOSIS — Z45.2 ENCOUNTER FOR VENOUS ACCESS DEVICE CARE: ICD-10-CM

## 2020-01-01 DIAGNOSIS — J96.00 SEPSIS WITH ACUTE RESPIRATORY FAILURE AND SEPTIC SHOCK, DUE TO UNSPECIFIED ORGANISM, UNSPECIFIED WHETHER HYPOXIA OR HYPERCAPNIA PRESENT (HCC): ICD-10-CM

## 2020-01-01 DIAGNOSIS — R11.15 INTRACTABLE CYCLICAL VOMITING WITH NAUSEA: ICD-10-CM

## 2020-01-01 DIAGNOSIS — M54.2 NECK PAIN: Primary | ICD-10-CM

## 2020-01-01 DIAGNOSIS — E86.0 DEHYDRATION: ICD-10-CM

## 2020-01-01 DIAGNOSIS — A41.9 SEPSIS WITH ACUTE RESPIRATORY FAILURE AND SEPTIC SHOCK, DUE TO UNSPECIFIED ORGANISM, UNSPECIFIED WHETHER HYPOXIA OR HYPERCAPNIA PRESENT (HCC): ICD-10-CM

## 2020-01-01 DIAGNOSIS — D64.9 ANEMIA, UNSPECIFIED TYPE: ICD-10-CM

## 2020-01-01 DIAGNOSIS — R11.0 NAUSEA: ICD-10-CM

## 2020-01-01 DIAGNOSIS — R65.21 SEPSIS WITH ACUTE RESPIRATORY FAILURE AND SEPTIC SHOCK, DUE TO UNSPECIFIED ORGANISM, UNSPECIFIED WHETHER HYPOXIA OR HYPERCAPNIA PRESENT (HCC): ICD-10-CM

## 2020-01-01 DIAGNOSIS — C93.12 CHRONIC MYELOMONOCYTIC LEUKEMIA, IN RELAPSE (HCC): ICD-10-CM

## 2020-01-01 LAB
ABO + RH BLD: NORMAL
ABO GROUP BLD: NORMAL
ALBUMIN SERPL-MCNC: 2.6 G/DL (ref 3.5–5.2)
ALBUMIN SERPL-MCNC: 2.9 G/DL (ref 3.5–5.2)
ALBUMIN SERPL-MCNC: 3 G/DL (ref 3.5–5.2)
ALBUMIN/GLOB SERPL: 1.5 G/DL
ALBUMIN/GLOB SERPL: 1.6 G/DL
ALBUMIN/GLOB SERPL: 1.7 G/DL
ALP SERPL-CCNC: 110 U/L (ref 39–117)
ALP SERPL-CCNC: 95 U/L (ref 39–117)
ALP SERPL-CCNC: 98 U/L (ref 39–117)
ALT SERPL W P-5'-P-CCNC: 7 U/L (ref 1–41)
ALT SERPL W P-5'-P-CCNC: 7 U/L (ref 1–41)
ALT SERPL W P-5'-P-CCNC: 8 U/L (ref 1–41)
ANION GAP SERPL CALCULATED.3IONS-SCNC: 12 MMOL/L (ref 5–15)
ANION GAP SERPL CALCULATED.3IONS-SCNC: 22 MMOL/L (ref 5–15)
ANION GAP SERPL CALCULATED.3IONS-SCNC: 8 MMOL/L (ref 5–15)
ANISOCYTOSIS BLD QL: ABNORMAL
APTT PPP: 39.6 SECONDS (ref 20–40.3)
ARTERIAL PATENCY WRIST A: ABNORMAL
AST SERPL-CCNC: 12 U/L (ref 1–40)
AST SERPL-CCNC: 9 U/L (ref 1–40)
AST SERPL-CCNC: 9 U/L (ref 1–40)
ATMOSPHERIC PRESS: 748 MMHG
BASE EXCESS BLDA CALC-SCNC: -15.3 MMOL/L (ref 0–2)
BASOPHILS # BLD AUTO: 0.01 10*3/MM3 (ref 0–0.2)
BASOPHILS # BLD AUTO: 0.01 10*3/MM3 (ref 0–0.2)
BASOPHILS NFR BLD AUTO: 0.4 % (ref 0–1.5)
BASOPHILS NFR BLD AUTO: 0.4 % (ref 0–1.5)
BDY SITE: ABNORMAL
BH BB BLOOD EXPIRATION DATE: NORMAL
BH BB BLOOD TYPE BARCODE: 6200
BH BB BLOOD TYPE BARCODE: 9500
BH BB BLOOD TYPE BARCODE: NORMAL
BH BB DISPENSE STATUS: NORMAL
BH BB PRODUCT CODE: NORMAL
BH BB UNIT NUMBER: NORMAL
BILIRUB SERPL-MCNC: 0.9 MG/DL (ref 0.2–1.2)
BILIRUB SERPL-MCNC: 1 MG/DL (ref 0.2–1.2)
BILIRUB SERPL-MCNC: 1.7 MG/DL (ref 0.2–1.2)
BLD GP AB SCN SERPL QL: NEGATIVE
BUN BLD-MCNC: 15 MG/DL (ref 8–23)
BUN BLD-MCNC: 19 MG/DL (ref 8–23)
BUN BLD-MCNC: 20 MG/DL (ref 8–23)
BUN/CREAT SERPL: 14.3 (ref 7–25)
BUN/CREAT SERPL: 22.7 (ref 7–25)
BUN/CREAT SERPL: 24.7 (ref 7–25)
CALCIUM SPEC-SCNC: 7.4 MG/DL (ref 8.6–10.5)
CALCIUM SPEC-SCNC: 7.5 MG/DL (ref 8.6–10.5)
CALCIUM SPEC-SCNC: 7.7 MG/DL (ref 8.6–10.5)
CHLORIDE SERPL-SCNC: 104 MMOL/L (ref 98–107)
CHLORIDE SERPL-SCNC: 107 MMOL/L (ref 98–107)
CHLORIDE SERPL-SCNC: 108 MMOL/L (ref 98–107)
CO2 SERPL-SCNC: 10 MMOL/L (ref 22–29)
CO2 SERPL-SCNC: 19 MMOL/L (ref 22–29)
CO2 SERPL-SCNC: 22 MMOL/L (ref 22–29)
CREAT BLD-MCNC: 0.66 MG/DL (ref 0.76–1.27)
CREAT BLD-MCNC: 0.81 MG/DL (ref 0.76–1.27)
CREAT BLD-MCNC: 1.33 MG/DL (ref 0.76–1.27)
D-LACTATE SERPL-SCNC: 12.7 MMOL/L (ref 0.5–2)
DACRYOCYTES BLD QL SMEAR: ABNORMAL
DEPRECATED RDW RBC AUTO: 54.4 FL (ref 37–54)
DEPRECATED RDW RBC AUTO: 55.2 FL (ref 37–54)
DEPRECATED RDW RBC AUTO: 57.5 FL (ref 37–54)
DEPRECATED RDW RBC AUTO: 59.2 FL (ref 37–54)
DEPRECATED RDW RBC AUTO: 59.9 FL (ref 37–54)
DEPRECATED RDW RBC AUTO: 61 FL (ref 37–54)
DEPRECATED RDW RBC AUTO: 67.7 FL (ref 37–54)
DEPRECATED RDW RBC AUTO: 72.4 FL (ref 37–54)
DEPRECATED RDW RBC AUTO: 75.2 FL (ref 37–54)
DEPRECATED RDW RBC AUTO: 88.6 FL (ref 37–54)
EOSINOPHIL # BLD AUTO: 0 10*3/MM3 (ref 0–0.4)
EOSINOPHIL # BLD AUTO: 0 10*3/MM3 (ref 0–0.4)
EOSINOPHIL # BLD MANUAL: 0.03 10*3/MM3 (ref 0–0.4)
EOSINOPHIL # BLD MANUAL: 0.05 10*3/MM3 (ref 0–0.4)
EOSINOPHIL NFR BLD AUTO: 0 % (ref 0.3–6.2)
EOSINOPHIL NFR BLD AUTO: 0 % (ref 0.3–6.2)
EOSINOPHIL NFR BLD MANUAL: 1 % (ref 0.3–6.2)
EOSINOPHIL NFR BLD MANUAL: 1 % (ref 0.3–6.2)
EPAP: 8
ERYTHROCYTE [DISTWIDTH] IN BLOOD BY AUTOMATED COUNT: 17.1 % (ref 12.3–15.4)
ERYTHROCYTE [DISTWIDTH] IN BLOOD BY AUTOMATED COUNT: 17.4 % (ref 12.3–15.4)
ERYTHROCYTE [DISTWIDTH] IN BLOOD BY AUTOMATED COUNT: 17.5 % (ref 12.3–15.4)
ERYTHROCYTE [DISTWIDTH] IN BLOOD BY AUTOMATED COUNT: 18.6 % (ref 12.3–15.4)
ERYTHROCYTE [DISTWIDTH] IN BLOOD BY AUTOMATED COUNT: 18.6 % (ref 12.3–15.4)
ERYTHROCYTE [DISTWIDTH] IN BLOOD BY AUTOMATED COUNT: 18.7 % (ref 12.3–15.4)
ERYTHROCYTE [DISTWIDTH] IN BLOOD BY AUTOMATED COUNT: 20.3 % (ref 12.3–15.4)
ERYTHROCYTE [DISTWIDTH] IN BLOOD BY AUTOMATED COUNT: 21.3 % (ref 12.3–15.4)
ERYTHROCYTE [DISTWIDTH] IN BLOOD BY AUTOMATED COUNT: 22.3 % (ref 12.3–15.4)
ERYTHROCYTE [DISTWIDTH] IN BLOOD BY AUTOMATED COUNT: 23.7 % (ref 12.3–15.4)
FERRITIN SERPL-MCNC: 687 NG/ML (ref 30–400)
FERRITIN SERPL-MCNC: 739.8 NG/ML (ref 30–400)
FLUAV AG NPH QL: NEGATIVE
FLUAV AG NPH QL: NEGATIVE
FLUBV AG NPH QL IA: NEGATIVE
FLUBV AG NPH QL IA: NEGATIVE
FOLATE SERPL-MCNC: 13.8 NG/ML (ref 4.78–24.2)
FOLATE SERPL-MCNC: >20 NG/ML (ref 4.78–24.2)
GFR SERPL CREATININE-BSD FRML MDRD: 117 ML/MIN/1.73
GFR SERPL CREATININE-BSD FRML MDRD: 52 ML/MIN/1.73
GFR SERPL CREATININE-BSD FRML MDRD: 92 ML/MIN/1.73
GLOBULIN UR ELPH-MCNC: 1.7 GM/DL
GLOBULIN UR ELPH-MCNC: 1.8 GM/DL
GLOBULIN UR ELPH-MCNC: 1.8 GM/DL
GLUCOSE BLD-MCNC: 109 MG/DL (ref 65–99)
GLUCOSE BLD-MCNC: 98 MG/DL (ref 65–99)
GLUCOSE BLD-MCNC: 99 MG/DL (ref 65–99)
GLUCOSE BLDC GLUCOMTR-MCNC: 107 MG/DL (ref 70–130)
HCO3 BLDA-SCNC: 9.5 MMOL/L (ref 20–26)
HCT VFR BLD AUTO: 16.7 % (ref 37.5–51)
HCT VFR BLD AUTO: 17.7 % (ref 37.5–51)
HCT VFR BLD AUTO: 18.4 % (ref 37.5–51)
HCT VFR BLD AUTO: 18.5 % (ref 37.5–51)
HCT VFR BLD AUTO: 18.6 % (ref 37.5–51)
HCT VFR BLD AUTO: 19 % (ref 37.5–51)
HCT VFR BLD AUTO: 19.3 % (ref 37.5–51)
HCT VFR BLD AUTO: 19.3 % (ref 37.5–51)
HCT VFR BLD AUTO: 19.5 % (ref 37.5–51)
HCT VFR BLD AUTO: 21.5 % (ref 37.5–51)
HGB BLD-MCNC: 5.6 G/DL (ref 13–17.7)
HGB BLD-MCNC: 5.8 G/DL (ref 13–17.7)
HGB BLD-MCNC: 6 G/DL (ref 13–17.7)
HGB BLD-MCNC: 6.1 G/DL (ref 13–17.7)
HGB BLD-MCNC: 6.2 G/DL (ref 13–17.7)
HGB BLD-MCNC: 6.5 G/DL (ref 13–17.7)
HGB BLD-MCNC: 6.6 G/DL (ref 13–17.7)
HGB BLD-MCNC: 7.2 G/DL (ref 13–17.7)
HOLD SPECIMEN: NORMAL
HOROWITZ INDEX BLD+IHG-RTO: 100 %
HYPOCHROMIA BLD QL: ABNORMAL
IMM GRANULOCYTES # BLD AUTO: 0.24 10*3/MM3 (ref 0–0.05)
IMM GRANULOCYTES # BLD AUTO: 0.3 10*3/MM3 (ref 0–0.05)
IMM GRANULOCYTES NFR BLD AUTO: 12.1 % (ref 0–0.5)
IMM GRANULOCYTES NFR BLD AUTO: 8.8 % (ref 0–0.5)
INR PPP: 2.28 (ref 0.8–1.2)
IPAP: 16
IRON 24H UR-MRATE: 129 MCG/DL (ref 59–158)
IRON 24H UR-MRATE: 143 MCG/DL (ref 59–158)
IRON SATN MFR SERPL: 82 % (ref 20–50)
IRON SATN MFR SERPL: 83 % (ref 20–50)
LARGE PLATELETS: ABNORMAL
LDH SERPL-CCNC: 129 U/L (ref 135–225)
LYMPHOCYTES # BLD AUTO: 0.6 10*3/MM3 (ref 0.7–3.1)
LYMPHOCYTES # BLD AUTO: 0.7 10*3/MM3 (ref 0.7–3.1)
LYMPHOCYTES # BLD MANUAL: 0.43 10*3/MM3 (ref 0.7–3.1)
LYMPHOCYTES # BLD MANUAL: 0.6 10*3/MM3 (ref 0.7–3.1)
LYMPHOCYTES # BLD MANUAL: 0.7 10*3/MM3 (ref 0.7–3.1)
LYMPHOCYTES # BLD MANUAL: 0.9 10*3/MM3 (ref 0.7–3.1)
LYMPHOCYTES # BLD MANUAL: 1.08 10*3/MM3 (ref 0.7–3.1)
LYMPHOCYTES # BLD MANUAL: 1.26 10*3/MM3 (ref 0.7–3.1)
LYMPHOCYTES # BLD MANUAL: 1.38 10*3/MM3 (ref 0.7–3.1)
LYMPHOCYTES NFR BLD AUTO: 24.2 % (ref 19.6–45.3)
LYMPHOCYTES NFR BLD AUTO: 25.7 % (ref 19.6–45.3)
LYMPHOCYTES NFR BLD MANUAL: 12 % (ref 19.6–45.3)
LYMPHOCYTES NFR BLD MANUAL: 18 % (ref 5–12)
LYMPHOCYTES NFR BLD MANUAL: 18 % (ref 5–12)
LYMPHOCYTES NFR BLD MANUAL: 20 % (ref 5–12)
LYMPHOCYTES NFR BLD MANUAL: 24 % (ref 19.6–45.3)
LYMPHOCYTES NFR BLD MANUAL: 24 % (ref 5–12)
LYMPHOCYTES NFR BLD MANUAL: 25 % (ref 19.6–45.3)
LYMPHOCYTES NFR BLD MANUAL: 25 % (ref 5–12)
LYMPHOCYTES NFR BLD MANUAL: 26 % (ref 19.6–45.3)
LYMPHOCYTES NFR BLD MANUAL: 29 % (ref 19.6–45.3)
LYMPHOCYTES NFR BLD MANUAL: 29 % (ref 5–12)
LYMPHOCYTES NFR BLD MANUAL: 30 % (ref 19.6–45.3)
LYMPHOCYTES NFR BLD MANUAL: 34 % (ref 19.6–45.3)
LYMPHOCYTES NFR BLD MANUAL: 9 % (ref 5–12)
Lab: ABNORMAL
Lab: NORMAL
MACROCYTES BLD QL SMEAR: ABNORMAL
MACROCYTES BLD QL SMEAR: ABNORMAL
MCH RBC QN AUTO: 30 PG (ref 26.6–33)
MCH RBC QN AUTO: 30.3 PG (ref 26.6–33)
MCH RBC QN AUTO: 30.4 PG (ref 26.6–33)
MCH RBC QN AUTO: 30.7 PG (ref 26.6–33)
MCH RBC QN AUTO: 31 PG (ref 26.6–33)
MCH RBC QN AUTO: 31.1 PG (ref 26.6–33)
MCH RBC QN AUTO: 31.6 PG (ref 26.6–33)
MCH RBC QN AUTO: 32.7 PG (ref 26.6–33)
MCH RBC QN AUTO: 32.8 PG (ref 26.6–33)
MCH RBC QN AUTO: 34.1 PG (ref 26.6–33)
MCHC RBC AUTO-ENTMCNC: 32.3 G/DL (ref 31.5–35.7)
MCHC RBC AUTO-ENTMCNC: 32.8 G/DL (ref 31.5–35.7)
MCHC RBC AUTO-ENTMCNC: 33 G/DL (ref 31.5–35.7)
MCHC RBC AUTO-ENTMCNC: 33.3 G/DL (ref 31.5–35.7)
MCHC RBC AUTO-ENTMCNC: 33.5 G/DL (ref 31.5–35.7)
MCHC RBC AUTO-ENTMCNC: 33.5 G/DL (ref 31.5–35.7)
MCHC RBC AUTO-ENTMCNC: 33.7 G/DL (ref 31.5–35.7)
MCHC RBC AUTO-ENTMCNC: 33.7 G/DL (ref 31.5–35.7)
MCHC RBC AUTO-ENTMCNC: 34.2 G/DL (ref 31.5–35.7)
MCHC RBC AUTO-ENTMCNC: 34.2 G/DL (ref 31.5–35.7)
MCV RBC AUTO: 104.1 FL (ref 79–97)
MCV RBC AUTO: 90.7 FL (ref 79–97)
MCV RBC AUTO: 91 FL (ref 79–97)
MCV RBC AUTO: 91.1 FL (ref 79–97)
MCV RBC AUTO: 92 FL (ref 79–97)
MCV RBC AUTO: 93.3 FL (ref 79–97)
MCV RBC AUTO: 93.9 FL (ref 79–97)
MCV RBC AUTO: 94.4 FL (ref 79–97)
MCV RBC AUTO: 95.5 FL (ref 79–97)
MCV RBC AUTO: 96 FL (ref 79–97)
METAMYELOCYTES NFR BLD MANUAL: 1 % (ref 0–0)
METAMYELOCYTES NFR BLD MANUAL: 2 % (ref 0–0)
METAMYELOCYTES NFR BLD MANUAL: 2 % (ref 0–0)
METAMYELOCYTES NFR BLD MANUAL: 5 % (ref 0–0)
METAMYELOCYTES NFR BLD MANUAL: 7 % (ref 0–0)
METAMYELOCYTES NFR BLD MANUAL: 8 % (ref 0–0)
METAMYELOCYTES NFR BLD MANUAL: 8 % (ref 0–0)
MODALITY: ABNORMAL
MONOCYTES # BLD AUTO: 0.33 10*3/MM3 (ref 0.1–0.9)
MONOCYTES # BLD AUTO: 0.5 10*3/MM3 (ref 0.1–0.9)
MONOCYTES # BLD AUTO: 0.56 10*3/MM3 (ref 0.1–0.9)
MONOCYTES # BLD AUTO: 0.59 10*3/MM3 (ref 0.1–0.9)
MONOCYTES # BLD AUTO: 0.66 10*3/MM3 (ref 0.1–0.9)
MONOCYTES # BLD AUTO: 0.69 10*3/MM3 (ref 0.1–0.9)
MONOCYTES # BLD AUTO: 0.87 10*3/MM3 (ref 0.1–0.9)
MONOCYTES # BLD AUTO: 0.99 10*3/MM3 (ref 0.1–0.9)
MONOCYTES # BLD AUTO: 1.03 10*3/MM3 (ref 0.1–0.9)
MONOCYTES NFR BLD AUTO: 23.8 % (ref 5–12)
MONOCYTES NFR BLD AUTO: 25.4 % (ref 5–12)
MYELOCYTES NFR BLD MANUAL: 1 % (ref 0–0)
MYELOCYTES NFR BLD MANUAL: 3 % (ref 0–0)
MYELOCYTES NFR BLD MANUAL: 7 % (ref 0–0)
NEUTROPHILS # BLD AUTO: 0.97 10*3/MM3 (ref 1.7–7)
NEUTROPHILS # BLD AUTO: 0.98 10*3/MM3 (ref 1.7–7)
NEUTROPHILS # BLD AUTO: 1.03 10*3/MM3 (ref 1.7–7)
NEUTROPHILS # BLD AUTO: 1.03 10*3/MM3 (ref 1.7–7)
NEUTROPHILS # BLD AUTO: 1.08 10*3/MM3 (ref 1.7–7)
NEUTROPHILS # BLD AUTO: 1.74 10*3/MM3 (ref 1.7–7)
NEUTROPHILS # BLD AUTO: 1.89 10*3/MM3 (ref 1.7–7)
NEUTROPHILS # BLD AUTO: 2.53 10*3/MM3 (ref 1.7–7)
NEUTROPHILS # BLD AUTO: 2.7 10*3/MM3 (ref 1.7–7)
NEUTROPHILS NFR BLD AUTO: 39.5 % (ref 42.7–76)
NEUTROPHILS NFR BLD AUTO: 39.7 % (ref 42.7–76)
NEUTROPHILS NFR BLD MANUAL: 34 % (ref 42.7–76)
NEUTROPHILS NFR BLD MANUAL: 37 % (ref 42.7–76)
NEUTROPHILS NFR BLD MANUAL: 39 % (ref 42.7–76)
NEUTROPHILS NFR BLD MANUAL: 40 % (ref 42.7–76)
NEUTROPHILS NFR BLD MANUAL: 44 % (ref 42.7–76)
NEUTROPHILS NFR BLD MANUAL: 45 % (ref 42.7–76)
NEUTROPHILS NFR BLD MANUAL: 48 % (ref 42.7–76)
NEUTS BAND NFR BLD MANUAL: 13 % (ref 0–5)
NEUTS BAND NFR BLD MANUAL: 2 % (ref 0–5)
NEUTS BAND NFR BLD MANUAL: 3 % (ref 0–5)
NEUTS BAND NFR BLD MANUAL: 4 % (ref 0–5)
NEUTS BAND NFR BLD MANUAL: 4 % (ref 0–5)
NEUTS BAND NFR BLD MANUAL: 5 % (ref 0–5)
NEUTS BAND NFR BLD MANUAL: 5 % (ref 0–5)
NRBC BLD AUTO-RTO: 2.6 /100 WBC (ref 0–0.2)
NRBC BLD AUTO-RTO: 4.8 /100 WBC (ref 0–0.2)
NRBC SPEC MANUAL: 1 /100 WBC (ref 0–0.2)
NRBC SPEC MANUAL: 1 /100 WBC (ref 0–0.2)
NRBC SPEC MANUAL: 2 /100 WBC (ref 0–0.2)
NRBC SPEC MANUAL: 4 /100 WBC (ref 0–0.2)
NRBC SPEC MANUAL: 5 /100 WBC (ref 0–0.2)
OVALOCYTES BLD QL SMEAR: ABNORMAL
PCO2 BLDA: 18.4 MM HG (ref 35–45)
PH BLDA: 7.32 PH UNITS (ref 7.35–7.45)
PLATELET # BLD AUTO: 14 10*3/MM3 (ref 140–450)
PLATELET # BLD AUTO: 26 10*3/MM3 (ref 140–450)
PLATELET # BLD AUTO: 26 10*3/MM3 (ref 140–450)
PLATELET # BLD AUTO: 29 10*3/MM3 (ref 140–450)
PLATELET # BLD AUTO: 30 10*3/MM3 (ref 140–450)
PLATELET # BLD AUTO: 34 10*3/MM3 (ref 140–450)
PLATELET # BLD AUTO: 36 10*3/MM3 (ref 140–450)
PLATELET # BLD AUTO: 40 10*3/MM3 (ref 140–450)
PLATELET # BLD AUTO: 45 10*3/MM3 (ref 140–450)
PLATELET # BLD AUTO: 52 10*3/MM3 (ref 140–450)
PMV BLD AUTO: 11.9 FL (ref 6–12)
PMV BLD AUTO: ABNORMAL FL
PO2 BLDA: 318 MM HG (ref 83–108)
POIKILOCYTOSIS BLD QL SMEAR: ABNORMAL
POTASSIUM BLD-SCNC: 3.4 MMOL/L (ref 3.5–5.2)
POTASSIUM BLD-SCNC: 3.6 MMOL/L (ref 3.5–5.2)
POTASSIUM BLD-SCNC: 3.9 MMOL/L (ref 3.5–5.2)
PROT SERPL-MCNC: 4.3 G/DL (ref 6–8.5)
PROT SERPL-MCNC: 4.7 G/DL (ref 6–8.5)
PROT SERPL-MCNC: 4.8 G/DL (ref 6–8.5)
PROTHROMBIN TIME: 24.9 SECONDS (ref 11.1–15.3)
RBC # BLD AUTO: 1.7 10*6/MM3 (ref 4.14–5.8)
RBC # BLD AUTO: 1.77 10*6/MM3 (ref 4.14–5.8)
RBC # BLD AUTO: 1.98 10*6/MM3 (ref 4.14–5.8)
RBC # BLD AUTO: 1.99 10*6/MM3 (ref 4.14–5.8)
RBC # BLD AUTO: 2 10*6/MM3 (ref 4.14–5.8)
RBC # BLD AUTO: 2.01 10*6/MM3 (ref 4.14–5.8)
RBC # BLD AUTO: 2.03 10*6/MM3 (ref 4.14–5.8)
RBC # BLD AUTO: 2.09 10*6/MM3 (ref 4.14–5.8)
RBC # BLD AUTO: 2.12 10*6/MM3 (ref 4.14–5.8)
RBC # BLD AUTO: 2.37 10*6/MM3 (ref 4.14–5.8)
REF LAB TEST METHOD: NORMAL
RH BLD: POSITIVE
SAO2 % BLDCOA: >100 % (ref 94–99)
SET MECH RESP RATE: 16
SMALL PLATELETS BLD QL SMEAR: ABNORMAL
SODIUM BLD-SCNC: 135 MMOL/L (ref 136–145)
SODIUM BLD-SCNC: 138 MMOL/L (ref 136–145)
SODIUM BLD-SCNC: 139 MMOL/L (ref 136–145)
T&S EXPIRATION DATE: NORMAL
TARGETS BLD QL SMEAR: ABNORMAL
TIBC SERPL-MCNC: 156 MCG/DL (ref 298–536)
TIBC SERPL-MCNC: 173 MCG/DL (ref 298–536)
TRANSFERRIN SERPL-MCNC: 105 MG/DL (ref 200–360)
TRANSFERRIN SERPL-MCNC: 116 MG/DL (ref 200–360)
TROPONIN T SERPL-MCNC: 0.03 NG/ML (ref 0–0.03)
UNIT  ABO: NORMAL
UNIT  RH: NORMAL
VARIANT LYMPHS NFR BLD MANUAL: 1 % (ref 0–5)
VARIANT LYMPHS NFR BLD MANUAL: 2 % (ref 0–5)
VARIANT LYMPHS NFR BLD MANUAL: 2 % (ref 0–5)
VARIANT LYMPHS NFR BLD MANUAL: 3 % (ref 0–5)
VENTILATOR MODE: ABNORMAL
VIT B12 BLD-MCNC: 1644 PG/ML (ref 211–946)
VIT B12 BLD-MCNC: 1944 PG/ML (ref 211–946)
WBC MORPH BLD: NORMAL
WBC NRBC COR # BLD: 2.34 10*3/MM3 (ref 3.4–10.8)
WBC NRBC COR # BLD: 2.48 10*3/MM3 (ref 3.4–10.8)
WBC NRBC COR # BLD: 2.64 10*3/MM3 (ref 3.4–10.8)
WBC NRBC COR # BLD: 2.72 10*3/MM3 (ref 3.4–10.8)
WBC NRBC COR # BLD: 3.56 10*3/MM3 (ref 3.4–10.8)
WBC NRBC COR # BLD: 3.71 10*3/MM3 (ref 3.4–10.8)
WBC NRBC COR # BLD: 3.92 10*3/MM3 (ref 3.4–10.8)
WBC NRBC COR # BLD: 4.86 10*3/MM3 (ref 3.4–10.8)
WBC NRBC COR # BLD: 5.52 10*3/MM3 (ref 3.4–10.8)
WBC NRBC COR # BLD: 5.78 10*3/MM3 (ref 3.4–10.8)
WHOLE BLOOD HOLD SPECIMEN: NORMAL
WHOLE BLOOD HOLD SPECIMEN: NORMAL

## 2020-01-01 PROCEDURE — 86850 RBC ANTIBODY SCREEN: CPT

## 2020-01-01 PROCEDURE — 85025 COMPLETE CBC W/AUTO DIFF WBC: CPT

## 2020-01-01 PROCEDURE — 25010000002 DEXAMETHASONE SODIUM PHOSPHATE 100 MG/10ML SOLUTION 10 ML VIAL: Performed by: INTERNAL MEDICINE

## 2020-01-01 PROCEDURE — 86901 BLOOD TYPING SEROLOGIC RH(D): CPT

## 2020-01-01 PROCEDURE — 99213 OFFICE O/P EST LOW 20 MIN: CPT | Performed by: FAMILY MEDICINE

## 2020-01-01 PROCEDURE — 86900 BLOOD TYPING SEROLOGIC ABO: CPT

## 2020-01-01 PROCEDURE — 36415 COLL VENOUS BLD VENIPUNCTURE: CPT | Performed by: INTERNAL MEDICINE

## 2020-01-01 PROCEDURE — 99284 EMERGENCY DEPT VISIT MOD MDM: CPT

## 2020-01-01 PROCEDURE — 84484 ASSAY OF TROPONIN QUANT: CPT | Performed by: EMERGENCY MEDICINE

## 2020-01-01 PROCEDURE — 85007 BL SMEAR W/DIFF WBC COUNT: CPT

## 2020-01-01 PROCEDURE — 36430 TRANSFUSION BLD/BLD COMPNT: CPT

## 2020-01-01 PROCEDURE — 82746 ASSAY OF FOLIC ACID SERUM: CPT

## 2020-01-01 PROCEDURE — 85025 COMPLETE CBC W/AUTO DIFF WBC: CPT | Performed by: PHYSICIAN ASSISTANT

## 2020-01-01 PROCEDURE — 86850 RBC ANTIBODY SCREEN: CPT | Performed by: EMERGENCY MEDICINE

## 2020-01-01 PROCEDURE — P9016 RBC LEUKOCYTES REDUCED: HCPCS

## 2020-01-01 PROCEDURE — 80053 COMPREHEN METABOLIC PANEL: CPT | Performed by: EMERGENCY MEDICINE

## 2020-01-01 PROCEDURE — 86923 COMPATIBILITY TEST ELECTRIC: CPT

## 2020-01-01 PROCEDURE — 82607 VITAMIN B-12: CPT

## 2020-01-01 PROCEDURE — 82746 ASSAY OF FOLIC ACID SERUM: CPT | Performed by: INTERNAL MEDICINE

## 2020-01-01 PROCEDURE — 93010 ELECTROCARDIOGRAM REPORT: CPT | Performed by: INTERNAL MEDICINE

## 2020-01-01 PROCEDURE — G8731 PAIN NEG NO PLAN: HCPCS | Performed by: INTERNAL MEDICINE

## 2020-01-01 PROCEDURE — 71045 X-RAY EXAM CHEST 1 VIEW: CPT

## 2020-01-01 PROCEDURE — 83605 ASSAY OF LACTIC ACID: CPT | Performed by: EMERGENCY MEDICINE

## 2020-01-01 PROCEDURE — 96365 THER/PROPH/DIAG IV INF INIT: CPT | Performed by: INTERNAL MEDICINE

## 2020-01-01 PROCEDURE — 83615 LACTATE (LD) (LDH) ENZYME: CPT

## 2020-01-01 PROCEDURE — G0463 HOSPITAL OUTPT CLINIC VISIT: HCPCS | Performed by: INTERNAL MEDICINE

## 2020-01-01 PROCEDURE — 99215 OFFICE O/P EST HI 40 MIN: CPT | Performed by: INTERNAL MEDICINE

## 2020-01-01 PROCEDURE — 82607 VITAMIN B-12: CPT | Performed by: INTERNAL MEDICINE

## 2020-01-01 PROCEDURE — 85025 COMPLETE CBC W/AUTO DIFF WBC: CPT | Performed by: EMERGENCY MEDICINE

## 2020-01-01 PROCEDURE — 87804 INFLUENZA ASSAY W/OPTIC: CPT | Performed by: EMERGENCY MEDICINE

## 2020-01-01 PROCEDURE — 87040 BLOOD CULTURE FOR BACTERIA: CPT | Performed by: EMERGENCY MEDICINE

## 2020-01-01 PROCEDURE — C1751 CATH, INF, PER/CENT/MIDLINE: HCPCS

## 2020-01-01 PROCEDURE — 1157F ADVNC CARE PLAN IN RCRD: CPT | Performed by: INTERNAL MEDICINE

## 2020-01-01 PROCEDURE — 86850 RBC ANTIBODY SCREEN: CPT | Performed by: INTERNAL MEDICINE

## 2020-01-01 PROCEDURE — 86900 BLOOD TYPING SEROLOGIC ABO: CPT | Performed by: INTERNAL MEDICINE

## 2020-01-01 PROCEDURE — 82728 ASSAY OF FERRITIN: CPT | Performed by: INTERNAL MEDICINE

## 2020-01-01 PROCEDURE — 93005 ELECTROCARDIOGRAM TRACING: CPT | Performed by: EMERGENCY MEDICINE

## 2020-01-01 PROCEDURE — 86901 BLOOD TYPING SEROLOGIC RH(D): CPT | Performed by: INTERNAL MEDICINE

## 2020-01-01 PROCEDURE — 25010000002 ONDANSETRON PER 1 MG

## 2020-01-01 PROCEDURE — 86965 POOLING BLOOD PLATELETS: CPT

## 2020-01-01 PROCEDURE — 85007 BL SMEAR W/DIFF WBC COUNT: CPT | Performed by: EMERGENCY MEDICINE

## 2020-01-01 PROCEDURE — P9019 PLATELETS, EACH UNIT: HCPCS

## 2020-01-01 PROCEDURE — 80053 COMPREHEN METABOLIC PANEL: CPT

## 2020-01-01 PROCEDURE — 80053 COMPREHEN METABOLIC PANEL: CPT | Performed by: PHYSICIAN ASSISTANT

## 2020-01-01 PROCEDURE — 36592 COLLECT BLOOD FROM PICC: CPT | Performed by: INTERNAL MEDICINE

## 2020-01-01 PROCEDURE — 86901 BLOOD TYPING SEROLOGIC RH(D): CPT | Performed by: EMERGENCY MEDICINE

## 2020-01-01 PROCEDURE — G9903 PT SCRN TBCO ID AS NON USER: HCPCS | Performed by: INTERNAL MEDICINE

## 2020-01-01 PROCEDURE — 86900 BLOOD TYPING SEROLOGIC ABO: CPT | Performed by: EMERGENCY MEDICINE

## 2020-01-01 PROCEDURE — 87186 SC STD MICRODIL/AGAR DIL: CPT | Performed by: EMERGENCY MEDICINE

## 2020-01-01 PROCEDURE — 85730 THROMBOPLASTIN TIME PARTIAL: CPT | Performed by: EMERGENCY MEDICINE

## 2020-01-01 PROCEDURE — P9035 PLATELET PHERES LEUKOREDUCED: HCPCS

## 2020-01-01 PROCEDURE — 82728 ASSAY OF FERRITIN: CPT

## 2020-01-01 PROCEDURE — 25010000002 ONDANSETRON PER 1 MG: Performed by: INTERNAL MEDICINE

## 2020-01-01 PROCEDURE — 94799 UNLISTED PULMONARY SVC/PX: CPT

## 2020-01-01 PROCEDURE — 36415 COLL VENOUS BLD VENIPUNCTURE: CPT | Performed by: EMERGENCY MEDICINE

## 2020-01-01 PROCEDURE — 94660 CPAP INITIATION&MGMT: CPT

## 2020-01-01 PROCEDURE — 84466 ASSAY OF TRANSFERRIN: CPT | Performed by: INTERNAL MEDICINE

## 2020-01-01 PROCEDURE — 87804 INFLUENZA ASSAY W/OPTIC: CPT | Performed by: PHYSICIAN ASSISTANT

## 2020-01-01 PROCEDURE — 84466 ASSAY OF TRANSFERRIN: CPT

## 2020-01-01 PROCEDURE — 82962 GLUCOSE BLOOD TEST: CPT

## 2020-01-01 PROCEDURE — 96375 TX/PRO/DX INJ NEW DRUG ADDON: CPT | Performed by: INTERNAL MEDICINE

## 2020-01-01 PROCEDURE — 83540 ASSAY OF IRON: CPT | Performed by: INTERNAL MEDICINE

## 2020-01-01 PROCEDURE — 36600 WITHDRAWAL OF ARTERIAL BLOOD: CPT

## 2020-01-01 PROCEDURE — 83540 ASSAY OF IRON: CPT

## 2020-01-01 PROCEDURE — 99214 OFFICE O/P EST MOD 30 MIN: CPT | Performed by: INTERNAL MEDICINE

## 2020-01-01 PROCEDURE — 85610 PROTHROMBIN TIME: CPT | Performed by: EMERGENCY MEDICINE

## 2020-01-01 PROCEDURE — 82803 BLOOD GASES ANY COMBINATION: CPT

## 2020-01-01 RX ORDER — SODIUM CHLORIDE 0.9 % (FLUSH) 0.9 %
10 SYRINGE (ML) INJECTION AS NEEDED
Status: DISCONTINUED | OUTPATIENT
Start: 2020-01-01 | End: 2020-02-10 | Stop reason: HOSPADM

## 2020-01-01 RX ORDER — ONDANSETRON 2 MG/ML
INJECTION INTRAMUSCULAR; INTRAVENOUS
Status: COMPLETED
Start: 2020-01-01 | End: 2020-01-01

## 2020-01-01 RX ORDER — SODIUM CHLORIDE 9 MG/ML
250 INJECTION, SOLUTION INTRAVENOUS AS NEEDED
Status: DISCONTINUED | OUTPATIENT
Start: 2020-01-01 | End: 2020-01-01 | Stop reason: HOSPADM

## 2020-01-01 RX ORDER — SODIUM CHLORIDE 0.9 % (FLUSH) 0.9 %
10 SYRINGE (ML) INJECTION AS NEEDED
Status: CANCELLED | OUTPATIENT
Start: 2020-01-01

## 2020-01-01 RX ORDER — SODIUM CHLORIDE 9 MG/ML
250 INJECTION, SOLUTION INTRAVENOUS AS NEEDED
Status: CANCELLED | OUTPATIENT
Start: 2020-01-01

## 2020-01-01 RX ORDER — HYDROCODONE BITARTRATE AND ACETAMINOPHEN 5; 325 MG/1; MG/1
1 TABLET ORAL EVERY 8 HOURS PRN
Qty: 40 TABLET | Refills: 0 | Status: SHIPPED | OUTPATIENT
Start: 2020-01-01

## 2020-01-01 RX ORDER — SOTALOL HYDROCHLORIDE 80 MG/1
40 TABLET ORAL 2 TIMES DAILY
Qty: 45 TABLET | Refills: 1 | Status: SHIPPED | OUTPATIENT
Start: 2020-01-01

## 2020-01-01 RX ORDER — ALLOPURINOL 100 MG/1
100 TABLET ORAL 4 TIMES DAILY
Qty: 360 TABLET | Refills: 3 | Status: SHIPPED | OUTPATIENT
Start: 2020-01-01

## 2020-01-01 RX ORDER — ONDANSETRON 4 MG/1
4 TABLET, FILM COATED ORAL ONCE
Status: CANCELLED
Start: 2020-01-01

## 2020-01-01 RX ORDER — SODIUM CHLORIDE 9 MG/ML
125 INJECTION, SOLUTION INTRAVENOUS CONTINUOUS
Status: DISCONTINUED | OUTPATIENT
Start: 2020-01-01 | End: 2020-02-10 | Stop reason: HOSPADM

## 2020-01-01 RX ORDER — OMEPRAZOLE 20 MG/1
20 CAPSULE, DELAYED RELEASE ORAL DAILY
Qty: 90 CAPSULE | Refills: 3 | Status: SHIPPED | OUTPATIENT
Start: 2020-01-01

## 2020-01-01 RX ORDER — ONDANSETRON 4 MG/1
4 TABLET, FILM COATED ORAL ONCE
Status: COMPLETED | OUTPATIENT
Start: 2020-01-01 | End: 2020-01-01

## 2020-01-01 RX ORDER — SODIUM CHLORIDE 0.9 % (FLUSH) 0.9 %
10 SYRINGE (ML) INJECTION AS NEEDED
Status: DISCONTINUED | OUTPATIENT
Start: 2020-01-01 | End: 2020-01-01 | Stop reason: HOSPADM

## 2020-01-01 RX ORDER — ONDANSETRON 2 MG/ML
4 INJECTION INTRAMUSCULAR; INTRAVENOUS ONCE
Status: COMPLETED | OUTPATIENT
Start: 2020-01-01 | End: 2020-01-01

## 2020-01-01 RX ADMIN — ONDANSETRON HYDROCHLORIDE 4 MG: 4 TABLET, FILM COATED ORAL at 13:28

## 2020-01-01 RX ADMIN — SODIUM CHLORIDE 2 G: 900 INJECTION INTRAVENOUS at 20:12

## 2020-01-01 RX ADMIN — DEXAMETHASONE SODIUM PHOSPHATE: 10 INJECTION, SOLUTION INTRAMUSCULAR; INTRAVENOUS at 17:14

## 2020-01-01 RX ADMIN — ONDANSETRON 4 MG: 2 INJECTION INTRAMUSCULAR; INTRAVENOUS at 21:00

## 2020-01-01 RX ADMIN — SODIUM CHLORIDE 250 ML: 9 INJECTION, SOLUTION INTRAVENOUS at 16:12

## 2020-01-01 RX ADMIN — SODIUM CHLORIDE 1000 ML: 900 INJECTION, SOLUTION INTRAVENOUS at 20:08

## 2020-01-03 NOTE — PROGRESS NOTES
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  Patient was also evaluated by Dr. Man Kimbrough at [place] in 2012.  Patient has been on observation since then without any active chemotherapy.  In November 2014 in view of worsening leukocytosis and anemia he had a repeat bone marrow biopsy done by Dr. Shore which showed systemic mastocytosis with associated clonal hematologic non-mass cell lineage disease, CMML-1.  Patient has not been on any chemotherapy since then.  In March 2018 patient was seen here at clinic by Goldie WHYTE.  In view of sclerotic lesion on lower extremity as well as skin lesion patient was referred to dermatology.  Biopsy done by Dr. Chen on June 20, 2018 showed increased MAST cells consistent with urticaria pigmentosa.   patient was referred to [facility] when he was seen by Dr. Kimbrough on August 20, 2018 and a bone marrow biopsy done on August 23, 2018.  Was started on Vidaza on September 24, 2018.  Due to worsening skin lesion, treatment has been changed to midostaurin on July 24, 2019.  Due to worsening cytopenias, midostaurin was discontinued on September 26, 2019.  Patient is here for follow-up appointment today.  Due to persistent cytopenia requiring blood transfusion.  Patient was referred to [facility].  Patient was evaluated by Dr. Kimbrough and underwent bone marrow biopsy on November 21, 2019 at [place] which again showed chronic myelomonocytic leukemia without any  evidence of acute leukemia or increased blast.  Due to anemia with a hemoglobin of less than 7, patient has required 1 unit of PRBC on December 28, 2019 in December 26, 2019.  Complains of worsening fatigue.  Complains of dizziness which is getting worse recently.  Complains of intermittent diarrhea.  Complains of left upper quadrant pain. Complains of depression.  Denies any homicidal or suicidal ideation.  Patient denies any excessive nausea or vomiting .      States he is skin rash affecting chest and back is stable.  Denies any bleeding.  Denies any more chills or fevers.        PAST MEDICAL HISTORY:    Past Medical History:   Diagnosis Date   • Atrophy of testis    • Benign prostatic hyperplasia    • Borderline glaucoma    • Chronic myelomonocytic leukemia (CMS/HCC)    • Degenerative joint disease involving multiple joints    • GERD (gastroesophageal reflux disease)    • Gout    • History of echocardiogram 05/08/2012    Normal left ventricular systolic function, EF 60%. Early diastolic dysfunction. Mild aortic and pulmonic regurgitation. Trace mitral and mild tricuspid regurgitation. No intracardiac mass pericardial effusion or cardiac thrombus.   • History of Holter monitoring 01/18/2011   • Impotence    • Lightheadedness    • Neck pain, musculoskeletal    • Sleep apnea     NOT WEARING C-PAP   • TIA (transient ischemic attack) 2014       SOCIAL HISTORY:    Social History     Tobacco Use   • Smoking status: Never Smoker   • Smokeless tobacco: Never Used   Substance Use Topics   • Alcohol use: No   • Drug use: No       Surgical History :  Past Surgical History:   Procedure Laterality Date   • CARDIAC CATHETERIZATION  09/30/2009    No epicardial coronary artery disease noted that would explain patient's chest pain of the abnormal stress test noted. Normal left ventricular systolic function with no wall motion abnormalities   • CARDIAC CATHETERIZATION  05/24/1989    Normal catheterization, false-positive exercise  treadmill. Noncardiac chest pain   • CARDIAC ELECTROPHYSIOLOGY PROCEDURE N/A 5/18/2017    Procedure: PPM generator change - dual;  Surgeon: Geneva Day MD;  Location: [facility] CATH INVASIVE LOCATION;  Service:    • CARDIAC PACEMAKER PLACEMENT  06/2008    Dual chamber permanent pacemaker implantation   • COLECTOMY PARTIAL / TOTAL  04/14/2000    Cecal diverticulitis. Removal of small segment of terminal ileum, cecum and right colon, sent to pathology   • COLON SURGERY  03/27/2016   • COLONOSCOPY  05/25/2012   • COLONOSCOPY N/A 7/30/2019    Procedure: COLONOSCOPY;  Surgeon: Seferino Guzman DO;  Location: [facility] ENDOSCOPY;  Service: Gastroenterology   • CYSTOSCOPY  11/15/2000    Urinary retention on the basis of medications and benign prostatic hypertrophy   • INGUINAL HERNIA REPAIR  02/15/2007    Recurrent right inguinal hernia. Repair of recurrent inguinal hernia with EPS Prolene mesh system.   • INGUINAL HERNIA REPAIR Right 1957   • LAPAROSCOPIC CHOLECYSTECTOMY  10/26/2006    Gallstones. Laparoscopic cholecystectomy with operative cholangiogram   • PROSTATECTOMY  11/17/2000    Benign prostatic hypertrophy with urinary retention. Prostatitis. transurethral resection of prostate.   • STEROID INJECTION  10/21/2010    Decadron (Gout)    • STEROID INJECTION  07/23/2013    Kenalog (Gout) (4)   • VENOUS ACCESS DEVICE (PORT) INSERTION N/A 9/13/2018    Procedure: ULTRASOUND ASSISTED RIGHT JUGULAR INSERTION VENOUS ACCESS DEVICE;  Surgeon: Luis E Babb MD;  Location: [facility] OR;  Service: General   • VENOUS ACCESS DEVICE (PORT) REMOVAL N/A 11/14/2018    Procedure: REMOVAL VENOUS ACCESS DEVICE;  Surgeon: Tono Arevalo MD;  Location: [facility] OR;  Service: General       ALLERGIES:    Allergies   Allergen Reactions   • Doxycycline Hyclate Other (See Comments)     Other reaction(s): lip swollen   • Sulfa Antibiotics Swelling     facial   • Other Rash     SILK TAPE   • Penicillins Rash     Reaction: rash   • Soma  "[Carisoprodol] Swelling and Rash         FAMILY HISTORY:  Family History   Problem Relation Age of Onset   • Cancer Other    • Colon cancer Other         parent   • Coronary artery disease Other    • Heart disease Other    • Hypertension Other    • Cholelithiasis Other    • Other Other         colon problems           REVIEW OF SYSTEMS:      CONSTITUTIONAL:  Complains of worsening fatigue.  No recent weight change.  Complains of poor appetite.  Denies any fever drenching night sweats .     EYES: No visual disturbances. No discharge. No new lesions    ENMT:  No epistaxis, mouth sores or difficulty swallowing.    RESPIRATORY:  Complains of shortness of breath with exertion. No new cough or hemoptysis.    CARDIOVASCULAR:  Complains of intermittent chest pain.No palpitations.    GASTROINTESTINAL:   Complains of intermittent diarrhea.  Denies any blood in stool.  Complains of worsening pain and discomfort in left upper quadrant.  No  nausea, vomiting .    GENITOURINARY: No Dysuria or Hematuria.    MUSCULOSKELETAL:  Complains of worsening back pain.  Complains of worsening pain in neck region.    LYMPHATICS:  Denies any abnormal swollen glands anywhere in the body.    NEUROLOGICAL : Complains of worsening dizziness.  No tingling or numbness. No headache . No seizures or balance problems.    SKIN: States skin rash affecting chest and back is stable    PSYCHIATRICS: Complains of feeling depressed.  Denies any homicidal or suicidal ideation.          PHYSICAL EXAMINATION:      VITAL SIGNS:  /63   Pulse 75   Temp 97.4 °F (36.3 °C) (Temporal)   Resp 18   Ht 180.3 cm (71\")   Wt 80 kg (176 lb 6.4 oz)   SpO2 100%   BMI 24.60 kg/m²       01/03/20  1024   Weight: 80 kg (176 lb 6.4 oz)         ECOG performance status: 2    CONSTITUTIONAL:  Not in any distress.      EYES: Mild conjunctival Pallor. No Icterus. No Pterygium. Extraocular Movements intact.No ptosis.    ENMT:  Normocephalic, Atraumatic.No Facial Asymmetry " noted.    NECK:  No adenopathy.Trachea midline. NO JVD.    RESPIRATORY:  Fair air entry bilateral. No rhonchi or wheezing.Fair respiratory effort.    CARDIOVASCULAR:  S1, S2. Regular rate and rhythm. No murmur or gallop appreciated.Pacemaker present on left chest wall.    ABDOMEN:  Soft, obese, nontender. Bowel sounds present in all four quadrants.  Splenomegaly palpable in left upper quadrant.    MUSCULOSKELETAL:  No edema.No Calf Tenderness.Decreased range of motion.    NEUROLOGIC:    No  Motor  deficit appreciated. Cranial Nerves 2-12 grossly intact.    SKIN : Skin lesion consistent with urticaria pigmentosa present on chest, abdomen, back, upper and lower extremity. Skin rash Appears stable    LYMPHATICS: No new enlarged lymph nodes in neck or supraclavicular area.    PSYCHIATRY: Alert, awake and oriented ×3.Normal affect.  Normal judgment.  Makes good eye contact.        DIAGNOSTIC DATA:    Glucose   Date Value Ref Range Status   12/05/2019 87 65 - 99 mg/dL Final     Sodium   Date Value Ref Range Status   12/05/2019 136 136 - 145 mmol/L Final     Potassium   Date Value Ref Range Status   12/05/2019 4.1 3.5 - 5.2 mmol/L Final     CO2   Date Value Ref Range Status   12/05/2019 22.0 22.0 - 29.0 mmol/L Final     Chloride   Date Value Ref Range Status   12/05/2019 105 98 - 107 mmol/L Final     Anion Gap   Date Value Ref Range Status   12/05/2019 9.0 5.0 - 15.0 mmol/L Final     Creatinine   Date Value Ref Range Status   12/05/2019 0.75 (L) 0.76 - 1.27 mg/dL Final     BUN   Date Value Ref Range Status   12/05/2019 13 8 - 23 mg/dL Final     BUN/Creatinine Ratio   Date Value Ref Range Status   12/05/2019 17.3 7.0 - 25.0 Final     Calcium   Date Value Ref Range Status   12/05/2019 8.3 (L) 8.6 - 10.5 mg/dL Final     eGFR Non  Amer   Date Value Ref Range Status   12/05/2019 101 >60 mL/min/1.73 Final     Alkaline Phosphatase   Date Value Ref Range Status   12/05/2019 136 (H) 39 - 117 U/L Final     Total Protein    Date Value Ref Range Status   12/05/2019 6.4 6.0 - 8.5 g/dL Final     ALT (SGPT)   Date Value Ref Range Status   12/05/2019 7 1 - 41 U/L Final     AST (SGOT)   Date Value Ref Range Status   12/05/2019 11 1 - 40 U/L Final     Total Bilirubin   Date Value Ref Range Status   12/05/2019 1.0 0.2 - 1.2 mg/dL Final     Albumin   Date Value Ref Range Status   12/05/2019 3.90 3.50 - 5.20 g/dL Final     Globulin   Date Value Ref Range Status   12/05/2019 2.5 gm/dL Final     Lab Results   Component Value Date    WBC 5.52 01/03/2020    HGB 5.8 (C) 01/03/2020    HCT 17.7 (C) 01/03/2020    .1 (H) 01/03/2020    PLT 52 (L) 01/03/2020     Lab Results   Component Value Date    NEUTROABS 2.70 01/03/2020    IRON 93 09/26/2019    TIBC 238 (L) 09/26/2019    LABIRON 39 09/26/2019    FERRITIN 248.10 09/26/2019    [id number] 365 09/26/2019    FOLATE >20.00 09/26/2019     Lab Results   Component Value Date    HCGQUANT <1 12/16/2014       Tryptase    Ref Range & Units 1mo ago   Tryptase 2.2 - 13.2 ug/L 93.9     Resulting Agency  [facility]   Narrative     Performed at:  [address] [facility]  [id number]  : Tej Matias MD, Phone:  [phone]      Specimen Collected: 07/25/18 12:34 Last Resulted: 07/27/18 13:16                     Blood Culture - Blood, Blood, Venous Line   Specimen Information: Blood, Venous Line        Blood Culture Acinetobacter lwoffii group Abnormal        ISOLATED FROM Aerobic Bottle              Gram Stain  Aerobic Bottle Gram negative bacilli   2 bottles of 3 bottles drawn positive for gram neg bacillus         Resulting Agency: [facility]   Susceptibility      Acinetobacter lwoffii group     CHIVO     Ampicillin + Sulbactam <=8/4 ug/ml Susceptible     Cefepime <=8 ug/ml Susceptible     Ceftazidime 4 ug/ml Susceptible     Ceftriaxone <=8 ug/ml Susceptible     Levofloxacin <=2 ug/ml Susceptible     Piperacillin <=16 ug/ml Susceptible     Trimethoprim +  Sulfamethoxazole <=2/38 ug/ml Susceptible                 Specimen Collected: 11/12/18 10:26 Last Resulted: 11/16/18 06:25                  PATHOLOGY:  Pathology report from [place] on November 21, 2019 showed:  Diagnosis:  BONE MARROW - PERIPHERAL BLOOD SMEAR, ASPIRATE SMEAR, PARTICLE PREPARATION, BIOPSY AND FLOW CYTOMETRY: PERSISTENT SYSTEMIC MASTOCYTOSIS WITH AN ASSOCIATED HEMATOLOGICAL NEOPLASM (CHRONIC MYELOMONOCYTIC LEUKEMIA)     IMPRESSION: This is a markedly hypercellular bone marrow exhibiting sheets of morphologically and immunophenotypically abnormal mast cells. In addition, there is relative monocytosis and trilineage dysplasia. These findings are indicative of persistence of the patient's previously diagnosed systemic mastocytosis with an associated hematological neoplasm (chronic myelomonocytic leukemia). There is no increase in blasts. Final diagnosis depends on correlation with pending additional testing, as documented below, which will be included in the comprehensive hematopathology report    MARROW SMEAR DIFFERENTIAL:  Blasts (0-4): 1.5%  Promyelocytes (1-8): 5.0%  Myelocytes and metamyelocytes (20-30): 21.0%  Bands and segmented neutrophils (12-25): 28.0%  Eosinophils and eosinophilic precursors (1-5): 0.0%  Basophils and basophilic precursors (0-1): 0.0%  Monocytes and monocytic precursors (0-2): 3.5%  Lymphocytes (10-15): 2.0%  Plasma cells (0-1): 0.0%  Erythroid precursors (15-27): 36.5%  Other: 2.5%  Total cells counted: 200    BONE MARROW BIOPSY AND PARTICLE PREPARATION:  H&E and PAS stained bone marrow biopsy and particle preparation sections are reviewed. The material is markedly hypercellular (90% cellular) and of adequate quality. The majority of cellularity is composed of mast cells, many of which show spindled morphology. Myeloid and erythroid elements are relatively decreased. Erythroid elements demonstrate atypia, including binucleation. Megakaryocytes are decreased in number, and  demonstrate mild dysplasia, including small size and hypolobated nuclei. There is no increase in or abnormal distribution of blasts, plasma cells, or lymphocytes. There are no granulomata. Bony trabeculae are normal for the patient's age.     FLOW CYTOMETRY STUDIES:  IP ID:   Viability: 89%    DIAGNOSIS: No increase in blasts; abnormal mast cells present    COMMENT: Myeloblasts are not increased (2.3% of total cells), with the following normal immunophenotype: positive for CD33 (moderate), CD34, and CD45 (dim); and negative for CD19. B cells are not increased (0.3% of total cells), with the following normal immunophenotype: positive for CD19 and CD45 (bright); and negative for CD34. T/NK cells are not increased (4.8% of total cells), with the following normal immunophenotype: positive for CD45 (bright); and negative for CD34 and CD19. There is a distinct population of abnormal mast cells (1.5% of total cells), with the following abnormal immunophenotype: positive for CD2, CD25, CD45,  (bright), and FcER1.    CYTOGENETICS AND MOLECULAR DIAGNOSTIC RESULTS:  Additional testing (karyotype, FLT3 ITD) is pending. Results will be included in the comprehensive hematopathology report.           Pathology report from August 23, 2018 at [place] showed:  Diagnosis:  BONE MARROW - PERIPHERAL BLOOD SMEAR, ASPIRATE SMEAR, PARTICLE PREPARATION, BIOPSY, AND FLOW CYTOMETRY: MARKEDLY HYPERCELLULAR MARROW WITH INVOLVEMENT BY SYSTEMIC MASTOCYTOSIS WITH AN ASSOCIATED HEMATOLOGICAL NEOPLASM, CHRONIC MYELOMONOCYTIC LEUKEMIA-1; SEE IMPRESSION     IMPRESSION:  The findings are of a markedly hypercellular marrow (90% cellular) with maturing trilineage hematopoiesis and multifocal mast cell aggregates, including spindled forms. Mast cells are positive for CD2 and CD25 by flow cytometric analysis. Recent testing at an outside institution showed an elevated tryptase level. The overall findings are consistent with involvement by  systemic mastocytosis. In addition, there is multilineage dysplasia with mildly increased blasts and blast equivalents (6.1% of cellularity in total) and a persistent relative and absolute peripheral monocytosis. The combined findings are consistent with involvement by chronic myelomonocytic leukemia-1 (CMML-1), proliferative type. Final diagnosis requires correlation with pending additional testing, as documented below, which will be included in the comprehensive hematopathology report.    PERIPHERAL BLOOD SMEAR:  CBC : WBC 18.9 k/microL, Hgb 9.7 g/dL, Plt-Ct 187 k/microL,  fL, RDW 26.3%.    A Yi's stained peripheral smear is reviewed. Erythrocytes are decreased and are macrocytic and normochromic with anisopoikilocytosis, including target cells and teardrop cells. Polychromasia is present. Nucleated red blood cells are present. Leukocytes are increased and include mature neutrophils, lymphocytes, and monocytes, with a relative and absolute monocytosis. Neutrophils show dysplasia (nuclear hypolobation, hypogranulation). Left-shifted myeloid elements are present. There are no circulating blasts or plasma cells. Platelets are adequate in number and show variation in size.     ASPIRATE SMEARS AND TOUCH PREPARATIONS:  Yi's stained aspirate smears and touch preparations are reviewed. The material is hypercellular and particulate. Myeloid elements are adequate and show dysplasia (oxseihv-la-kltnvzzgwqk dyssynchrony). Erythroid elements are adequate and show left-shifted maturation with mild dysplasia (megaloblastoid change, occasional nuclear membrane irregularity). The myeloid:erythroid ratio is 1.1 to 1. Megakaryocytes are increased in number, and demonstrate no significant atypia. Blasts and blast equivalents (monoblasts, promonocytes) comprise 6.1% of cellularity in total (600 cell count). There is no increase in plasma cells or lymphocytes. Focal particles show significantly increased mast cells,  including occasional spindle forms. A Prussian blue iron stain is performed on the sample, revealing increased storage iron. Sideroblastic iron is present. Ring sideroblasts are present (approximately 40-50%).     MARROW SMEAR DIFFERENTIAL:  Blasts (0-4): 3.8%  Monoblasts/promonocytes: 2.3%  Promyelocytes (1-8): 2.2%  Myelocytes and metamyelocytes (20-30): 15.8%  Bands and segmented neutrophils (12-25): 23.0%  Eosinophils and eosinophilic precursors (1-5): 0.3%  Basophils and basophilic precursors (0-1): 0.0%  Monocytes and monocytic precursors (0-2): 5.8%  Lymphocytes (10-15): 3.0%  Plasma cells (0-1): 0.2%  Erythroid precursors (15-27): 43.5%  Total cells counted: 600    BONE MARROW BIOPSY AND PARTICLE PREPARATION:  H&E and PAS stained bone marrow biopsy and particle preparation sections are reviewed. The material is adequate and markedly hypercellular (90% cellular). Myeloid elements are present in normal proportion and exhibit maturation. Erythroid elements are present in normal proportion and exhibit left-shifted maturation. Megakaryocytes are increased and include occasional hypolobated forms and forms with abnormal nuclear lobe separation. CD34-positive blasts comprise approximately 3-5% of marrow cellularity. Multifocal dense mast cell aggregates are present, including spindled forms, as confirmed by  and tryptase immunostains. A CD68 stain highlights increased monocytic/histiocytic forms. Bony trabeculae are normal for the patient's age. All stains performed with appropriate controls.     FLOW CYTOMETRY STUDIES:  IP ID:   Viability: 94.1%  DIAGNOSIS: No increase in blasts; abnormal mast cells present    COMMENT: Myeloblasts are not increased (0.9% of total cells), with the following normal immunophenotype: positive for CD33 (moderate), CD34, and CD45 (dim); and negative for CD19. B cells are not increased (0.1% of total cells), with the following normal immunophenotype: positive for CD19  (bright) and CD45 (bright); and negative for CD34. T cells are not increased (3.1% of total cells), with the following normal immunophenotype: positive for CD45 (bright); and negative for CD34. Mast cells are present (0.03% of total cells), with the following abnormal immunophenotype: positive for CD2, CD25, CD45,  (bright), and FcER1.    CYTOGENETICS AND MOLECULAR DIAGNOSTIC RESULTS:  Additional testing (Karyotype, Myeloid NGS) is pending. Results will be included in the comprehensive hematopathology report.         **Electronically signed out by ELVIA TO,TYLER**on 8/27/2018  MS/EM/haritha  Case reviewed by Attending Pathologist        Pathology report from November 13, 2014 showed:  FINAL DIAGNOSIS:   PERIPHERAL SMEAR, REVIEW:        MONOCYTOSIS WITH LEFT MYELOID SHIFT.        ANEMIA, BORDERLINE.   BONE MARROW ASPIRATION AND BIOPSY:        SYSTEMIC MASTOCYTOSIS WITH ASSOCIATED CLONAL             HEMATOLOGIC NON-MAST CELL LINEAGE DISEASE,             CHRONIC MYELOMONOCYTIC LEUKEMIA-1.            RADIOLOGY DATA :  CT of abdomen and pelvis without contrast done on October 25, 2019 was reviewed, discussed with patient, it showed:  IMPRESSION:  Sigmoid diverticulitis.  No gross abscess.  Diffuse increase in extent of the sclerotic lesions in the  visualized bony structures in the dorsal spine, rib cage  bilaterally, sacrum, pelvic girdle, and also bilateral proximal  femora concerning for bony metastases enlargement involvement..    Correlation with total body bone scan is recommended as  follow-up.  Small left and trace amount of right pleural effusion. Trace  amount of pericardial effusion. Mild nonspecific atelectasis in  the lung bases bilaterally.  Small peritoneal free fluid.  Splenomegaly, new since prior CT scan study.  Stable hyperdense renal cyst in the right lower kidney.          CT of abdomen with and without contrast done on May 14, 2018 showed:  The liver is normal. The gallbladder surgically absent.  The  biliary system appears within normal limits status post  cholecystectomy. The pancreas is normal. The spleen is normal.  Bilateral adrenal glands are normal. Right kidney mid zone 4.7 mm  nonobstructive renal calculi. Stable right kidney lower pole oval  hyperdense lesion on unenhanced imaging which has Hounsfield  units of  49. Following enhancement this lesion has Hounsfield  units of 49 and 51 suggesting no significant enhancement. This  lesion measures 2.36 cm in greatest diameter and is not  appreciably changed. Otherwise right kidney and ureter are  unremarkable. Left kidney and visualized ureter are normal.  Visualized hollow viscera is normal. No lymphadenopathy in the  abdomen. No acute osseous abnormalities.     IMPRESSION:  1. Stable right kidney lower pole oval hyperdense lesion which  does not enhance measuring 2.36 cm in greatest diameter. This  interval stability and appearance is most consistent with benign  hyperdense cyst.  2. Right kidney mid zone 4.7 mm stable nonobstructive renal  calculi..  3. Otherwise unremarkable CT abdomen study with without  contrast..      CT of left lower extremity with contrast done on March 23, 2018 showed:  COMMENTS:              A CT examination was performed of the lower extremities, with  images coned to the left femur.     There is a marrow-based focal sclerotic lesion involving the  proximal/mid femur, measuring approximately 2.6 cm in  craniocaudal extent. There is no evidence of endosteal  scalloping, or features to suggest an aggressive process. There  is no abnormal periosteal reaction.     The remainder the examination is unremarkable for age.     .     IMPRESSION:  CONCLUSION:          1. Focal sclerotic bone lesion which is marrow based in the  diaphysis of the left femur in the proximal/midportion. This is  likely a benign lesion and correlation with plain film  radiographs are suggested.            CT chest with contrast done on January 26, 2018  showed:  IMPRESSION:  CONCLUSION:  Multiple sclerotic lesions in the spine and right RIBS,  concerning for metastatic prostate cancer. Recommend correlation  with PSA levels and nuclear medicine bone scan.               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 Shore.  -Patient hasso far not require any chemotherapy or any other treatment.  -Recently in view of worsening skin lesion on the chest, abdomen and back he underwent a biopsy by Dr. 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  -Patient is also found to have worsening anemia recently on blood work done from June 2018.  -Patient is also found to have multiple sclerotic lesion on the spine and hips on CT scan done from January 2018 until June 2018.  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  -Patient was evaluated by Dr. Kimbrough at [facility] on August 20, 2018 and had a bone marrow biopsy done on August 23, 2018 that again showed systemic mastocytosis with CMML-1.  -In view of elevated tryptase level, his case was discussed at tumor board at [facility].  -Case was discussed with Dr. Kimbrough on September 10, 2018, is recommending treatment with Vidaza for now.    -He was started on Vidaza on September 24, 2018.  -Upon next clinic visit on November 12, 2018,patient was admitted to hospital due to bacteremia and chemotherapy was delayed.  -On November 26, 2018, patient requested chemotherapy to be held because of ill health of his wife.  -Patient received cycle 2 of  Vidaza  on January 7, 2019.    -Patient has received so far 7 cycles of Vidaza from September 24, 2018 until Eugenia 3, 2019.  -In view of persistent anemia, mastocytosis and worsening skin rash.  Patient was referred back to Dr. Kimbrough at [facility].  Patient was seen by Dr. Truong on June 17, 2019.  -Case was discussed with Dr. Kimbrough  on July 8, 2019.  -Due to worsening skin lesion and anemia due to mastocytosis, recommendation is to change treatment to  midostaurin.  This recommendation were discussed with the patient today.  -Patient started taking midostaurin 50 mg twice daily on July 24, 2019.    -CBC done  on September 26, 2019 shows hemoglobin is decreased to 8.5.  Platelet count is also decreased  To 108,000.  Midostaurin has been held since September 26, 2019.  -Since patient has a persistent cytopenia despite avoiding midostaurin for 5 weeks and recent CT scan showing new onset splenomegaly with worsening sclerotic lesion on bone.  Patient was referred back to [facility] clinic.  Patient was evaluated by Dr. Man Kimbrough and bone marrow biopsy was done on November 21, 2019.  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  There is no evidence of increased blast.  Case was discussed with Dr. Kimbrough over the phone on December 3, 2019.  Patient's case was discussed at tumor board at [facility].  Recommendation is to continue with supportive care for now since patient has significant cytopenia requiring blood transfusion.  No further chemotherapy is recommended at present.  Recommendation were discussed with patient today.  -Patient has required 1 unit of PRBC on December 6, 2019, December 20, 2019 and December 26, 2019.  -Hemoglobin today is again decreased to 5.8.  We will do 2 units of PRBC today.  Risk and benefit of blood transfusion were discussed with patient.  He wants to proceed with blood transfusion.  - We will do weekly CBC for now.  Will transfuse patient as needed if  hemoglobin is less than 7.  -We will ask patient to return to clinic in 4 weeks with repeat CBC CMP and LDH on that day.      2.  Anemia: Most likely secondary to #1  -Hemoglobin is decreased to 5.8 today  -Patient received 1 unit of PRBC on December 6, 2019, December 20, 2019 and December 26, 2019.  -Due to hemoglobin of 5.8, will do 2 units of PRBC today.  Risk and benefit of blood transfusion were discussed with patient.  He wants to proceed with blood transfusion.  -Anemia work-up has been repeated today on January 3, 2020.  If there is any evidence of nutritional deficiency will replace it.          3.  Leukocytosis: Secondary to chronic myelomonocytic leukemia. Resolved. Wbc is 5.52 today. We'll monitored with CBC for now.    4.  Urticaria pigmentosa, secondary to underlying  marrow pathology.    5.  Thrombocytopenia: Secondary to bone marrow involvement. Platelet count is 52,000 today.  We'll monitored with CBC for now.    6.  History of bradycardia status post pacemaker in 2008.    7.  Diarrhea:  -Patient was complaining of 2 -3 loose watery bowel movement daily.  Stool for C. difficile done  on October 10, 2019 is negative.  -Recommend using Imodium as required for diarrhea.    8.  Splenomegaly with infarction:( problem is new to me)  -Secondary to underlying systemic mastocytosis and chronic myelomonocytic leukemia.  -Since patient is transfusion dependent anemia and thrombocytopenia.  Would not recommend any anticoagulation at present.  - Will send prescription for Norco 5/325 every 8 hours as needed for pain.  Patient was counseled about bowel regimen with Norco.    9.  Depression and loss of appetite:  -Patient denies any homicidal or suicidal ideation.  -We will start patient on Remeron 15 mg every night to help him depression as well as appetite issue.  -Recommend following up with primary medical provider     10.  Health maintenance: Patient does not smoke.  Had a colonoscopy in 2014 by   [facility].    11. BMI: Patient's Body mass index is 24.6 kg/m². BMI is higher than reference range.  Patient was notified about elevated BMI.    12. Advance Care Planning: Patient has a living will on chart.    13.  Pain assessment:  -Patient denies any pain today.           Jordin Roblero MD  1/3/2020  1:50 PM        EMR Dragon/Transcription disclaimer:   Much of this encounter note is an electronic transcription/translation of spoken language to printed text. The electronic translation of spoken language may permit erroneous, or at times, nonsensical words or phrases to be inadvertently transcribed; Although I have reviewed the note for such errors, some may still exist.

## 2020-01-09 NOTE — TELEPHONE ENCOUNTER
Called pt, on his medications. Pt states that on his AVS yesterday there were prescriptions called in. Looked a med list. Looks like medications were called in by DR Tan and Dr Day. Informed pt. Pt voiced understanding.

## 2020-01-10 NOTE — TELEPHONE ENCOUNTER
----- Message from Jordin Roblero MD sent at 1/9/2020  8:40 AM CST -----  Regarding: RE: pt concern  Unfortunately his bone marrow is not recovering.  He has become transfusion dependent due to underlying problem with chronic myelomonocytic leukemia and systemic mastocytosis.  His shortness of breath with exertion and worsening fatigue is due to his severe anemia.  For now we will continue with supportive transfusion.  Thank you  ----- Message -----  From: Janie Marks RN  Sent: 1/8/2020   2:59 PM CST  To: Jordin Roblero MD  Subject: pt concern                                       Pt expressed concern regarding being very weak and unable to go even short distances now without feeling very winded and worn out.  I talked to pt and explained to him with his blood counts, he will be more tired and short of breath.  I told him I would speak with you just so you would be aware and if we needed to do anything else we could let him know.  Thanks

## 2020-01-22 NOTE — PROGRESS NOTES
Called [employer]; spoke to Issac, called in Zofran 4mg tablet q 6 hours, as needed for n/v. Quantity 40; 3 refills per Dr. Roblero.

## 2020-01-29 NOTE — PROGRESS NOTES
TWO PATIENT IDENTIFIERS WERE USED. CONSENT WAS SIGNED PER PATIENT EDUCATION MATERIAL WAS GIVEN TO PATIENT AND / OR FAMILY. THE PATIENT WAS DRAPED WITH FULL BODY DRAPE AND PATIENT'S LEFT ARM WAS PREPPED WITH CHLORAPREP.  ULTRASOUND WAS USED TO LOCALIZE THELEFT BASILIC  VEIN. SUBCUTANEOUS TISSUE AT THE CATHETER SITE WAS INFILTRATED WITH 2% LIDOCAINE. UNDER ULTRASOUND GUIDANCE, THE VEIN WAS ACCESSED WITH A 21GAUGE  NEEDLE. AN 0.018 WIRE WAS THEN THREADED THROUGH THE NEEDLE INTO THE CENTRAL VENOUS SYSTEM. THE 21GAUGE  NEEDLE WAS REMOVED AND A 4 [place] PEEL AWAY SHEATH WAS PLACED OVER THE WIRE. THE PICC LINE CATHETER WAS CUT AT 41 CM. THE PICC LINE CATHETER WAS THEN PLACED OVER THE WIRE INTO THE VEIN, THE SHEATH WAS PEELED AWAY,WIRE WAS REMOVED. CATHETER WAS FLUSHED WITH NORMAL SALINE AND TIPS APPLIED. BIOPATCH PLACED. CATHETER SECURED WITH STATLOCK AND TEGADERM. PATIENT TOLERATED PROCEDURE WELL. THIS WAS DONE IN THE   ANGIOSUITE      IMPRESSION: SUCCESSFUL PLACEMENT OF SINGLE LUMEN SOLO PICC        Anjelica Gallegos  1/29/2020  12:22 PM

## 2020-01-29 NOTE — PROGRESS NOTES
Informed consent has been given for a PICC (Peripherally Inserted Central Catheter). The provider has explained to me: (1) the nature of the proposed procedure; (2) the potential benefits, risks, or side effects, including potential problems that might occur during recuperation; (3) the likelihood of achieving goals; (4) reasonable alternatives to the procedure, if any; and (5) the relevant benefits, risks, and side effects associated with those alternatives, including the possible results of not receiving care, treatment, and services.

## 2020-01-30 NOTE — PROGRESS NOTES
" Subjective   Jan Julio Workman is a 79 y.o. male.     History of Present Illness     Chronic neck pain.  Bone marrow has shut down due to his cancer, has had 12 transfusions?   Has norco rx from dr ortega,  Hasn't picked up yet.   Has bump back of inferior neck, wonders if it is something    Review of Systems   Constitutional: Negative for chills, fatigue and fever.   HENT: Negative for congestion, ear discharge, ear pain, facial swelling, hearing loss, postnasal drip, rhinorrhea, sinus pressure, sore throat, trouble swallowing and voice change.    Eyes: Negative for discharge, redness and visual disturbance.   Respiratory: Negative for cough, chest tightness, shortness of breath and wheezing.    Cardiovascular: Negative for chest pain and palpitations.   Gastrointestinal: Negative for abdominal pain, blood in stool, constipation, diarrhea, nausea and vomiting.   Endocrine: Negative for polydipsia and polyuria.   Genitourinary: Negative for dysuria, flank pain, hematuria and urgency.   Musculoskeletal: Positive for joint swelling and myalgias. Negative for arthralgias and back pain.   Skin: Negative for rash.   Neurological: Negative for dizziness, weakness, numbness and headaches.   Hematological: Negative for adenopathy.   Psychiatric/Behavioral: Negative for confusion and sleep disturbance. The patient is not nervous/anxious.            /60 (BP Location: Left arm, Patient Position: Sitting, Cuff Size: Adult)   Pulse 76   Temp 97.7 °F (36.5 °C) (Temporal)   Ht 180.3 cm (70.98\")   Wt 78.1 kg (172 lb 3.2 oz)   SpO2 99%   BMI 24.03 kg/m²       Objective     Physical Exam   Constitutional: He is oriented to person, place, and time. He appears well-developed and well-nourished.   HENT:   Head: Normocephalic and atraumatic.   Right Ear: External ear normal.   Left Ear: External ear normal.   Nose: Nose normal.   Eyes: Pupils are equal, round, and reactive to light. Conjunctivae and EOM are normal.   Neck: " Normal range of motion.   Pulmonary/Chest: Effort normal.   Musculoskeletal: Normal range of motion.   No swelling/erythema/abnormal mass back of neck   Neurological: He is alert and oriented to person, place, and time.   Psychiatric: He has a normal mood and affect. His behavior is normal. Judgment and thought content normal.   Nursing note and vitals reviewed.          PAST MEDICAL HISTORY     Past Medical History:   Diagnosis Date   • Atrophy of testis    • Benign prostatic hyperplasia    • Borderline glaucoma    • Chronic myelomonocytic leukemia (CMS/HCC)    • Degenerative joint disease involving multiple joints    • GERD (gastroesophageal reflux disease)    • Gout    • History of echocardiogram 05/08/2012    Normal left ventricular systolic function, EF 60%. Early diastolic dysfunction. Mild aortic and pulmonic regurgitation. Trace mitral and mild tricuspid regurgitation. No intracardiac mass pericardial effusion or cardiac thrombus.   • History of Holter monitoring 01/18/2011   • Impotence    • Lightheadedness    • Neck pain, musculoskeletal    • Sleep apnea     NOT WEARING C-PAP   • TIA (transient ischemic attack) 2014      PAST SURGICAL HISTORY     Past Surgical History:   Procedure Laterality Date   • CARDIAC CATHETERIZATION  09/30/2009    No epicardial coronary artery disease noted that would explain patient's chest pain of the abnormal stress test noted. Normal left ventricular systolic function with no wall motion abnormalities   • CARDIAC CATHETERIZATION  05/24/1989    Normal catheterization, false-positive exercise treadmill. Noncardiac chest pain   • CARDIAC ELECTROPHYSIOLOGY PROCEDURE N/A 5/18/2017    Procedure: PPM generator change - dual;  Surgeon: Geneva Day MD;  Location: [facility] INVASIVE LOCATION;  Service:    • CARDIAC PACEMAKER PLACEMENT  06/2008    Dual chamber permanent pacemaker implantation   • COLECTOMY PARTIAL / TOTAL  04/14/2000    Cecal diverticulitis. Removal of  small segment of terminal ileum, cecum and right colon, sent to pathology   • COLON SURGERY  03/27/2016   • COLONOSCOPY  05/25/2012   • COLONOSCOPY N/A 7/30/2019    Procedure: COLONOSCOPY;  Surgeon: Seferino Guzman DO;  Location: [facility] ENDOSCOPY;  Service: Gastroenterology   • CYSTOSCOPY  11/15/2000    Urinary retention on the basis of medications and benign prostatic hypertrophy   • INGUINAL HERNIA REPAIR  02/15/2007    Recurrent right inguinal hernia. Repair of recurrent inguinal hernia with EPS Prolene mesh system.   • INGUINAL HERNIA REPAIR Right 1957   • LAPAROSCOPIC CHOLECYSTECTOMY  10/26/2006    Gallstones. Laparoscopic cholecystectomy with operative cholangiogram   • PROSTATECTOMY  11/17/2000    Benign prostatic hypertrophy with urinary retention. Prostatitis. transurethral resection of prostate.   • STEROID INJECTION  10/21/2010    Decadron (Gout)    • STEROID INJECTION  07/23/2013    Kenalog (Gout) (4)   • VENOUS ACCESS DEVICE (PORT) INSERTION N/A 9/13/2018    Procedure: ULTRASOUND ASSISTED RIGHT JUGULAR INSERTION VENOUS ACCESS DEVICE;  Surgeon: Luis E Babb MD;  Location: [facility] OR;  Service: General   • VENOUS ACCESS DEVICE (PORT) REMOVAL N/A 11/14/2018    Procedure: REMOVAL VENOUS ACCESS DEVICE;  Surgeon: Tono Arevalo MD;  Location: [facility] OR;  Service: General      SOCIAL HISTORY     Social History     Socioeconomic History   • Marital status:      Spouse name: Not on file   • Number of children: Not on file   • Years of education: Not on file   • Highest education level: Not on file   Tobacco Use   • Smoking status: Never Smoker   • Smokeless tobacco: Never Used   Substance and Sexual Activity   • Alcohol use: No   • Drug use: No   • Sexual activity: Defer      ALLERGIES   Doxycycline hyclate; Sulfa antibiotics; Other; Penicillins; and Soma [carisoprodol]   MEDICATIONS     Current Outpatient Medications   Medication Sig Dispense Refill   • acetaminophen (TYLENOL) 325 MG tablet Take 1  tablet by mouth As Needed.     • allopurinol (ZYLOPRIM) 100 MG tablet Take 1 tablet by mouth 4 (Four) Times a Day. 360 tablet 3   • aspirin 81 MG EC tablet Take 81 mg by mouth Daily.     • colchicine 0.6 MG tablet Take 0.6 mg by mouth Daily As Needed for Muscle / Joint Pain.     • diclofenac (VOLTAREN) 50 MG EC tablet Take 1 tablet by mouth 2 (Two) Times a Day. 180 tablet 3   • folic acid (FOLVITE) 1 MG tablet Take 1 tablet by mouth Daily. 90 tablet 3   • loperamide (IMODIUM A-D) 2 MG tablet Take 1 tablet by mouth 5 (Five) Times a Day As Needed for Diarrhea. 40 tablet 3   • mirtazapine (REMERON) 15 MG tablet TAKE 1 TABLET BY MOUTH EVERY NIGHT. 30 tablet 0   • omeprazole (priLOSEC) 20 MG capsule Take 1 capsule by mouth Daily. 90 capsule 3   • promethazine (PHENERGAN) 12.5 MG tablet Take 1 tablet by mouth Every 6 (Six) Hours As Needed for Nausea or Vomiting. 90 tablet 0   • sotalol (BETAPACE) 80 MG tablet Take 0.5 tablets by mouth 2 (Two) Times a Day. 45 tablet 1   • tamsulosin (FLOMAX) 0.4 MG capsule 24 hr capsule Take 1 capsule by mouth every night.     • DULoxetine (CYMBALTA) 30 MG capsule Take 1 capsule by mouth Daily. 30 capsule 11   • HYDROcodone-acetaminophen (NORCO) 5-325 MG per tablet Take 1 tablet by mouth Every 8 (Eight) Hours As Needed for Moderate Pain  or Severe Pain . 40 tablet 0   • ondansetron (ZOFRAN) 4 MG tablet Take 1 tablet by mouth 4 (Four) Times a Day As Needed for Nausea or Vomiting. 40 tablet 3   • oxybutynin (DITROPAN) 5 MG tablet Take 5 mg by mouth 2 (Two) Times a Day.     • vitamin B-12 (CYANOCOBALAMIN) 1000 MCG tablet Take 1 tablet by mouth Daily. 90 tablet 1     No current facility-administered medications for this visit.         The following portions of the patient's history were reviewed and updated as appropriate: allergies, current medications, past family history, past medical history, past social history, past surgical history and problem list.        Assessment/Plan   Jan whitman  seen today for neck pain and shoulder pain.    Diagnoses and all orders for this visit:    Neck pain  -     XR Spine Cervical 2 or 3 View      Degenerative changes cervical spine.  Given two cervical collars, different sizes, to find one that works best for him.     Call if needs anything.                  No follow-ups on file.                  This document has been electronically signed by Seferino Tan MD on January 30, 2020 2:55 PM

## 2020-01-31 PROBLEM — R11.0 NAUSEA: Status: ACTIVE | Noted: 2020-01-01

## 2020-01-31 NOTE — PROGRESS NOTES
1630 Dr Roblero called related to patient nauseated and not feeling well pt states he is very weak. Pt voiced that he feels so bad that he might need to stay overnight. Dr Roblero ordered nausea meds states pt needs to stay out of er if he can and that he will talk to son and pt next week.

## 2020-01-31 NOTE — CODE DOCUMENTATION
Elke Mathews RN called Dr. Gonzalez to discuss patient's condition. Dr. Gonzalez said to take the patient to the ER

## 2020-01-31 NOTE — CODE DOCUMENTATION
His son Ranjith was called to let him know that his dad will be going to the ER because he feels like he has the flu.

## 2020-02-01 NOTE — ED TRIAGE NOTES
Pt in hospital to receive outpatient transfusion and received platelets, but began experiencing chills, and an episode of diarrhea.

## 2020-02-01 NOTE — ED PROVIDER NOTES
Subjective   Patient presents to emergency department for chills and body aches.  He had one episode of diarrhea.  Hx of CML.  He was getting an infusion and platelets upstairs and was directed to come to the emergency department for further evaluation.        History provided by:  Patient   used: No    Flu Symptoms   Presenting symptoms: diarrhea, fatigue, myalgias and nausea    Presenting symptoms: no cough, no fever, no headaches, no rhinorrhea, no shortness of breath, no sore throat and no vomiting    Associated symptoms: chills        Review of Systems   Constitutional: Positive for chills and fatigue. Negative for fever.   HENT: Negative for rhinorrhea and sore throat.    Respiratory: Negative for cough, shortness of breath and wheezing.    Cardiovascular: Negative for chest pain.   Gastrointestinal: Positive for diarrhea and nausea. Negative for abdominal pain, blood in stool and vomiting.   Genitourinary: Negative for dysuria and flank pain.   Musculoskeletal: Positive for myalgias.   Skin: Negative for color change.   Allergic/Immunologic: Positive for immunocompromised state.   Neurological: Negative for headaches.   Hematological: Does not bruise/bleed easily.   Psychiatric/Behavioral: Negative for confusion.       Past Medical History:   Diagnosis Date   • Atrophy of testis    • Benign prostatic hyperplasia    • Borderline glaucoma    • Chronic myelomonocytic leukemia (CMS/HCC)    • Degenerative joint disease involving multiple joints    • GERD (gastroesophageal reflux disease)    • Gout    • History of echocardiogram 05/08/2012    Normal left ventricular systolic function, EF 60%. Early diastolic dysfunction. Mild aortic and pulmonic regurgitation. Trace mitral and mild tricuspid regurgitation. No intracardiac mass pericardial effusion or cardiac thrombus.   • History of Holter monitoring 01/18/2011   • Impotence    • Lightheadedness    • Neck pain, musculoskeletal    • Sleep apnea      NOT WEARING C-PAP   • TIA (transient ischemic attack) 2014       Allergies   Allergen Reactions   • Doxycycline Hyclate Other (See Comments)     Other reaction(s): lip swollen   • Sulfa Antibiotics Swelling     facial   • Other Rash     SILK TAPE   • Penicillins Rash     Reaction: rash   • Soma [Carisoprodol] Swelling and Rash       Past Surgical History:   Procedure Laterality Date   • CARDIAC CATHETERIZATION  09/30/2009    No epicardial coronary artery disease noted that would explain patient's chest pain of the abnormal stress test noted. Normal left ventricular systolic function with no wall motion abnormalities   • CARDIAC CATHETERIZATION  05/24/1989    Normal catheterization, false-positive exercise treadmill. Noncardiac chest pain   • CARDIAC ELECTROPHYSIOLOGY PROCEDURE N/A 5/18/2017    Procedure: PPM generator change - dual;  Surgeon: Geneva Day MD;  Location: [facility] CATH INVASIVE LOCATION;  Service:    • CARDIAC PACEMAKER PLACEMENT  06/2008    Dual chamber permanent pacemaker implantation   • COLECTOMY PARTIAL / TOTAL  04/14/2000    Cecal diverticulitis. Removal of small segment of terminal ileum, cecum and right colon, sent to pathology   • COLON SURGERY  03/27/2016   • COLONOSCOPY  05/25/2012   • COLONOSCOPY N/A 7/30/2019    Procedure: COLONOSCOPY;  Surgeon: Seferino Guzamn DO;  Location: [facility] ENDOSCOPY;  Service: Gastroenterology   • CYSTOSCOPY  11/15/2000    Urinary retention on the basis of medications and benign prostatic hypertrophy   • INGUINAL HERNIA REPAIR  02/15/2007    Recurrent right inguinal hernia. Repair of recurrent inguinal hernia with EPS Prolene mesh system.   • INGUINAL HERNIA REPAIR Right 1957   • LAPAROSCOPIC CHOLECYSTECTOMY  10/26/2006    Gallstones. Laparoscopic cholecystectomy with operative cholangiogram   • PROSTATECTOMY  11/17/2000    Benign prostatic hypertrophy with urinary retention. Prostatitis. transurethral resection of prostate.   • STEROID  "INJECTION  10/21/2010    Decadron (Gout)    • STEROID INJECTION  07/23/2013    Kenalog (Gout) (4)   • VENOUS ACCESS DEVICE (PORT) INSERTION N/A 9/13/2018    Procedure: ULTRASOUND ASSISTED RIGHT JUGULAR INSERTION VENOUS ACCESS DEVICE;  Surgeon: Luis E Babb MD;  Location: [facility] OR;  Service: General   • VENOUS ACCESS DEVICE (PORT) REMOVAL N/A 11/14/2018    Procedure: REMOVAL VENOUS ACCESS DEVICE;  Surgeon: Tono Arevalo MD;  Location: [facility];  Service: General       Family History   Problem Relation Age of Onset   • Cancer Other    • Colon cancer Other         parent   • Coronary artery disease Other    • Heart disease Other    • Hypertension Other    • Cholelithiasis Other    • Other Other         colon problems       Social History     Socioeconomic History   • Marital status:      Spouse name: Not on file   • Number of children: Not on file   • Years of education: Not on file   • Highest education level: Not on file   Tobacco Use   • Smoking status: Never Smoker   • Smokeless tobacco: Never Used   Substance and Sexual Activity   • Alcohol use: No   • Drug use: No   • Sexual activity: Defer           Objective      /53 (BP Location: Right arm, Patient Position: Lying)   Pulse 61   Temp 98.2 °F (36.8 °C) (Oral)   Resp 18   Ht 177.8 cm (70\")   Wt 78 kg (172 lb)   SpO2 96%   BMI 24.68 kg/m²     Physical Exam   Constitutional: He is oriented to person, place, and time. He appears well-developed and well-nourished. No distress.   HENT:   Head: Normocephalic and atraumatic.   Eyes: Conjunctivae are normal.   Cardiovascular: Normal rate, regular rhythm, normal heart sounds and intact distal pulses.   Pulmonary/Chest: Effort normal and breath sounds normal. No respiratory distress. He has no wheezes.   Abdominal: Soft. Bowel sounds are normal. He exhibits no distension and no mass. There is no tenderness. There is no rebound and no guarding.   Musculoskeletal: Normal range of motion. He " exhibits no edema.   Neurological: He is alert and oriented to person, place, and time.   Skin: Capillary refill takes more than 3 seconds. There is pallor.   Psychiatric: He has a normal mood and affect. His behavior is normal. Thought content normal.   Nursing note and vitals reviewed.      Procedures           ED Course      Results for orders placed or performed during the hospital encounter of 01/31/20   Influenza Antigen, Rapid - Swab, Nasopharynx   Result Value Ref Range    Influenza A Ag, EIA Negative Negative    Influenza B Ag, EIA Negative Negative   Comprehensive Metabolic Panel   Result Value Ref Range    Glucose 109 (H) 65 - 99 mg/dL    BUN 20 8 - 23 mg/dL    Creatinine 0.81 0.76 - 1.27 mg/dL    Sodium 135 (L) 136 - 145 mmol/L    Potassium 3.9 3.5 - 5.2 mmol/L    Chloride 104 98 - 107 mmol/L    CO2 19.0 (L) 22.0 - 29.0 mmol/L    Calcium 7.7 (L) 8.6 - 10.5 mg/dL    Total Protein 4.8 (L) 6.0 - 8.5 g/dL    Albumin 3.00 (L) 3.50 - 5.20 g/dL    ALT (SGPT) 7 1 - 41 U/L    AST (SGOT) 9 1 - 40 U/L    Alkaline Phosphatase 95 39 - 117 U/L    Total Bilirubin 1.7 (H) 0.2 - 1.2 mg/dL    eGFR Non African Amer 92 >60 mL/min/1.73    Globulin 1.8 gm/dL    A/G Ratio 1.7 g/dL    BUN/Creatinine Ratio 24.7 7.0 - 25.0    Anion Gap 12.0 5.0 - 15.0 mmol/L   CBC Auto Differential   Result Value Ref Range    WBC 3.92 3.40 - 10.80 10*3/mm3    RBC 2.00 (L) 4.14 - 5.80 10*6/mm3    Hemoglobin 6.2 (C) 13.0 - 17.7 g/dL    Hematocrit 18.4 (C) 37.5 - 51.0 %    MCV 92.0 79.0 - 97.0 fL    MCH 31.0 26.6 - 33.0 pg    MCHC 33.7 31.5 - 35.7 g/dL    RDW 18.6 (H) 12.3 - 15.4 %    RDW-SD 59.9 (H) 37.0 - 54.0 fl    MPV 11.9 6.0 - 12.0 fL    Platelets 26 (C) 140 - 450 10*3/mm3   Prepare RBC, 1 Units   Result Value Ref Range    Product Code P5950O09     Unit Number [id number]     UNIT  ABO A     UNIT  RH POS     Dispense Status XM     Blood Type APOS     Blood Expiration Date 202002212359     Blood Type Barcode 6200      Xr Spine Cervical 2 Or  3 View    Result Date: 1/31/2020  Narrative: Procedure: Cervical spine 3 views Reason for exam: Neck pain FINDINGS: Comparison exam dated May 5, 2016. Cervical spine vertebral body heights and alignment are normal. There is no evidence of fracture or dislocation. Moderate to severe loss of intervertebral disc space height at the C2-C3, C3-C4, C5-C6, and C6-C7 intervertebral disc space levels with associated anterior osteophytosis consistent with degenerative disc disease. Otherwise the intervertebral disc spaces are intact.     Impression: 1.  Moderate to severe loss of intervertebral disc space height at the C2-C3, C3-C4, C5-C6, and C6-C7 intervertebral disc space levels with associated anterior osteophytosis consistent with degenerative disc disease. 2.  No acute cervical spine abnormality. Electronically signed by:  Christophe Arreola MD  1/31/2020 8:53 AM CST Workstation: [id number]    Ir Picc W Imaging Guidance    Result Date: 1/29/2020  Narrative: This procedure was auto-finalized with no dictation required.    Us Guidance Picc Nc    Result Date: 1/29/2020  Narrative: This procedure was auto-finalized with no dictation required.    Xr Chest Post Cva Port    Result Date: 1/29/2020  Narrative: PROCEDURE: Portable chest x-ray TECHNIQUE: Single AP view of the chest COMPARISON: None HISTORY: unable to obtain IV for weekly blood transfusions, C93.10 Chronic myelomonocytic leukemia not having achieved remission FINDINGS:  Life-support devices: There is a left-sided cardiac pacer with leads in the regions of the right atrium and right ventricle. There is a left upper extremity catheter with its tip probably projecting over the left axilla therefore it would be a midline catheter. Lungs/pleura: Clear Heart, hilar and mediastinal structures: Cardiac silhouette is normal in size. Thoracic aorta contains atherosclerotic calcification. Degenerative changes of the left shoulder.     Impression: CONCLUSION: No acute pulmonary disease.  Electronically signed by:  Tej Velazquez MD  1/29/2020 12:34 PM CST Workstation: [id number]    Discussed results with patient.  Gave educational materials.  Advised close follow up with Oncology.  Return to emergency department for new or worsening symptoms.                                             MDM    Final diagnoses:   CML (chronic myelocytic leukemia) (CMS/HCC)            Sony De Leon PA-C  01/31/20 [id number]

## 2020-02-01 NOTE — ED NOTES
Per lab pt received platelets this afternoon, they spoke with Sony ROMAN and we will not admin those at this time.      Kristy Galan, RN  01/31/20 [id number]

## 2020-02-03 NOTE — NURSING NOTE
Pt admitted to unit for PRBC infusion.  Pt alert and oriented.  Lungs clear.  Blood consent signed.  Will administer PRBCs per order and monitor pt closely.

## 2020-02-05 PROBLEM — D70.8 OTHER NEUTROPENIA (HCC): Status: ACTIVE | Noted: 2020-01-01

## 2020-02-05 NOTE — PROGRESS NOTES
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  Patient was also evaluated by Dr. Man Kimbrough at [facility] in 2012.  Patient has been on observation since then without any active chemotherapy.  In November 2014 in view of worsening leukocytosis and anemia he had a repeat bone marrow biopsy done by Dr. Shore which showed systemic mastocytosis with associated clonal hematologic non-mass cell lineage disease, CMML-1.  Patient has not been on any chemotherapy since then.  In March 2018 patient was seen here at clinic by Goldie WHYTE.  In view of sclerotic lesion on lower extremity as well as skin lesion patient was referred to dermatology.  Biopsy done by Dr. Chen on June 20, 2018 showed increased MAST cells consistent with urticaria pigmentosa.   patient was referred to [facility] when he was seen by Dr. Kimbrough on August 20, 2018 and a bone marrow biopsy done on August 23, 2018.  Was started on Vidaza on September 24, 2018.  Due to worsening skin lesion, treatment has been changed to midostaurin on July 24, 2019.  Due to worsening cytopenias, midostaurin was discontinued on September 26, 2019.  Patient is here for follow-up appointment today.  Due to persistent cytopenia requiring blood transfusion.  Patient was referred to [facility].  Patient was evaluated by Dr. Kimbrough and underwent bone marrow biopsy on November 21, 2019 at [facility] which again showed chronic myelomonocytic leukemia without any  evidence of acute leukemia or increased blast.  Over the last 4 weeks, patient has been heavily transfusion dependent at least requiring 1 to 2 units of PRBC with intermittent platelet transfusion.  Most recent blood transfusion was done on February 3, 2020.  Complains of intermittent diarrhea.  Complains of left upper quadrant pain. Complains of depression.  Denies any homicidal or suicidal ideation.  Patient denies any excessive nausea or vomiting .  Complains of skin rash affecting chest and back.  Denies any bleeding.  Denies any more chills or fevers.        PAST MEDICAL HISTORY:    Past Medical History:   Diagnosis Date   • Atrophy of testis    • Benign prostatic hyperplasia    • Borderline glaucoma    • Chronic myelomonocytic leukemia (CMS/HCC)    • Degenerative joint disease involving multiple joints    • GERD (gastroesophageal reflux disease)    • Gout    • History of echocardiogram 05/08/2012    Normal left ventricular systolic function, EF 60%. Early diastolic dysfunction. Mild aortic and pulmonic regurgitation. Trace mitral and mild tricuspid regurgitation. No intracardiac mass pericardial effusion or cardiac thrombus.   • History of Holter monitoring 01/18/2011   • Impotence    • Lightheadedness    • Neck pain, musculoskeletal    • Sleep apnea     NOT WEARING C-PAP   • TIA (transient ischemic attack) 2014       SOCIAL HISTORY:    Social History     Tobacco Use   • Smoking status: Never Smoker   • Smokeless tobacco: Never Used   Substance Use Topics   • Alcohol use: No   • Drug use: No       Surgical History :  Past Surgical History:   Procedure Laterality Date   • CARDIAC CATHETERIZATION  09/30/2009    No epicardial coronary artery disease noted that would explain patient's chest pain of the abnormal stress test noted. Normal left ventricular systolic function with no wall motion abnormalities   • CARDIAC CATHETERIZATION  05/24/1989    Normal catheterization, false-positive exercise treadmill.  Noncardiac chest pain   • CARDIAC ELECTROPHYSIOLOGY PROCEDURE N/A 5/18/2017    Procedure: PPM generator change - dual;  Surgeon: Geneva Day MD;  Location: [facility] CATH INVASIVE LOCATION;  Service:    • CARDIAC PACEMAKER PLACEMENT  06/2008    Dual chamber permanent pacemaker implantation   • COLECTOMY PARTIAL / TOTAL  04/14/2000    Cecal diverticulitis. Removal of small segment of terminal ileum, cecum and right colon, sent to pathology   • COLON SURGERY  03/27/2016   • COLONOSCOPY  05/25/2012   • COLONOSCOPY N/A 7/30/2019    Procedure: COLONOSCOPY;  Surgeon: Seferino Guzman DO;  Location: [facility] ENDOSCOPY;  Service: Gastroenterology   • CYSTOSCOPY  11/15/2000    Urinary retention on the basis of medications and benign prostatic hypertrophy   • INGUINAL HERNIA REPAIR  02/15/2007    Recurrent right inguinal hernia. Repair of recurrent inguinal hernia with EPS Prolene mesh system.   • INGUINAL HERNIA REPAIR Right 1957   • LAPAROSCOPIC CHOLECYSTECTOMY  10/26/2006    Gallstones. Laparoscopic cholecystectomy with operative cholangiogram   • PROSTATECTOMY  11/17/2000    Benign prostatic hypertrophy with urinary retention. Prostatitis. transurethral resection of prostate.   • STEROID INJECTION  10/21/2010    Decadron (Gout)    • STEROID INJECTION  07/23/2013    Kenalog (Gout) (4)   • VENOUS ACCESS DEVICE (PORT) INSERTION N/A 9/13/2018    Procedure: ULTRASOUND ASSISTED RIGHT JUGULAR INSERTION VENOUS ACCESS DEVICE;  Surgeon: Luis E Babb MD;  Location: [facility] OR;  Service: General   • VENOUS ACCESS DEVICE (PORT) REMOVAL N/A 11/14/2018    Procedure: REMOVAL VENOUS ACCESS DEVICE;  Surgeon: Tono Arevalo MD;  Location: [facility] OR;  Service: General       ALLERGIES:    Allergies   Allergen Reactions   • Doxycycline Hyclate Other (See Comments)     Other reaction(s): lip swollen   • Sulfa Antibiotics Swelling     facial   • Other Rash     SILK TAPE   • Penicillins Rash     Reaction: rash   • Soma  "[Carisoprodol] Swelling and Rash         FAMILY HISTORY:  Family History   Problem Relation Age of Onset   • Cancer Other    • Colon cancer Other         parent   • Coronary artery disease Other    • Heart disease Other    • Hypertension Other    • Cholelithiasis Other    • Other Other         colon problems           REVIEW OF SYSTEMS:      CONSTITUTIONAL:  Complains of worsening fatigue. Has gained 2 pounds since last clinic visit.  Complains of poor appetite.  Denies any fever drenching night sweats .     EYES: No visual disturbances. No discharge. No new lesions    ENMT:  No epistaxis, mouth sores or difficulty swallowing.    RESPIRATORY:  Complains of shortness of breath with exertion. No new cough or hemoptysis.    CARDIOVASCULAR:  Complains of intermittent chest pain.No palpitations.    GASTROINTESTINAL:   Complains of intermittent diarrhea.  Denies any blood in stool.  Complains of worsening pain and discomfort in left upper quadrant.  No  nausea, vomiting .    GENITOURINARY: No Dysuria or Hematuria.    MUSCULOSKELETAL:  Complains of worsening back pain.  Complains of worsening pain in neck region.    LYMPHATICS:  Denies any abnormal swollen glands anywhere in the body.    NEUROLOGICAL : Complains of worsening dizziness.  No tingling or numbness. No headache . No seizures or balance problems.    SKIN: States skin rash affecting chest and back is stable    PSYCHIATRICS: Complains of feeling depressed.  Denies any homicidal or suicidal ideation.          PHYSICAL EXAMINATION:      VITAL SIGNS:  /60   Pulse 68   Temp 98.4 °F (36.9 °C) (Temporal)   Resp 18   Ht 180.3 cm (70.98\")   Wt 80 kg (176 lb 4.8 oz)   BMI 24.60 kg/m²       02/05/20  1024   Weight: 80 kg (176 lb 4.8 oz)         ECOG performance status: 2    CONSTITUTIONAL:  Not in any distress.  Appears pale.    EYES: Mild conjunctival Pallor. No Icterus. No Pterygium. Extraocular Movements intact.No ptosis.    ENMT:  Normocephalic, " Atraumatic.No Facial Asymmetry noted.    NECK:  No adenopathy.Trachea midline. NO JVD.    RESPIRATORY:  Fair air entry bilateral. No rhonchi or wheezing.Fair respiratory effort.    CARDIOVASCULAR:  S1, S2. Regular rate and rhythm. No murmur or gallop appreciated.Pacemaker present on left chest wall.    ABDOMEN:  Soft, obese, nontender. Bowel sounds present in all four quadrants.  Splenomegaly palpable in left upper quadrant.    MUSCULOSKELETAL:  No edema.No Calf Tenderness.Decreased range of motion.    NEUROLOGIC:    No  Motor  deficit appreciated. Cranial Nerves 2-12 grossly intact.    SKIN : Skin lesion consistent with urticaria pigmentosa present on chest, abdomen, back, upper and lower extremity. Skin rash Appears stable    LYMPHATICS: No new enlarged lymph nodes in neck or supraclavicular area.    PSYCHIATRY: Alert, awake and oriented ×3.Normal affect.  Normal judgment.  Makes good eye contact.        DIAGNOSTIC DATA:    Glucose   Date Value Ref Range Status   02/03/2020 98 65 - 99 mg/dL Final     Sodium   Date Value Ref Range Status   02/03/2020 138 136 - 145 mmol/L Final     Potassium   Date Value Ref Range Status   02/03/2020 3.6 3.5 - 5.2 mmol/L Final     CO2   Date Value Ref Range Status   02/03/2020 22.0 22.0 - 29.0 mmol/L Final     Chloride   Date Value Ref Range Status   02/03/2020 108 (H) 98 - 107 mmol/L Final     Anion Gap   Date Value Ref Range Status   02/03/2020 8.0 5.0 - 15.0 mmol/L Final     Creatinine   Date Value Ref Range Status   02/03/2020 0.66 (L) 0.76 - 1.27 mg/dL Final     BUN   Date Value Ref Range Status   02/03/2020 15 8 - 23 mg/dL Final     BUN/Creatinine Ratio   Date Value Ref Range Status   02/03/2020 22.7 7.0 - 25.0 Final     Calcium   Date Value Ref Range Status   02/03/2020 7.5 (L) 8.6 - 10.5 mg/dL Final     eGFR Non  Amer   Date Value Ref Range Status   02/03/2020 117 >60 mL/min/1.73 Final     Alkaline Phosphatase   Date Value Ref Range Status   02/03/2020 98 39 - 117 U/L  Final     Total Protein   Date Value Ref Range Status   02/03/2020 4.7 (L) 6.0 - 8.5 g/dL Final     ALT (SGPT)   Date Value Ref Range Status   02/03/2020 7 1 - 41 U/L Final     AST (SGOT)   Date Value Ref Range Status   02/03/2020 9 1 - 40 U/L Final     Total Bilirubin   Date Value Ref Range Status   02/03/2020 0.9 0.2 - 1.2 mg/dL Final     Albumin   Date Value Ref Range Status   02/03/2020 2.90 (L) 3.50 - 5.20 g/dL Final     Globulin   Date Value Ref Range Status   02/03/2020 1.8 gm/dL Final     Lab Results   Component Value Date    WBC 2.72 (L) 02/05/2020    HGB 7.2 (L) 02/05/2020    HCT 21.5 (L) 02/05/2020    MCV 90.7 02/05/2020    PLT 36 (C) 02/05/2020     Lab Results   Component Value Date    NEUTROABS 1.08 (L) 02/05/2020    IRON 129 02/03/2020    TIBC 156 (L) 02/03/2020    LABIRON 82 (H) 02/03/2020    FERRITIN 739.80 (H) 02/03/2020    FIOVPFCX62 1,644 (H) 02/03/2020    FOLATE 13.80 02/03/2020     Lab Results   Component Value Date    HCGQUANT <1 12/16/2014    REFLABREPO SEE ATTACHED REPORT 01/08/2020       Tryptase    Ref Range & Units 1mo ago   Tryptase 2.2 - 13.2 ug/L 93.9     Resulting Agency  [facility]   Narrative     Performed at:  [facility]  [id number]  : Tej Matias MD, Phone:  [phone]      Specimen Collected: 07/25/18 12:34 Last Resulted: 07/27/18 13:16                     Blood Culture - Blood, Blood, Venous Line   Specimen Information: Blood, Venous Line        Blood Culture Acinetobacter lwoffii group Abnormal        ISOLATED FROM Aerobic Bottle              Gram Stain  Aerobic Bottle Gram negative bacilli   2 bottles of 3 bottles drawn positive for gram neg bacillus         Resulting Agency: [facility]   Susceptibility      Acinetobacter lwoffii group     CHIVO     Ampicillin + Sulbactam <=8/4 ug/ml Susceptible     Cefepime <=8 ug/ml Susceptible     Ceftazidime 4 ug/ml Susceptible     Ceftriaxone <=8 ug/ml Susceptible     Levofloxacin  <=2 ug/ml Susceptible     Piperacillin <=16 ug/ml Susceptible     Trimethoprim + Sulfamethoxazole <=2/38 ug/ml Susceptible                 Specimen Collected: 11/12/18 10:26 Last Resulted: 11/16/18 06:25                  PATHOLOGY:  Pathology report from [place] on November 21, 2019 showed:  Diagnosis:  BONE MARROW - PERIPHERAL BLOOD SMEAR, ASPIRATE SMEAR, PARTICLE PREPARATION, BIOPSY AND FLOW CYTOMETRY: PERSISTENT SYSTEMIC MASTOCYTOSIS WITH AN ASSOCIATED HEMATOLOGICAL NEOPLASM (CHRONIC MYELOMONOCYTIC LEUKEMIA)     IMPRESSION: This is a markedly hypercellular bone marrow exhibiting sheets of morphologically and immunophenotypically abnormal mast cells. In addition, there is relative monocytosis and trilineage dysplasia. These findings are indicative of persistence of the patient's previously diagnosed systemic mastocytosis with an associated hematological neoplasm (chronic myelomonocytic leukemia). There is no increase in blasts. Final diagnosis depends on correlation with pending additional testing, as documented below, which will be included in the comprehensive hematopathology report    MARROW SMEAR DIFFERENTIAL:  Blasts (0-4): 1.5%  Promyelocytes (1-8): 5.0%  Myelocytes and metamyelocytes (20-30): 21.0%  Bands and segmented neutrophils (12-25): 28.0%  Eosinophils and eosinophilic precursors (1-5): 0.0%  Basophils and basophilic precursors (0-1): 0.0%  Monocytes and monocytic precursors (0-2): 3.5%  Lymphocytes (10-15): 2.0%  Plasma cells (0-1): 0.0%  Erythroid precursors (15-27): 36.5%  Other: 2.5%  Total cells counted: 200    BONE MARROW BIOPSY AND PARTICLE PREPARATION:  H&E and PAS stained bone marrow biopsy and particle preparation sections are reviewed. The material is markedly hypercellular (90% cellular) and of adequate quality. The majority of cellularity is composed of mast cells, many of which show spindled morphology. Myeloid and erythroid elements are relatively decreased. Erythroid elements  demonstrate atypia, including binucleation. Megakaryocytes are decreased in number, and demonstrate mild dysplasia, including small size and hypolobated nuclei. There is no increase in or abnormal distribution of blasts, plasma cells, or lymphocytes. There are no granulomata. Bony trabeculae are normal for the patient's age.     FLOW CYTOMETRY STUDIES:  IP ID:   Viability: 89%    DIAGNOSIS: No increase in blasts; abnormal mast cells present    COMMENT: Myeloblasts are not increased (2.3% of total cells), with the following normal immunophenotype: positive for CD33 (moderate), CD34, and CD45 (dim); and negative for CD19. B cells are not increased (0.3% of total cells), with the following normal immunophenotype: positive for CD19 and CD45 (bright); and negative for CD34. T/NK cells are not increased (4.8% of total cells), with the following normal immunophenotype: positive for CD45 (bright); and negative for CD34 and CD19. There is a distinct population of abnormal mast cells (1.5% of total cells), with the following abnormal immunophenotype: positive for CD2, CD25, CD45,  (bright), and FcER1.    CYTOGENETICS AND MOLECULAR DIAGNOSTIC RESULTS:  Additional testing (karyotype, FLT3 ITD) is pending. Results will be included in the comprehensive hematopathology report.           Pathology report from August 23, 2018 at [place] showed:  Diagnosis:  BONE MARROW - PERIPHERAL BLOOD SMEAR, ASPIRATE SMEAR, PARTICLE PREPARATION, BIOPSY, AND FLOW CYTOMETRY: MARKEDLY HYPERCELLULAR MARROW WITH INVOLVEMENT BY SYSTEMIC MASTOCYTOSIS WITH AN ASSOCIATED HEMATOLOGICAL NEOPLASM, CHRONIC MYELOMONOCYTIC LEUKEMIA-1; SEE IMPRESSION     IMPRESSION:  The findings are of a markedly hypercellular marrow (90% cellular) with maturing trilineage hematopoiesis and multifocal mast cell aggregates, including spindled forms. Mast cells are positive for CD2 and CD25 by flow cytometric analysis. Recent testing at an outside institution  showed an elevated tryptase level. The overall findings are consistent with involvement by systemic mastocytosis. In addition, there is multilineage dysplasia with mildly increased blasts and blast equivalents (6.1% of cellularity in total) and a persistent relative and absolute peripheral monocytosis. The combined findings are consistent with involvement by chronic myelomonocytic leukemia-1 (CMML-1), proliferative type. Final diagnosis requires correlation with pending additional testing, as documented below, which will be included in the comprehensive hematopathology report.    PERIPHERAL BLOOD SMEAR:  CBC : WBC 18.9 k/microL, Hgb 9.7 g/dL, Plt-Ct 187 k/microL,  fL, RDW 26.3%.    A Yi's stained peripheral smear is reviewed. Erythrocytes are decreased and are macrocytic and normochromic with anisopoikilocytosis, including target cells and teardrop cells. Polychromasia is present. Nucleated red blood cells are present. Leukocytes are increased and include mature neutrophils, lymphocytes, and monocytes, with a relative and absolute monocytosis. Neutrophils show dysplasia (nuclear hypolobation, hypogranulation). Left-shifted myeloid elements are present. There are no circulating blasts or plasma cells. Platelets are adequate in number and show variation in size.     ASPIRATE SMEARS AND TOUCH PREPARATIONS:  Yi's stained aspirate smears and touch preparations are reviewed. The material is hypercellular and particulate. Myeloid elements are adequate and show dysplasia (hsboekv-fc-qrryycftspv dyssynchrony). Erythroid elements are adequate and show left-shifted maturation with mild dysplasia (megaloblastoid change, occasional nuclear membrane irregularity). The myeloid:erythroid ratio is 1.1 to 1. Megakaryocytes are increased in number, and demonstrate no significant atypia. Blasts and blast equivalents (monoblasts, promonocytes) comprise 6.1% of cellularity in total (600 cell count). There is no increase in  plasma cells or lymphocytes. Focal particles show significantly increased mast cells, including occasional spindle forms. A Prussian blue iron stain is performed on the sample, revealing increased storage iron. Sideroblastic iron is present. Ring sideroblasts are present (approximately 40-50%).     MARROW SMEAR DIFFERENTIAL:  Blasts (0-4): 3.8%  Monoblasts/promonocytes: 2.3%  Promyelocytes (1-8): 2.2%  Myelocytes and metamyelocytes (20-30): 15.8%  Bands and segmented neutrophils (12-25): 23.0%  Eosinophils and eosinophilic precursors (1-5): 0.3%  Basophils and basophilic precursors (0-1): 0.0%  Monocytes and monocytic precursors (0-2): 5.8%  Lymphocytes (10-15): 3.0%  Plasma cells (0-1): 0.2%  Erythroid precursors (15-27): 43.5%  Total cells counted: 600    BONE MARROW BIOPSY AND PARTICLE PREPARATION:  H&E and PAS stained bone marrow biopsy and particle preparation sections are reviewed. The material is adequate and markedly hypercellular (90% cellular). Myeloid elements are present in normal proportion and exhibit maturation. Erythroid elements are present in normal proportion and exhibit left-shifted maturation. Megakaryocytes are increased and include occasional hypolobated forms and forms with abnormal nuclear lobe separation. CD34-positive blasts comprise approximately 3-5% of marrow cellularity. Multifocal dense mast cell aggregates are present, including spindled forms, as confirmed by  and tryptase immunostains. A CD68 stain highlights increased monocytic/histiocytic forms. Bony trabeculae are normal for the patient's age. All stains performed with appropriate controls.     FLOW CYTOMETRY STUDIES:  IP ID:   Viability: 94.1%  DIAGNOSIS: No increase in blasts; abnormal mast cells present    COMMENT: Myeloblasts are not increased (0.9% of total cells), with the following normal immunophenotype: positive for CD33 (moderate), CD34, and CD45 (dim); and negative for CD19. B cells are not increased (0.1%  of total cells), with the following normal immunophenotype: positive for CD19 (bright) and CD45 (bright); and negative for CD34. T cells are not increased (3.1% of total cells), with the following normal immunophenotype: positive for CD45 (bright); and negative for CD34. Mast cells are present (0.03% of total cells), with the following abnormal immunophenotype: positive for CD2, CD25, CD45,  (bright), and FcER1.    CYTOGENETICS AND MOLECULAR DIAGNOSTIC RESULTS:  Additional testing (Karyotype, Myeloid NGS) is pending. Results will be included in the comprehensive hematopathology report.         **Electronically signed out by ELVIA TO,, TYLER**on 8/27/2018  MS/EM/haritha  Case reviewed by Attending Pathologist        Pathology report from November 13, 2014 showed:  FINAL DIAGNOSIS:   PERIPHERAL SMEAR, REVIEW:        MONOCYTOSIS WITH LEFT MYELOID SHIFT.        ANEMIA, BORDERLINE.   BONE MARROW ASPIRATION AND BIOPSY:        SYSTEMIC MASTOCYTOSIS WITH ASSOCIATED CLONAL             HEMATOLOGIC NON-MAST CELL LINEAGE DISEASE,             CHRONIC MYELOMONOCYTIC LEUKEMIA-1.            RADIOLOGY DATA :  CT of abdomen and pelvis without contrast done on October 25, 2019 was reviewed, discussed with patient, it showed:  IMPRESSION:  Sigmoid diverticulitis.  No gross abscess.  Diffuse increase in extent of the sclerotic lesions in the  visualized bony structures in the dorsal spine, rib cage  bilaterally, sacrum, pelvic girdle, and also bilateral proximal  femora concerning for bony metastases enlargement involvement..    Correlation with total body bone scan is recommended as  follow-up.  Small left and trace amount of right pleural effusion. Trace  amount of pericardial effusion. Mild nonspecific atelectasis in  the lung bases bilaterally.  Small peritoneal free fluid.  Splenomegaly, new since prior CT scan study.  Stable hyperdense renal cyst in the right lower kidney.          CT of abdomen with and without contrast done on  May 14, 2018 showed:  The liver is normal. The gallbladder surgically absent. The  biliary system appears within normal limits status post  cholecystectomy. The pancreas is normal. The spleen is normal.  Bilateral adrenal glands are normal. Right kidney mid zone 4.7 mm  nonobstructive renal calculi. Stable right kidney lower pole oval  hyperdense lesion on unenhanced imaging which has Hounsfield  units of  49. Following enhancement this lesion has Hounsfield  units of 49 and 51 suggesting no significant enhancement. This  lesion measures 2.36 cm in greatest diameter and is not  appreciably changed. Otherwise right kidney and ureter are  unremarkable. Left kidney and visualized ureter are normal.  Visualized hollow viscera is normal. No lymphadenopathy in the  abdomen. No acute osseous abnormalities.     IMPRESSION:  1. Stable right kidney lower pole oval hyperdense lesion which  does not enhance measuring 2.36 cm in greatest diameter. This  interval stability and appearance is most consistent with benign  hyperdense cyst.  2. Right kidney mid zone 4.7 mm stable nonobstructive renal  calculi..  3. Otherwise unremarkable CT abdomen study with without  contrast..      CT of left lower extremity with contrast done on March 23, 2018 showed:  COMMENTS:              A CT examination was performed of the lower extremities, with  images coned to the left femur.     There is a marrow-based focal sclerotic lesion involving the  proximal/mid femur, measuring approximately 2.6 cm in  craniocaudal extent. There is no evidence of endosteal  scalloping, or features to suggest an aggressive process. There  is no abnormal periosteal reaction.     The remainder the examination is unremarkable for age.     .     IMPRESSION:  CONCLUSION:          1. Focal sclerotic bone lesion which is marrow based in the  diaphysis of the left femur in the proximal/midportion. This is  likely a benign lesion and correlation with plain film  radiographs  are suggested.            CT chest with contrast done on January 26, 2018 showed:  IMPRESSION:  CONCLUSION:  Multiple sclerotic lesions in the spine and right RIBS,  concerning for metastatic prostate cancer. Recommend correlation  with PSA levels and nuclear medicine bone scan.               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 Shore.  -Patient hasso far not require any chemotherapy or any other treatment.  -Recently in view of worsening skin lesion on the chest, abdomen and back he underwent a biopsy by Dr. 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  -Patient is also found to have worsening anemia recently on blood work done from June 2018.  -Patient is also found to have multiple sclerotic lesion on the spine and hips on CT scan done from January 2018 until June 2018.  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  -Patient was evaluated by Dr. Kimbrough at [facility] on August 20, 2018 and had a bone marrow biopsy done on August 23, 2018 that again showed systemic mastocytosis with CMML-1.  -In view of elevated tryptase level, his case was discussed at tumor board at [facility].  -Case was discussed with Dr. Kimbrough on September 10, 2018, is recommending treatment with Vidaza for now.    -He was started on Vidaza on September 24, 2018.  -Upon next clinic visit on November 12, 2018,patient was admitted to hospital due to bacteremia and chemotherapy was delayed.  -On November 26, 2018, patient requested chemotherapy to be held  because of ill health of his wife.  -Patient received cycle 2 of  Vidaza on January 7, 2019.    -Patient has received so far 7 cycles of Vidaza from September 24, 2018 until Eugenia 3, 2019.  -In view of persistent anemia, mastocytosis and worsening skin rash.  Patient was referred back to Dr. Kimbrough at [place].  Patient was seen by Dr. Truong on June 17, 2019.  -Case was discussed with Dr. Kimbrough  on July 8, 2019.  -Due to worsening skin lesion and anemia due to mastocytosis, recommendation is to change treatment to  midostaurin.  This recommendation were discussed with the patient today.  -Patient started taking midostaurin 50 mg twice daily on July 24, 2019.    -CBC done  on September 26, 2019 shows hemoglobin is decreased to 8.5.  Platelet count is also decreased  To 108,000.  Midostaurin has been held since September 26, 2019.  -Since patient has a persistent cytopenia despite avoiding midostaurin for 5 weeks and recent CT scan showing new onset splenomegaly with worsening sclerotic lesion on bone.  Patient was referred back to [place] clinic.  Patient was evaluated by Dr. Man Kimbrough and bone marrow biopsy was done on November 21, 2019.  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  There is no evidence of increased blast.  Case was discussed with Dr. Kimbrough over the phone on December 3, 2019.  Patient's case was discussed at tumor board at [place].  Recommendation is to continue with supportive care for now since patient has significant cytopenia requiring blood transfusion.  No further chemotherapy is recommended at present.  Recommendation were discussed with patient today.  -CBC done today on February 5, 2020 shows white blood cell count is 172 with absolute neutrophil count of 1080, hemoglobin 7.2, platelet count is 36,000.  -Most recent blood transfusion was done on February 3, 2020.  -Case was discussed with Dr. Kimbrough at [place] today on February 5, 2020.   Again he mentioned there is no treatment that patient can be a candidate for.  He recommended palliative care/hospice.  -Due to severe transfusion dependency and developing iron overload, hospice was recommended to patient and his son.  For now he wants to continue with blood transfusion.  He does understand the risk of ongoing blood transfusion including allergic reaction which could be life-threatening.  - We will do weekly CBC for now.  Will transfuse patient as needed if hemoglobin is less than 7.  -We will ask patient to return to clinic in 3 weeks with repeat CBC  on that day.      2.  Anemia:  secondary to #1  -Hemoglobin is 7.2  today  -Patient received 1 unit of PRBC on 2/03/2020  -Anemia Work-up done on February 3, 2020 shows developing iron overload.  There is no evidence of nutritional deficiency.          3.  Neutropenia: Secondary to splenomegaly plus chronic myelomonocytic leukemia.  Absolute neutrophil count is 1080.  Will monitor with CBC.  Due to massive splenomegaly would not recommend doing Neupogen.  As it can rupture spleen.    4.  Urticaria pigmentosa, secondary to underlying  marrow pathology.    5.  Thrombocytopenia: Secondary to bone marrow involvement. Platelet count is 36,000 today.  We'll monitored with CBC for now.    6.  History of bradycardia status post pacemaker in 2008.    7.  Diarrhea:  -Patient was complaining of 2 -3 loose watery bowel movement daily.  Stool for C. difficile done  on October 10, 2019 is negative.  -Recommend using Imodium as required for diarrhea.    8.  Splenomegaly with infarction:  -Secondary to underlying systemic mastocytosis and chronic myelomonocytic leukemia.  -Since patient is transfusion dependent anemia and thrombocytopenia.  Would not recommend any anticoagulation at present.  - Will send prescription for Norco 5/325 every 8 hours as needed for pain.  Patient was counseled about bowel regimen with Norco.    9.  Depression and loss of  appetite:  -Patient denies any homicidal or suicidal ideation.  -We will start patient on Remeron 15 mg every night to help him depression as well as appetite issue.  -Recommend following up with primary medical provider     10.  Health maintenance: Patient does not smoke.  Had a colonoscopy in 2014 by Dr. Guzman.    11. BMI: Patient's Body mass index is 24.6 kg/m². BMI is higher than reference range.  Patient was notified about elevated BMI.    12. Advance Care Planning: Patient has a living will on chart.    13. Jan Humphrey reports a pain score of 0.  Given his pain assessment as noted, treatment options were discussed and the following options were decided upon as a follow-up plan to address the patient's pain: No acute intervention required.    14. PHQ-9 Total Score:                  Jordin Roblero MD  2/5/2020  10:54 AM        EMR Dragon/Transcription disclaimer:   Much of this encounter note is an electronic transcription/translation of spoken language to printed text. The electronic translation of spoken language may permit erroneous, or at times, nonsensical words or phrases to be inadvertently transcribed; Although I have reviewed the note for such errors, some may still exist.

## 2020-02-09 PROBLEM — J96.00 ACUTE RESPIRATORY FAILURE (HCC): Status: ACTIVE | Noted: 2020-01-01

## 2020-02-10 LAB
ANISOCYTOSIS BLD QL: ABNORMAL
HYPOCHROMIA BLD QL: ABNORMAL
LARGE PLATELETS: ABNORMAL
LYMPHOCYTES # BLD MANUAL: 0.52 10*3/MM3 (ref 0.7–3.1)
LYMPHOCYTES NFR BLD MANUAL: 18 % (ref 5–12)
LYMPHOCYTES NFR BLD MANUAL: 9 % (ref 19.6–45.3)
METAMYELOCYTES NFR BLD MANUAL: 3 % (ref 0–0)
MONOCYTES # BLD AUTO: 1.04 10*3/MM3 (ref 0.1–0.9)
MYELOCYTES NFR BLD MANUAL: 1 % (ref 0–0)
NEUTROPHILS # BLD AUTO: 3.18 10*3/MM3 (ref 1.7–7)
NEUTROPHILS NFR BLD MANUAL: 51 % (ref 42.7–76)
NEUTS BAND NFR BLD MANUAL: 4 % (ref 0–5)
NRBC SPEC MANUAL: 12 /100 WBC (ref 0–0.2)
PLASMA CELL PREC NFR BLD MANUAL: 1 % (ref 0–0)
PROMYELOCYTES NFR BLD MANUAL: 1 % (ref 0–0)
SMALL PLATELETS BLD QL SMEAR: ABNORMAL
SMUDGE CELLS IN BLOOD BY LIGHT MICROSCOPY: 5 /100 WBC
VARIANT LYMPHS NFR BLD MANUAL: 12 % (ref 0–5)
WBC MORPH BLD: NORMAL

## 2020-02-10 NOTE — ED NOTES
Pt has agonal breathing, heart rate starting to heriberto down. Hospitalist called.     Ashok Huynh RN  02/09/20 8625

## 2020-02-10 NOTE — DISCHARGE SUMMARY
[facility] Medicine Services  DEATH SUMMARY       Date of Admission: 2/9/2020  Date of Death:  2/9/2020 at approximately     Primary Care Physician: Seferino Tan MD    Presenting Problem/History of Present Illness:  Acute respiratory failure, unspecified whether with hypoxia or hypercapnia (CMS/HCC) [J96.00]     Final Death Diagnoses:  Active Hospital Problems    Diagnosis   • Acute respiratory failure (CMS/HCC)       Consults:   Consults     Date and Time Order Name Status Description    2/9/2020 2028 Hematology and Oncology (on-call MD unless specified)      2/9/2020 2028 Hospitalist (on-call MD unless specified)            Procedures Performed:   The patient was placed on BiPAP              Hospital Course:  The patient is a 79 y.o. male who presented to [facility] with shortness of breath.  According to his accompanying family members, the patient has been sick for a long time and he refused to come to the hospital.  His family was able to prevail on him to come today.  According to his family, the patient has been recommended for hospice care, and the paperwork is ongoing.    When the patient was first seen in the emergency department, he exhibited features suggestive of respiratory failure, and was placed on BiPAP.  He was noncommunicative and appeared moribound.    While he was still being evaluated, and before his admission process could be finalized, the patient passed away in the ER.  His family was at the bedside when this happened.  His body was disposed according to the hospital protocols.    Yusuf Tompkins MD  02/09/20  11:19 PM    Time: Less than 30 minutes

## 2020-02-10 NOTE — ED TRIAGE NOTES
"[facility] called for transport for pain management. fmaily reported Enlarged spleen, soon to be placed on hospice, DNR. When EMS arrived patient was \"unresponsive and gasping for air.\"   "

## 2020-02-10 NOTE — ED NOTES
Pt has no pulse, pt is not breathing, Dr Tompkins at bedside, family presence facilitated. Dr Tompkins called [id number].     Ashok Huynh RN  02/09/20 2112

## 2020-02-10 NOTE — ED NOTES
asked me to go speak with pt's family and set up hospice. ERCM visit with pt and his brother and son . Pt is the primary caregiver for his wife at home ( she has some paralysis). They won't be able to set up 24/7 caregivers tonight. I gave them hospice choices and they chose [facility] and would like to be evaluated for GIP, however, while I was in the room pt started vomiting and I called for er staff to come to pt's room now.

## 2020-02-10 NOTE — H&P
"      [facility] Medicine Admission    Date of Admission: 2/9/2020    Primary Care Physician: Seferino Tan MD    Chief Complaint: \"He is sick\"    HPI:The patient is a 79-year-old  male who was brought to the emergency department by his family because he is not doing very well.  He has a chronic myelomonocytic leukemia.  Earlier in the week, the patient's doctors recommended that he be placed in hospice.  The family is still working on getting him there, before the patient's condition became even much worse and he was brought to the emergency department tonight.    The patient is noncommunicative.    Past Medical History:  has a past medical history of Atrophy of testis, Benign prostatic hyperplasia, Borderline glaucoma, Chronic myelomonocytic leukemia (CMS/HCC), Degenerative joint disease involving multiple joints, GERD (gastroesophageal reflux disease), Gout, History of echocardiogram (05/08/2012), History of Holter monitoring (01/18/2011), Impotence, Lightheadedness, Neck pain, musculoskeletal, Sleep apnea, and TIA (transient ischemic attack) (2014).    Past Surgical History:  has a past surgical history that includes Cardiac pacemaker placement (06/2008); Prostatectomy (11/17/2000); Laparoscopic cholecystectomy (10/26/2006); Colectomy partial / total (04/14/2000); Cystoscopy (11/15/2000); Steroid Injection (10/21/2010); Steroid Injection (07/23/2013); Cardiac catheterization (09/30/2009); Cardiac catheterization (05/24/1989); Colon surgery (03/27/2016); Colonoscopy (05/25/2012); Cardiac electrophysiology procedure (N/A, 5/18/2017); Inguinal hernia repair (02/15/2007); Inguinal hernia repair (Right, 1957); Venous Access Device (Port) (N/A, 9/13/2018); Venous Access Device (Port) Removal (N/A, 11/14/2018); and Colonoscopy (N/A, 7/30/2019).    Family History: family history includes Cancer in an other family member; Cholelithiasis in an other family member; Colon " cancer in an other family member; Coronary artery disease in an other family member; Heart disease in an other family member; Hypertension in an other family member; Other in an other family member.    Social History:  reports that he has never smoked. He has never used smokeless tobacco. He reports that he does not drink alcohol or use drugs.    Allergies:   Allergies   Allergen Reactions   • Doxycycline Hyclate Other (See Comments)     Other reaction(s): lip swollen   • Sulfa Antibiotics Swelling     facial   • Other Rash     SILK TAPE   • Penicillins Rash     Reaction: rash   • Soma [Carisoprodol] Swelling and Rash       Medications:   Prior to Admission medications    Medication Sig Start Date End Date Taking? Authorizing Provider   acetaminophen (TYLENOL) 325 MG tablet Take 1 tablet by mouth As Needed. 4/26/12   ProviderSherin MD   allopurinol (ZYLOPRIM) 100 MG tablet Take 1 tablet by mouth 4 (Four) Times a Day. 1/8/20   Seferino Tan MD   aspirin 81 MG EC tablet Take 81 mg by mouth Daily.    ProviderSherin MD   colchicine 0.6 MG tablet Take 0.6 mg by mouth Daily As Needed for Muscle / Joint Pain.    ProviderSherin MD   diclofenac (VOLTAREN) 50 MG EC tablet Take 1 tablet by mouth 2 (Two) Times a Day. 1/16/20   Seferino Tan MD   DULoxetine (CYMBALTA) 30 MG capsule Take 1 capsule by mouth Daily. 10/22/19   Seferino Tan MD   folic acid (FOLVITE) 1 MG tablet Take 1 tablet by mouth Daily. 5/6/19   Jordin Roblero MD   HYDROcodone-acetaminophen (NORCO) 5-325 MG per tablet Take 1 tablet by mouth Every 8 (Eight) Hours As Needed for Moderate Pain  or Severe Pain . 1/3/20   Jordin Roblero MD   loperamide (IMODIUM A-D) 2 MG tablet Take 1 tablet by mouth 5 (Five) Times a Day As Needed for Diarrhea. 10/10/19   Jordin Roblero MD   mirtazapine (REMERON) 15 MG tablet TAKE 1 TABLET BY MOUTH EVERY NIGHT. 12/31/19   Jordin Roblero MD   omeprazole (priLOSEC) 20 MG capsule Take 1 capsule by mouth  Daily. 1/8/20   Seferino Tan MD   ondansetron (ZOFRAN) 4 MG tablet Take 1 tablet by mouth 4 (Four) Times a Day As Needed for Nausea or Vomiting. 6/3/19   Jordin Roblero MD   oxybutynin (DITROPAN) 5 MG tablet Take 5 mg by mouth 2 (Two) Times a Day. 5/31/17   ProviderSherin MD   promethazine (PHENERGAN) 12.5 MG tablet Take 1 tablet by mouth Every 6 (Six) Hours As Needed for Nausea or Vomiting. 9/11/19   July Jones MD   sotalol (BETAPACE) 80 MG tablet Take 0.5 tablets by mouth 2 (Two) Times a Day. 1/8/20   Geneva Day MD   tamsulosin (FLOMAX) 0.4 MG capsule 24 hr capsule Take 1 capsule by mouth every night.    Provider, MD Sherin   vitamin B-12 (CYANOCOBALAMIN) 1000 MCG tablet Take 1 tablet by mouth Daily. 9/27/19   Jordin Roblero MD       Review of Systems:  Review of Systems   Review of systems could not be obtained because the patient is moribound    Physical Exam:   Heart Rate:  [] 65  Resp:  [6-44] 6  BP: ()/() 49/26  Physical exam:  Constitutional:  Well-developed.  Elderly and chronically ill looking.  Severely dyspneic.      HENT:  Head:  Normocephalic and atraumatic.  Mouth: BiPAP in situ.    Eyes:  Conjunctivae and EOM are normal.  Pupils are equal, round, and reactive to light.  No scleral icterus.    Neck:  Neck supple.  No JVD present.    Cardiovascular:  Normal rate, regular rhythm.  Distant heart sounds.   Pulmonary/Chest: The patient is dyspneic.  Reduced air entry globally.  He has abdominal:  Soft.  Bowel sounds are hypoactive.  There is moderate distension.   Musculoskeletal: Lower extremity edema bilaterally.    Neurological: Does not make eye contact.  Not responsive.  No facial droop.  Skin:  Skin is warm and dry. No rash noted. No pallor.   Peripheral vascular: No clubbing, no cyanosis.    Results Reviewed:  I have personally reviewed current lab, radiology, and data and agree with results.  Lab Results (last 24 hours)     Procedure  Component Value Units Date/Time    Extra Tubes [[id number]] Collected:  02/09/20 2047    Specimen:  Blood, Venous Line Updated:  02/09/20 2201    Narrative:       The following orders were created for panel order Extra Tubes.  Procedure                               Abnormality         Status                     ---------                               -----------         ------                     Green Top (Gel)[[id number]]                                  Final result                 Please view results for these tests on the individual orders.    Green Top (Gel) [[id number]] Collected:  02/09/20 2047    Specimen:  Blood Updated:  02/09/20 2201     Extra Tube Hold for add-ons.     Comment: Auto resulted.       [place] Draw [[id number]] Collected:  02/09/20 2003    Specimen:  Blood Updated:  02/09/20 2146    Narrative:       The following orders were created for panel order [place] Draw.  Procedure                               Abnormality         Status                     ---------                               -----------         ------                     Light Blue Top[[id number]]                                   Final result               Green Top (Gel)[[id number]]                                  Final result               Lavender Top[[id number]]                                     Final result               Gold Top - SST[[id number]]                                   Final result                 Please view results for these tests on the individual orders.    Lavender Top [[id number]] Collected:  02/09/20 2003    Specimen:  Blood Updated:  02/09/20 2146     Extra Tube hold for add-on     Comment: Auto resulted       Light Blue Top [[id number]] Collected:  02/09/20 2003    Specimen:  Blood Updated:  02/09/20 2115     Extra Tube hold for add-on     Comment: Auto resulted       Green Top (Gel) [[id number]] Collected:  02/09/20 2003    Specimen:  Blood Updated:  02/09/20 2115     Extra Tube Hold for add-ons.      Comment: Auto resulted.       Gold Top - SST [[id number]] Collected:  02/09/20 2003    Specimen:  Blood Updated:  02/09/20 2115     Extra Tube Hold for add-ons.     Comment: Auto resulted.       POC Glucose Once [[id number]]  (Normal) Collected:  02/09/20 2001    Specimen:  Blood Updated:  02/09/20 2103     Glucose 107 mg/dL      Comment: : [id number] DEBORAH SHANNONMeter ID: [id number]       Blood Culture - Blood, Arm, Right [[id number]] Collected:  02/09/20 2047    Specimen:  Blood from Arm, Right Updated:  02/09/20 2050    Blood Gas, Arterial [[id number]]  (Abnormal) Collected:  02/09/20 2030    Specimen:  Arterial Blood Updated:  02/09/20 2037     Site Left Brachial     Ricco's Test N/A     pH, Arterial 7.321 pH units      Comment: 84 Value below reference range        pCO2, Arterial 18.4 mm Hg      Comment: 85 Value below critical limit        pO2, Arterial 318.0 mm Hg      Comment: 83 Value above reference range        HCO3, Arterial 9.5 mmol/L      Comment: 84 Value below reference range        Base Excess, Arterial -15.3 mmol/L      Comment: 84 Value below reference range        O2 Saturation, Arterial >100.0 %      Comment: 93 Value above reportable range > 100.0        Barometric Pressure for Blood Gas 748 mmHg      Modality N/A     FIO2 100 %      Ventilator Mode BiPAP     Set [facility] Resp Rate 16.0     IPAP 16     Comment: Meter: [id number]     :  [id number]        EPAP 8     Collected by SHZAIA    Comprehensive Metabolic Panel [[id number]]  (Abnormal) Collected:  02/09/20 2003    Specimen:  Blood Updated:  02/09/20 2034     Glucose 99 mg/dL      BUN 19 mg/dL      Creatinine 1.33 mg/dL      Sodium 139 mmol/L      Potassium 3.4 mmol/L      Chloride 107 mmol/L      CO2 10.0 mmol/L      Calcium 7.4 mg/dL      Total Protein 4.3 g/dL      Albumin 2.60 g/dL      ALT (SGPT) 8 U/L      AST (SGOT) 12 U/L      Alkaline Phosphatase 110 U/L      Total Bilirubin 1.0 mg/dL      eGFR Non African Amer 52  mL/min/1.73      Globulin 1.7 gm/dL      A/G Ratio 1.5 g/dL      BUN/Creatinine Ratio 14.3     Anion Gap 22.0 mmol/L     Narrative:       GFR Normal >60  Chronic Kidney Disease <60  Kidney Failure <15      Troponin [[id number]]  (Abnormal) Collected:  02/09/20 2003    Specimen:  Blood Updated:  02/09/20 2034     Troponin T 0.031 ng/mL     Narrative:       Troponin T Reference Range:  <= 0.03 ng/mL-   Negative for AMI  >0.03 ng/mL-     Abnormal for myocardial necrosis.  Clinicians would have to utilize clinical acumen, EKG, Troponin and serial changes to determine if it is an Acute Myocardial Infarction or myocardial injury due to an underlying chronic condition.       Results may be falsely decreased if patient taking Biotin.      Lactic Acid, Plasma [[id number]]  (Abnormal) Collected:  02/09/20 2003    Specimen:  Blood Updated:  02/09/20 2033     Lactate 12.7 mmol/L     Lactic Acid, Reflex Timer (This will reflex a repeat order 3-3:15 hours after ordered.) [[id number]] Collected:  02/09/20 2003    Specimen:  Blood Updated:  02/09/20 2033    Protime-INR [[id number]]  (Abnormal) Collected:  02/09/20 2003    Specimen:  Blood Updated:  02/09/20 2027     Protime 24.9 Seconds      INR 2.28    Narrative:       Therapeutic range for most indications is 2.0-3.0 INR,  or 2.5-3.5 for mechanical heart valves.    aPTT [[id number]]  (Normal) Collected:  02/09/20 2003    Specimen:  Blood Updated:  02/09/20 2027     PTT 39.6 seconds     Narrative:       The recommended Heparin therapeutic range is 68-97 seconds.    Influenza Antigen, Rapid - Swab, Nasopharynx [[id number]]  (Normal) Collected:  02/09/20 2003    Specimen:  Swab from Nasopharynx Updated:  02/09/20 2022     Influenza A Ag, EIA Negative     Influenza B Ag, EIA Negative    CBC & Differential [[id number]] Collected:  02/09/20 2003    Specimen:  Blood Updated:  02/09/20 2019    Narrative:       The following orders were created for panel order CBC & Differential.  Procedure                                Abnormality         Status                     ---------                               -----------         ------                     CBC Auto Differential[[id number]]        Abnormal            Final result                 Please view results for these tests on the individual orders.    CBC Auto Differential [[id number]]  (Abnormal) Collected:  02/09/20 2003    Specimen:  Blood Updated:  02/09/20 2019     WBC 5.78 10*3/mm3      RBC 1.98 10*6/mm3      Hemoglobin 6.0 g/dL      Hematocrit 18.6 %      MCV 93.9 fL      MCH 30.3 pg      MCHC 32.3 g/dL      RDW 17.4 %      RDW-SD 57.5 fl      MPV --     Comment: Instrument unable to calculate MPV        Platelets 45 10*3/mm3     Manual Differential [[id number]] Collected:  02/09/20 2003    Specimen:  Blood Updated:  02/09/20 2010        Imaging Results (Last 24 Hours)     Procedure Component Value Units Date/Time    XR Chest 1 View [[id number]] Collected:  02/09/20 1952     Updated:  02/09/20 2015    Narrative:       Chest x-ray single view.        CLINICAL INDICATION: Shortness of breath sepsis protocol.    COMPARISON: January 29, 2020.    FINDINGS: Cardiac silhouette is normal in size. Pulmonary  vascularity is unremarkable. Pacemaker leads right atrium right  ventricle.    Interval development of bilateral basilar infiltrative changes  possibly bilateral pneumonitis. Unfavorable change since prior  exam.      Impression:       CONCLUSION: Interval development of bilateral lower lobe  infiltrative changes possibly bilateral pneumonitis. Unfavorable  change since prior exam.    Electronically signed by:  Charles Lezama MD  2/9/2020 8:14 PM CST  Workstation: [id number]        Assessment:    Active Hospital Problems    Diagnosis   • Acute respiratory failure (CMS/HCC)       Plan:  The patient is already placed on BiPAP.  However before admission orders were placed, the patient passed away in the emergency department.  His family was at the bedside when he  passed away.  His body was disposed according to hospital protocols.    Yusuf Tompkins MD  02/09/20  11:17 PM

## 2020-02-10 NOTE — ED PROVIDER NOTES
Subjective   80yo male pmh significant CMML-1/pacemaker/gout/bph/tia/mastocytosis/sick sinus syndrome/atrial fibrillation, currently palliative care/pending hospice/transfusion dependent, presents ED in respiratory distress.  Pt reports 1wk hx increased generalized abdominal discomfort.  Pt appears oriented person/place/time et confirms DNR status, requesting no cpr/intubation/defibrillation.  ROS limited secondary to respiratory distress.      History provided by:  Patient, EMS personnel and relative  Illness   Severity:  Severe  Timing:  Unable to specify      Review of Systems   Unable to perform ROS: Severe respiratory distress       Past Medical History:   Diagnosis Date   • Atrophy of testis    • Benign prostatic hyperplasia    • Borderline glaucoma    • Chronic myelomonocytic leukemia (CMS/HCC)    • Degenerative joint disease involving multiple joints    • GERD (gastroesophageal reflux disease)    • Gout    • History of echocardiogram 05/08/2012    Normal left ventricular systolic function, EF 60%. Early diastolic dysfunction. Mild aortic and pulmonic regurgitation. Trace mitral and mild tricuspid regurgitation. No intracardiac mass pericardial effusion or cardiac thrombus.   • History of Holter monitoring 01/18/2011   • Impotence    • Lightheadedness    • Neck pain, musculoskeletal    • Sleep apnea     NOT WEARING C-PAP   • TIA (transient ischemic attack) 2014       Allergies   Allergen Reactions   • Doxycycline Hyclate Other (See Comments)     Other reaction(s): lip swollen   • Sulfa Antibiotics Swelling     facial   • Other Rash     SILK TAPE   • Penicillins Rash     Reaction: rash   • Soma [Carisoprodol] Swelling and Rash       Past Surgical History:   Procedure Laterality Date   • CARDIAC CATHETERIZATION  09/30/2009    No epicardial coronary artery disease noted that would explain patient's chest pain of the abnormal stress test noted. Normal left ventricular systolic function with no wall motion  abnormalities   • CARDIAC CATHETERIZATION  05/24/1989    Normal catheterization, false-positive exercise treadmill. Noncardiac chest pain   • CARDIAC ELECTROPHYSIOLOGY PROCEDURE N/A 5/18/2017    Procedure: PPM generator change - dual;  Surgeon: Geneva Day MD;  Location: [facility] CATH INVASIVE LOCATION;  Service:    • CARDIAC PACEMAKER PLACEMENT  06/2008    Dual chamber permanent pacemaker implantation   • COLECTOMY PARTIAL / TOTAL  04/14/2000    Cecal diverticulitis. Removal of small segment of terminal ileum, cecum and right colon, sent to pathology   • COLON SURGERY  03/27/2016   • COLONOSCOPY  05/25/2012   • COLONOSCOPY N/A 7/30/2019    Procedure: COLONOSCOPY;  Surgeon: Seferino Guzman DO;  Location: [facility] ENDOSCOPY;  Service: Gastroenterology   • CYSTOSCOPY  11/15/2000    Urinary retention on the basis of medications and benign prostatic hypertrophy   • INGUINAL HERNIA REPAIR  02/15/2007    Recurrent right inguinal hernia. Repair of recurrent inguinal hernia with EPS Prolene mesh system.   • INGUINAL HERNIA REPAIR Right 1957   • LAPAROSCOPIC CHOLECYSTECTOMY  10/26/2006    Gallstones. Laparoscopic cholecystectomy with operative cholangiogram   • PROSTATECTOMY  11/17/2000    Benign prostatic hypertrophy with urinary retention. Prostatitis. transurethral resection of prostate.   • STEROID INJECTION  10/21/2010    Decadron (Gout)    • STEROID INJECTION  07/23/2013    Kenalog (Gout) (4)   • VENOUS ACCESS DEVICE (PORT) INSERTION N/A 9/13/2018    Procedure: ULTRASOUND ASSISTED RIGHT JUGULAR INSERTION VENOUS ACCESS DEVICE;  Surgeon: Luis E Babb MD;  Location: [facility] OR;  Service: General   • VENOUS ACCESS DEVICE (PORT) REMOVAL N/A 11/14/2018    Procedure: REMOVAL VENOUS ACCESS DEVICE;  Surgeon: Tono Arevalo MD;  Location: [facility] OR;  Service: General       Family History   Problem Relation Age of Onset   • Cancer Other    • Colon cancer Other         parent   • Coronary artery disease Other    •  Heart disease Other    • Hypertension Other    • Cholelithiasis Other    • Other Other         colon problems       Social History     Socioeconomic History   • Marital status:      Spouse name: Not on file   • Number of children: Not on file   • Years of education: Not on file   • Highest education level: Not on file   Tobacco Use   • Smoking status: Never Smoker   • Smokeless tobacco: Never Used   Substance and Sexual Activity   • Alcohol use: No   • Drug use: No   • Sexual activity: Defer           Objective   Physical Exam   Constitutional: He appears lethargic. He appears distressed.   HENT:   Head: Normocephalic and atraumatic.   Mouth/Throat: Oropharynx is clear and moist.   Eyes: Pupils are equal, round, and reactive to light.   Neck: Normal range of motion. Neck supple. No JVD present. No tracheal deviation present.   Cardiovascular: Regular rhythm, S1 normal, S2 normal and normal heart sounds. Tachycardia present. Exam reveals no gallop and no friction rub.   No murmur heard.  Pulmonary/Chest: Accessory muscle usage present. He is in respiratory distress. He has decreased breath sounds. He has no wheezes. He has no rhonchi. He has no rales.   Abdominal: Soft. Bowel sounds are normal. He exhibits distension. He exhibits no mass. There is generalized tenderness. There is no rebound and no guarding.   Musculoskeletal: He exhibits no edema.   Lymphadenopathy:     He has no cervical adenopathy.   Neurological: He has normal strength. He appears lethargic. No sensory deficit. GCS eye subscore is 4. GCS verbal subscore is 5. GCS motor subscore is 6.   Skin: There is pallor.   Nursing note and vitals reviewed.      ECG 12 Lead    Date/Time: 2/9/2020 8:30 PM  Performed by: Raymundo Membreno MD  Authorized by: Raymundo Membreno MD   Interpreted by physician  Rhythm: atrial fibrillation  Rate: tachycardic  BPM: 126  QRS axis: normal  Conduction: conduction normal  ST Segments: ST segments normal  T Waves: T waves  normal  Clinical impression: abnormal ECG and dysrhythmia - atrial                 ED Course  ED Course as of Feb 09 2038   Sun Feb 09, 2020 2020 D/w patient's son who confirmed DNR status.  Consulted Dr. Roblero. Recommends supportive care, antibiotics, prbc transfusion. Will see inpatient.  Consulted Dr. Tompkins for admit. Will eval in ED.    [SD]   2037 FAST scan: (+) free fluid RUQ/pelvis.    [SD]      ED Course User Index  [SD] Raymundo Membreno MD      Labs Reviewed   COMPREHENSIVE METABOLIC PANEL - Abnormal; Notable for the following components:       Result Value    Creatinine 1.33 (*)     Potassium 3.4 (*)     CO2 10.0 (*)     Calcium 7.4 (*)     Total Protein 4.3 (*)     Albumin 2.60 (*)     eGFR Non  Amer 52 (*)     Anion Gap 22.0 (*)     All other components within normal limits    Narrative:     GFR Normal >60  Chronic Kidney Disease <60  Kidney Failure <15     LACTIC ACID, PLASMA - Abnormal; Notable for the following components:    Lactate 12.7 (*)     All other components within normal limits   CBC WITH AUTO DIFFERENTIAL - Abnormal; Notable for the following components:    RBC 1.98 (*)     Hemoglobin 6.0 (*)     Hematocrit 18.6 (*)     RDW 17.4 (*)     RDW-SD 57.5 (*)     Platelets 45 (*)     All other components within normal limits   PROTIME-INR - Abnormal; Notable for the following components:    Protime 24.9 (*)     INR 2.28 (*)     All other components within normal limits    Narrative:     Therapeutic range for most indications is 2.0-3.0 INR,  or 2.5-3.5 for mechanical heart valves.   TROPONIN (IN-HOUSE) - Abnormal; Notable for the following components:    Troponin T 0.031 (*)     All other components within normal limits    Narrative:     Troponin T Reference Range:  <= 0.03 ng/mL-   Negative for AMI  >0.03 ng/mL-     Abnormal for myocardial necrosis.  Clinicians would have to utilize clinical acumen, EKG, Troponin and serial changes to determine if it is an Acute Myocardial Infarction or  myocardial injury due to an underlying chronic condition.       Results may be falsely decreased if patient taking Biotin.     BLOOD GAS, ARTERIAL - Abnormal; Notable for the following components:    pH, Arterial 7.321 (*)     pCO2, Arterial 18.4 (*)     pO2, Arterial 318.0 (*)     HCO3, Arterial 9.5 (*)     Base Excess, Arterial -15.3 (*)     O2 Saturation, Arterial >100.0 (*)     All other components within normal limits   INFLUENZA ANTIGEN, RAPID - Normal   APTT - Normal    Narrative:     The recommended Heparin therapeutic range is 68-97 seconds.   BLOOD CULTURE   BLOOD CULTURE   BLOOD GAS, ARTERIAL   RAINBOW DRAW    Narrative:     The following orders were created for panel order [facility] Draw.  Procedure                               Abnormality         Status                     ---------                               -----------         ------                     Light Blue Top[[id number]]                                   In process                 Green Top (Gel)[[id number]]                                  In process                 Lavender Top[[id number]]                                     In process                 Gold Top - SST[[id number]]                                   In process                   Please view results for these tests on the individual orders.   LACTIC ACID REFLEX TIMER   TYPE AND SCREEN   PREPARE RBC   PREVIOUS HISTORY   CBC AND DIFFERENTIAL    Narrative:     The following orders were created for panel order CBC & Differential.  Procedure                               Abnormality         Status                     ---------                               -----------         ------                     CBC Auto Differential[[id number]]        Abnormal            Final result                 Please view results for these tests on the individual orders.   LIGHT BLUE TOP   GREEN TOP   LAVENDER TOP   GOLD TOP - SST   MANUAL DIFFERENTIAL     Xr Spine Cervical 2 Or 3 View    Result Date:  1/31/2020  Narrative: Procedure: Cervical spine 3 views Reason for exam: Neck pain FINDINGS: Comparison exam dated May 5, 2016. Cervical spine vertebral body heights and alignment are normal. There is no evidence of fracture or dislocation. Moderate to severe loss of intervertebral disc space height at the C2-C3, C3-C4, C5-C6, and C6-C7 intervertebral disc space levels with associated anterior osteophytosis consistent with degenerative disc disease. Otherwise the intervertebral disc spaces are intact.     Impression: 1.  Moderate to severe loss of intervertebral disc space height at the C2-C3, C3-C4, C5-C6, and C6-C7 intervertebral disc space levels with associated anterior osteophytosis consistent with degenerative disc disease. 2.  No acute cervical spine abnormality. Electronically signed by:  Christophe Arreola MD  1/31/2020 8:53 AM CST Workstation: [id number]    Xr Chest 1 View    Result Date: 2/9/2020  Narrative: Chest x-ray single view. CLINICAL INDICATION: Shortness of breath sepsis protocol. COMPARISON: January 29, 2020. FINDINGS: Cardiac silhouette is normal in size. Pulmonary vascularity is unremarkable. Pacemaker leads right atrium right ventricle. Interval development of bilateral basilar infiltrative changes possibly bilateral pneumonitis. Unfavorable change since prior exam.     Impression: CONCLUSION: Interval development of bilateral lower lobe infiltrative changes possibly bilateral pneumonitis. Unfavorable change since prior exam. Electronically signed by:  Charles Lezama MD  2/9/2020 8:14 PM CST Workstation: [id number]    Ir Picc W Imaging Guidance    Result Date: 1/29/2020  Narrative: This procedure was auto-finalized with no dictation required.    Us Guidance Picc Nc    Result Date: 1/29/2020  Narrative: This procedure was auto-finalized with no dictation required.    Xr Chest Post Cva Port    Result Date: 1/29/2020  Narrative: PROCEDURE: Portable chest x-ray TECHNIQUE: Single AP view of the chest COMPARISON:  None HISTORY: unable to obtain IV for weekly blood transfusions, C93.10 Chronic myelomonocytic leukemia not having achieved remission FINDINGS:  Life-support devices: There is a left-sided cardiac pacer with leads in the regions of the right atrium and right ventricle. There is a left upper extremity catheter with its tip probably projecting over the left axilla therefore it would be a midline catheter. Lungs/pleura: Clear Heart, hilar and mediastinal structures: Cardiac silhouette is normal in size. Thoracic aorta contains atherosclerotic calcification. Degenerative changes of the left shoulder.     Impression: CONCLUSION: No acute pulmonary disease. Electronically signed by:  Tej Velazquez MD  1/29/2020 12:34 PM [facility] Workstation: [id number]                                             [facility]    Final diagnoses:   Acute respiratory failure, unspecified whether with hypoxia or hypercapnia (CMS/HCC)   Chronic myelomonocytic leukemia, in relapse (CMS/HCC)   Sepsis with acute respiratory failure and septic shock, due to unspecified organism, unspecified whether hypoxia or hypercapnia present (CMS/HCC)   Anemia, unspecified type   Thrombocytopenia (CMS/HCC)   Atrial fibrillation, unspecified type (CMS/HCC)            Raymundo Membreno MD  02/09/20 2030       Raymundo Membreno MD  02/09/20 2031       Raymundo Membreno MD  02/09/20 2038

## 2020-02-10 NOTE — ED NOTES
MD at bedside, patient is alert and oriented x4, answers all questions appropriately. Patient states he does not want CPR or artifical ventilation if he becomes unresponsive.        Skyla Petty RN  02/09/20 1948

## 2020-02-12 ENCOUNTER — APPOINTMENT (OUTPATIENT)
Dept: ONCOLOGY | Facility: HOSPITAL | Age: 80
End: 2020-02-12

## 2020-02-12 LAB
BACTERIA SPEC AEROBE CULT: ABNORMAL
GRAM STN SPEC: ABNORMAL
ISOLATED FROM: ABNORMAL

## 2020-02-13 LAB
ABO + RH BLD: NORMAL
ABO + RH BLD: NORMAL
BH BB BLOOD EXPIRATION DATE: NORMAL
BH BB BLOOD EXPIRATION DATE: NORMAL
BH BB BLOOD TYPE BARCODE: 6200
BH BB BLOOD TYPE BARCODE: 6200
BH BB DISPENSE STATUS: NORMAL
BH BB DISPENSE STATUS: NORMAL
BH BB PRODUCT CODE: NORMAL
BH BB PRODUCT CODE: NORMAL
BH BB UNIT NUMBER: NORMAL
BH BB UNIT NUMBER: NORMAL
UNIT  ABO: NORMAL
UNIT  ABO: NORMAL
UNIT  RH: NORMAL
UNIT  RH: NORMAL

## 2020-02-26 ENCOUNTER — APPOINTMENT (OUTPATIENT)
Dept: ONCOLOGY | Facility: HOSPITAL | Age: 80
End: 2020-02-26
